# Patient Record
Sex: MALE | Race: WHITE | Employment: UNEMPLOYED | URBAN - METROPOLITAN AREA
[De-identification: names, ages, dates, MRNs, and addresses within clinical notes are randomized per-mention and may not be internally consistent; named-entity substitution may affect disease eponyms.]

---

## 2020-09-21 ENCOUNTER — HOSPITAL ENCOUNTER (OUTPATIENT)
Dept: SURGERY | Age: 60
Discharge: HOME | End: 2020-09-21
Payer: COMMERCIAL

## 2020-09-21 ENCOUNTER — HOSPITAL ENCOUNTER (OUTPATIENT)
Dept: PHYSICAL THERAPY | Age: 60
Discharge: HOME | End: 2020-09-21
Payer: COMMERCIAL

## 2020-09-21 VITALS
BODY MASS INDEX: 29.12 KG/M2 | SYSTOLIC BLOOD PRESSURE: 181 MMHG | RESPIRATION RATE: 16 BRPM | DIASTOLIC BLOOD PRESSURE: 103 MMHG | HEART RATE: 78 BPM | TEMPERATURE: 98.1 F | HEIGHT: 71 IN | OXYGEN SATURATION: 95 % | WEIGHT: 208 LBS

## 2020-09-21 DIAGNOSIS — R06.83 SNORING: Primary | ICD-10-CM

## 2020-09-21 LAB
ANION GAP SERPL CALC-SCNC: 7 MMOL/L (ref 7–16)
APTT PPP: 28 SEC (ref 24.3–35.4)
BACTERIA SPEC CULT: ABNORMAL
BASOPHILS # BLD: 0.1 K/UL (ref 0–0.2)
BASOPHILS NFR BLD: 1 % (ref 0–2)
BUN SERPL-MCNC: 17 MG/DL (ref 8–23)
CALCIUM SERPL-MCNC: 9.3 MG/DL (ref 8.3–10.4)
CHLORIDE SERPL-SCNC: 110 MMOL/L (ref 98–107)
CO2 SERPL-SCNC: 26 MMOL/L (ref 21–32)
CREAT SERPL-MCNC: 1.17 MG/DL (ref 0.8–1.5)
DIFFERENTIAL METHOD BLD: ABNORMAL
EOSINOPHIL # BLD: 0.3 K/UL (ref 0–0.8)
EOSINOPHIL NFR BLD: 3 % (ref 0.5–7.8)
ERYTHROCYTE [DISTWIDTH] IN BLOOD BY AUTOMATED COUNT: 13.6 % (ref 11.9–14.6)
EST. AVERAGE GLUCOSE BLD GHB EST-MCNC: 120 MG/DL
GLUCOSE SERPL-MCNC: 73 MG/DL (ref 65–100)
HBA1C MFR BLD: 5.8 %
HCT VFR BLD AUTO: 44.8 % (ref 41.1–50.3)
HGB BLD-MCNC: 14.7 G/DL (ref 13.6–17.2)
IMM GRANULOCYTES # BLD AUTO: 0.1 K/UL (ref 0–0.5)
IMM GRANULOCYTES NFR BLD AUTO: 1 % (ref 0–5)
INR PPP: 1
LYMPHOCYTES # BLD: 1.1 K/UL (ref 0.5–4.6)
LYMPHOCYTES NFR BLD: 11 % (ref 13–44)
MCH RBC QN AUTO: 29.1 PG (ref 26.1–32.9)
MCHC RBC AUTO-ENTMCNC: 32.8 G/DL (ref 31.4–35)
MCV RBC AUTO: 88.5 FL (ref 79.6–97.8)
MONOCYTES # BLD: 0.8 K/UL (ref 0.1–1.3)
MONOCYTES NFR BLD: 8 % (ref 4–12)
NEUTS SEG # BLD: 7.5 K/UL (ref 1.7–8.2)
NEUTS SEG NFR BLD: 76 % (ref 43–78)
NRBC # BLD: 0 K/UL (ref 0–0.2)
PLATELET # BLD AUTO: 173 K/UL (ref 150–450)
PMV BLD AUTO: 10.6 FL (ref 9.4–12.3)
POTASSIUM SERPL-SCNC: 4.3 MMOL/L (ref 3.5–5.1)
PROTHROMBIN TIME: 13.4 SEC (ref 12–14.7)
RBC # BLD AUTO: 5.06 M/UL (ref 4.23–5.6)
SERVICE CMNT-IMP: ABNORMAL
SODIUM SERPL-SCNC: 143 MMOL/L (ref 136–145)
WBC # BLD AUTO: 9.9 K/UL (ref 4.3–11.1)

## 2020-09-21 PROCEDURE — 97161 PT EVAL LOW COMPLEX 20 MIN: CPT

## 2020-09-21 PROCEDURE — 87641 MR-STAPH DNA AMP PROBE: CPT

## 2020-09-21 PROCEDURE — 85610 PROTHROMBIN TIME: CPT

## 2020-09-21 PROCEDURE — 36415 COLL VENOUS BLD VENIPUNCTURE: CPT

## 2020-09-21 PROCEDURE — 80048 BASIC METABOLIC PNL TOTAL CA: CPT

## 2020-09-21 PROCEDURE — 83036 HEMOGLOBIN GLYCOSYLATED A1C: CPT

## 2020-09-21 PROCEDURE — 80307 DRUG TEST PRSMV CHEM ANLYZR: CPT

## 2020-09-21 PROCEDURE — 85730 THROMBOPLASTIN TIME PARTIAL: CPT

## 2020-09-21 PROCEDURE — 85025 COMPLETE CBC W/AUTO DIFF WBC: CPT

## 2020-09-21 RX ORDER — VERAPAMIL HYDROCHLORIDE 120 MG/1
240 TABLET, FILM COATED, EXTENDED RELEASE ORAL DAILY
COMMUNITY
Start: 2019-10-22 | End: 2020-10-21

## 2020-09-21 RX ORDER — NAPROXEN 250 MG/1
TABLET ORAL DAILY
COMMUNITY
End: 2020-10-08

## 2020-09-21 RX ORDER — ASPIRIN 81 MG/1
81 TABLET ORAL DAILY
COMMUNITY
End: 2020-10-08

## 2020-09-21 RX ORDER — ATORVASTATIN CALCIUM 40 MG/1
40 TABLET, FILM COATED ORAL
COMMUNITY
Start: 2019-10-22

## 2020-09-21 NOTE — PERIOP NOTES
Recent Results (from the past 8 hour(s))   CBC WITH AUTOMATED DIFF    Collection Time: 09/21/20  8:37 AM   Result Value Ref Range    WBC 9.9 4.3 - 11.1 K/uL    RBC 5.06 4.23 - 5.6 M/uL    HGB 14.7 13.6 - 17.2 g/dL    HCT 44.8 41.1 - 50.3 %    MCV 88.5 79.6 - 97.8 FL    MCH 29.1 26.1 - 32.9 PG    MCHC 32.8 31.4 - 35.0 g/dL    RDW 13.6 11.9 - 14.6 %    PLATELET 912 293 - 010 K/uL    MPV 10.6 9.4 - 12.3 FL    ABSOLUTE NRBC 0.00 0.0 - 0.2 K/uL    DF AUTOMATED      NEUTROPHILS 76 43 - 78 %    LYMPHOCYTES 11 (L) 13 - 44 %    MONOCYTES 8 4.0 - 12.0 %    EOSINOPHILS 3 0.5 - 7.8 %    BASOPHILS 1 0.0 - 2.0 %    IMMATURE GRANULOCYTES 1 0.0 - 5.0 %    ABS. NEUTROPHILS 7.5 1.7 - 8.2 K/UL    ABS. LYMPHOCYTES 1.1 0.5 - 4.6 K/UL    ABS. MONOCYTES 0.8 0.1 - 1.3 K/UL    ABS. EOSINOPHILS 0.3 0.0 - 0.8 K/UL    ABS. BASOPHILS 0.1 0.0 - 0.2 K/UL    ABS. IMM.  GRANS. 0.1 0.0 - 0.5 K/UL   METABOLIC PANEL, BASIC    Collection Time: 09/21/20  8:37 AM   Result Value Ref Range    Sodium 143 136 - 145 mmol/L    Potassium 4.3 3.5 - 5.1 mmol/L    Chloride 110 (H) 98 - 107 mmol/L    CO2 26 21 - 32 mmol/L    Anion gap 7 7 - 16 mmol/L    Glucose 73 65 - 100 mg/dL    BUN 17 8 - 23 MG/DL    Creatinine 1.17 0.8 - 1.5 MG/DL    GFR est AA >60 >60 ml/min/1.73m2    GFR est non-AA >60 >60 ml/min/1.73m2    Calcium 9.3 8.3 - 10.4 MG/DL   PROTHROMBIN TIME + INR    Collection Time: 09/21/20  8:37 AM   Result Value Ref Range    Prothrombin time 13.4 12.0 - 14.7 sec    INR 1.0     PTT    Collection Time: 09/21/20  8:37 AM   Result Value Ref Range    aPTT 28.0 24.3 - 35.4 SEC   HEMOGLOBIN A1C WITH EAG    Collection Time: 09/21/20  8:38 AM   Result Value Ref Range    Hemoglobin A1c 5.8 %    Est. average glucose 120 mg/dL

## 2020-09-21 NOTE — PERIOP NOTES
Patient verified name and . Order for consent yes found in EHR and matches case posting; patient verified. Type 3 surgery, joint camp assessment complete. Labs per surgeon: CBC,BMP, PT/PTT,  Hgb A1c, urine nicotine ; results routed via fax to surgeon, Dr Gipson Files for review. Labs per anesthesia protocol: no additional  EKG:completed 10/22/19 and within anesthesia guidelines, placed on chart with most recent cardiology office note 10/22/19 Dr Ghassan Holm and ECHO 19;  fax sent to [facility] requesting stress test result from 17~all for anesthesia reference. Patient aware that a negative Covid swab result is required to proceed with surgery; appointments are made by the surgeon office and should test should be collected 7 days prior to surgery. The testing center is located at the [address], [place]. MRSA/MSSA swab collected; pharmacy to review and dose antibiotic as appropriate. Hospital approved surgical skin cleanser and instructions to return bottle on DOS given per hospital policy. Patient provided with handouts including Guide to Surgery, Pain Management, Hand Hygiene, Blood Transfusion Education, and [facility] Brochure. Patient answered medical/surgical history questions at their best of ability. All prior to admission medications documented in [facility]. Original medication prescription bottle NOT visualized during patient appointment. Patient instructed to hold all vitamins 3 weeks prior to surgery and NSAIDS 5 days prior to surgery. Patient teach back successful and patient demonstrates knowledge of instruction.

## 2020-09-21 NOTE — PERIOP NOTES
PLEASE CONTINUE TAKING ALL PRESCRIPTION MEDICATIONS UP TO THE DAY OF SURGERY UNLESS OTHERWISE DIRECTED BELOW. DISCONTINUE all vitamins and supplements 21 days prior to surgery. DISCONTINUE Non-Steriodal Anti-Inflammatory (NSAIDS) such as Advil and Aleve 5 days prior to surgery. Home Medications to take  the day of surgery    Aspirin   Verapamil        Home Medications   to Hold   Naproxen hold for 5 days prior to surgery        Comments          *Visitor policy of 1 visitor per patient discussed. Please do not bring home medications with you on the day of surgery unless otherwise directed by your nurse. If you are instructed to bring home medications, please give them to your nurse as they will be administered by the nursing staff. If you have any questions, please call [address] [place] [address] [phone] or [facility] [phone]. A copy of this note was provided to the patient for reference.

## 2020-09-21 NOTE — PROGRESS NOTES
09/21/20 0730   Oxygen Therapy   O2 Sat (%) 98 %   Pulse via Oximetry 85 beats per minute   O2 Device Room air   Pre-Treatment   Breath Sounds Bilateral Clear   Pt's symptoms include:    Snoring  Tiredness- excessive daytime sleepiness  HTN  HX OF WIDE COMPLEX TACHYCARDIA  Neck size         43     cm  Modified Hardy stage 4  SACS Score 25  STOP BANG 7  [place] Sleepiness scale 17  Height     5 ' 11   \"   Weight   208  lbs  BMI 29.01    Sleepiness Scale:     Sitting and reading 3    Watching TV 3    Sitting inactive in a public place 2    As a passenger in a car for an hour without a break 2    Lying down to rest in the afternoon when circumstances Permits 3    Sitting and talking to someone 1    Sitting quietly after lunch without alcohol 3    In a car, while stopped for a few minutes 0    Total :17        Refer patient for sleep study based on above assessment. Initial respiratory Assessment completed with pt. Pt was interviewed and evaluated in Joint camp prior to surgery. Patient ID:  Jose Gomez  [id number]  46 y.o.  1960  Surgeon: Dr. Lillian Blankenship  Date of Surgery: 10/7/2020  Procedure: Total Right Hip Arthroplasty  Primary Care Physician: None None  Specialists:     Pt. IS NOT PRACTICING SOCIAL DISTANCING AND NOT  WEARING A MASK WHEN IN PUBLIC.     Pt taught proper COUGH technique  DIAPHRAGMATIC BREATHING EXERCISE INSTRUCTIONS GIVEN    History of smoking:   DENIES                 Quit date:         Secondhand smoke:PARENTS    Past procedures with Oxygen desaturation or delayed awakening:DENIES    Past Medical History:   Diagnosis Date    Arthritis     hips    CAD (coronary artery disease) 2017    non obstructive~ on heart cath; followed by cardio annually    Dyspnea on exertion     GERD (gastroesophageal reflux disease)     resolved    H/O cold sores     Hyperlipidemia     Latex allergy     Wide-complex tachycardia ([address] 2017    NSVT      HX OF ASTHMA ON CHART- PT DENIES  Respiratory history:DENIES SOB                                 SOB  ON EXERTION                                    Respiratory meds:  DENIES    FAMILY PRESENT:             NO                                                   PAST SLEEP STUDY:                        DENIES  HX OF ADILENE:                                           DENIES  ADILENE assessment:                                               SLEEPS ON SIDE        PHYSICAL EXAM   Body mass index is 29.01 kg/m².    Visit Vitals  BP (!) 181/103 (BP 1 Location: Right arm, BP Patient Position: Sitting)   Pulse 78   Temp 98.1 °F (36.7 °C)   Resp 16   Ht 5' 11\" (1.803 m)   Wt 94.3 kg (208 lb)   SpO2 95%   BMI 29.01 kg/m²     Neck circumference: 43    cm    Loud snoring:                                                 YES            Witnessed apnea or wakening gasping or choking:        DENIES         Awakens with headaches:                                               DENIES  Morning or daytime tiredness/ sleepiness:                         TIRED  Dry mouth or sore throat in morning:            YES                                             Hardy stage:  4                                   SACS score:25  Stop Bang   STOP-BANG  Does the patient snore loudly (louder than talking or loud enough to be heard through closed doors)?: Yes  Does the patient often feel tired, fatigued, or sleepy during the daytime, even after a \"good\" night's sleep?: Yes  Has anyone ever observed the patient stop breathing during their sleep? : No  Does the patient have or are they being treated for high blood pressure?: Yes  Is the patient's BMI greater than 35?: No  Is your neck circumference greater than 17 inches (Male) or 16 inches (Female)?: No  Is the patient older than 48?: Yes  Is the patient male?: Yes  ADILENE Score: 5  Has the patient been referred to Sleep Medicine?: Yes  Has the patient previously been diagnosed with Obstructive Sleep Apnea?: No                            [facility]                                        Referrals:  [facility]ST  Pt.  Phone Number:  [phone]

## 2020-09-21 NOTE — PROGRESS NOTES
Kaye Leonardbishnu [facility]  : (63 y.o.) Joint Camp at [address], [address] [place] [address]  [phone]       Physical Therapy Prehab Plan of Treatment and Evaluation Summary:2020    ICD-10: Treatment Diagnosis:   · Pain in Right Hip (M25.551)  · Stiffness of Right Hip, Not elsewhere classified (M25.651)  · Difficulty in walking, Not elsewhere classified (R26.2)  Precautions/Allergies:   Latex  MEDICAL/REFERRING DIAGNOSIS:  Unilateral primary osteoarthritis, right hip [M16.11]  REFERRING PHYSICIAN: Rosealee Ormond, MD  DATE OF SURGERY: 10/7/20    Assessment:   Comments:  Pt. Plans to go home with wife. He needs a left malia as well. PROBLEM LIST (Impacting functional limitations):   [facility] presents with the following right lower extremity(s) problems:  1. Strength  2. Range of Motion  3. Home Exercise Program  4. Pain   INTERVENTIONS PLANNED:  1. Home Exercise Program  2. Educational Discussion      TREATMENT PLAN: Effective Dates: 2020 TO 2020. Frequency/Duration: Patient to continue to perform home exercise program at least twice per day up until his surgery. GOALS: (Goals have been discussed and agreed upon with patient.)  Discharge Goals: Time Frame: 1 Day  1. Patient will demonstrate independence with a home exercise program designed to increase strength, range of motion and pain control to minimize functional deficits and optimize patient for total joint replacement. Rehabilitation Potential For Stated Goals: Good  Regarding Kaye Mathur Vasu's therapy, I certify that the treatment plan above will be carried out by a therapist or under their direction.   Thank you for this referral,  Loren Freeman, PT               HISTORY:   Present Symptoms:  Pain Intensity 1: (5 at worst)  Pain Location 1: Hip, Back   History of Present Injury/Illness (Reason for Referral):  Medical/Referring Diagnosis: Unilateral primary osteoarthritis, right hip [M16.11]   Past Medical History/Comorbidities:   Mr. Manuela Green  has a past medical history of Arthritis, CAD (coronary artery disease) (2017), Dyspnea on exertion, GERD (gastroesophageal reflux disease), H/O cold sores, Hyperlipidemia, Latex allergy, and Wide-complex tachycardia ([address] (2017). He also has no past medical history of Aneurysm ([address] Arrhythmia, Asthma, Autoimmune disease ([address] Cancer ([address] Chronic kidney disease, Chronic obstructive pulmonary disease ([address] Chronic pain, Coagulation disorder ([address] Diabetes ([address] Difficult intubation, Endocarditis, Heart failure ([address] Hypertension, Liver disease, Malignant hyperthermia due to anesthesia, Morbid obesity ([address] Nausea & vomiting, Nicotine vapor product user, Non-nicotine vapor product user, Pseudocholinesterase deficiency, Psychiatric disorder, PUD (peptic ulcer disease), Rheumatic fever, Seizures ([address] Sleep apnea, Stroke ([address] Thromboembolus ([address] or Thyroid disease. Mr. Manuela Green  has a past surgical history that includes hx heart catheterization (2017) and hx colonoscopy.   Social History/Living Environment:   Home Environment: Private residence  # Steps to Enter: 1  Rails to Enter: No  One/Two Story Residence: One story(2 steps)  Living Alone: No  Support Systems: Spouse/Significant Other/Partner  Patient Expects to be Discharged to[de-identified] Private residence  Current DME Used/Available at Home: [address] [facility] [place], straight  Tub or Shower Type: Shower  Work/Activity:  Fabricator, heavy activity  Dominant Side:  RIGHT  Current Medications:  See Pre-assessment nursing note   Number of Personal Factors/Comorbidities that affect the Plan of Care: 1-2: MODERATE COMPLEXITY   EXAMINATION:   ADLs (Current Functional Status):   Ambulation:  [x] Independent  [] Walk Indoors Only  [] Walk Outdoors  [] Use Assistive Device  [] Use Wheelchair Only Dressing:  [x] [address] from Someone for:  [] Sock/Shoes  [] Pants  [] Everything Bathing/Showering:   [x] Independent  [] Requires Assistance from Someone  [] Sponge Bath Only Household Activities:  [x] Routine house and yard work  [] Light Housework Only  [] None   Observation/Orthostatic Postural Assessment:   Exceptions to [place] shoulders  ROM/Flexibility:   Gross Assessment: Yes  AROM: Generally decreased, functional(left LE, hip limited)                LLE Assessment  LLE Assessment (WDL): Exception to WDL      RLE Assessment  RLE Assessment (WDL): Exceptions to WDL(right hip <3/5)  RLE AROM  R Hip Flexion: 100  R Hip ABduction: 10  R Knee Extension: 0   Strength:   Gross Assessment: Yes  Strength: Generally decreased, functional(left LE, hip limited)                  Functional Mobility:    Gross Assessment: Yes    Gait Description (WDL): Exceptions to WDL  Stand to Sit: Additional time, Independent  Sit to Stand: Independent, Additional time  Distance (ft): 200 Feet (ft)  Ambulation - Level of Assistance: Independent  Speed/Gabrielle: Delayed  Stance: Right decreased  Gait Abnormalities: Antalgic;Trunk sway increased          Balance:    Sitting: Intact  Standing: Intact   Body Structures Involved:  1. Bones  2. Joints  3. Muscles  4. Ligaments Body Functions Affected:  1. Movement Related Activities and Participation Affected:  1. Mobility   Number of elements that affect the Plan of Care: 3: MODERATE COMPLEXITY   CLINICAL PRESENTATION:   Presentation: Stable and uncomplicated: LOW COMPLEXITY   CLINICAL DECISION MAKING:   Outcome Measure: Tool Used: Lower Extremity Functional Scale (LEFS)  Score:  Initial: 39/80 Most Recent: X/80 (Date: -- )   Interpretation of Score: 20 questions each scored on a 5 point scale with 0 representing \"extreme difficulty or unable to perform\" and 4 representing \"no difficulty\". The lower the score, the greater the functional disability. 80/80 represents no disability. Minimal detectable change is 9 points. Medical Necessity:   · Mr. Leone is expected to optimize his lower extremity strength and ROM in preparation for joint replacement surgery. Reason for Services/Other Comments:  · Achieve baseline assesment of musculoskeletal system, functional mobility and home environment. , educate in PT HEP in preparation for surgery, educate in hospital plan of care. Use of outcome tool(s) and clinical judgement create a POC that gives a: Clear prediction of patient's progress: LOW COMPLEXITY   TREATMENT:   Treatment/Session Assessment:  Patient was instructed in PT- HEP to increase strength and ROM in LEs. Answered all questions. · Post session pain:  Hip/back pain  · Compliance with Program/Exercises: compliant most of the time.   Total Treatment Duration:  PT Patient Time In/Time Out  Time In: 0830  Time Out: Kait Chaves, PT

## 2020-09-22 PROBLEM — R06.83 SNORING: Status: ACTIVE | Noted: 2020-09-22

## 2020-09-22 LAB
COTININE UR QL SCN: NEGATIVE NG/ML
DRUG SCREEN COMMENT:, 753798: NORMAL

## 2020-09-22 NOTE — ADVANCED PRACTICE NURSE
Total Joint Surgery Preoperative Chart Review      Patient ID:  Jim Lin  [id number]  4 y.o.  1960  Surgeon: Dr. Yuliana Issa  Date of Surgery: 10/7/2020  Procedure: Total Right Hip Arthroplasty  Primary Care Physician: None None  Specialty Physician(s):      Subjective:   Jim Lin is a 60 y.o. WHITE [place]  male who presents for preoperative evaluation for Total Right Hip arthroplasty. This is a preoperative chart review note based on data collected by the nurse at the surgical Pre-Assessment visit. Past Medical History:   Diagnosis Date    Arthritis     hips    CAD (coronary artery disease) 2017    non obstructive~ on heart cath; followed by cardio annually    Dyspnea on exertion     GERD (gastroesophageal reflux disease)     resolved    H/O cold sores     Hyperlipidemia     Latex allergy     Wide-complex tachycardia ([address] 2017    NSVT      Past Surgical History:   Procedure Laterality Date    HX COLONOSCOPY      HX HEART CATHETERIZATION  2017     Family History   Problem Relation Age of Onset    Depression Mother     Heart Disease Father       Social History     Tobacco Use    Smoking status: Never Smoker    Smokeless tobacco: Never Used   Substance Use Topics    Alcohol use: Never     Frequency: Never       Prior to Admission medications    Medication Sig Start Date End Date Taking? Authorizing Provider   aspirin delayed-release 81 mg tablet Take 81 mg by mouth daily. Take / use AM day of surgery  per anesthesia protocols. Yes Provider, Historical   atorvastatin (LIPITOR) 40 mg tablet Take 40 mg by mouth nightly. 10/22/19  Yes Provider, Historical   verapamil ER (CALAN-SR) 120 mg tablet Take 120 mg by mouth daily. Take / use AM day of surgery  per anesthesia protocols. Indications: paroxysmal supraventricular tachycardia 10/22/19 10/21/20 Yes Provider, Historical   multivit-min/FA/lycopen/lutein (CENTRUM SILVER MEN PO) Take  by mouth daily.    Yes Provider, Historical   naproxen (NAPROSYN) 250 mg tablet Take  by mouth daily. Indications: pain   Yes Provider, Historical   LYSINE PO Take 1,000 mg by mouth nightly. Yes Provider, Historical     Allergies   Allergen Reactions    Latex Rash          Objective:     Physical Exam:   No data found. ECG:    EKG Results     None          Data Review:   Labs:     Labs reviewed    Problem List:  )  Patient Active Problem List   Diagnosis Code    Snoring R06.83       Total Joint Surgery Pre-Assessment Recommendations:           Patient reports the symptoms of snoring, fatigue, observed apnea and /or excessive daytime sleepiness. Will refer patient for HST based on above assessment. Recommend continuous saturation monitoring hours of sleep, during hospitalization. Albuterol every 6 hours as need during hospitalization.      Signed By: Ramona Lynch, NP-C    September 22, 2020

## 2020-09-23 NOTE — PERIOP NOTES
Nicholas Coronado [[id number]] (Order [id number])   Lab   Date: 9/21/2020  Department: [facility] Pre Assessment  Released By: Cande Salas RN (auto-released)  Authorizing: Tony Love MD    Component  Value  Flag  Ref Range  Units  Status    Cotinine Screen, urine  Negative   Jneetl=695  ng/mL  Final    Drug Screen Comment:  Comment

## 2020-09-23 NOTE — PERIOP NOTES
Echo dated 12/4/17 received from [facility] - placed on chart for reference DOS if needed.

## 2020-10-04 ENCOUNTER — ANESTHESIA EVENT (OUTPATIENT)
Dept: SURGERY | Age: 60
End: 2020-10-04
Payer: COMMERCIAL

## 2020-10-07 ENCOUNTER — APPOINTMENT (OUTPATIENT)
Dept: GENERAL RADIOLOGY | Age: 60
End: 2020-10-07
Attending: ORTHOPAEDIC SURGERY
Payer: COMMERCIAL

## 2020-10-07 ENCOUNTER — HOME HEALTH ADMISSION (OUTPATIENT)
Dept: HOME HEALTH SERVICES | Facility: HOME HEALTH | Age: 60
End: 2020-10-07
Payer: COMMERCIAL

## 2020-10-07 ENCOUNTER — HOSPITAL ENCOUNTER (OUTPATIENT)
Age: 60
Setting detail: OUTPATIENT SURGERY
Discharge: HOME HEALTH | End: 2020-10-08
Attending: ORTHOPAEDIC SURGERY | Admitting: ORTHOPAEDIC SURGERY
Payer: COMMERCIAL

## 2020-10-07 ENCOUNTER — ANESTHESIA (OUTPATIENT)
Dept: SURGERY | Age: 60
End: 2020-10-07
Payer: COMMERCIAL

## 2020-10-07 DIAGNOSIS — M16.11 PRIMARY OSTEOARTHRITIS OF RIGHT HIP: Primary | ICD-10-CM

## 2020-10-07 LAB
GLUCOSE BLD STRIP.AUTO-MCNC: 90 MG/DL (ref 65–100)
HGB BLD-MCNC: 12.7 G/DL (ref 13.6–17.2)

## 2020-10-07 PROCEDURE — 65270000029 HC RM PRIVATE

## 2020-10-07 PROCEDURE — 97165 OT EVAL LOW COMPLEX 30 MIN: CPT

## 2020-10-07 PROCEDURE — 94760 N-INVAS EAR/PLS OXIMETRY 1: CPT

## 2020-10-07 PROCEDURE — 74011000250 HC RX REV CODE- 250: Performed by: ANESTHESIOLOGY

## 2020-10-07 PROCEDURE — 77030002922 HC SUT FBRWRE ARTH -B: Performed by: ORTHOPAEDIC SURGERY

## 2020-10-07 PROCEDURE — C1776 JOINT DEVICE (IMPLANTABLE): HCPCS | Performed by: ORTHOPAEDIC SURGERY

## 2020-10-07 PROCEDURE — 77030002933 HC SUT MCRYL J&J -A: Performed by: ORTHOPAEDIC SURGERY

## 2020-10-07 PROCEDURE — 74011000250 HC RX REV CODE- 250: Performed by: NURSE ANESTHETIST, CERTIFIED REGISTERED

## 2020-10-07 PROCEDURE — 74011250637 HC RX REV CODE- 250/637: Performed by: NURSE PRACTITIONER

## 2020-10-07 PROCEDURE — 36415 COLL VENOUS BLD VENIPUNCTURE: CPT

## 2020-10-07 PROCEDURE — 72170 X-RAY EXAM OF PELVIS: CPT

## 2020-10-07 PROCEDURE — 76060000034 HC ANESTHESIA 1.5 TO 2 HR: Performed by: ORTHOPAEDIC SURGERY

## 2020-10-07 PROCEDURE — 97161 PT EVAL LOW COMPLEX 20 MIN: CPT

## 2020-10-07 PROCEDURE — 77030003665 HC NDL SPN BBMI -A: Performed by: ANESTHESIOLOGY

## 2020-10-07 PROCEDURE — 74011250636 HC RX REV CODE- 250/636: Performed by: NURSE PRACTITIONER

## 2020-10-07 PROCEDURE — 77030033138 HC SUT PGA STRATFX J&J -B: Performed by: ORTHOPAEDIC SURGERY

## 2020-10-07 PROCEDURE — 74011000250 HC RX REV CODE- 250: Performed by: NURSE PRACTITIONER

## 2020-10-07 PROCEDURE — 97535 SELF CARE MNGMENT TRAINING: CPT

## 2020-10-07 PROCEDURE — 74011250636 HC RX REV CODE- 250/636: Performed by: ANESTHESIOLOGY

## 2020-10-07 PROCEDURE — 76210000016 HC OR PH I REC 1 TO 1.5 HR: Performed by: ORTHOPAEDIC SURGERY

## 2020-10-07 PROCEDURE — 77030033067 HC SUT PDO STRATFX SPIR J&J -B: Performed by: ORTHOPAEDIC SURGERY

## 2020-10-07 PROCEDURE — 2709999900 HC NON-CHARGEABLE SUPPLY: Performed by: ORTHOPAEDIC SURGERY

## 2020-10-07 PROCEDURE — 85018 HEMOGLOBIN: CPT

## 2020-10-07 PROCEDURE — 77030040922 HC BLNKT HYPOTHRM STRY -A: Performed by: ANESTHESIOLOGY

## 2020-10-07 PROCEDURE — 74011250636 HC RX REV CODE- 250/636: Performed by: NURSE ANESTHETIST, CERTIFIED REGISTERED

## 2020-10-07 PROCEDURE — 94762 N-INVAS EAR/PLS OXIMTRY CONT: CPT

## 2020-10-07 PROCEDURE — 97110 THERAPEUTIC EXERCISES: CPT

## 2020-10-07 PROCEDURE — 77030019557 HC ELECTRD VES SEAL MEDT -F: Performed by: ORTHOPAEDIC SURGERY

## 2020-10-07 PROCEDURE — 74011250636 HC RX REV CODE- 250/636: Performed by: ORTHOPAEDIC SURGERY

## 2020-10-07 PROCEDURE — 77030018673: Performed by: ORTHOPAEDIC SURGERY

## 2020-10-07 PROCEDURE — 74011250637 HC RX REV CODE- 250/637: Performed by: ANESTHESIOLOGY

## 2020-10-07 PROCEDURE — 77030034479 HC ADH SKN CLSR PRINEO J&J -B: Performed by: ORTHOPAEDIC SURGERY

## 2020-10-07 PROCEDURE — 76010000162 HC OR TIME 1.5 TO 2 HR INTENSV-TIER 1: Performed by: ORTHOPAEDIC SURGERY

## 2020-10-07 PROCEDURE — 77030007880 HC KT SPN EPDRL BBMI -B: Performed by: ANESTHESIOLOGY

## 2020-10-07 PROCEDURE — 82962 GLUCOSE BLOOD TEST: CPT

## 2020-10-07 PROCEDURE — 77030006835 HC BLD SAW SAG STRY -B: Performed by: ORTHOPAEDIC SURGERY

## 2020-10-07 DEVICE — HIP H2 TOT ADV OTHER HD -- IMPL CAPPED H2: Type: IMPLANTABLE DEVICE | Status: FUNCTIONAL

## 2020-10-07 DEVICE — LINER ACET SZ F ID36MM THK7.9MM 0DEG HIP X3 LOK RNG FOR: Type: IMPLANTABLE DEVICE | Site: HIP | Status: FUNCTIONAL

## 2020-10-07 DEVICE — SHELL ACET CLUS H 58F TRTANIUM -- TRIDENT II: Type: IMPLANTABLE DEVICE | Site: HIP | Status: FUNCTIONAL

## 2020-10-07 DEVICE — STEM FEM SZ 5 127D 35X108X44MM -- ACCOLADE II V40: Type: IMPLANTABLE DEVICE | Site: HIP | Status: FUNCTIONAL

## 2020-10-07 DEVICE — HEAD FEM DELT V40 +5MM NK 36MM -- V40 BIOLOX: Type: IMPLANTABLE DEVICE | Site: HIP | Status: FUNCTIONAL

## 2020-10-07 RX ORDER — ATORVASTATIN CALCIUM 40 MG/1
40 TABLET, FILM COATED ORAL
Status: DISCONTINUED | OUTPATIENT
Start: 2020-10-07 | End: 2020-10-08 | Stop reason: HOSPADM

## 2020-10-07 RX ORDER — HYDROMORPHONE HYDROCHLORIDE 2 MG/1
2 TABLET ORAL
Status: DISCONTINUED | OUTPATIENT
Start: 2020-10-07 | End: 2020-10-08 | Stop reason: HOSPADM

## 2020-10-07 RX ORDER — BUPIVACAINE HYDROCHLORIDE 7.5 MG/ML
INJECTION, SOLUTION INTRASPINAL
Status: COMPLETED | OUTPATIENT
Start: 2020-10-07 | End: 2020-10-07

## 2020-10-07 RX ORDER — OXYCODONE HYDROCHLORIDE 5 MG/1
5 TABLET ORAL
Status: DISCONTINUED | OUTPATIENT
Start: 2020-10-07 | End: 2020-10-07 | Stop reason: HOSPADM

## 2020-10-07 RX ORDER — ALBUTEROL SULFATE 0.83 MG/ML
2.5 SOLUTION RESPIRATORY (INHALATION) AS NEEDED
Status: DISCONTINUED | OUTPATIENT
Start: 2020-10-07 | End: 2020-10-07 | Stop reason: HOSPADM

## 2020-10-07 RX ORDER — MIDAZOLAM HYDROCHLORIDE 1 MG/ML
INJECTION, SOLUTION INTRAMUSCULAR; INTRAVENOUS AS NEEDED
Status: DISCONTINUED | OUTPATIENT
Start: 2020-10-07 | End: 2020-10-07 | Stop reason: HOSPADM

## 2020-10-07 RX ORDER — GABAPENTIN 100 MG/1
100 CAPSULE ORAL 2 TIMES DAILY
Qty: 30 CAP | Refills: 0 | Status: SHIPPED | OUTPATIENT
Start: 2020-10-07 | End: 2020-11-04

## 2020-10-07 RX ORDER — ACETAMINOPHEN 500 MG
1000 TABLET ORAL
Qty: 60 TAB | Refills: 0 | Status: ON HOLD | OUTPATIENT
Start: 2020-10-07 | End: 2020-11-11 | Stop reason: SDUPTHER

## 2020-10-07 RX ORDER — CEFAZOLIN SODIUM/WATER 2 G/20 ML
2 SYRINGE (ML) INTRAVENOUS ONCE
Status: DISCONTINUED | OUTPATIENT
Start: 2020-10-07 | End: 2020-10-07 | Stop reason: SDUPTHER

## 2020-10-07 RX ORDER — TRANEXAMIC ACID 100 MG/ML
INJECTION, SOLUTION INTRAVENOUS AS NEEDED
Status: DISCONTINUED | OUTPATIENT
Start: 2020-10-07 | End: 2020-10-07 | Stop reason: HOSPADM

## 2020-10-07 RX ORDER — ONDANSETRON 8 MG/1
8 TABLET, ORALLY DISINTEGRATING ORAL
Status: DISCONTINUED | OUTPATIENT
Start: 2020-10-07 | End: 2020-10-08 | Stop reason: HOSPADM

## 2020-10-07 RX ORDER — ONDANSETRON 2 MG/ML
INJECTION INTRAMUSCULAR; INTRAVENOUS AS NEEDED
Status: DISCONTINUED | OUTPATIENT
Start: 2020-10-07 | End: 2020-10-07 | Stop reason: HOSPADM

## 2020-10-07 RX ORDER — ONDANSETRON 2 MG/ML
4 INJECTION INTRAMUSCULAR; INTRAVENOUS
Status: DISCONTINUED | OUTPATIENT
Start: 2020-10-07 | End: 2020-10-07 | Stop reason: HOSPADM

## 2020-10-07 RX ORDER — SODIUM CHLORIDE 0.9 % (FLUSH) 0.9 %
5-40 SYRINGE (ML) INJECTION AS NEEDED
Status: DISCONTINUED | OUTPATIENT
Start: 2020-10-07 | End: 2020-10-08 | Stop reason: HOSPADM

## 2020-10-07 RX ORDER — ALBUTEROL SULFATE 0.83 MG/ML
2.5 SOLUTION RESPIRATORY (INHALATION)
Status: DISCONTINUED | OUTPATIENT
Start: 2020-10-07 | End: 2020-10-08 | Stop reason: HOSPADM

## 2020-10-07 RX ORDER — ONDANSETRON 4 MG/1
4 TABLET, ORALLY DISINTEGRATING ORAL
Status: DISCONTINUED | OUTPATIENT
Start: 2020-10-07 | End: 2020-10-08 | Stop reason: HOSPADM

## 2020-10-07 RX ORDER — DEXAMETHASONE SODIUM PHOSPHATE 100 MG/10ML
10 INJECTION INTRAMUSCULAR; INTRAVENOUS ONCE
Status: DISCONTINUED | OUTPATIENT
Start: 2020-10-08 | End: 2020-10-08 | Stop reason: HOSPADM

## 2020-10-07 RX ORDER — ZOLPIDEM TARTRATE 5 MG/1
5 TABLET ORAL
Qty: 30 TAB | Refills: 0 | Status: SHIPPED | OUTPATIENT
Start: 2020-10-07 | End: 2020-11-04

## 2020-10-07 RX ORDER — HYDROMORPHONE HYDROCHLORIDE 1 MG/ML
1 INJECTION, SOLUTION INTRAMUSCULAR; INTRAVENOUS; SUBCUTANEOUS
Status: DISCONTINUED | OUTPATIENT
Start: 2020-10-07 | End: 2020-10-08 | Stop reason: HOSPADM

## 2020-10-07 RX ORDER — HYDROMORPHONE HYDROCHLORIDE 2 MG/1
2 TABLET ORAL
Qty: 40 TAB | Refills: 0 | Status: SHIPPED | OUTPATIENT
Start: 2020-10-07 | End: 2020-10-14

## 2020-10-07 RX ORDER — TRANEXAMIC ACID 650 1/1
1300 TABLET ORAL
Status: COMPLETED | OUTPATIENT
Start: 2020-10-07 | End: 2020-10-07

## 2020-10-07 RX ORDER — ACETAMINOPHEN 500 MG
1000 TABLET ORAL ONCE
Status: COMPLETED | OUTPATIENT
Start: 2020-10-07 | End: 2020-10-07

## 2020-10-07 RX ORDER — CEFAZOLIN SODIUM/WATER 2 G/20 ML
2 SYRINGE (ML) INTRAVENOUS EVERY 8 HOURS
Status: COMPLETED | OUTPATIENT
Start: 2020-10-07 | End: 2020-10-08

## 2020-10-07 RX ORDER — ASPIRIN 81 MG/1
81 TABLET ORAL EVERY 12 HOURS
Status: DISCONTINUED | OUTPATIENT
Start: 2020-10-07 | End: 2020-10-08 | Stop reason: HOSPADM

## 2020-10-07 RX ORDER — KETOROLAC TROMETHAMINE 15 MG/ML
15 INJECTION, SOLUTION INTRAMUSCULAR; INTRAVENOUS EVERY 8 HOURS
Status: DISCONTINUED | OUTPATIENT
Start: 2020-10-07 | End: 2020-10-08 | Stop reason: HOSPADM

## 2020-10-07 RX ORDER — CYCLOBENZAPRINE HCL 5 MG
5 TABLET ORAL
Qty: 30 TAB | Refills: 0 | Status: SHIPPED | OUTPATIENT
Start: 2020-10-07 | End: 2020-11-04

## 2020-10-07 RX ORDER — EPHEDRINE SULFATE/0.9% NACL/PF 50 MG/5 ML
SYRINGE (ML) INTRAVENOUS AS NEEDED
Status: DISCONTINUED | OUTPATIENT
Start: 2020-10-07 | End: 2020-10-07 | Stop reason: HOSPADM

## 2020-10-07 RX ORDER — MIDAZOLAM HYDROCHLORIDE 1 MG/ML
2 INJECTION, SOLUTION INTRAMUSCULAR; INTRAVENOUS
Status: DISCONTINUED | OUTPATIENT
Start: 2020-10-07 | End: 2020-10-07 | Stop reason: HOSPADM

## 2020-10-07 RX ORDER — VERAPAMIL HYDROCHLORIDE 120 MG/1
120 TABLET, FILM COATED, EXTENDED RELEASE ORAL DAILY
Status: DISCONTINUED | OUTPATIENT
Start: 2020-10-08 | End: 2020-10-08 | Stop reason: HOSPADM

## 2020-10-07 RX ORDER — SODIUM CHLORIDE, SODIUM LACTATE, POTASSIUM CHLORIDE, CALCIUM CHLORIDE 600; 310; 30; 20 MG/100ML; MG/100ML; MG/100ML; MG/100ML
1000 INJECTION, SOLUTION INTRAVENOUS CONTINUOUS
Status: DISCONTINUED | OUTPATIENT
Start: 2020-10-07 | End: 2020-10-07 | Stop reason: HOSPADM

## 2020-10-07 RX ORDER — SODIUM CHLORIDE 9 MG/ML
100 INJECTION, SOLUTION INTRAVENOUS CONTINUOUS
Status: DISCONTINUED | OUTPATIENT
Start: 2020-10-07 | End: 2020-10-08 | Stop reason: HOSPADM

## 2020-10-07 RX ORDER — ASPIRIN 81 MG/1
81 TABLET ORAL 2 TIMES DAILY
Qty: 60 TAB | Refills: 0 | Status: ON HOLD | OUTPATIENT
Start: 2020-10-07 | End: 2020-11-11 | Stop reason: SDUPTHER

## 2020-10-07 RX ORDER — MELOXICAM 7.5 MG/1
7.5 TABLET ORAL DAILY
Qty: 30 TAB | Refills: 2 | Status: ON HOLD | OUTPATIENT
Start: 2020-10-07 | End: 2020-11-11 | Stop reason: SDUPTHER

## 2020-10-07 RX ORDER — ZOLPIDEM TARTRATE 5 MG/1
5 TABLET ORAL
Status: DISCONTINUED | OUTPATIENT
Start: 2020-10-07 | End: 2020-10-08 | Stop reason: HOSPADM

## 2020-10-07 RX ORDER — DEXAMETHASONE SODIUM PHOSPHATE 4 MG/ML
INJECTION, SOLUTION INTRA-ARTICULAR; INTRALESIONAL; INTRAMUSCULAR; INTRAVENOUS; SOFT TISSUE AS NEEDED
Status: DISCONTINUED | OUTPATIENT
Start: 2020-10-07 | End: 2020-10-07 | Stop reason: HOSPADM

## 2020-10-07 RX ORDER — HYDROMORPHONE HYDROCHLORIDE 2 MG/ML
0.5 INJECTION, SOLUTION INTRAMUSCULAR; INTRAVENOUS; SUBCUTANEOUS
Status: DISCONTINUED | OUTPATIENT
Start: 2020-10-07 | End: 2020-10-07 | Stop reason: HOSPADM

## 2020-10-07 RX ORDER — NALOXONE HYDROCHLORIDE 0.4 MG/ML
.2-.4 INJECTION, SOLUTION INTRAMUSCULAR; INTRAVENOUS; SUBCUTANEOUS
Status: DISCONTINUED | OUTPATIENT
Start: 2020-10-07 | End: 2020-10-08 | Stop reason: HOSPADM

## 2020-10-07 RX ORDER — SODIUM CHLORIDE 0.9 % (FLUSH) 0.9 %
5-40 SYRINGE (ML) INJECTION EVERY 8 HOURS
Status: DISCONTINUED | OUTPATIENT
Start: 2020-10-07 | End: 2020-10-08 | Stop reason: HOSPADM

## 2020-10-07 RX ORDER — KETOROLAC TROMETHAMINE 30 MG/ML
INJECTION, SOLUTION INTRAMUSCULAR; INTRAVENOUS AS NEEDED
Status: DISCONTINUED | OUTPATIENT
Start: 2020-10-07 | End: 2020-10-07 | Stop reason: HOSPADM

## 2020-10-07 RX ORDER — CEFAZOLIN SODIUM/WATER 2 G/20 ML
2 SYRINGE (ML) INTRAVENOUS ONCE
Status: COMPLETED | OUTPATIENT
Start: 2020-10-07 | End: 2020-10-07

## 2020-10-07 RX ORDER — CYCLOBENZAPRINE HCL 10 MG
5 TABLET ORAL
Status: DISCONTINUED | OUTPATIENT
Start: 2020-10-07 | End: 2020-10-08 | Stop reason: HOSPADM

## 2020-10-07 RX ORDER — GABAPENTIN 100 MG/1
100 CAPSULE ORAL 2 TIMES DAILY
Status: DISCONTINUED | OUTPATIENT
Start: 2020-10-07 | End: 2020-10-08 | Stop reason: HOSPADM

## 2020-10-07 RX ORDER — ROPIVACAINE HYDROCHLORIDE 2 MG/ML
INJECTION, SOLUTION EPIDURAL; INFILTRATION; PERINEURAL AS NEEDED
Status: DISCONTINUED | OUTPATIENT
Start: 2020-10-07 | End: 2020-10-07 | Stop reason: HOSPADM

## 2020-10-07 RX ORDER — ONDANSETRON 8 MG/1
8 TABLET, ORALLY DISINTEGRATING ORAL
Qty: 30 TAB | Refills: 0 | Status: SHIPPED | OUTPATIENT
Start: 2020-10-07 | End: 2020-11-04

## 2020-10-07 RX ORDER — AMOXICILLIN 250 MG
1 CAPSULE ORAL DAILY
Qty: 30 TAB | Refills: 0 | Status: ON HOLD | OUTPATIENT
Start: 2020-10-07 | End: 2020-11-11 | Stop reason: SDUPTHER

## 2020-10-07 RX ORDER — ACETAMINOPHEN 500 MG
1000 TABLET ORAL EVERY 6 HOURS
Status: DISCONTINUED | OUTPATIENT
Start: 2020-10-07 | End: 2020-10-08 | Stop reason: HOSPADM

## 2020-10-07 RX ORDER — LIDOCAINE HYDROCHLORIDE 10 MG/ML
0.1 INJECTION INFILTRATION; PERINEURAL AS NEEDED
Status: DISCONTINUED | OUTPATIENT
Start: 2020-10-07 | End: 2020-10-07 | Stop reason: HOSPADM

## 2020-10-07 RX ORDER — DIPHENHYDRAMINE HCL 25 MG
25 CAPSULE ORAL
Status: DISCONTINUED | OUTPATIENT
Start: 2020-10-07 | End: 2020-10-08 | Stop reason: HOSPADM

## 2020-10-07 RX ORDER — AMOXICILLIN 250 MG
2 CAPSULE ORAL DAILY
Status: DISCONTINUED | OUTPATIENT
Start: 2020-10-08 | End: 2020-10-08 | Stop reason: HOSPADM

## 2020-10-07 RX ORDER — PROPOFOL 10 MG/ML
INJECTION, EMULSION INTRAVENOUS
Status: DISCONTINUED | OUTPATIENT
Start: 2020-10-07 | End: 2020-10-07 | Stop reason: HOSPADM

## 2020-10-07 RX ORDER — MELOXICAM 7.5 MG/1
7.5 TABLET ORAL 2 TIMES DAILY
Status: DISCONTINUED | OUTPATIENT
Start: 2020-10-08 | End: 2020-10-08 | Stop reason: HOSPADM

## 2020-10-07 RX ADMIN — PHENYLEPHRINE HYDROCHLORIDE 50 MCG: 10 INJECTION INTRAVENOUS at 10:48

## 2020-10-07 RX ADMIN — TRANEXAMIC ACID 1000 MG: 100 INJECTION, SOLUTION INTRAVENOUS at 11:44

## 2020-10-07 RX ADMIN — PHENYLEPHRINE HYDROCHLORIDE 100 MCG: 10 INJECTION INTRAVENOUS at 11:07

## 2020-10-07 RX ADMIN — BUPIVACAINE HYDROCHLORIDE IN DEXTROSE 13.5 MG: 7.5 INJECTION, SOLUTION SUBARACHNOID at 10:37

## 2020-10-07 RX ADMIN — DEXAMETHASONE SODIUM PHOSPHATE 10 MG: 4 INJECTION, SOLUTION INTRAMUSCULAR; INTRAVENOUS at 10:43

## 2020-10-07 RX ADMIN — CEFAZOLIN SODIUM 2 G: 100 INJECTION, POWDER, LYOPHILIZED, FOR SOLUTION INTRAVENOUS at 18:02

## 2020-10-07 RX ADMIN — ACETAMINOPHEN 1000 MG: 500 TABLET, FILM COATED ORAL at 09:27

## 2020-10-07 RX ADMIN — Medication 1 AMPULE: at 21:30

## 2020-10-07 RX ADMIN — ATORVASTATIN CALCIUM 40 MG: 40 TABLET, FILM COATED ORAL at 21:30

## 2020-10-07 RX ADMIN — Medication 10 MG: at 11:07

## 2020-10-07 RX ADMIN — ASPIRIN 81 MG: 81 TABLET, COATED ORAL at 21:30

## 2020-10-07 RX ADMIN — ACETAMINOPHEN 1000 MG: 500 TABLET, FILM COATED ORAL at 18:01

## 2020-10-07 RX ADMIN — TRANEXAMIC ACID 1300 MG: 650 TABLET ORAL at 18:01

## 2020-10-07 RX ADMIN — Medication 2 G: at 10:28

## 2020-10-07 RX ADMIN — TRANEXAMIC ACID 1000 MG: 100 INJECTION, SOLUTION INTRAVENOUS at 10:43

## 2020-10-07 RX ADMIN — PROPOFOL 50 MCG/KG/MIN: 10 INJECTION, EMULSION INTRAVENOUS at 10:43

## 2020-10-07 RX ADMIN — SODIUM CHLORIDE 10 MCG/MIN: 900 INJECTION, SOLUTION INTRAVENOUS at 11:15

## 2020-10-07 RX ADMIN — KETOROLAC TROMETHAMINE 15 MG: 15 INJECTION, SOLUTION INTRAMUSCULAR; INTRAVENOUS at 21:30

## 2020-10-07 RX ADMIN — MIDAZOLAM 2 MG: 1 INJECTION INTRAMUSCULAR; INTRAVENOUS at 10:31

## 2020-10-07 RX ADMIN — ONDANSETRON 4 MG: 2 INJECTION INTRAMUSCULAR; INTRAVENOUS at 10:43

## 2020-10-07 RX ADMIN — SODIUM CHLORIDE, SODIUM LACTATE, POTASSIUM CHLORIDE, AND CALCIUM CHLORIDE 1000 ML: 600; 310; 30; 20 INJECTION, SOLUTION INTRAVENOUS at 09:26

## 2020-10-07 RX ADMIN — Medication 10 ML: at 21:30

## 2020-10-07 RX ADMIN — TRANEXAMIC ACID 1300 MG: 650 TABLET ORAL at 15:23

## 2020-10-07 RX ADMIN — Medication 3 AMPULE: at 09:27

## 2020-10-07 RX ADMIN — GABAPENTIN 100 MG: 100 CAPSULE ORAL at 18:01

## 2020-10-07 NOTE — ANESTHESIA PREPROCEDURE EVALUATION
Relevant Problems   No relevant active problems       Anesthetic History   No history of anesthetic complications            Review of Systems / Medical History  Patient summary reviewed and pertinent labs reviewed    Pulmonary          Shortness of breath (with exertion)         Neuro/Psych   Within defined limits           Cardiovascular            Dysrhythmias   CAD (+ stress 2020, clean [facility])    Exercise tolerance: <4 METS  Comments: longstanding mild SOB with activity and stress   GI/Hepatic/Renal     GERD: well controlled           Endo/Other        Arthritis     Other Findings              Physical Exam    Airway  Mallampati: II  TM Distance: 4 - 6 cm  Neck ROM: normal range of motion   Mouth opening: Normal     Cardiovascular    Rhythm: regular  Rate: normal      Pertinent negatives: No murmur   Dental  No notable dental hx       Pulmonary  Breath sounds clear to auscultation               Abdominal         Other Findings            Anesthetic Plan    ASA: 2  Anesthesia type: spinal          Induction: Intravenous  Anesthetic plan and risks discussed with: Patient

## 2020-10-07 NOTE — PERIOP NOTES
Teach back method used in review of Hibiclens usage preop/postop, TB screening, pain management goals, falls precautions and use of Nozin for prevention of staph infections. Incentive spirometer reviewed and pt reached TOTAL TIDAL 3250 ML OBSERVED   in preop holding.

## 2020-10-07 NOTE — PROGRESS NOTES
TRANSFER - IN REPORT:    Verbal report received from Shelby Meléndez RN on Loki Leone  being received from PACU for routine post - op      Report consisted of patients Situation, Background, Assessment and   Recommendations(SBAR). Information from the following report(s) SBAR was reviewed with the receiving nurse. Opportunity for questions and clarification was provided. Assessment completed upon patients arrival to unit and care assumed. Oriented to room, bed controls, and how to order meals. ABDs and tape dry and intact to R hip. SCDs and yellow gripper socks to LEs and instructed not to get up without staff to assist. Moving feet. Starting to get sensation to LEs. Wife at bedside.

## 2020-10-07 NOTE — PROGRESS NOTES
Problem: Mobility Impaired (Adult and Pediatric)  Goal: *Acute Goals and Plan of Care (Insert Text)  Outcome: Progressing Towards Goal  Note: GOALS (1-4 days):  (1.)Mr. Leone will move from supine to sit and sit to supine  in bed with STAND BY ASSIST.    (2.)Mr. Leone will transfer from bed to chair and chair to bed with STAND BY ASSIST using the least restrictive device. (3.)Mr. Leone will ambulate with STAND BY ASSIST for 150 feet with the least restrictive device. (4.)Mr. Leone will ambulate up/down 2 steps with left railing with MINIMAL ASSIST with device as needed. (5.)Mr. Leone will state/observe LIN precautions with 0 verbal cues. ________________________________________________________________________________________________       PHYSICAL THERAPY JOINT CAMP LIN: Initial Assessment and PM 10/7/2020  INPATIENT: Hospital Day: 1  Payor: Fleet Caper / Plan: [facility] [employer] [facility] / Product Type: PPO /      NAME/AGE/GENDER: Kassandra Marmolejo is a [age] [facility] y.o. male   PRIMARY DIAGNOSIS:  Osteoarthritis of right hip, unspecified osteoarthritis type [M16.11]   Procedure(s) and Anesthesia Type:     * RIGHT HIP ARTHROPLASTY TOTAL - Spinal (Right)  ICD-10: Treatment Diagnosis:    · Pain in Right Hip (M25.551)  · Stiffness of Right Hip, Not elsewhere classified (M25.651)  · Difficulty in walking, Not elsewhere classified (R26.2)      ASSESSMENT:     Mr. Fly Ahumada presents with decreased functional mobility and gait as well as decreased rom and strength of right LE s/p right lin. He plans to go home with HHPT. This section established at most recent assessment   PROBLEM LIST (Impairments causing functional limitations):  1. Decreased Strength  2. Decreased ADL/Functional Activities  3. Decreased Transfer Abilities  4. Decreased Ambulation Ability/Technique  5. Decreased Balance  6. Increased Pain  7. Decreased Activity Tolerance  8.  Decreased Flexibility/Joint Mobility  9. Decreased Levy with Home Exercise Program  10. Decreased strength   INTERVENTIONS PLANNED: (Benefits and precautions of physical therapy have been discussed with the patient.)  1. Bed Mobility  2. Cold  3. Gait Training  4. Home Exercise Program (HEP)  5. Range of Motion (ROM)  6. Therapeutic Activites  7. Therapeutic Exercise/Strengthening  8. Transfer Training     TREATMENT PLAN: Frequency/Duration: Follow patient BID for duration of hospital stay to address above goals. Rehabilitation Potential For Stated Goals: Good     RECOMMENDED REHABILITATION/EQUIPMENT: (at time of discharge pending progress): Continue Skilled Therapy and Home Health: Physical Therapy. HISTORY:   History of Present Injury/Illness (Reason for Referral):  S/p right malia  Past Medical History/Comorbidities:   Mr. Coleen Gaitan  has a past medical history of Arthritis, CAD (coronary artery disease) (2017), Dyspnea on exertion, GERD (gastroesophageal reflux disease), H/O cold sores, Hyperlipidemia, Latex allergy, and Wide-complex tachycardia ([address] (2017). He also has no past medical history of Aneurysm ([address] Arrhythmia, Asthma, Autoimmune disease ([address] Cancer ([address] Chronic kidney disease, Chronic obstructive pulmonary disease ([address] Chronic pain, Coagulation disorder ([address] Diabetes ([address] Difficult intubation, Endocarditis, Heart failure ([address] Hypertension, Liver disease, Malignant hyperthermia due to anesthesia, Morbid obesity ([address] Nausea & vomiting, Nicotine vapor product user, Non-nicotine vapor product user, Pseudocholinesterase deficiency, Psychiatric disorder, PUD (peptic ulcer disease), Rheumatic fever, Seizures ([address] Sleep apnea, Stroke ([address] Thromboembolus ([address] or Thyroid disease. Mr. Coleen Gaitan  has a past surgical history that includes hx heart catheterization (2017) and hx colonoscopy.    Social History/Living Environment:   Home Environment: Private residence  # Steps to Enter: 1  Rails to Enter: No  One/Two Story Residence: One story (2 steps)  Living Alone: No  Support Systems: Spouse/Significant Other/Partner  Patient Expects to be Discharged to[de-identified] Private residence  Current DME Used/Available at Home: Lynnann Courser, straight  Tub or Shower Type: Shower    Prior Level of Function/Work/Activity:  independent   Number of Personal Factors/Comorbidities that affect the Plan of Care: 1-2: MODERATE COMPLEXITY   EXAMINATION:   Most Recent Physical Functioning:      Gross Assessment  AROM: Within functional limits(left LE)  Strength: Within functional limits(left LE)        RLE AROM  R Hip Flexion: 90  R Hip ABduction: 10  R Knee Extension: 0            Bed Mobility  Supine to Sit: Contact guard assistance    Transfers  Sit to Stand: Contact guard assistance  Stand to Sit: Contact guard assistance  Bed to Chair: Contact guard assistance    Balance  Sitting: Intact  Standing: With support              Weight Bearing Status  Right Side Weight Bearing: As tolerated  Distance (ft): 30 Feet (ft)  Ambulation - Level of Assistance: Contact guard assistance  Assistive Device: Walker, rolling  Speed/Gabrielle: Delayed  Step Length: Left shortened;Right shortened  Stance: Right decreased  Gait Abnormalities: Antalgic  Interventions: Safety awareness training;Verbal cues     Braces/Orthotics:     Right Hip Cold  Type: Cold/ice packs      Body Structures Involved:  1. Bones  2. Joints  3. Muscles  4. Ligaments Body Functions Affected:  1. Movement Related Activities and Participation Affected:  1. Mobility   Number of elements that affect the Plan of Care: 3: MODERATE COMPLEXITY   CLINICAL PRESENTATION:   Presentation: Stable and uncomplicated: LOW COMPLEXITY   CLINICAL DECISION MAKIN [facility] [address] [address] AM-PAC 6 Clicks   Basic Mobility Inpatient Short Form  How much difficulty does the patient currently have. .. Unable A Lot A Little None   1. Turning over in bed (including adjusting bedclothes, sheets and blankets)?    [] 1 [] 2   [x] 3   [] 4   2. Sitting down on and standing up from a chair with arms ( e.g., wheelchair, bedside commode, etc.)   [] 1   [] 2   [x] 3   [] 4   3. Moving from lying on back to sitting on the side of the bed? [] 1   [] 2   [x] 3   [] 4   How much help from another person does the patient currently need. .. Total A Lot A Little None   4. Moving to and from a bed to a chair (including a wheelchair)? [] 1   [] 2   [x] 3   [] 4   5. Need to walk in hospital room? [] 1   [] 2   [x] 3   [] 4   6. Climbing 3-5 steps with a railing? [] 1   [] 2   [x] 3   [] 4   © 2007, Trustees of [address] [place] [address], under license to [employer]. All rights reserved     Score:  Initial: 18 Most Recent: X (Date: -- )    Interpretation of Tool:  Represents activities that are increasingly more difficult (i.e. Bed mobility, Transfers, Gait). Medical Necessity:     · Patient is expected to demonstrate progress in   · strength, range of motion, and balance  ·  to   · decrease assistance required with exercises and functional mobility  · . Reason for Services/Other Comments:  · Patient   · continues to require present interventions due to patient's inability to perform exercises and functional mobility independently  · . Use of outcome tool(s) and clinical judgement create a POC that gives a: Clear prediction of patient's progress: LOW COMPLEXITY            TREATMENT:   (In addition to Assessment/Re-Assessment sessions the following treatments were rendered)     Pre-treatment Symptoms/Complaints:  hip pain  Pain Initial:      Post Session:  2     Therapeutic Exercise: (8 Minutes):  Exercises per grid below to improve mobility and strength. Required minimal visual, verbal, and manual cues to promote proper body alignment, promote proper body posture, and promote proper body mechanics. Progressed range and repetitions as indicated.      Date:  10/7 Date:   Date:     ACTIVITY/EXERCISE AM PM AM PM AM PM   GROUP THERAPY []  []  []  []  []  []   Ankle Pumps  10a       Quad Sets  10a       Gluteal Sets  10a       Hip ABd/ADduction  10a       Straight Leg Raises         Knee Slides  10a       Short Arc Quads         Long Arc Quads         Chair Slides                  B = bilateral; AA = active assistive; A = active; P = passive      Treatment/Session Assessment:     Response to Treatment:  did fine. Education:  [x] Home Exercises  [x] Fall Precautions  [x] Hip Precautions [] D/C Instruction Review  [x] Hip Prosthesis Review  [x] Walker Management/Safety [] Adaptive Equipment as Needed       Interdisciplinary Collaboration:   o Occupational Therapist  o Registered Nurse    After treatment position/precautions:   o Up in chair  o Bed/Chair-wheels locked  o Bed in low position  o Caregiver at bedside  o Call light within reach  o Family at bedside    Compliance with Program/Exercises: Will assess as treatment progresses. Recommendations/Intent for next treatment session:  Treatment next visit will focus on increasing Mr. Mcphersons independence with bed mobility, transfers, gait training, strength/ROM exercises, modalities for pain, and patient education.       Total Treatment Duration:  PT Patient Time In/Time Out  Time In: 1535  Time Out: 518 [facility],

## 2020-10-07 NOTE — PROGRESS NOTES
Problem: Self Care Deficits Care Plan (Adult)  Goal: *Acute Goals and Plan of Care (Insert Text)  Outcome: Progressing Towards Goal  Note: GOALS:   DISCHARGE GOALS (in preparation for going home/rehab):  3 days  1. Mr. Kun Pardo will perform one lower body dressing activity with minimal assistance with adaptive equipment to demonstrate improved functional mobility and safety. 2.  Mr. Kun Pardo will perform one lower body bathing activity with minimal  assistance with adaptive equipment to demonstrate improved functional mobility and safety. 3.  Mr. Kun Pardo will perform toileting/toilet transfer with contact guard assistance with adaptive equipment to demonstrate improved functional mobility and safety. 4.  Mr. Kun Pardo will perform shower transfer with contact guard assistance with adaptive equipment to demonstrate improved functional mobility and safety. 5.  Mr. Kun Pardo will state LIN precautions with two verbal cues to demonstrate improved functional mobility and safety. JOINT CAMP OCCUPATIONAL THERAPY LIN: Initial Assessment and PM 10/7/2020  INPATIENT: Hospital Day: 1  Payor: TYRESE CARRILLO / Plan: [place] [employer] [facility] / Product Type: PPO /      NAME/AGE/GENDER: Dustin Sarah is a 60 y.o. male   PRIMARY DIAGNOSIS:  Osteoarthritis of right hip, unspecified osteoarthritis type [M16.11]   Procedure(s) and Anesthesia Type:     * RIGHT HIP ARTHROPLASTY TOTAL - Spinal (Right)  ICD-10: Treatment Diagnosis:    · Pain in Right Hip (M25.551)  · Stiffness of Right Hip, Not elsewhere classified (M25.651)  · Other lack of cordination (R27.8)  · Difficulty in walking, Not elsewhere classified (R26.2)  · Other abnormalities of gait and mobility (R26.89)      ASSESSMENT:     Mr. Kun Pardo is s/p right LIN and presents with decreased weight bearing on right LE and decreased independence with functional mobility and activities of daily living as compared to prior level of function and safety. Patient would benefit from skilled Occupational Therapy to maximize independence and safety with self-care task and functional mobility. Pt would also benefit from education on lower body adaptive equipment and hip precautions post-surgery in preparation for going home. Patient plans for further rehab at home with home health services and good family support. OT reviewed therapy schedule and plan of care with patient. Patient was able to transfer and perform self care skills as charted below. Patient instructed to call for assistance when needing to get up from the recliner and all needs in reach. Patient verbalized understanding of call light. He transferred to EOB and donned clothes. He ambulated in the room and back to the recliner. His wife is present and he plans to discharge home tomorrow. This section established at most recent assessment   PROBLEM LIST (Impairments causing functional limitations):  1. Decreased Strength  2. Decreased ADL/Functional Activities  3. Decreased Transfer Abilities  4. Increased Pain  5. Increased Fatigue  6. Decreased Flexibility/Joint Mobility  7. Decreased Knowledge of Precautions   INTERVENTIONS PLANNED: (Benefits and precautions of occupational therapy have been discussed with the patient.)  1. Activities of daily living training  2. Adaptive equipment training  3. Balance training  4. Clothing management  5. Donning&doffing training  6. Theraputic activity     TREATMENT PLAN: Frequency/Duration: Follow patient 1-2 times to address above goals. Rehabilitation Potential For Stated Goals: Good     RECOMMENDED REHABILITATION/EQUIPMENT: (at time of discharge pending progress): Continue Skilled Therapy. OCCUPATIONAL PROFILE AND HISTORY:   History of Present Injury/Illness (Reason for Referral): Pt presents this date s/p (right) LIN.     Past Medical History/Comorbidities:   Mr. Eliecer Meléndez  has a past medical history of Arthritis, CAD (coronary artery disease) (2017), Dyspnea on exertion, GERD (gastroesophageal reflux disease), H/O cold sores, Hyperlipidemia, Latex allergy, and Wide-complex tachycardia ([address] (2017). He also has no past medical history of Aneurysm ([address] Arrhythmia, Asthma, Autoimmune disease ([address] Cancer ([address] Chronic kidney disease, Chronic obstructive pulmonary disease ([address] Chronic pain, Coagulation disorder ([address] Diabetes ([address] Difficult intubation, Endocarditis, Heart failure ([address] Hypertension, Liver disease, Malignant hyperthermia due to anesthesia, Morbid obesity ([address] Nausea & vomiting, Nicotine vapor product user, Non-nicotine vapor product user, Pseudocholinesterase deficiency, Psychiatric disorder, PUD (peptic ulcer disease), Rheumatic fever, Seizures ([address] Sleep apnea, Stroke ([address] Thromboembolus ([address] or Thyroid disease. Mr. Lisa Lopez  has a past surgical history that includes hx heart catheterization (2017) and hx colonoscopy. Social History/Living Environment:   Home Environment: Private residence  # Steps to Enter: 1  Rails to Enter: No  One/Two Story Residence: One story  Living Alone: No  Support Systems: Spouse/Significant Other/Partner  Patient Expects to be Discharged to[de-identified] Private residence  Current DME Used/Available at Home: Odean Homans, straight  Tub or Shower Type: Shower  Prior Level of Function/Work/Activity:  Mod I     Number of Personal Factors/Comorbidities that affect the Plan of Care: Brief history (0):  LOW COMPLEXITY   ASSESSMENT OF OCCUPATIONAL PERFORMANCE[de-identified]   Most Recent Physical Functioning:   Balance  Sitting: Intact  Standing: With support       Gross Assessment  AROM: Within functional limits(left LE)  Strength: Within functional limits(left LE)            Coordination  Fine Motor Skills-Upper: Left Intact; Right Intact  Gross Motor Skills-Upper: Left Intact; Right Intact         Mental Status  Neurologic State: Alert; Appropriate for age  Orientation Level: Appropriate for age  Cognition: Appropriate decision making; Appropriate for age attention/concentration; Appropriate safety awareness; Follows commands  Perception: Appears intact  Perseveration: No perseveration noted  Safety/Judgement: Awareness of environment; Fall prevention          RLE AROM  R Hip Flexion: 90  R Hip ABduction: 10  R Knee Extension: 0     Basic ADLs (From Assessment) Complex ADLs (From Assessment)   Basic ADL  Feeding: Supervision  Oral Facial Hygiene/Grooming: Supervision  Bathing: Minimum assistance, Moderate assistance  Upper Body Dressing: Supervision  Lower Body Dressing: Moderate assistance  Toileting: Minimum assistance     Grooming/Bathing/Dressing Activities of Daily Living     Cognitive Retraining  Safety/Judgement: Awareness of environment; Fall prevention                 Functional Transfers  Toilet Transfer : Contact guard assistance;Minimum assistance  Shower Transfer: Contact guard assistance;Minimum assistance     Bed/Mat Mobility  Supine to Sit: Contact guard assistance  Sit to Stand: Contact guard assistance  Stand to Sit: Contact guard assistance  Bed to Chair: Contact guard assistance         Physical Skills Involved:  1. Strength  2. Activity Tolerance  3. Pain (acute) Cognitive Skills Affected (resulting in the inability to perform in a timely and safe manner): 1. none Psychosocial Skills Affected:  1. none   Number of elements that affect the Plan of Care: 1-3:  LOW COMPLEXITY   CLINICAL DECISION MAKIN [facility] [address] [address] AM-PAC 6 Clicks   Daily Activity Inpatient Short Form  How much help from another person does the patient currently need. .. Total A Lot A Little None   1. Putting on and taking off regular lower body clothing? [] 1   [x] 2   [] 3   [] 4   2. Bathing (including washing, rinsing, drying)? [] 1   [x] 2   [] 3   [] 4   3. Toileting, which includes using toilet, bedpan or urinal?   [] 1   [] 2   [x] 3   [] 4   4. Putting on and taking off regular upper body clothing? [] 1   [] 2   [] 3   [x] 4   5. Taking care of personal grooming such as brushing teeth? [] 1   [] 2   [] 3   [x] 4   6. Eating meals? [] 1   [] 2   [] 3   [x] 4   © 2007, Trustees of [address] [place] [address] [address] [id number], under license to [employer]. All rights reserved     Score:  Initial:19 Most Recent: X (Date: -- )    Interpretation of Tool:  Represents activities that are increasingly more difficult (i.e. Bed mobility, Transfers, Gait). Medical Necessity:     · Patient is expected to demonstrate progress in   · Self care skills and functional mobility  ·     Reason for Services/Other Comments:  · Patient continues to require skilled intervention due to   · Above listed deficits     Use of outcome tool(s) and clinical judgement create a POC that gives a: LOW COMPLEXITY            TREATMENT:   (In addition to Assessment/Re-Assessment sessions the following treatments were rendered)     Pre-treatment Symptoms/Complaints:    Pain: Initial:   Pain Intensity 1: 2  Pain Location 1: Hip  Pain Orientation 1: Right  Pain Intervention(s) 1: Ambulation/Increased Activity  Post Session:  2/10 icepack given     Self Care: (10): Procedure(s) (per grid) utilized to improve and/or restore self-care/home management as related to dressing. Required moderate visual, verbal, manual, and tactile cueing to facilitate activities of daily living skills and compensatory activities. Assessment/Reassessment complete    Treatment/Session Assessment:     Response to Treatment:  tolerated well, moved well.     Education:  [] Home Exercises  [x] Fall Precautions  [x] Hip Precautions [] Going Home Video  [] Knee/Hip Prosthesis Review  [x] Walker Management/Safety [x] Adaptive Equipment as Needed       Interdisciplinary Collaboration:   o Physical Therapist  o Occupational Therapist  o Registered Nurse    After treatment position/precautions:   o Up in chair  o Bed/Chair-wheels locked  o Caregiver at bedside  o Call light within reach  o RN notified  o Family at bedside Compliance with Program/Exercises: Compliant all of the time. Recommendations/Intent for next treatment session:  Treatment next visit will focus on increasing Mr. Mcphersons independence with bed mobility, transfers, self care, functional mobility, modalities for pain, and patient education.       Total Treatment Duration:10  OT Patient Time In/Time Out  Time In: 1520  Time Out: 2026 [facility],

## 2020-10-07 NOTE — OP NOTES
Total Hip Procedure Note    Iva Dare [place],  [id number],  1960    Pre-operative Diagnosis: Osteoarthritis of right hip, unspecified osteoarthritis type [M16.11]    Post-operative Diagnosis: Same    Procedure: Right Total Hip Arthroplasty    BMI: Body mass index is 29.01 kg/m². Landon Hong Location: [address] [place] [address]    Surgeon: Emelyn Salcedo MD    Assistant: Logan Sanchez CFA and Zev Flores NP CFA    Anesthesia: Spinal     Complications: None    EBL: 200cc    Drains: None    Intra-op Findings: Pre-operatively leg lengths were assessed using preop Xrays and with the patient in the lateral decubitus position and operative leg was felt to be similar in length compared to the contralateral leg. The operative hip showed notable cartilage loss of both the femoral head and acetabulum. No intra-operative periprosthetic fracture was encountered. Sciatic nerve was noted to be intact at the end of the procedure. We measured the distance of our resected femoral head/neck from the cut surface to the center of the femoral head to be approximately 39mm. The overall length replaced with the implanted head/neck was 40mm. Intra-operatively we felt that we restored the patients leg lengths to equal lengths using the method described later. Postop with the patient supine we assessed leg lengths and felt they were similar. Patient condition at completion of Procedure: Stable    Implants:   Implant Name Type Inv.  Item Serial No.  Lot No. LRB No. Used Action   SHELL ACET CLUS H 58F TRTANIUM -- TRIDENT II - [id number]  SHELL ACET CLUS H 58F TRTANIUM -- TRIDENT II  [employer] [facility] [id number] Right 1 Implanted   LINER ACET SZ F ID36MM THK7.9MM 0DEG HIP X3 CHRSIS RNG FOR - [id number]  LINER ACET SZ F ID36MM THK7.9MM 0DEG HIP X3 CHRISS RNG FOR  [employer] [facility] [id number] Right 1 Implanted   STEM FEM SZ 5 L108MM NK L35MM 44MM OFFSET 127DEG HIP TI - [id number]  STEM FEM SZ 5 L108MM NK L35MM 44MM OFFSET 127DEG HIP TI  [employer]WD [id number] Right 1 Implanted   HEAD FEM DELT V40 +5MM NK 36MM -- V40 BIOLOX - [id number]  HEAD FEM DELT V40 +5MM NK 36MM -- V40 BIOLOX  [employer] [id number] Right 1 Implanted       Description of Procedure    The complexity of the total joint surgery requires the use of a first assistant for positioning, retraction and assistance in closure. Issac Leone was brought to the operating room. Patient was transferred from the stretcher to OR bed. Anesthesia was induced. IV antibiotics were administered per protocol. Issac Leone was positioned in the lateral decubitus position and the pelvis/torso stabilized with posts. The limb was prepped and draped in the usual sterile fashion. A time out identifying the patient, procedure, operative side and surgeon was administered and charted by the OR Nurse. Prior to incision one gram of TXA was given intravenously. A standard posterior approach was utilized to expose the hip. The incision was carried through the subcutaneous tissue and underlying fascia with hemostasis obtained using the bovie cauterization and Aquamantys. A Charmley retractor was inserted. We resected any redundent bursal tissue off the external rotators. We were able to identify the piriformis tendon. The short external rotators and capsule were dissected off the posterior femur as a single layer just superior to the piriformis tendon. The sciatic nerve was palpated and protected during dissection. The femoral head was dislocated. The articular surface revealed loss of cartilage, exposed bone and bone spurring. The neck was osteotomized approximately 1cm above the top of the lesser trochanter. We were careful to protect the greater trochanter during osteotomy and protect it from iatrogenic injury. Acetabular retractors were placed both anterior and posterior just superficial to the acetabular labrum.   Remaining acetabular labrum was resected and any soft tissue was removed from the acetabulum including any tissue within the codyloid fossa. The acetabular surface revealed loss of cartilage with exposed bone. The acetabulum was sequentially reamed noting proper anteversion and inclination during reaming. The transverse acetabular ligament was used as the primary anatomic landmark to determine version and inclination. The acetabulum was sized to a 58 mm acetabular component. The prepared acetabulum was irrigated of any residual reamings and soft tissue. The permanent acetabular component was impacted into place to achieve appropriate horizontal tilt, anteversion, bone coverage and stability. We visualize that the acetabular component was fully seated. A trial liner was impacted into position. We did not have to excise overhanging osteophytes anterior and posterior  in order to minimize the risk of impingement. We then turned our attention to the proximal femur. A 2-prong proximal femoral retractor was placed. We gained access to the proximal femur using a  and femoral canal finder. The canal was prepared with appropriate lateralization in which we used the initial smaller broaches to remove lateral bone to avoid varus placement of the femoral component. The canal was then broached progressively. The broach was positioned with the appropriate anatomic femoral anteversion. We broached up to a size 5 Accolade II stem. A calcar planar was used. A trial reduction with a size #5 12 degree Accolade II stem with a +5 neck length and 36 mm head was performed. This was found to be the most stable with maximum hip flexion and with internal/external rotation testing. We did not appreciate any evidence of component or bony impingement. Limb lengths were assessed using three techniques.  First, the position of the tip of the operative greater trochanter was compared to the center of the femoral head component and was felt to match this same anatomic relationship as the non-operative hip based on preoperative X-rays. Next, we measured the length of our resected femoral head neck and felt that the trial components matched this resected length as closely as possible when accounting for the length provided by the femoral neck/head. Finally, we compared leg lengths by comparing the possible of the patella with the patients heels together with the patient in the lateral decubitus position. Using these methods it was felt that the patients leg lengths were equilibrated and with appropriate stability as mentioned above. All trial components were removed. The final liner was impacted into place which was a neutrl liner. A cementless size 5 127 degree Accolade II stem was impacted into place carefully. We were careful to observe the calcar region for any iatrogenic fractures during insertion. The permanent femoral head was impacted into place which was a +5mm 36mm ceramic head. [facility]'s hip was reduced and stability was as mentioned above. Dilute Betadine solution was allowed to sit in the surgical site for a 3 minute period and copious saline was used to irrigate the surgical site. The sciatic nerve was palpated and noted to be intact. A periarticular of ropivicaine, toradol, and morphine was injected about the surgical site with care being taken not to inject in the vicinity of neurovascular structures. Prior to wound closure one additional gram of TXA was given for a total of 2 grams. The capsule was repaired with a three #2 Fiberwires secured through drill holes placed in the posterior aspect of the trochanter. No drain was placed. The fascia was closed with a #0 Bidirectional Stratafix barbed suture. The sub-Q layer was closed with 2-0 monocril suture and a  3-0 moncril stratafix suture in a running subcuticular fashion was used to close the skin.  The incision site was thoroughly cleaned with alcohol and the Exofin wound closure system was applied to seal it off from external contamination after the overlying skin was thoroughly cleaned with alcohol. A thin layer of [place] lubricant was applied over the wound to keep the dressing from adhering to the overlying sterile bandage and said bandage was placed. Drapes were then broken down and patient was transferred carefully back to the OR stretcher. The sponge and needle counts were correct. The patient tolerated the procedure without difficulty and left the operating room in satisfactory condition.     Signed By: Yessenia Collins MD

## 2020-10-07 NOTE — PROGRESS NOTES
Patient received resting  In bedside recliner, offers no complaints. Shift assessment completed, will monitor.

## 2020-10-07 NOTE — ANESTHESIA PROCEDURE NOTES
Spinal Block    Start time: 10/7/2020 10:33 AM  End time: 10/7/2020 10:37 AM  Performed by: Nisha Abarca MD  Authorized by: Nisha Abarca MD     Pre-procedure: Indications: primary anesthetic  Preanesthetic Checklist: patient identified, risks and benefits discussed, anesthesia consent, patient being monitored and timeout performed    Timeout Time: 10:33  Preanesthetic Checklist comment:  Risk of nerve damage discussed.     Spinal Block:   Patient Position:  Seated  Prep Region:  Lumbar  Prep: chlorhexidine and patient draped      Location:  L3-4  Technique:  Single shot    Local Dose (mL):  3    Needle:   Needle Type:  Pencan  Needle Gauge:  25 G  Attempts:  1      Events: CSF confirmed, no blood with aspiration and no paresthesia        Assessment:  Insertion:  Uncomplicated  Patient tolerance:  Patient tolerated the procedure well with no immediate complications

## 2020-10-07 NOTE — PROGRESS NOTES
TRANSFER - OUT REPORT:    [facility]  being transferred to room 336 for routine progression of care       Report consisted of patients Situation, Background, Assessment and   Recommendations(SBAR). Information from the following report(s) SBAR, Procedure Summary, MAR, Med Rec Status and Cardiac Rhythm NSR was reviewed with the receiving nurse. Lines:   Peripheral IV 10/07/20 Left Forearm (Active)   Site Assessment Clean, dry, & intact 10/07/20 1241   Phlebitis Assessment 0 10/07/20 1241   Infiltration Assessment 0 10/07/20 1241   Dressing Status Occlusive 10/07/20 1241   Dressing Type Tape;Transparent 10/07/20 1241   Hub Color/Line Status Green;Patent; Infusing 10/07/20 1241   Action Taken Blood drawn 10/07/20 0920        Opportunity for questions and clarification was provided.       Patient transported with:   O2 @ 2 liters  Tech

## 2020-10-07 NOTE — PROGRESS NOTES
Care Management Interventions  PCP Verified by CM: Yes  Mode of Transport at Discharge: Self  Transition of Care Consult (CM Consult): [address]: Yes  Discharge Durable Medical Equipment: No  Physical Therapy Consult: Yes  Occupational Therapy Consult: Yes  Current Support Network: Own Home  Confirm Follow Up Transport: Family  The Plan for Transition of Care is Related to the Following Treatment Goals : return to independent function  The Patient and/or Patient Representative was Provided with a Choice of Provider and Agrees with the Discharge Plan?: Yes  Freedom of Choice List was Provided with Basic Dialogue that Supports the Patient's Individualized Plan of Care/Goals, Treatment Preferences and Shares the Quality Data Associated with the Providers?: Yes  Discharge Location  Discharge Placement: Home with home health    Patient is a 60y.o. year old male admitted for Right LIN . Patient plans to return home on discharge. Order received to arrange home health. Patient without preference towards agency. Referral sent to [facility]. Patient denies any equipment needs as patient has a walker. Will follow until discharge.

## 2020-10-07 NOTE — H&P
[id number] [facility]  Pre Operative History and Physical Exam    Patient ID:  Melonie Cotto  [id number]  23 y.o.  1960    Today: October 7, 2020       CC:  Right hip pain    HPI:   The patient has end stage arthritis of the right hip. The patient was evaluated and examined in the office prior to today and was found to have a history on physical exam consistent with intra-articular pathology of the right hip. Patient complains of anterior groin pain predominately. Pain occurs during activity. Patient has difficulty putting on socks/shoes and has notable pain when getting up from a chair and getting out of a car. Patient does not complain of significant lateral hip pain or radicular pain down the leg. There have been no changes to the patient's orthopedic condition since the last office visit    Past Medical History:  Past Medical History:   Diagnosis Date    Arthritis     hips    CAD (coronary artery disease) 2017    non obstructive~ on heart cath; followed by cardio annually    Dyspnea on exertion     GERD (gastroesophageal reflux disease)     resolved    H/O cold sores     Hyperlipidemia     Latex allergy     Wide-complex tachycardia ([address] 2017    NSVT       Past Surgical History:  Past Surgical History:   Procedure Laterality Date    HX COLONOSCOPY      HX HEART CATHETERIZATION  2017        Medications:     Prior to Admission medications    Medication Sig Start Date End Date Taking? Authorizing Provider   aspirin delayed-release 81 mg tablet Take 81 mg by mouth daily. Take / use AM day of surgery  per anesthesia protocols. Provider, Historical   atorvastatin (LIPITOR) 40 mg tablet Take 40 mg by mouth nightly. 10/22/19   Provider, Historical   verapamil ER (CALAN-SR) 120 mg tablet Take 120 mg by mouth daily. Take / use AM day of surgery  per anesthesia protocols.   Indications: paroxysmal supraventricular tachycardia 10/22/19 10/21/20  Provider, Historical multivit-min/FA/lycopen/lutein (CENTRUM SILVER MEN PO) Take  by mouth daily. Provider, Historical   naproxen (NAPROSYN) 250 mg tablet Take  by mouth daily. Indications: pain    Provider, Historical   LYSINE PO Take 1,000 mg by mouth nightly. Provider, Historical       Family History:     Family History   Problem Relation Age of Onset    Depression Mother     Heart Disease Father        Social History:      Social History     Tobacco Use    Smoking status: Never Smoker    Smokeless tobacco: Never Used   Substance Use Topics    Alcohol use: Never     Frequency: Never         Allergies: Allergies   Allergen Reactions    Latex Rash        Vitals:      Visit Vitals  Ht 5' 11\" (1.803 m)   Wt 94.3 kg (208 lb)   BMI 29.01 kg/m²        Objective:         General: No Acute distress                   HEENT: Normocephalic/atramatic                   Lungs:  Breathing non-labored                   Heart:   RRR                    Abdomen: soft       Extremities:  Prior exam done in office has been consistent with end-stage hip arthritis. We have noted pain with ROM of the right hip which manifests as anterior groin pain. There is decreased internal and external rotation of the affected hip. No significant pain with palpation over the greater trochanteric bursa. No radicular pain with straight leg raise. Patient walks with and antalgic gait. Distally patient has no neurologic deficit. Patient Active Problem List   Diagnosis Code    Snoring R06.83       Assessment:   1. Arthritis of the Right hip    Plan:   The patient has failed previous conservative treatment for this condition. The patient has pain in the right hip which causes daily symptoms which affects the patient's activities of daily living and quality of life.  The risks, benefits, alternatives and possible complications of right total hip arthroplasty have been discussed with the patient including but not limited to infection, bleeding, damage to nerves and blood vessels with particular attention given to risk of damage of the femoral, obturator, lateral femoral cutaneous, superior gluteal, inferior gluteal, and sciatic nerve, risk of dislocation, fracture both intra-op and post-op, limb length inequality, polyethylene wear, implant loosening, risk for continued pain, and risk for revision surgery secondary to but not limited to all of these discussed risks. Further we discussed risk of venous thrombo-embolism including deep vein thrombosis and pulmonary embolism despite being on prophylaxis. We have also previously discussed the potential of morbidity and mortality associated with, but not limited to, the actual surgical procedure, anesthesia, prior medical conditions, and/or the administration of medications perioperatively. I have made no guarantees to the patient regarding outcomes and the patient has voiced understanding of that. The patient has been given the opportunity to ask questions all of which have been answered and the patient wishes to proceed. The patient will be admitted the day of surgery for right total hip replacement. The patient was counseled at length about the risks of suzanne Covid-19 during their perioperative period and any recovery window from their procedure. The patient was made aware that suzanne Covid-19  may worsen their prognosis for recovering from their procedure and lend to a higher morbidity and/or mortality risk. All material risks, benefits, and reasonable alternatives including postponing the procedure were discussed. The patient does  wish to proceed with the procedure at this time.         Signed By: Claudio Bernstein MD  October 7, 2020

## 2020-10-08 VITALS
WEIGHT: 208 LBS | HEART RATE: 84 BPM | OXYGEN SATURATION: 93 % | BODY MASS INDEX: 29.12 KG/M2 | SYSTOLIC BLOOD PRESSURE: 118 MMHG | DIASTOLIC BLOOD PRESSURE: 74 MMHG | HEIGHT: 71 IN | RESPIRATION RATE: 18 BRPM | TEMPERATURE: 98 F

## 2020-10-08 LAB — HGB BLD-MCNC: 12.1 G/DL (ref 13.6–17.2)

## 2020-10-08 PROCEDURE — 97110 THERAPEUTIC EXERCISES: CPT

## 2020-10-08 PROCEDURE — 97116 GAIT TRAINING THERAPY: CPT

## 2020-10-08 PROCEDURE — 74011250637 HC RX REV CODE- 250/637: Performed by: NURSE PRACTITIONER

## 2020-10-08 PROCEDURE — 36415 COLL VENOUS BLD VENIPUNCTURE: CPT

## 2020-10-08 PROCEDURE — 74011250636 HC RX REV CODE- 250/636: Performed by: NURSE PRACTITIONER

## 2020-10-08 PROCEDURE — 97535 SELF CARE MNGMENT TRAINING: CPT

## 2020-10-08 PROCEDURE — 85018 HEMOGLOBIN: CPT

## 2020-10-08 PROCEDURE — 74011000250 HC RX REV CODE- 250: Performed by: NURSE PRACTITIONER

## 2020-10-08 RX ADMIN — ASPIRIN 81 MG: 81 TABLET, COATED ORAL at 09:10

## 2020-10-08 RX ADMIN — Medication 1 AMPULE: at 09:09

## 2020-10-08 RX ADMIN — ACETAMINOPHEN 1000 MG: 500 TABLET, FILM COATED ORAL at 05:25

## 2020-10-08 RX ADMIN — Medication 10 ML: at 05:25

## 2020-10-08 RX ADMIN — DOCUSATE SODIUM 50MG AND SENNOSIDES 8.6MG 2 TABLET: 8.6; 5 TABLET, FILM COATED ORAL at 09:10

## 2020-10-08 RX ADMIN — GABAPENTIN 100 MG: 100 CAPSULE ORAL at 09:10

## 2020-10-08 RX ADMIN — ACETAMINOPHEN 1000 MG: 500 TABLET, FILM COATED ORAL at 00:24

## 2020-10-08 RX ADMIN — KETOROLAC TROMETHAMINE 15 MG: 15 INJECTION, SOLUTION INTRAMUSCULAR; INTRAVENOUS at 05:25

## 2020-10-08 RX ADMIN — VERAPAMIL HYDROCHLORIDE 120 MG: 120 TABLET, FILM COATED, EXTENDED RELEASE ORAL at 09:10

## 2020-10-08 RX ADMIN — CEFAZOLIN SODIUM 2 G: 100 INJECTION, POWDER, LYOPHILIZED, FOR SOLUTION INTRAVENOUS at 05:25

## 2020-10-08 NOTE — PROGRESS NOTES
Given multiple prescriptions and instructed how to take. Has follow up appt scheduled with Dr Mahogany Hall. Home health to see pt for therapy. Reminded of hip precautions. Instructed to call doctor if having fever, excessive drainage, numbness or other problems. I have reviewed discharge instructions with the patient and spouse. The patient and spouse verbalized understanding. Going to car via wheelchair.

## 2020-10-08 NOTE — PROGRESS NOTES
ABDs and tape removed from R hip, no drainage. Incision intact with prineo. Getting up to shower with OT assisting.

## 2020-10-08 NOTE — DISCHARGE INSTRUCTIONS
[facility]   Patient Discharge Instructions    [facility] / [id number] : 1960    Admitted 10/7/2020 Discharged: 10/8/2020     IF YOU HAVE ANY PROBLEMS ONCE YOU ARE AT HOME CALL THE FOLLOWING NUMBERS:   Main office number: [phone]    Take Home Medications     · It is important that you take the medication exactly as they are prescribed. · Keep your medication in the bottles provided by the pharmacist and keep a list of the medication names, dosages, and times to be taken in your wallet. · Do not take other medications without consulting your doctor. What to do at [address] your prehospital diet. If you have excessive nausea or vomitting call your doctor's office     Home Physical Therapy is arranged. Use rolling walker when walking. Patients who have had a joint replacement should not drive until you are seen for your follow up appointment by Dr. Maranda Barillas. When to Call    - Call if you have a temperature greater then 101  - Unable to keep food down  - Loose control of your bladder or bowel function  - Are unable to bear any weight   - Need a pain medication refill       DISCHARGE SUMMARY from Nurse    The following personal items collected during your admission are returned to you:   Dental Appliance: Dental Appliances: None  Vision: Visual Aid: Glasses, With patient  Hearing Aid:    Jewelry: Jewelry: None  Clothing: Clothing: Other (comment)(2 BAGS OF BELONGINGS)  Other Valuables:  Other Valuables: Cell Phone, Wallet(PT WIFE HAS WALLET AND PHONE)  Valuables sent to safe:      PATIENT INSTRUCTIONS:    After general anesthesia or intravenous sedation, for 24 hours or while taking prescription Narcotics:  · Limit your activities  · Do not drive and operate hazardous machinery  · Do not make important personal or business decisions  · Do  not drink alcoholic beverages  · If you have not urinated within 8 hours after discharge, please contact your surgeon on call.    Report the following to your surgeon:  · Excessive pain, swelling, redness or odor of or around the surgical area  · Temperature over 101  · Nausea and vomiting lasting longer than 4 hours or if unable to take medications  · Any signs of decreased circulation or nerve impairment to extremity: change in color, persistent  numbness, tingling, coldness or increase pain  · Any questions, call office @ [phone]      Keep scheduled follow up appointment. If need to change, call office @ [phone]. *  Please give a list of your current medications to your Primary Care Provider. *  Please update this list whenever your medications are discontinued, doses are      changed, or new medications (including over-the-counter products) are added. *  Please carry medication information at all times in case of emergency situations. Patient Education        Hip Replacement Surgery (Posterior): What to Expect at Home  Your Recovery  Hip replacement surgery replaces the worn parts of your hip joint. When you leave the hospital, you will probably be walking with crutches or a walker. You may be able to climb a few stairs and get in and out of bed and chairs. But you will need someone to help you at home for the next few weeks or until you have more energy and can move around better. If you need more extensive rehab, you may go to a specialized rehab center for more treatment. You will go home with a bandage and stitches, staples, tissue glue, or tape strips. You can remove the bandage when your doctor tells you to. If you have stitches or staples, your doctor will remove them 10 days to 3 weeks after your surgery. Glue or tape strips will fall off on their own over time. You may still have some mild pain, and the area may be swollen for 3 to 4 months after surgery. Your doctor will give you medicine for the pain.   You will continue the rehabilitation program (rehab) you started in the hospital. The better you do with your rehab exercises, the sooner you will get your strength and movement back. Most people are able to return to work 4 weeks to 4 months after surgery. This care sheet gives you a general idea about how long it will take for you to recover. But each person recovers at a different pace. Follow the steps below to get better as quickly as possible. How can you care for yourself at home? Activity    · Your doctor may not want your affected leg to cross the center of your body toward the other leg. If so, your therapist may suggest these ideas:  ? Do not cross your legs. ? Be very careful as you get in or out of bed or a car, so your leg does not cross that imaginary line in the middle of your body.     · Go slowly when you climb stairs. Make sure the lights are on. Have someone watch you, if you can. When you climb stairs:  ? Step up first with your unaffected leg. Then bring the affected leg up to the same step. Bring your crutches or cane up. ? To go down stairs, reverse the order. First, put your crutches or cane on the lower step. Then bring the affected leg down to that step. Finally, step down with the unaffected leg.     · You can ride in a car, but stop at least once every hour to get out and walk around.     · You may want to sleep on your back. Don't reach down too far to pull up blankets when you lie in bed.     · If your doctor recommends exercises, do them as directed. You can cut back on your exercises if your muscles start to ache, but don't stop doing them.     · Rest when you feel tired. You may take a nap, but don't stay in bed all day.     · Work with your physical therapist to learn the best way to exercise. You will probably have to use crutches or a walker for at least 4 to 6 weeks.     · Your doctor may advise you to stay away from activities that put stress on the joint. This includes sports such as tennis, football, and jogging.     · Try not to sit for too long at one time.  You will feel less stiff if you take a short walk about every hour. When you sit, use chairs with arms, and don't sit in low chairs.     · Do not bend over more than 90 degrees (like the angle in a letter \"L\").     · Sleep on your back with your legs slightly apart or on your side with a pillow between your knees for about 6 weeks or as your doctor tells you. Do not sleep on your stomach or affected leg.     · Ask your doctor when you can drive again.     · Most people are able to return to work 4 weeks to 4 months after surgery.     · Ask your doctor when it is okay for you to have sex. Diet    · By the time you leave the hospital, you will probably be eating your normal diet. If your stomach is upset, try bland, low-fat foods like plain rice, broiled chicken, toast, and yogurt. Your doctor may recommend that you take iron and vitamin supplements.     · Drink plenty of fluids (unless your doctor tells you not to).   · Eat healthy foods, and watch your portion sizes. Try to stay at your ideal weight. Too much weight puts more stress on your new hip joint.     · You may notice that your bowel movements are not regular right after your surgery. This is common. Try to avoid constipation and straining with bowel movements. You may want to take a fiber supplement every day. If you have not had a bowel movement after a couple of days, ask your doctor about taking a mild laxative. Medicines    · Your doctor will tell you if and when you can restart your medicines. He or she will also give you instructions about taking any new medicines.     · If you take aspirin or some other blood thinner, ask your doctor if and when to start taking it again. Make sure that you understand exactly what your doctor wants you to do.     · Your doctor may give you a blood-thinning medicine to prevent blood clots. If you take a blood thinner, be sure you get instructions about how to take your medicine safely.  Blood thinners can cause serious bleeding problems. This medicine could be in pill form or as a shot (injection). If a shot is necessary, your doctor will tell you how to do this.     · Be safe with medicines. Take pain medicines exactly as directed. ? If the doctor gave you a prescription medicine for pain, take it as prescribed. ? If you are not taking a prescription pain medicine, ask your doctor if you can take an over-the-counter medicine.     · If you think your pain medicine is making you sick to your stomach:  ? Take your medicine after meals (unless your doctor has told you not to). ? Ask your doctor for a different pain medicine.     · If your doctor prescribed antibiotics, take them as directed. Do not stop taking them just because you feel better. You need to take the full course of antibiotics. Incision care    · If your doctor told you how to care for your cut (incision), follow your doctor's instructions. You will have a dressing over the cut. A dressing helps the incision heal and protects it. Your doctor will tell you how to take care of this.     · If you did not get instructions, follow this general advice:  ? If you have strips of tape on the cut the doctor made, leave the tape on for a week or until it falls off.  ? If you have stitches or staples, your doctor will tell you when to come back to have them removed. ? If you have skin adhesive on the cut, leave it on until it falls off. Skin adhesive is also called glue or liquid stitches. ? Change the bandage every day. ? Wash the area daily with warm water, and pat it dry. Don't use hydrogen peroxide or alcohol. They can slow healing. ? You may cover the area with a gauze bandage if it oozes fluid or rubs against clothing. ? You may shower 24 to 48 hours after surgery. Pat the incision dry. Don't swim or take a bath for the first 2 weeks, or until your doctor tells you it is okay. Exercise    · Your rehab program will include a number of exercises to do.  Always do them as your therapist tells you. Ice and elevation    · For pain, put ice or a cold pack on the area for 10 to 20 minutes at a time. Put a thin cloth between the ice and your skin.     · Your ankle may swell for about 3 months. Prop up your ankle when you ice it or anytime you sit or lie down. Try to keep it above the level of your heart. This will help reduce swelling. Other instructions    · Continue to wear your support stockings as your doctor says. These help to prevent blood clots. How long you'll have to wear them depends on your activity level and the amount of swelling you have. Most people wear these stockings for 4 to 6 weeks after surgery.     · Try to prevent falls. To avoid falling:  ? Arrange furniture so that you will not trip on it. ? Get rid of throw rugs, and move electrical cords out of the way. ? Walk only in areas with plenty of light. ? Put grab bars in showers and bathtubs. ? Avoid icy or snowy sidewalks. ? Wear shoes with sturdy, flat soles. Follow-up care is a key part of your treatment and safety. Be sure to make and go to all appointments, and call your doctor if you are having problems. It's also a good idea to know your test results and keep a list of the medicines you take. When should you call for help? Call 911 anytime you think you may need emergency care. For example, call if:    · You passed out (lost consciousness).     · You have severe trouble breathing.     · You have sudden chest pain and shortness of breath, or you cough up blood. Call your doctor now or seek immediate medical care if:    · You have signs that your hip may be dislocated, including:  ? Severe pain and not being able to stand. ? A crooked leg that looks like your hip is out of position. ?  Not being able to bend or straighten your leg.     · Your leg or foot is cool or pale or changes color.     · You cannot feel or move your leg.     · You have signs of a blood clot, such as:  ? Pain in your calf, back of the knee, thigh, or groin. ? Redness and swelling in your leg or groin.     · Your incision comes open and begins to bleed, or the bleeding increases.     · You feel like your heart is racing or beating irregularly.     · You have signs of infection, such as:  ? Increased pain, swelling, warmth, or redness. ? Red streaks leading from the incision. ? Pus draining from the incision. ? A fever. Watch closely for changes in your health, and be sure to contact your doctor if:    · You do not have a bowel movement after taking a laxative.     · You do not get better as expected. Where can you learn more? Go to http://www.gray.com/  Enter Q746 in the search box to learn more about \"Hip Replacement Surgery (Posterior): What to Expect at Home. \"  Current as of: March 2, 2020               Content Version: 12.6  © 3399-8386 [employer]. Care instructions adapted under license by [employer] (which disclaims liability or warranty for this information). If you have questions about a medical condition or this instruction, always ask your healthcare professional. [place] [address] any warranty or liability for your use of this information. These are general instructions for a healthy lifestyle:    No smoking/ No tobacco products/ Avoid exposure to second hand smoke    Surgeon General's Warning:  Quitting smoking now greatly reduces serious risk to your health. Obesity, smoking, and sedentary lifestyle greatly increases your risk for illness    A healthy diet, regular physical exercise & weight monitoring are important for maintaining a healthy lifestyle    You may be retaining fluid if you have a history of heart failure or if you experience any of the following symptoms:  Weight gain of 3 pounds or more overnight or 5 pounds in a week, increased swelling in our hands or feet or shortness of breath while lying flat in bed. Please call your doctor as soon as you notice any of these symptoms; do not wait until your next office visit. Recognize signs and symptoms of STROKE:    F-face looks uneven    A-arms unable to move or move even    S-speech slurred or non-existent    T-time-call 911 as soon as signs and symptoms begin-DO NOT go       Back to bed or wait to see if you get better-TIME IS BRAIN. The discharge information has been reviewed with the patient. The patient verbalized understanding. Information obtained by :  I understand that if any problems occur once I am at home I am to contact my physician. I understand and acknowledge receipt of the instructions indicated above.                                                                                                                                            Physician's or R.N.'s Signature                                                                  Date/Time                                                                                                                                              Patient or Representative Signature                                                          Date/Time

## 2020-10-08 NOTE — PROGRESS NOTES
Problem: Self Care Deficits Care Plan (Adult)  Goal: *Acute Goals and Plan of Care (Insert Text)  Outcome: Progressing Towards Goal  Note: GOALS:   DISCHARGE GOALS (in preparation for going home/rehab):  3 days  1. Mr. Cuauhtemoc Grady will perform one lower body dressing activity with minimal assistance with adaptive equipment to demonstrate improved functional mobility and safety. GOAL MET 10/8/2020    2. Mr. Cuauhtemoc Grady will perform one lower body bathing activity with minimal  assistance with adaptive equipment to demonstrate improved functional mobility and safety. GOAL MET 10/8/2020  3. Mr. Cuauhtemoc Grady will perform toileting/toilet transfer with contact guard assistance with adaptive equipment to demonstrate improved functional mobility and safety. GOAL MET 10/8/2020  4. Mr. Cuauhtemoc Grady will perform shower transfer with contact guard assistance with adaptive equipment to demonstrate improved functional mobility and safety. GOAL MET 10/8/2020  5. Mr. Cuauhtemoc Grady will state LIN precautions with two verbal cues to demonstrate improved functional mobility and safety. GOAL MET 10/8/2020         JOINT CAMP OCCUPATIONAL THERAPY LIN: Daily Note, Discharge, Treatment Day: 2nd and AM 10/8/2020  INPATIENT: Hospital Day: 2  Payor: Coarl Mcmanus / Plan: [facility] [employer] [facility] / Product Type: PPO /      NAME/AGE/GENDER: Ashley Begum is a [age] [facility] y.o male   PRIMARY DIAGNOSIS:  Osteoarthritis of right hip, unspecified osteoarthritis type [M16.11]   Procedure(s) and Anesthesia Type:     * RIGHT HIP ARTHROPLASTY TOTAL - Spinal (Right)  ICD-10: Treatment Diagnosis:    · Pain in Right Hip (M25.551)  · Stiffness of Right Hip, Not elsewhere classified (M25.651)  · Other lack of cordination (R27.8)  · Difficulty in walking, Not elsewhere classified (R26.2)  · Other abnormalities of gait and mobility (R26.89)      ASSESSMENT:     Mr. Cuauhtemoc Grady is s/p right LIN and presents with decreased weight bearing on right LE and decreased independence with functional mobility and activities of daily living as compared to prior level of function and safety. Patient would benefit from skilled Occupational Therapy to maximize independence and safety with self-care task and functional mobility. Pt would also benefit from education on lower body adaptive equipment and hip precautions post-surgery in preparation for going home. Patient plans for further rehab at home with home health services and good family support. OT reviewed therapy schedule and plan of care with patient. Patient was able to transfer and perform self care skills as charted below. Patient instructed to call for assistance when needing to get up from the recliner and all needs in reach. Patient verbalized understanding of call light. He transferred to EOB and donned clothes. He ambulated in the room and back to the recliner. His wife is present and he plans to discharge home tomorrow. 10/8//2020 730am patient tranfserred to EOB stood and nurse removed bandage. He ambulated to the bathroom with supervision. He stood and urinated then transferred to shower. He showered and dressed using long handled sponge and hip kit. He stood at sink to groom then  He returned to recliner all needs in reach. He plans to return home with wife to assist.all goals met. He has hip kit at home. Discharge Ot. This section established at most recent assessment   PROBLEM LIST (Impairments causing functional limitations):  1. Decreased Strength  2. Decreased ADL/Functional Activities  3. Decreased Transfer Abilities  4. Increased Pain  5. Increased Fatigue  6. Decreased Flexibility/Joint Mobility  7. Decreased Knowledge of Precautions   INTERVENTIONS PLANNED: (Benefits and precautions of occupational therapy have been discussed with the patient.)  1. Activities of daily living training  2. Adaptive equipment training  3. Balance training  4. Clothing management  5. Donning&doffing training  6.  Theraputic activity     TREATMENT PLAN: Frequency/Duration: Follow patient 1-2 times to address above goals. Rehabilitation Potential For Stated Goals: Good     RECOMMENDED REHABILITATION/EQUIPMENT: (at time of discharge pending progress): Continue Skilled Therapy. OCCUPATIONAL PROFILE AND HISTORY:   History of Present Injury/Illness (Reason for Referral): Pt presents this date s/p (right) LIN. Past Medical History/Comorbidities:   Mr. Cuauhtemoc Grady  has a past medical history of Arthritis, CAD (coronary artery disease) (2017), Dyspnea on exertion, GERD (gastroesophageal reflux disease), H/O cold sores, Hyperlipidemia, Latex allergy, and Wide-complex tachycardia ([address] (2017). He also has no past medical history of Aneurysm ([address] Arrhythmia, Asthma, Autoimmune disease ([address] Cancer ([address] Chronic kidney disease, Chronic obstructive pulmonary disease ([address] Chronic pain, Coagulation disorder ([address] Diabetes ([address] Difficult intubation, Endocarditis, Heart failure ([address] Hypertension, Liver disease, Malignant hyperthermia due to anesthesia, Morbid obesity ([address] Nausea & vomiting, Nicotine vapor product user, Non-nicotine vapor product user, Pseudocholinesterase deficiency, Psychiatric disorder, PUD (peptic ulcer disease), Rheumatic fever, Seizures ([address] Sleep apnea, Stroke ([address] Thromboembolus ([address] or Thyroid disease. Mr. Cuauhtemoc Grady  has a past surgical history that includes hx heart catheterization (2017) and hx colonoscopy.   Social History/Living Environment:   Home Environment: Private residence  # Steps to Enter: 1  Rails to Enter: No  One/Two Story Residence: One story  Living Alone: No  Support Systems: Spouse/Significant Other/Partner  Patient Expects to be Discharged to[de-identified] Private residence  Current DME Used/Available at Home: Ely Cast, straight  Tub or Shower Type: Shower  Prior Level of Function/Work/Activity:  Mod I     Number of Personal Factors/Comorbidities that affect the Plan of Care: Brief history (0): LOW COMPLEXITY   ASSESSMENT OF OCCUPATIONAL PERFORMANCE[de-identified]   Most Recent Physical Functioning:   Balance  Sitting: Intact  Standing: Without support                              Mental Status  Neurologic State: Alert; Appropriate for age  Orientation Level: Appropriate for age  Cognition: Appropriate decision making; Appropriate for age attention/concentration; Appropriate safety awareness; Follows commands  Perception: Appears intact  Perseveration: No perseveration noted  Safety/Judgement: Awareness of environment; Fall prevention                Basic ADLs (From Assessment) Complex ADLs (From Assessment)   Basic ADL  Feeding: Supervision  Oral Facial Hygiene/Grooming: Supervision  Bathing: Minimum assistance, Moderate assistance  Type of Bath: Chlorhexidine (CHG), Shower  Upper Body Dressing: Supervision  Lower Body Dressing: Moderate assistance  Toileting: Minimum assistance     Grooming/Bathing/Dressing Activities of Daily Living   Grooming  Grooming Assistance: Supervision  Position Performed: Standing  Washing Face: Supervision  Washing Hands: Supervision  Brushing/Combing Hair: Supervision Cognitive Retraining  Safety/Judgement: Awareness of environment; Fall prevention   Upper Body Bathing  Bathing Assistance: Supervision  Position Performed: Standing     Lower Body Bathing  Bathing Assistance: Supervision;Minimum assistance  Perineal  : Supervision  Position Performed: Standing  Adaptive Equipment: Grab bar  Lower Body : Minimum assistance  Position Performed: Seated in chair;Standing  Adaptive Equipment: Grab bar;Long handled sponge; Tub bench Toileting  Toileting Assistance: Supervision  Bladder Hygiene: Supervision  Clothing Management: Supervision   Upper Body Dressing Assistance  Dressing Assistance: Supervision  Pullover Shirt: Supervision Functional Transfers  Bathroom Mobility: Supervision/set up  Toilet Transfer : Supervision  Shower Transfer: [address]: Grab bars; Tub transfer bench   Lower Body Dressing Assistance  Dressing Assistance: Contact guard assistance  Underpants: Contact guard assistance  Pants With Elastic Waist: Contact guard assistance  Socks: Contact guard assistance Bed/Mat Mobility  Supine to Sit: Supervision  Sit to Supine: Supervision  Stand to Sit: Supervision  Bed to Chair: Supervision         Physical Skills Involved:  1. Strength  2. Activity Tolerance  3. Pain (acute) Cognitive Skills Affected (resulting in the inability to perform in a timely and safe manner): 1. none Psychosocial Skills Affected:  1. none   Number of elements that affect the Plan of Care: 1-3:  LOW COMPLEXITY   CLINICAL DECISION MAKIN[address] [place] [address] AM-PAC 6 Clicks   Daily Activity Inpatient Short Form  How much help from another person does the patient currently need. .. Total A Lot A Little None   1. Putting on and taking off regular lower body clothing? [] 1   [] 2   [x] 3   [] 4   2. Bathing (including washing, rinsing, drying)? [] 1   [] 2   [x] 3   [] 4   3. Toileting, which includes using toilet, bedpan or urinal?   [] 1   [] 2   [x] 3   [] 4   4. Putting on and taking off regular upper body clothing? [] 1   [] 2   [] 3   [x] 4   5. Taking care of personal grooming such as brushing teeth? [] 1   [] 2   [] 3   [x] 4   6. Eating meals? [] 1   [] 2   [] 3   [x] 4   © , Trustees of [address] [place] [address], under license to [employer]. All rights reserved     Score:  Initial:19 Most Recent: 21 discharge 10/08/2020    Interpretation of Tool:  Represents activities that are increasingly more difficult (i.e. Bed mobility, Transfers, Gait).         Use of outcome tool(s) and clinical judgement create a POC that gives a: LOW COMPLEXITY            TREATMENT:   (In addition to Assessment/Re-Assessment sessions the following treatments were rendered)     Pre-treatment Symptoms/Complaints:    Pain: Initial:   Pain Intensity 1: 0  Pain Location 1: Hip  Pain Orientation 1: Right  Pain Intervention(s) 1: Ambulation/Increased Activity  Post Session:  2/10 icepack given     Self Care: 40): Procedure(s) (per grid) utilized to improve and/or restore self-care/home management as related to dressing, bathing, toileting and grooming. Required min visual, verbal, manual and tactile cueing to facilitate activities of daily living skills, compensatory activities and hip precautions and hip kit use. Treatment/Session Assessment:     Response to Treatment:  tolerated well, moved well. Education:  [] Home Exercises  [x] Fall Precautions  [x] Hip Precautions [] Going Home Video  [] Knee/Hip Prosthesis Review  [x] Walker Management/Safety [x] Adaptive Equipment as Needed       Interdisciplinary Collaboration:   o Physical Therapist  o Occupational Therapist  o Registered Nurse  o PA    After treatment position/precautions:   o Up in chair  o Bed/Chair-wheels locked  o Call light within reach  o RN notified     Compliance with Program/Exercises: Compliant all of the time. Recommendations/Intent for next treatment session: Pt doing well all goals met and will do well at home with support from spouse. Patient will be discharged home with home health PT. No further Occupational Therapy warranted, will discharge Occupational Therapy services.         Total Treatment Duration:40  OT Patient Time In/Time Out  Time In: 0730  Time Out: Kat [address], OT

## 2020-10-08 NOTE — PROGRESS NOTES
Problem: Mobility Impaired (Adult and Pediatric)  Goal: *Acute Goals and Plan of Care (Insert Text)  Outcome: Progressing Towards Goal  Note: GOALS (1-4 days):  (1.)Mr. Leone will move from supine to sit and sit to supine  in bed with STAND BY ASSIST.    (2.)Mr. Leone will transfer from bed to chair and chair to bed with STAND BY ASSIST using the least restrictive device. (3.)Mr. Leone will ambulate with STAND BY ASSIST for 150 feet with the least restrictive device. (4.)Mr. Leone will ambulate up/down 2 steps with left railing with MINIMAL ASSIST with device as needed. (5.)Mr. Leone will state/observe MALIA precautions with 0 verbal cues. Goals met  ________________________________________________________________________________________________       PHYSICAL THERAPY JOINT CAMP MALIA: Daily Note and AM 10/8/2020  INPATIENT: Hospital Day: 2  Payor: Quincy Bianchi / Plan: [facility] / Product Type: PPO /      NAME/AGE/GENDER: Edison Jack is a 60 y.o. male   PRIMARY DIAGNOSIS:  Osteoarthritis of right hip, unspecified osteoarthritis type [M16.11]   Procedure(s) and Anesthesia Type:     * RIGHT HIP ARTHROPLASTY TOTAL - Spinal (Right)  ICD-10: Treatment Diagnosis:    · Pain in Right Hip (M25.551)  · Stiffness of Right Hip, Not elsewhere classified (M25.651)  · Difficulty in walking, Not elsewhere classified (R26.2)      ASSESSMENT:     Mr. Radha Gardner presents with decreased functional mobility and gait as well as decreased rom and strength of right LE s/p right malia. He plans to go home with HHPT. Pt. Doing very well this am and is eager to go home. He reports no pain. We discussed safety and malia precautions. He ambulated well with RW and did steps without any problems. He did malia exercises with cues only. He had no questions or concerns about going home today.     This section established at most recent assessment   PROBLEM LIST (Impairments causing functional limitations):  1. Decreased Strength  2. Decreased ADL/Functional Activities  3. Decreased Transfer Abilities  4. Decreased Ambulation Ability/Technique  5. Decreased Balance  6. Increased Pain  7. Decreased Activity Tolerance  8. Decreased Flexibility/Joint Mobility  9. Decreased Hughes with Home Exercise Program  10. Decreased strength   INTERVENTIONS PLANNED: (Benefits and precautions of physical therapy have been discussed with the patient.)  1. Bed Mobility  2. Cold  3. Gait Training  4. Home Exercise Program (HEP)  5. Range of Motion (ROM)  6. Therapeutic Activites  7. Therapeutic Exercise/Strengthening  8. Transfer Training     TREATMENT PLAN: Frequency/Duration: Follow patient BID for duration of hospital stay to address above goals. Rehabilitation Potential For Stated Goals: Good     RECOMMENDED REHABILITATION/EQUIPMENT: (at time of discharge pending progress): Continue Skilled Therapy and Home Health: Physical Therapy. HISTORY:   History of Present Injury/Illness (Reason for Referral):  S/p right malia  Past Medical History/Comorbidities:   Mr. Juan Blanco  has a past medical history of Arthritis, CAD (coronary artery disease) (2017), Dyspnea on exertion, GERD (gastroesophageal reflux disease), H/O cold sores, Hyperlipidemia, Latex allergy, and Wide-complex tachycardia ([address] (2017).  He also has no past medical history of Aneurysm ([address] Arrhythmia, Asthma, Autoimmune disease ([address] Cancer ([address] Chronic kidney disease, Chronic obstructive pulmonary disease ([address] Chronic pain, Coagulation disorder ([address] Diabetes ([address] Difficult intubation, Endocarditis, Heart failure ([address] Hypertension, Liver disease, Malignant hyperthermia due to anesthesia, Morbid obesity ([address] Nausea & vomiting, Nicotine vapor product user, Non-nicotine vapor product user, Pseudocholinesterase deficiency, Psychiatric disorder, PUD (peptic ulcer disease), Rheumatic fever, Seizures ([address] Sleep apnea, Stroke ([address]bo[address] or Thyroid disease. Mr. Nadine Min  has a past surgical history that includes hx heart catheterization (2017) and hx colonoscopy. Social History/Living Environment:   Home Environment: Private residence  # Steps to Enter: 1  Rails to Enter: No  One/Two Story Residence: One story  Living Alone: No  Support Systems: Spouse/Significant Other/Partner  Patient Expects to be Discharged to[de-identified] Private residence  Current DME Used/Available at Home: Cane, straight  Tub or Shower Type: Shower    Prior Level of Function/Work/Activity:  independent   Number of Personal Factors/Comorbidities that affect the Plan of Care: 1-2: MODERATE COMPLEXITY   EXAMINATION:   Most Recent Physical Functioning:               RLE AROM  R Hip Flexion: 90  R Hip ABduction: 20            Bed Mobility  Supine to Sit: Supervision  Sit to Supine: Supervision    Transfers  Stand to Sit: Supervision  Bed to Chair: Supervision    Balance  Sitting: Intact  Standing: With support              Weight Bearing Status  Right Side Weight Bearing: As tolerated  Distance (ft): 200 Feet (ft)  Ambulation - Level of Assistance: Supervision  Assistive Device: Walker, rolling  Speed/Gabrielle: Delayed  Stance: Right decreased  Gait Abnormalities: Antalgic  Number of Stairs Trained: 3  Stairs - Level of Assistance: Stand-by assistance  Rail Use: Both(also one step with walker)  Interventions: Safety awareness training;Verbal cues     Braces/Orthotics:     Right Hip Cold  Type: Cold/ice packs      Body Structures Involved:  1. Bones  2. Joints  3. Muscles  4. Ligaments Body Functions Affected:  1. Movement Related Activities and Participation Affected:  1.  Mobility   Number of elements that affect the Plan of Care: 3: MODERATE COMPLEXITY   CLINICAL PRESENTATION:   Presentation: Stable and uncomplicated: LOW COMPLEXITY   CLINICAL DECISION MAKIN [facility] [address] [id number] AM-PAC 6 Clicks   Basic Mobility Inpatient Short Form  How much difficulty does the patient currently have. .. Unable A Lot A Little None   1. Turning over in bed (including adjusting bedclothes, sheets and blankets)? [] 1   [] 2   [x] 3   [] 4   2. Sitting down on and standing up from a chair with arms ( e.g., wheelchair, bedside commode, etc.)   [] 1   [] 2   [x] 3   [] 4   3. Moving from lying on back to sitting on the side of the bed? [] 1   [] 2   [x] 3   [] 4   How much help from another person does the patient currently need. .. Total A Lot A Little None   4. Moving to and from a bed to a chair (including a wheelchair)? [] 1   [] 2   [x] 3   [] 4   5. Need to walk in hospital room? [] 1   [] 2   [x] 3   [] 4   6. Climbing 3-5 steps with a railing? [] 1   [] 2   [x] 3   [] 4   © 2007, Trustees of [address] [place], [place] [address], under license to [employer]. All rights reserved     Score:  Initial: 18 Most Recent: X (Date: -- )    Interpretation of Tool:  Represents activities that are increasingly more difficult (i.e. Bed mobility, Transfers, Gait). Medical Necessity:     · Patient is expected to demonstrate progress in   · strength, range of motion, and balance  ·  to   · decrease assistance required with exercises and functional mobility  · . Reason for Services/Other Comments:  · Patient   · continues to require present interventions due to patient's inability to perform exercises and functional mobility independently  · . Use of outcome tool(s) and clinical judgement create a POC that gives a: Clear prediction of patient's progress: LOW COMPLEXITY            TREATMENT:   (In addition to Assessment/Re-Assessment sessions the following treatments were rendered)     Pre-treatment Symptoms/Complaints:  Little hip soreness  Pain Initial:      Post Session:  none     Therapeutic Exercise: (10 Minutes):  Exercises per grid below to improve mobility and strength.   Required minimal visual, verbal, and manual cues to promote proper body alignment, promote proper body posture, and promote proper body mechanics. Progressed range and repetitions as indicated. Gait Training (15 Minutes):  Gait training to improve and/or restore physical functioning as related to mobility, strength and balance. Ambulated 200 Feet (ft) with Supervision using a Walker, rolling and minimal Safety awareness training;Verbal cues related to their stance phase to promote proper body alignment, promote proper body posture and promote proper body mechanics. Date:  10/7 Date:  10/8 Date:     ACTIVITY/EXERCISE AM PM AM PM AM PM   GROUP THERAPY  []  []  []  []  []  []   Ankle Pumps  10a 15a      Quad Sets  10a 15a      Gluteal Sets  10a 15a      Hip ABd/ADduction  10a 15a      Straight Leg Raises         Knee Slides  10a 15a      Short Arc Quads   15a      Long Arc Quads   15a      Chair Slides                  B = bilateral; AA = active assistive; A = active; P = passive      Treatment/Session Assessment:     Response to Treatment:  Doing very well    Education:  [x] Home Exercises  [x] Fall Precautions  [x] Hip Precautions [x] D/C Instruction Review  [x] Hip Prosthesis Review  [x] Walker Management/Safety [] Adaptive Equipment as Needed       Interdisciplinary Collaboration:   o Registered Nurse    After treatment position/precautions:   o Up in chair  o Bed/Chair-wheels locked  o Bed in low position  o Call light within reach    Compliance with Program/Exercises: Compliant most of the time. Recommendations/Intent for next treatment session:  Treatment next visit will focus on increasing Mr. Mcphersons independence with bed mobility, transfers, gait training, strength/ROM exercises, modalities for pain, and patient education.       Total Treatment Duration:  PT Patient Time In/Time Out  Time In: [facility]  Time Out: [address] [facility]

## 2020-10-08 NOTE — PROGRESS NOTES
10/07/20 1955   Oxygen Therapy   O2 Sat (%) 95 %   Pulse via Oximetry 84 beats per minute   O2 Device Room air   O2 Flow Rate (L/min) 0 l/min   Patient placed on continuous sat monitor. Monitor history deleted prior to placing on patient. Patient working on IS achieving 6125-9652 ml. Very good effort, technique.

## 2020-10-08 NOTE — PROGRESS NOTES
2020         Post Op day: 1 Day Post-Op     Admit Date: 10/7/2020  Admit Diagnosis: Osteoarthritis of right hip, unspecified osteoarthritis type [M16.11]  Osteoarthritis of right hip [M16.11]        Subjective: Patient stable. No acute events. Objective:   Visit Vitals  /69 (BP 1 Location: Right arm, BP Patient Position: At rest)   Pulse 76   Temp 98 °F (36.7 °C)   Resp 18   Ht 5' 11\" (1.803 m)   Wt 94.3 kg (208 lb)   SpO2 94%   BMI 29.01 kg/m²    Temp (24hrs), Av.7 °F (36.5 °C), Min:97.3 °F (36.3 °C), Max:98.2 °F (36.8 °C)    Lab Results   Component Value Date/Time    HGB 12.1 (L) 10/08/2020 04:23 AM     Extremity Exam  Dressing clean and dry   Tibialis Anterior and Gastroc-Soleus functioning normally right lower extremity  Sensation intact to light touch on operative limb  Extremity perfused  TEDS/SCDS in place  No sign of DVT     Assessment / Plan :  WBAT RLE  Continue PT/OT  Continue current DVT prophylaxis in house.  Discharge on ASA BID  DIspo-HH  Patient Active Problem List   Diagnosis Code    Snoring R06.83    Osteoarthritis of right hip M16.11          Signed By: Cristal Borges NP

## 2020-10-10 ENCOUNTER — HOME CARE VISIT (OUTPATIENT)
Dept: SCHEDULING | Facility: HOME HEALTH | Age: 60
End: 2020-10-10
Payer: COMMERCIAL

## 2020-10-10 VITALS
TEMPERATURE: 98.5 F | HEART RATE: 85 BPM | RESPIRATION RATE: 18 BRPM | DIASTOLIC BLOOD PRESSURE: 80 MMHG | SYSTOLIC BLOOD PRESSURE: 148 MMHG

## 2020-10-10 PROCEDURE — G0151 HHCP-SERV OF PT,EA 15 MIN: HCPCS

## 2020-10-10 PROCEDURE — 400013 HH SOC

## 2020-10-13 ENCOUNTER — HOME CARE VISIT (OUTPATIENT)
Dept: SCHEDULING | Facility: HOME HEALTH | Age: 60
End: 2020-10-13
Payer: COMMERCIAL

## 2020-10-13 VITALS
DIASTOLIC BLOOD PRESSURE: 82 MMHG | TEMPERATURE: 97.3 F | SYSTOLIC BLOOD PRESSURE: 142 MMHG | HEART RATE: 80 BPM | RESPIRATION RATE: 16 BRPM

## 2020-10-13 PROCEDURE — G0151 HHCP-SERV OF PT,EA 15 MIN: HCPCS

## 2020-10-15 ENCOUNTER — HOME CARE VISIT (OUTPATIENT)
Dept: SCHEDULING | Facility: HOME HEALTH | Age: 60
End: 2020-10-15
Payer: COMMERCIAL

## 2020-10-15 VITALS
SYSTOLIC BLOOD PRESSURE: 154 MMHG | TEMPERATURE: 97.6 F | RESPIRATION RATE: 16 BRPM | DIASTOLIC BLOOD PRESSURE: 84 MMHG | HEART RATE: 80 BPM

## 2020-10-15 PROCEDURE — G0151 HHCP-SERV OF PT,EA 15 MIN: HCPCS

## 2020-10-20 ENCOUNTER — HOME CARE VISIT (OUTPATIENT)
Dept: SCHEDULING | Facility: HOME HEALTH | Age: 60
End: 2020-10-20
Payer: COMMERCIAL

## 2020-10-20 VITALS
OXYGEN SATURATION: 98 % | TEMPERATURE: 98 F | SYSTOLIC BLOOD PRESSURE: 158 MMHG | RESPIRATION RATE: 18 BRPM | HEART RATE: 82 BPM | DIASTOLIC BLOOD PRESSURE: 85 MMHG

## 2020-10-20 PROCEDURE — G0157 HHC PT ASSISTANT EA 15: HCPCS

## 2020-10-22 ENCOUNTER — HOME CARE VISIT (OUTPATIENT)
Dept: SCHEDULING | Facility: HOME HEALTH | Age: 60
End: 2020-10-22
Payer: COMMERCIAL

## 2020-10-22 VITALS
SYSTOLIC BLOOD PRESSURE: 150 MMHG | RESPIRATION RATE: 18 BRPM | OXYGEN SATURATION: 97 % | TEMPERATURE: 98.5 F | HEART RATE: 80 BPM | DIASTOLIC BLOOD PRESSURE: 84 MMHG

## 2020-10-22 PROCEDURE — G0157 HHC PT ASSISTANT EA 15: HCPCS

## 2020-10-27 ENCOUNTER — HOME CARE VISIT (OUTPATIENT)
Dept: SCHEDULING | Facility: HOME HEALTH | Age: 60
End: 2020-10-27
Payer: COMMERCIAL

## 2020-10-27 VITALS
SYSTOLIC BLOOD PRESSURE: 150 MMHG | RESPIRATION RATE: 18 BRPM | DIASTOLIC BLOOD PRESSURE: 78 MMHG | HEART RATE: 78 BPM | OXYGEN SATURATION: 98 % | TEMPERATURE: 97.8 F

## 2020-10-27 PROCEDURE — G0151 HHCP-SERV OF PT,EA 15 MIN: HCPCS

## 2020-11-04 ENCOUNTER — HOSPITAL ENCOUNTER (OUTPATIENT)
Dept: SURGERY | Age: 60
Discharge: HOME | End: 2020-11-04
Attending: ORTHOPAEDIC SURGERY
Payer: COMMERCIAL

## 2020-11-04 VITALS
HEART RATE: 83 BPM | HEIGHT: 71 IN | OXYGEN SATURATION: 96 % | SYSTOLIC BLOOD PRESSURE: 156 MMHG | BODY MASS INDEX: 29.54 KG/M2 | DIASTOLIC BLOOD PRESSURE: 92 MMHG | WEIGHT: 211 LBS | TEMPERATURE: 97.3 F

## 2020-11-04 LAB
ANION GAP SERPL CALC-SCNC: 4 MMOL/L (ref 7–16)
BACTERIA SPEC CULT: ABNORMAL
BUN SERPL-MCNC: 19 MG/DL (ref 8–23)
CALCIUM SERPL-MCNC: 9.6 MG/DL (ref 8.3–10.4)
CHLORIDE SERPL-SCNC: 107 MMOL/L (ref 98–107)
CO2 SERPL-SCNC: 29 MMOL/L (ref 21–32)
CREAT SERPL-MCNC: 1.19 MG/DL (ref 0.8–1.5)
ERYTHROCYTE [DISTWIDTH] IN BLOOD BY AUTOMATED COUNT: 14.4 % (ref 11.9–14.6)
GLUCOSE SERPL-MCNC: 88 MG/DL (ref 65–100)
HCT VFR BLD AUTO: 40.3 % (ref 41.1–50.3)
HGB BLD-MCNC: 12.7 G/DL (ref 13.6–17.2)
MCH RBC QN AUTO: 29 PG (ref 26.1–32.9)
MCHC RBC AUTO-ENTMCNC: 31.5 G/DL (ref 31.4–35)
MCV RBC AUTO: 92 FL (ref 79.6–97.8)
NRBC # BLD: 0 K/UL (ref 0–0.2)
PLATELET # BLD AUTO: 183 K/UL (ref 150–450)
PMV BLD AUTO: 10 FL (ref 9.4–12.3)
POTASSIUM SERPL-SCNC: 4.1 MMOL/L (ref 3.5–5.1)
RBC # BLD AUTO: 4.38 M/UL (ref 4.23–5.6)
SERVICE CMNT-IMP: ABNORMAL
SODIUM SERPL-SCNC: 140 MMOL/L (ref 136–145)
WBC # BLD AUTO: 9.4 K/UL (ref 4.3–11.1)

## 2020-11-04 PROCEDURE — 85027 COMPLETE CBC AUTOMATED: CPT

## 2020-11-04 PROCEDURE — 36415 COLL VENOUS BLD VENIPUNCTURE: CPT

## 2020-11-04 PROCEDURE — 87641 MR-STAPH DNA AMP PROBE: CPT

## 2020-11-04 PROCEDURE — 80048 BASIC METABOLIC PNL TOTAL CA: CPT

## 2020-11-04 RX ORDER — VERAPAMIL HYDROCHLORIDE 240 MG/1
240 TABLET, FILM COATED, EXTENDED RELEASE ORAL DAILY
COMMUNITY

## 2020-11-04 NOTE — PERIOP NOTES
PLEASE CONTINUE TAKING ALL PRESCRIPTION MEDICATIONS UP TO THE DAY OF SURGERY UNLESS OTHERWISE DIRECTED BELOW. DISCONTINUE all vitamins and supplements now. DISCONTINUE Non-Steriodal Anti-Inflammatory (NSAIDS) such as Advil, Ibuprofen, Motrin, Naproxen, and Aleve FIVE days prior to surgery. Home Medications to take  the day of surgery (11/11/2020)      81 mg Aspirin, Verapamil              Home Medications   to Hold   Meloxicam/Mobic=stop 5 days prior on 11/06/2020   *Decrease Aspirin to one 81 mg tab daily on 11/06/2020     Comments   *May take Tylenol up until the day before surgery if needed. *Bring: Carmelina-hex soap, Incentive Spirometer, Photo ID, Insurance card,    *Visitor policy of 1 visitor per patient discussed. Please do not bring home medications with you on the day of surgery unless otherwise directed by your nurse. If you are instructed to bring home medications, please give them to your nurse as they will be administered by the nursing staff. If you have any questions, please call [facility] [phone]. A copy of this note was provided to the patient for reference.

## 2020-11-04 NOTE — PERIOP NOTES
Received EKG dated 09/28/2020 from [facility]. Result within anesthesia protocol and on chart for reference if needed.

## 2020-11-04 NOTE — ADVANCED PRACTICE NURSE
Total Joint Surgery Preoperative Chart Review      Patient ID:  Shruthi Bermudez  [id number]  63 y.o.  1960  Surgeon: Dr. Galen Charles  Date of Surgery: 11/11/2020  Procedure: Total Left Hip Arthroplasty  Primary Care Physician: None None  Specialty Physician(s):      Subjective:   Shruthi Bermudez is a 63 y.o. WHITE OR  male who presents for preoperative evaluation for Total Left Hip arthroplasty. This is a preoperative chart review note based on data collected by the nurse at the surgical Pre-Assessment visit. Past Medical History:   Diagnosis Date    Arthritis     hips    CAD (coronary artery disease) 2017, 10/02/2020    Mild CAD, normal LV function per cath dated 10/02/2020; followed by [facility]    Dyspnea on exertion     GERD (gastroesophageal reflux disease)     resolved    H/O cold sores     Hyperlipidemia     on med for control     Hypertension     on med for control     Latex allergy     Wide-complex tachycardia ([address] 2017    NSVT      Past Surgical History:   Procedure Laterality Date    HX COLONOSCOPY      HX HEART CATHETERIZATION  2017    no stents     HX HEART CATHETERIZATION  10/02/2020    Mild CAD, normal LV function    HX HIP REPLACEMENT Right 10/07/2020     Family History   Problem Relation Age of Onset    Depression Mother     Heart Disease Father       Social History     Tobacco Use    Smoking status: Never Smoker    Smokeless tobacco: Never Used   Substance Use Topics    Alcohol use: Never     Frequency: Never       Prior to Admission medications    Medication Sig Start Date End Date Taking? Authorizing Provider   verapamil ER (CALAN-SR) 240 mg CR tablet Take 240 mg by mouth daily. Take / use AM day of surgery  per anesthesia protocols. Yes Provider, Historical   losartan (COZAAR) 50 mg tablet Take 100 mg by mouth daily.  10/17/20  Yes RAYMUNDO Loera   acetaminophen (TYLENOL) 500 mg tablet Take 2 Tabs by mouth every six (6) hours as needed for Pain. 10/7/20  Yes Viridiana Raman MD   aspirin delayed-release 81 mg tablet Take 1 Tab by mouth two (2) times a day. 10/7/20  Yes Viridiana Raman MD   meloxicam (MOBIC) 7.5 mg tablet Take 1 Tab by mouth daily. 10/7/20  Yes Viridiana Raman MD   senna-docusate (PERICOLACE) 8.6-50 mg per tablet Take 1 Tab by mouth daily. 10/7/20  Yes Viridiana Raman MD   atorvastatin (LIPITOR) 40 mg tablet Take 40 mg by mouth nightly. 10/22/19  Yes Provider, Historical   multivit-min/FA/lycopen/lutein (CENTRUM SILVER MEN PO) Take 1 Tab by mouth daily. Yes Provider, Historical   LYSINE PO Take 1,000 mg by mouth nightly.    Yes Provider, Historical     Allergies   Allergen Reactions    Latex Rash          Objective:     Physical Exam:   Patient Vitals for the past 24 hrs:   Temp Pulse BP SpO2   11/04/20 1013 97.3 °F (36.3 °C) 83 (!) 156/92 96 %       ECG:    EKG Results     None          Data Review:   Labs:   Recent Results (from the past 24 hour(s))   CBC W/O DIFF    Collection Time: 11/04/20 10:53 AM   Result Value Ref Range    WBC 9.4 4.3 - 11.1 K/uL    RBC 4.38 4.23 - 5.6 M/uL    HGB 12.7 (L) 13.6 - 17.2 g/dL    HCT 40.3 (L) 41.1 - 50.3 %    MCV 92.0 79.6 - 97.8 FL    MCH 29.0 26.1 - 32.9 PG    MCHC 31.5 31.4 - 35.0 g/dL    RDW 14.4 11.9 - 14.6 %    PLATELET 883 935 - 061 K/uL    MPV 10.0 9.4 - 12.3 FL    ABSOLUTE NRBC 0.00 0.0 - 0.2 K/uL   METABOLIC PANEL, BASIC    Collection Time: 11/04/20 10:53 AM   Result Value Ref Range    Sodium 140 136 - 145 mmol/L    Potassium 4.1 3.5 - 5.1 mmol/L    Chloride 107 98 - 107 mmol/L    CO2 29 21 - 32 mmol/L    Anion gap 4 (L) 7 - 16 mmol/L    Glucose 88 65 - 100 mg/dL    BUN 19 8 - 23 MG/DL    Creatinine 1.19 0.8 - 1.5 MG/DL    GFR est AA >60 >60 ml/min/1.73m2    GFR est non-AA >60 >60 ml/min/1.73m2    Calcium 9.6 8.3 - 10.4 MG/DL         Problem List:  )  Patient Active Problem List   Diagnosis Code    Snoring R06.83    Osteoarthritis of right hip M16.11       Total Joint Surgery Pre-Assessment Recommendations:           HST pending  Recommend continuous saturation monitoring hours of sleep, during hospitalization. O2 prn per respiratory protocol.        Signed By: Drea Albarado NP-C    November 4, 2020

## 2020-11-04 NOTE — PERIOP NOTES
Patient verified name and . Order for consent NOT found in EHR; patient verified. Type 3 surgery, walk in assessment complete. Labs per surgeon: unknown; no orders received in EHR at time of assessment. Labs per anesthesia protocol: CBC, BMP; results pending. EKG: last completed on 2020 at [facility] cardiology. Will request record via fax along with EKG dated 10/22/19 for comparison. Heart cath (10/02/2020), Stress test (2020), ECHO (19), and [facility] office note's (2020 & 10/22/19) available in EHR for reference if needed. Patient states still have Incentive Spirometer from prior surgery in October. Instructed to bring dos and use 10 times twice a day. Patient verbalized understanding. Instruction sheet given. Patient aware that a negative Covid swab result is required to proceed with surgery; appointments are made by the surgeon office and should test should be collected 7 days prior to surgery. The testing center is located at the . [address], [place]. MRSA/MSSA swab collected; pharmacy to review and dose antibiotic as appropriate. Hospital approved surgical skin cleanser and instructions to return bottle on DOS given per hospital policy. Patient provided with handouts including Guide to Surgery, Pain Management, Hand Hygiene, Blood Transfusion Education, and [facility] Brochure. Patient answered medical/surgical history questions at their best of ability. All prior to admission medications documented in [facility]. Original medication prescription bottles NOT visualized during patient appointment. Patient instructed to hold all vitamins 3 weeks prior to surgery and NSAIDS 5 days prior to surgery. Patient teach back successful and patient demonstrates knowledge of instruction.

## 2020-11-04 NOTE — PERIOP NOTES
Dr. Wilda Asher,     Your patient was seen in [place] [address] today. I am attaching the results of the labs for your to review and follow-up with if necessary. If you would like to order additional labs or tests please enter into [facility] or fax to [phone]. Please do not respond to this message as my mailbox is not monitored. You may call [phone] with questions or concerns. Patient does not have a PCP to route labs to.        Recent Results (from the past 12 hour(s))   CBC W/O DIFF    Collection Time: 11/04/20 10:53 AM   Result Value Ref Range    WBC 9.4 4.3 - 11.1 K/uL    RBC 4.38 4.23 - 5.6 M/uL    HGB 12.7 (L) 13.6 - 17.2 g/dL    HCT 40.3 (L) 41.1 - 50.3 %    MCV 92.0 79.6 - 97.8 FL    MCH 29.0 26.1 - 32.9 PG    MCHC 31.5 31.4 - 35.0 g/dL    RDW 14.4 11.9 - 14.6 %    PLATELET 010 279 - 284 K/uL    MPV 10.0 9.4 - 12.3 FL    ABSOLUTE NRBC 0.00 0.0 - 0.2 K/uL   METABOLIC PANEL, BASIC    Collection Time: 11/04/20 10:53 AM   Result Value Ref Range    Sodium 140 136 - 145 mmol/L    Potassium 4.1 3.5 - 5.1 mmol/L    Chloride 107 98 - 107 mmol/L    CO2 29 21 - 32 mmol/L    Anion gap 4 (L) 7 - 16 mmol/L    Glucose 88 65 - 100 mg/dL    BUN 19 8 - 23 MG/DL    Creatinine 1.19 0.8 - 1.5 MG/DL    GFR est AA >60 >60 ml/min/1.73m2    GFR est non-AA >60 >60 ml/min/1.73m2    Calcium 9.6 8.3 - 10.4 MG/DL

## 2020-11-04 NOTE — PERIOP NOTES
The following records have been requested from [facility]:       [address]    Please send EKG's (09/28/2020 & 10/22/2019) via fax to [phone]. Thank you!

## 2020-11-10 ENCOUNTER — ANESTHESIA EVENT (OUTPATIENT)
Dept: SURGERY | Age: 60
End: 2020-11-10
Payer: COMMERCIAL

## 2020-11-11 ENCOUNTER — HOSPITAL ENCOUNTER (OUTPATIENT)
Age: 60
Setting detail: OBSERVATION
Discharge: HOME HEALTH | End: 2020-11-12
Attending: ORTHOPAEDIC SURGERY | Admitting: ORTHOPAEDIC SURGERY
Payer: COMMERCIAL

## 2020-11-11 ENCOUNTER — HOME HEALTH ADMISSION (OUTPATIENT)
Dept: HOME HEALTH SERVICES | Facility: HOME HEALTH | Age: 60
End: 2020-11-11
Payer: COMMERCIAL

## 2020-11-11 ENCOUNTER — ANESTHESIA (OUTPATIENT)
Dept: SURGERY | Age: 60
End: 2020-11-11
Payer: COMMERCIAL

## 2020-11-11 ENCOUNTER — APPOINTMENT (OUTPATIENT)
Dept: GENERAL RADIOLOGY | Age: 60
End: 2020-11-11
Attending: ORTHOPAEDIC SURGERY
Payer: COMMERCIAL

## 2020-11-11 DIAGNOSIS — M16.12 PRIMARY OSTEOARTHRITIS OF LEFT HIP: Primary | ICD-10-CM

## 2020-11-11 PROBLEM — M16.9 OA (OSTEOARTHRITIS) OF HIP: Status: ACTIVE | Noted: 2020-11-11

## 2020-11-11 LAB
GLUCOSE BLD STRIP.AUTO-MCNC: 79 MG/DL (ref 65–100)
HGB BLD-MCNC: 10.9 G/DL (ref 13.6–17.2)

## 2020-11-11 PROCEDURE — 97165 OT EVAL LOW COMPLEX 30 MIN: CPT

## 2020-11-11 PROCEDURE — 77030033138 HC SUT PGA STRATFX J&J -B: Performed by: ORTHOPAEDIC SURGERY

## 2020-11-11 PROCEDURE — 2709999900 HC NON-CHARGEABLE SUPPLY: Performed by: ORTHOPAEDIC SURGERY

## 2020-11-11 PROCEDURE — 99218 HC RM OBSERVATION: CPT

## 2020-11-11 PROCEDURE — 77030019557 HC ELECTRD VES SEAL MEDT -F: Performed by: ORTHOPAEDIC SURGERY

## 2020-11-11 PROCEDURE — 77030007880 HC KT SPN EPDRL BBMI -B: Performed by: STUDENT IN AN ORGANIZED HEALTH CARE EDUCATION/TRAINING PROGRAM

## 2020-11-11 PROCEDURE — 77030003665 HC NDL SPN BBMI -A: Performed by: STUDENT IN AN ORGANIZED HEALTH CARE EDUCATION/TRAINING PROGRAM

## 2020-11-11 PROCEDURE — 74011000250 HC RX REV CODE- 250: Performed by: ORTHOPAEDIC SURGERY

## 2020-11-11 PROCEDURE — 2709999900 HC NON-CHARGEABLE SUPPLY

## 2020-11-11 PROCEDURE — 74011250637 HC RX REV CODE- 250/637: Performed by: NURSE PRACTITIONER

## 2020-11-11 PROCEDURE — 76010000162 HC OR TIME 1.5 TO 2 HR INTENSV-TIER 1: Performed by: ORTHOPAEDIC SURGERY

## 2020-11-11 PROCEDURE — 74011000250 HC RX REV CODE- 250: Performed by: NURSE ANESTHETIST, CERTIFIED REGISTERED

## 2020-11-11 PROCEDURE — 94762 N-INVAS EAR/PLS OXIMTRY CONT: CPT

## 2020-11-11 PROCEDURE — 76210000006 HC OR PH I REC 0.5 TO 1 HR: Performed by: ORTHOPAEDIC SURGERY

## 2020-11-11 PROCEDURE — 36415 COLL VENOUS BLD VENIPUNCTURE: CPT

## 2020-11-11 PROCEDURE — 74011000250 HC RX REV CODE- 250: Performed by: NURSE PRACTITIONER

## 2020-11-11 PROCEDURE — 72170 X-RAY EXAM OF PELVIS: CPT

## 2020-11-11 PROCEDURE — C1776 JOINT DEVICE (IMPLANTABLE): HCPCS | Performed by: ORTHOPAEDIC SURGERY

## 2020-11-11 PROCEDURE — 77030002922 HC SUT FBRWRE ARTH -B: Performed by: ORTHOPAEDIC SURGERY

## 2020-11-11 PROCEDURE — 74011250636 HC RX REV CODE- 250/636: Performed by: STUDENT IN AN ORGANIZED HEALTH CARE EDUCATION/TRAINING PROGRAM

## 2020-11-11 PROCEDURE — 97161 PT EVAL LOW COMPLEX 20 MIN: CPT

## 2020-11-11 PROCEDURE — 76060000035 HC ANESTHESIA 2 TO 2.5 HR: Performed by: ORTHOPAEDIC SURGERY

## 2020-11-11 PROCEDURE — C1713 ANCHOR/SCREW BN/BN,TIS/BN: HCPCS | Performed by: ORTHOPAEDIC SURGERY

## 2020-11-11 PROCEDURE — 77030018673: Performed by: ORTHOPAEDIC SURGERY

## 2020-11-11 PROCEDURE — 74011250636 HC RX REV CODE- 250/636: Performed by: ORTHOPAEDIC SURGERY

## 2020-11-11 PROCEDURE — 74011000250 HC RX REV CODE- 250: Performed by: STUDENT IN AN ORGANIZED HEALTH CARE EDUCATION/TRAINING PROGRAM

## 2020-11-11 PROCEDURE — 94760 N-INVAS EAR/PLS OXIMETRY 1: CPT

## 2020-11-11 PROCEDURE — 77030002933 HC SUT MCRYL J&J -A: Performed by: ORTHOPAEDIC SURGERY

## 2020-11-11 PROCEDURE — 77030034479 HC ADH SKN CLSR PRINEO J&J -B: Performed by: ORTHOPAEDIC SURGERY

## 2020-11-11 PROCEDURE — 77030006835 HC BLD SAW SAG STRY -B: Performed by: ORTHOPAEDIC SURGERY

## 2020-11-11 PROCEDURE — 97110 THERAPEUTIC EXERCISES: CPT

## 2020-11-11 PROCEDURE — 74011250637 HC RX REV CODE- 250/637: Performed by: ORTHOPAEDIC SURGERY

## 2020-11-11 PROCEDURE — 74011250637 HC RX REV CODE- 250/637: Performed by: STUDENT IN AN ORGANIZED HEALTH CARE EDUCATION/TRAINING PROGRAM

## 2020-11-11 PROCEDURE — 85018 HEMOGLOBIN: CPT

## 2020-11-11 PROCEDURE — 82962 GLUCOSE BLOOD TEST: CPT

## 2020-11-11 PROCEDURE — 77030033067 HC SUT PDO STRATFX SPIR J&J -B: Performed by: ORTHOPAEDIC SURGERY

## 2020-11-11 PROCEDURE — 65270000029 HC RM PRIVATE

## 2020-11-11 PROCEDURE — 74011250636 HC RX REV CODE- 250/636: Performed by: NURSE ANESTHETIST, CERTIFIED REGISTERED

## 2020-11-11 PROCEDURE — 77030040361 HC SLV COMPR DVT MDII -B

## 2020-11-11 PROCEDURE — 74011250636 HC RX REV CODE- 250/636: Performed by: NURSE PRACTITIONER

## 2020-11-11 DEVICE — HIP H2 TOT ADV OTHER HD -- IMPL CAPPED H2: Type: IMPLANTABLE DEVICE | Site: HIP | Status: FUNCTIONAL

## 2020-11-11 DEVICE — CERAMIC V40 FEMORAL HEAD
Type: IMPLANTABLE DEVICE | Site: HIP | Status: FUNCTIONAL
Brand: BIOLOX

## 2020-11-11 DEVICE — 127 DEGREE NECK ANGLE HIP STEM
Type: IMPLANTABLE DEVICE | Site: HIP | Status: FUNCTIONAL
Brand: ACCOLADE

## 2020-11-11 DEVICE — SHELL ACET CLUS H 58F TRTANIUM -- TRIDENT II: Type: IMPLANTABLE DEVICE | Site: HIP | Status: FUNCTIONAL

## 2020-11-11 DEVICE — 0 DEGREE POLYETHYLENE INSERT
Type: IMPLANTABLE DEVICE | Site: HIP | Status: FUNCTIONAL
Brand: TRIDENT

## 2020-11-11 DEVICE — 6.5MM LOW PROFILE HEX SCREW 35MM
Type: IMPLANTABLE DEVICE | Site: HIP | Status: FUNCTIONAL
Brand: TRIDENT II

## 2020-11-11 RX ORDER — ZOLPIDEM TARTRATE 5 MG/1
5 TABLET ORAL
Status: DISCONTINUED | OUTPATIENT
Start: 2020-11-11 | End: 2020-11-12 | Stop reason: HOSPADM

## 2020-11-11 RX ORDER — TRANEXAMIC ACID 100 MG/ML
INJECTION, SOLUTION INTRAVENOUS AS NEEDED
Status: DISCONTINUED | OUTPATIENT
Start: 2020-11-11 | End: 2020-11-11 | Stop reason: HOSPADM

## 2020-11-11 RX ORDER — LOSARTAN POTASSIUM 50 MG/1
100 TABLET ORAL DAILY
Status: DISCONTINUED | OUTPATIENT
Start: 2020-11-12 | End: 2020-11-12 | Stop reason: HOSPADM

## 2020-11-11 RX ORDER — AMOXICILLIN 250 MG
2 CAPSULE ORAL DAILY
Status: DISCONTINUED | OUTPATIENT
Start: 2020-11-12 | End: 2020-11-12 | Stop reason: HOSPADM

## 2020-11-11 RX ORDER — FLUMAZENIL 0.1 MG/ML
0.2 INJECTION INTRAVENOUS
Status: DISCONTINUED | OUTPATIENT
Start: 2020-11-11 | End: 2020-11-11 | Stop reason: HOSPADM

## 2020-11-11 RX ORDER — DEXAMETHASONE SODIUM PHOSPHATE 4 MG/ML
INJECTION, SOLUTION INTRA-ARTICULAR; INTRALESIONAL; INTRAMUSCULAR; INTRAVENOUS; SOFT TISSUE AS NEEDED
Status: DISCONTINUED | OUTPATIENT
Start: 2020-11-11 | End: 2020-11-11 | Stop reason: HOSPADM

## 2020-11-11 RX ORDER — KETOROLAC TROMETHAMINE 30 MG/ML
15 INJECTION, SOLUTION INTRAMUSCULAR; INTRAVENOUS EVERY 8 HOURS
Status: DISCONTINUED | OUTPATIENT
Start: 2020-11-11 | End: 2020-11-12 | Stop reason: HOSPADM

## 2020-11-11 RX ORDER — ACETAMINOPHEN 500 MG
1000 TABLET ORAL EVERY 6 HOURS
Status: DISCONTINUED | OUTPATIENT
Start: 2020-11-11 | End: 2020-11-12 | Stop reason: HOSPADM

## 2020-11-11 RX ORDER — SODIUM CHLORIDE 9 MG/ML
100 INJECTION, SOLUTION INTRAVENOUS CONTINUOUS
Status: DISCONTINUED | OUTPATIENT
Start: 2020-11-11 | End: 2020-11-12 | Stop reason: HOSPADM

## 2020-11-11 RX ORDER — CEFAZOLIN SODIUM/WATER 2 G/20 ML
SYRINGE (ML) INTRAVENOUS
Status: ACTIVE
Start: 2020-11-11 | End: 2020-11-11

## 2020-11-11 RX ORDER — CEFAZOLIN SODIUM/WATER 2 G/20 ML
2 SYRINGE (ML) INTRAVENOUS ONCE
Status: DISCONTINUED | OUTPATIENT
Start: 2020-11-11 | End: 2020-11-11 | Stop reason: SDUPTHER

## 2020-11-11 RX ORDER — SODIUM CHLORIDE, SODIUM LACTATE, POTASSIUM CHLORIDE, CALCIUM CHLORIDE 600; 310; 30; 20 MG/100ML; MG/100ML; MG/100ML; MG/100ML
100 INJECTION, SOLUTION INTRAVENOUS CONTINUOUS
Status: DISCONTINUED | OUTPATIENT
Start: 2020-11-11 | End: 2020-11-11 | Stop reason: HOSPADM

## 2020-11-11 RX ORDER — FENTANYL CITRATE 50 UG/ML
INJECTION, SOLUTION INTRAMUSCULAR; INTRAVENOUS AS NEEDED
Status: DISCONTINUED | OUTPATIENT
Start: 2020-11-11 | End: 2020-11-11 | Stop reason: HOSPADM

## 2020-11-11 RX ORDER — HYDROMORPHONE HYDROCHLORIDE 2 MG/1
2 TABLET ORAL
Status: DISCONTINUED | OUTPATIENT
Start: 2020-11-11 | End: 2020-11-12 | Stop reason: HOSPADM

## 2020-11-11 RX ORDER — ATORVASTATIN CALCIUM 40 MG/1
40 TABLET, FILM COATED ORAL
Status: DISCONTINUED | OUTPATIENT
Start: 2020-11-11 | End: 2020-11-12 | Stop reason: HOSPADM

## 2020-11-11 RX ORDER — DEXAMETHASONE SODIUM PHOSPHATE 100 MG/10ML
10 INJECTION INTRAMUSCULAR; INTRAVENOUS ONCE
Status: DISCONTINUED | OUTPATIENT
Start: 2020-11-12 | End: 2020-11-12 | Stop reason: HOSPADM

## 2020-11-11 RX ORDER — MELOXICAM 7.5 MG/1
7.5 TABLET ORAL 2 TIMES DAILY
Status: DISCONTINUED | OUTPATIENT
Start: 2020-11-12 | End: 2020-11-12 | Stop reason: HOSPADM

## 2020-11-11 RX ORDER — TRANEXAMIC ACID 650 1/1
1300 TABLET ORAL
Status: DISPENSED | OUTPATIENT
Start: 2020-11-11 | End: 2020-11-11

## 2020-11-11 RX ORDER — HYDROMORPHONE HYDROCHLORIDE 2 MG/ML
0.5 INJECTION, SOLUTION INTRAMUSCULAR; INTRAVENOUS; SUBCUTANEOUS
Status: DISCONTINUED | OUTPATIENT
Start: 2020-11-11 | End: 2020-11-11 | Stop reason: HOSPADM

## 2020-11-11 RX ORDER — SODIUM CHLORIDE 0.9 % (FLUSH) 0.9 %
5-40 SYRINGE (ML) INJECTION AS NEEDED
Status: DISCONTINUED | OUTPATIENT
Start: 2020-11-11 | End: 2020-11-11 | Stop reason: HOSPADM

## 2020-11-11 RX ORDER — DIPHENHYDRAMINE HCL 25 MG
25 CAPSULE ORAL
Status: DISCONTINUED | OUTPATIENT
Start: 2020-11-11 | End: 2020-11-12 | Stop reason: HOSPADM

## 2020-11-11 RX ORDER — ASPIRIN 81 MG/1
81 TABLET ORAL EVERY 12 HOURS
Status: DISCONTINUED | OUTPATIENT
Start: 2020-11-11 | End: 2020-11-12 | Stop reason: HOSPADM

## 2020-11-11 RX ORDER — CEFAZOLIN SODIUM/WATER 2 G/20 ML
2 SYRINGE (ML) INTRAVENOUS ONCE
Status: COMPLETED | OUTPATIENT
Start: 2020-11-11 | End: 2020-11-11

## 2020-11-11 RX ORDER — CYCLOBENZAPRINE HCL 10 MG
5 TABLET ORAL
Status: DISCONTINUED | OUTPATIENT
Start: 2020-11-11 | End: 2020-11-12 | Stop reason: HOSPADM

## 2020-11-11 RX ORDER — ONDANSETRON 2 MG/ML
INJECTION INTRAMUSCULAR; INTRAVENOUS AS NEEDED
Status: DISCONTINUED | OUTPATIENT
Start: 2020-11-11 | End: 2020-11-11 | Stop reason: HOSPADM

## 2020-11-11 RX ORDER — PROPOFOL 10 MG/ML
INJECTION, EMULSION INTRAVENOUS
Status: DISCONTINUED | OUTPATIENT
Start: 2020-11-11 | End: 2020-11-11 | Stop reason: HOSPADM

## 2020-11-11 RX ORDER — ONDANSETRON 8 MG/1
8 TABLET, ORALLY DISINTEGRATING ORAL
Qty: 30 TAB | Refills: 0 | Status: SHIPPED | OUTPATIENT
Start: 2020-11-11 | End: 2021-04-01 | Stop reason: ALTCHOICE

## 2020-11-11 RX ORDER — ZOLPIDEM TARTRATE 5 MG/1
5 TABLET ORAL
Qty: 30 TAB | Refills: 0 | Status: SHIPPED | OUTPATIENT
Start: 2020-11-11 | End: 2021-04-01 | Stop reason: ALTCHOICE

## 2020-11-11 RX ORDER — ROPIVACAINE HYDROCHLORIDE 2 MG/ML
INJECTION, SOLUTION EPIDURAL; INFILTRATION; PERINEURAL AS NEEDED
Status: DISCONTINUED | OUTPATIENT
Start: 2020-11-11 | End: 2020-11-11 | Stop reason: HOSPADM

## 2020-11-11 RX ORDER — MIDAZOLAM HYDROCHLORIDE 1 MG/ML
2 INJECTION, SOLUTION INTRAMUSCULAR; INTRAVENOUS
Status: COMPLETED | OUTPATIENT
Start: 2020-11-11 | End: 2020-11-11

## 2020-11-11 RX ORDER — CEFAZOLIN SODIUM/WATER 2 G/20 ML
2 SYRINGE (ML) INTRAVENOUS EVERY 8 HOURS
Status: COMPLETED | OUTPATIENT
Start: 2020-11-11 | End: 2020-11-11

## 2020-11-11 RX ORDER — ACETAMINOPHEN 500 MG
1000 TABLET ORAL ONCE
Status: COMPLETED | OUTPATIENT
Start: 2020-11-11 | End: 2020-11-11

## 2020-11-11 RX ORDER — ONDANSETRON 4 MG/1
4 TABLET, ORALLY DISINTEGRATING ORAL
Status: DISCONTINUED | OUTPATIENT
Start: 2020-11-11 | End: 2020-11-12 | Stop reason: HOSPADM

## 2020-11-11 RX ORDER — ACETAMINOPHEN 500 MG
1000 TABLET ORAL
Qty: 60 TAB | Refills: 0 | Status: SHIPPED | OUTPATIENT
Start: 2020-11-11

## 2020-11-11 RX ORDER — VERAPAMIL HYDROCHLORIDE 120 MG/1
240 TABLET, FILM COATED, EXTENDED RELEASE ORAL DAILY
Status: DISCONTINUED | OUTPATIENT
Start: 2020-11-12 | End: 2020-11-12 | Stop reason: HOSPADM

## 2020-11-11 RX ORDER — GABAPENTIN 100 MG/1
100 CAPSULE ORAL 2 TIMES DAILY
Status: DISCONTINUED | OUTPATIENT
Start: 2020-11-11 | End: 2020-11-12 | Stop reason: HOSPADM

## 2020-11-11 RX ORDER — SODIUM CHLORIDE 0.9 % (FLUSH) 0.9 %
5-40 SYRINGE (ML) INJECTION EVERY 8 HOURS
Status: DISCONTINUED | OUTPATIENT
Start: 2020-11-11 | End: 2020-11-12 | Stop reason: HOSPADM

## 2020-11-11 RX ORDER — SODIUM CHLORIDE, SODIUM LACTATE, POTASSIUM CHLORIDE, CALCIUM CHLORIDE 600; 310; 30; 20 MG/100ML; MG/100ML; MG/100ML; MG/100ML
INJECTION, SOLUTION INTRAVENOUS
Status: DISCONTINUED | OUTPATIENT
Start: 2020-11-11 | End: 2020-11-11 | Stop reason: HOSPADM

## 2020-11-11 RX ORDER — OXYCODONE HYDROCHLORIDE 5 MG/1
5 TABLET ORAL
Status: DISCONTINUED | OUTPATIENT
Start: 2020-11-11 | End: 2020-11-11 | Stop reason: HOSPADM

## 2020-11-11 RX ORDER — HYDROMORPHONE HYDROCHLORIDE 2 MG/1
2 TABLET ORAL
Qty: 40 TAB | Refills: 0 | Status: SHIPPED | OUTPATIENT
Start: 2020-11-11 | End: 2020-11-18

## 2020-11-11 RX ORDER — SODIUM CHLORIDE 0.9 % (FLUSH) 0.9 %
5-40 SYRINGE (ML) INJECTION AS NEEDED
Status: DISCONTINUED | OUTPATIENT
Start: 2020-11-11 | End: 2020-11-12 | Stop reason: HOSPADM

## 2020-11-11 RX ORDER — EPHEDRINE SULFATE/0.9% NACL/PF 50 MG/5 ML
SYRINGE (ML) INTRAVENOUS AS NEEDED
Status: DISCONTINUED | OUTPATIENT
Start: 2020-11-11 | End: 2020-11-11 | Stop reason: HOSPADM

## 2020-11-11 RX ORDER — AMOXICILLIN 250 MG
1 CAPSULE ORAL DAILY
Qty: 30 TAB | Refills: 0 | Status: SHIPPED | OUTPATIENT
Start: 2020-11-11 | End: 2021-04-01 | Stop reason: ALTCHOICE

## 2020-11-11 RX ORDER — GABAPENTIN 100 MG/1
100 CAPSULE ORAL 2 TIMES DAILY
Qty: 30 CAP | Refills: 0 | Status: SHIPPED | OUTPATIENT
Start: 2020-11-11 | End: 2021-04-01 | Stop reason: ALTCHOICE

## 2020-11-11 RX ORDER — HYDROMORPHONE HYDROCHLORIDE 1 MG/ML
1 INJECTION, SOLUTION INTRAMUSCULAR; INTRAVENOUS; SUBCUTANEOUS
Status: DISCONTINUED | OUTPATIENT
Start: 2020-11-11 | End: 2020-11-12 | Stop reason: HOSPADM

## 2020-11-11 RX ORDER — BUPIVACAINE HYDROCHLORIDE 7.5 MG/ML
INJECTION, SOLUTION INTRASPINAL
Status: DISCONTINUED | OUTPATIENT
Start: 2020-11-11 | End: 2020-11-11 | Stop reason: HOSPADM

## 2020-11-11 RX ORDER — SODIUM CHLORIDE 9 MG/ML
INJECTION INTRAMUSCULAR; INTRAVENOUS; SUBCUTANEOUS AS NEEDED
Status: DISCONTINUED | OUTPATIENT
Start: 2020-11-11 | End: 2020-11-11 | Stop reason: HOSPADM

## 2020-11-11 RX ORDER — ASPIRIN 81 MG/1
81 TABLET ORAL 2 TIMES DAILY
Qty: 60 TAB | Refills: 0 | Status: SHIPPED | OUTPATIENT
Start: 2020-11-11

## 2020-11-11 RX ORDER — DIPHENHYDRAMINE HYDROCHLORIDE 50 MG/ML
12.5 INJECTION, SOLUTION INTRAMUSCULAR; INTRAVENOUS
Status: DISCONTINUED | OUTPATIENT
Start: 2020-11-11 | End: 2020-11-11 | Stop reason: HOSPADM

## 2020-11-11 RX ORDER — NALOXONE HYDROCHLORIDE 0.4 MG/ML
0.1 INJECTION, SOLUTION INTRAMUSCULAR; INTRAVENOUS; SUBCUTANEOUS AS NEEDED
Status: DISCONTINUED | OUTPATIENT
Start: 2020-11-11 | End: 2020-11-11 | Stop reason: HOSPADM

## 2020-11-11 RX ORDER — NALOXONE HYDROCHLORIDE 0.4 MG/ML
.2-.4 INJECTION, SOLUTION INTRAMUSCULAR; INTRAVENOUS; SUBCUTANEOUS
Status: DISCONTINUED | OUTPATIENT
Start: 2020-11-11 | End: 2020-11-12 | Stop reason: HOSPADM

## 2020-11-11 RX ORDER — LIDOCAINE HYDROCHLORIDE 10 MG/ML
0.1 INJECTION INFILTRATION; PERINEURAL AS NEEDED
Status: DISCONTINUED | OUTPATIENT
Start: 2020-11-11 | End: 2020-11-11 | Stop reason: HOSPADM

## 2020-11-11 RX ORDER — SODIUM CHLORIDE 0.9 % (FLUSH) 0.9 %
5-40 SYRINGE (ML) INJECTION EVERY 8 HOURS
Status: DISCONTINUED | OUTPATIENT
Start: 2020-11-11 | End: 2020-11-11 | Stop reason: HOSPADM

## 2020-11-11 RX ORDER — MIDAZOLAM HYDROCHLORIDE 1 MG/ML
INJECTION, SOLUTION INTRAMUSCULAR; INTRAVENOUS
Status: DISCONTINUED
Start: 2020-11-11 | End: 2020-11-11 | Stop reason: ALTCHOICE

## 2020-11-11 RX ORDER — CYCLOBENZAPRINE HCL 5 MG
5 TABLET ORAL
Qty: 30 TAB | Refills: 0 | Status: SHIPPED | OUTPATIENT
Start: 2020-11-11 | End: 2021-04-01 | Stop reason: ALTCHOICE

## 2020-11-11 RX ORDER — MIDAZOLAM HYDROCHLORIDE 1 MG/ML
INJECTION, SOLUTION INTRAMUSCULAR; INTRAVENOUS AS NEEDED
Status: DISCONTINUED | OUTPATIENT
Start: 2020-11-11 | End: 2020-11-11 | Stop reason: HOSPADM

## 2020-11-11 RX ORDER — MELOXICAM 7.5 MG/1
7.5 TABLET ORAL DAILY
Qty: 30 TAB | Refills: 2 | OUTPATIENT
Start: 2020-11-11 | End: 2021-04-05

## 2020-11-11 RX ORDER — ACETAMINOPHEN 500 MG
TABLET ORAL
Status: ACTIVE
Start: 2020-11-11 | End: 2020-11-11

## 2020-11-11 RX ORDER — ONDANSETRON 8 MG/1
8 TABLET, ORALLY DISINTEGRATING ORAL
Status: DISCONTINUED | OUTPATIENT
Start: 2020-11-11 | End: 2020-11-12 | Stop reason: HOSPADM

## 2020-11-11 RX ORDER — KETOROLAC TROMETHAMINE 30 MG/ML
INJECTION, SOLUTION INTRAMUSCULAR; INTRAVENOUS AS NEEDED
Status: DISCONTINUED | OUTPATIENT
Start: 2020-11-11 | End: 2020-11-11 | Stop reason: HOSPADM

## 2020-11-11 RX ADMIN — CEFAZOLIN SODIUM 2 G: 10 INJECTION, POWDER, FOR SOLUTION INTRAVENOUS at 17:13

## 2020-11-11 RX ADMIN — Medication 10 MG: at 09:34

## 2020-11-11 RX ADMIN — Medication 5 MG: at 09:31

## 2020-11-11 RX ADMIN — FENTANYL CITRATE 25 MCG: 50 INJECTION INTRAMUSCULAR; INTRAVENOUS at 08:20

## 2020-11-11 RX ADMIN — TRANEXAMIC ACID 1000 MG: 100 INJECTION, SOLUTION INTRAVENOUS at 08:25

## 2020-11-11 RX ADMIN — PHENYLEPHRINE HYDROCHLORIDE 100 MCG: 10 INJECTION INTRAVENOUS at 09:31

## 2020-11-11 RX ADMIN — ATORVASTATIN CALCIUM 40 MG: 40 TABLET, FILM COATED ORAL at 22:05

## 2020-11-11 RX ADMIN — Medication 3 AMPULE: at 06:44

## 2020-11-11 RX ADMIN — PHENYLEPHRINE HYDROCHLORIDE 100 MCG: 10 INJECTION INTRAVENOUS at 09:19

## 2020-11-11 RX ADMIN — BUPIVACAINE HYDROCHLORIDE IN DEXTROSE 12 MG: 7.5 INJECTION, SOLUTION SUBARACHNOID at 08:15

## 2020-11-11 RX ADMIN — ONDANSETRON 4 MG: 2 INJECTION INTRAMUSCULAR; INTRAVENOUS at 09:42

## 2020-11-11 RX ADMIN — PHENYLEPHRINE HYDROCHLORIDE 100 MCG: 10 INJECTION INTRAVENOUS at 08:57

## 2020-11-11 RX ADMIN — PHENYLEPHRINE HYDROCHLORIDE 100 MCG: 10 INJECTION INTRAVENOUS at 09:03

## 2020-11-11 RX ADMIN — ACETAMINOPHEN 1000 MG: 500 TABLET, FILM COATED ORAL at 06:44

## 2020-11-11 RX ADMIN — PHENYLEPHRINE HYDROCHLORIDE 100 MCG: 10 INJECTION INTRAVENOUS at 08:49

## 2020-11-11 RX ADMIN — DEXAMETHASONE SODIUM PHOSPHATE 8 MG: 4 INJECTION, SOLUTION INTRAMUSCULAR; INTRAVENOUS at 09:42

## 2020-11-11 RX ADMIN — ACETAMINOPHEN 1000 MG: 500 TABLET, FILM COATED ORAL at 23:21

## 2020-11-11 RX ADMIN — Medication 10 MG: at 09:29

## 2020-11-11 RX ADMIN — SODIUM CHLORIDE, SODIUM LACTATE, POTASSIUM CHLORIDE, AND CALCIUM CHLORIDE 100 ML/HR: 600; 310; 30; 20 INJECTION, SOLUTION INTRAVENOUS at 06:44

## 2020-11-11 RX ADMIN — FENTANYL CITRATE 25 MCG: 50 INJECTION INTRAMUSCULAR; INTRAVENOUS at 08:08

## 2020-11-11 RX ADMIN — PROPOFOL 50 MCG/KG/MIN: 10 INJECTION, EMULSION INTRAVENOUS at 08:25

## 2020-11-11 RX ADMIN — CEFAZOLIN SODIUM 2 G: 10 INJECTION, POWDER, FOR SOLUTION INTRAVENOUS at 23:22

## 2020-11-11 RX ADMIN — Medication 15 MG: at 09:37

## 2020-11-11 RX ADMIN — KETOROLAC TROMETHAMINE 15 MG: 30 INJECTION, SOLUTION INTRAMUSCULAR at 17:15

## 2020-11-11 RX ADMIN — SODIUM CHLORIDE, SODIUM LACTATE, POTASSIUM CHLORIDE, AND CALCIUM CHLORIDE: 600; 310; 30; 20 INJECTION, SOLUTION INTRAVENOUS at 08:02

## 2020-11-11 RX ADMIN — LIDOCAINE HYDROCHLORIDE 0.1 ML: 10 INJECTION, SOLUTION INFILTRATION; PERINEURAL at 06:45

## 2020-11-11 RX ADMIN — Medication 81 MG: at 22:05

## 2020-11-11 RX ADMIN — TRANEXAMIC ACID 1300 MG: 650 TABLET ORAL at 17:14

## 2020-11-11 RX ADMIN — GABAPENTIN 100 MG: 100 CAPSULE ORAL at 17:15

## 2020-11-11 RX ADMIN — Medication 2 G: at 08:20

## 2020-11-11 RX ADMIN — KETOROLAC TROMETHAMINE 15 MG: 30 INJECTION, SOLUTION INTRAMUSCULAR at 23:21

## 2020-11-11 RX ADMIN — MIDAZOLAM 2 MG: 1 INJECTION INTRAMUSCULAR; INTRAVENOUS at 07:20

## 2020-11-11 RX ADMIN — PHENYLEPHRINE HYDROCHLORIDE 100 MCG: 10 INJECTION INTRAVENOUS at 09:24

## 2020-11-11 RX ADMIN — Medication 1 AMPULE: at 22:05

## 2020-11-11 RX ADMIN — PHENYLEPHRINE HYDROCHLORIDE 50 MCG: 10 INJECTION INTRAVENOUS at 09:34

## 2020-11-11 RX ADMIN — PHENYLEPHRINE HYDROCHLORIDE 50 MCG: 10 INJECTION INTRAVENOUS at 09:21

## 2020-11-11 RX ADMIN — ACETAMINOPHEN 1000 MG: 500 TABLET, FILM COATED ORAL at 17:14

## 2020-11-11 RX ADMIN — PHENYLEPHRINE HYDROCHLORIDE 100 MCG: 10 INJECTION INTRAVENOUS at 09:14

## 2020-11-11 RX ADMIN — MIDAZOLAM 2 MG: 1 INJECTION INTRAMUSCULAR; INTRAVENOUS at 08:04

## 2020-11-11 RX ADMIN — Medication 5 ML: at 17:16

## 2020-11-11 NOTE — H&P
[facility]  Pre Operative History and Physical Exam    Patient ID:  Rosemary Kimbrough  [id number]  59 y.o.  1960    Today: November 11, 2020       CC:  Left hip pain    HPI:   The patient has end stage arthritis of the left hip. The patient was evaluated and examined in the office prior to today and was found to have a history and physical exam consistent with intra-articular pathology of the left hip. Patient complains of anterior groin pain predominately. Pain occurs during activity. Patient has difficulty putting on socks/shoes and has notable pain when getting up from a chair and getting out of a car. Patient does not complain of significant lateral hip pain or radicular pain down the leg. There have been no changes to the patient's orthopedic condition since the last office visit    Past Medical History:  Past Medical History:   Diagnosis Date    Arthritis     hips    CAD (coronary artery disease) 2017, 10/02/2020    Mild CAD, normal LV function per cath dated 10/02/2020; followed by [facility]    Dyspnea on exertion     GERD (gastroesophageal reflux disease)     resolved    H/O cold sores     Hyperlipidemia     on med for control     Hypertension     on med for control     Latex allergy     Wide-complex tachycardia ([facility] [address] 2017    NSVT       Past Surgical History:  Past Surgical History:   Procedure Laterality Date    HX COLONOSCOPY      HX HEART CATHETERIZATION  2017    no stents     HX HEART CATHETERIZATION  10/02/2020    Mild CAD, normal LV function    HX HIP REPLACEMENT Right 10/07/2020        Medications:     Prior to Admission medications    Medication Sig Start Date End Date Taking? Authorizing Provider   verapamil ER (CALAN-SR) 240 mg CR tablet Take 240 mg by mouth daily. Take / use AM day of surgery  per anesthesia protocols. Provider, Historical   losartan (COZAAR) 50 mg tablet Take 100 mg by mouth daily.  10/17/20   RAYMUNDO Lopez acetaminophen (TYLENOL) 500 mg tablet Take 2 Tabs by mouth every six (6) hours as needed for Pain. 10/7/20   Flonnie Siemens, MD   aspirin delayed-release 81 mg tablet Take 1 Tab by mouth two (2) times a day. 10/7/20   Flonnie Siemens, MD   meloxicam (MOBIC) 7.5 mg tablet Take 1 Tab by mouth daily. 10/7/20   Flonnie Siemens, MD   senna-docusate (PERICOLACE) 8.6-50 mg per tablet Take 1 Tab by mouth daily. 10/7/20   Flonnie Siemens, MD   atorvastatin (LIPITOR) 40 mg tablet Take 40 mg by mouth nightly. 10/22/19   Provider, Historical   multivit-min/FA/lycopen/lutein (CENTRUM SILVER MEN PO) Take 1 Tab by mouth daily. Provider, Historical   LYSINE PO Take 1,000 mg by mouth nightly. Provider, Historical       Family History:     Family History   Problem Relation Age of Onset    Depression Mother     Heart Disease Father        Social History:      Social History     Tobacco Use    Smoking status: Never Smoker    Smokeless tobacco: Never Used   Substance Use Topics    Alcohol use: Never     Frequency: Never         Allergies: Allergies   Allergen Reactions    Latex Rash        Vitals:      Visit Vitals  Ht 5' 11\" (1.803 m)   Wt 95.7 kg (211 lb)   BMI 29.43 kg/m²        Objective:         General: No Acute distress                   HEENT: Normocephalic/atramatic                   Lungs:  Breathing non-labored                   Heart:   RRR                    Abdomen: soft       Extremities:  Prior exam done in office has been consistent with end-stage hip arthritis. We have noted pain with ROM of the left hip which manifests as anterior groin pain. There is decreased internal and external rotation of the affected hip. No significant pain with palpation over the greater trochanteric bursa. No radicular pain with straight leg raise. Patient walks with and antalgic gait. Distally patient has no neurologic deficit.       Patient Active Problem List   Diagnosis Code    Snoring R06.83    Osteoarthritis of right hip M16.11       Assessment:   1. Arthritis of the Left hip    Plan:   The patient has failed previous conservative treatment for this condition. The patient has pain in the left hip which causes daily symptoms which affects the patient's activities of daily living and quality of life. The risks, benefits, alternatives and possible complications of left total hip arthroplasty have been discussed with the patient including but not limited to infection, bleeding, damage to nerves and blood vessels with particular attention given to risk of damage of the femoral, obturator, lateral femoral cutaneous, superior gluteal, inferior gluteal, and sciatic nerve, risk of dislocation, fracture both intra-op and post-op, limb length inequality, polyethylene wear, implant loosening, risk for continued pain, and risk for revision surgery secondary to but not limited to all of these discussed risks. Further we discussed risk of venous thrombo-embolism including deep vein thrombosis and pulmonary embolism despite being on prophylaxis. We have also previously discussed the potential of morbidity and mortality associated with, but not limited to, the actual surgical procedure, anesthesia, prior medical conditions, and/or the administration of medications perioperatively. I have made no guarantees to the patient regarding outcomes and the patient has voiced understanding of that. The patient has been given the opportunity to ask questions all of which have been answered and the patient wishes to proceed. The patient was counseled at length about the risks of suzanne Covid-19 during their perioperative period and any recovery window from their procedure. The patient was made aware that suzanne Covid-19  may worsen their prognosis for recovering from their procedure and lend to a higher morbidity and/or mortality risk.   All material risks, benefits, and reasonable alternatives including postponing the procedure were discussed. The patient does  wish to proceed with the procedure at this time.         Signed By: Raymundo Moya MD  November 11, 2020

## 2020-11-11 NOTE — ANESTHESIA PREPROCEDURE EVALUATION
Relevant Problems   No relevant active problems       Anesthetic History   No history of anesthetic complications            Review of Systems / Medical History  Patient summary reviewed and pertinent labs reviewed    Pulmonary          Shortness of breath (with exertion)         Neuro/Psych   Within defined limits           Cardiovascular            Dysrhythmias   CAD (+ stress 2020, clean [facility])    Exercise tolerance: <4 METS  Comments: longstanding mild SOB with activity and stress    Denies any current angina. His SOB with exertion is unchanged. GI/Hepatic/Renal     GERD: well controlled           Endo/Other        Arthritis     Other Findings              Physical Exam    Airway  Mallampati: II  TM Distance: 4 - 6 cm  Neck ROM: normal range of motion   Mouth opening: Normal     Cardiovascular    Rhythm: regular  Rate: normal      Pertinent negatives: No murmur   Dental  No notable dental hx       Pulmonary  Breath sounds clear to auscultation               Abdominal         Other Findings            Anesthetic Plan    ASA: 2  Anesthesia type: spinal          Induction: Intravenous  Anesthetic plan and risks discussed with: Patient and Family      Tolerated SAB in October without issue.

## 2020-11-11 NOTE — PROGRESS NOTES
Uneventful shift. Pt voiding well. Has strong push/ pulls to both feet and wiggles all toes well. All sensation has returned to both lower extremities. inst pt to call for needs.

## 2020-11-11 NOTE — PROGRESS NOTES
Problem: Self Care Deficits Care Plan (Adult)  Goal: *Acute Goals and Plan of Care (Insert Text)  Outcome: Progressing Towards Goal  Note: GOALS:   DISCHARGE GOALS (in preparation for going home/rehab):  3 days  1. Mr. Donald Branch will perform one lower body dressing activity with minimal assistance with adaptive equipment to demonstrate improved functional mobility and safety. 2.  Mr. Donald Branch will perform one lower body bathing activity with minimal  assistance with adaptive equipment to demonstrate improved functional mobility and safety. 3.  Mr. Donald Branch will perform toileting/toilet transfer with contact guard assistance with adaptive equipment to demonstrate improved functional mobility and safety. 4.  Mr. Donald Branch will perform shower transfer with contact guard assistance with adaptive equipment to demonstrate improved functional mobility and safety. 5.  Mr. Donald Branch will state LIN precautions with two verbal cues to demonstrate improved functional mobility and safety. JOINT CAMP OCCUPATIONAL THERAPY LIN: Initial Assessment and PM 11/11/2020  INPATIENT: Hospital Day: 1  Payor: Rambo Szymanski / Plan: [facility] / Product Type: PPO /      NAME/AGE/GENDER: Karie Kelley is a 63 y.o. male   PRIMARY DIAGNOSIS:  Localized osteoarthrosis of left hip [M16.12]   Procedure(s) and Anesthesia Type:     * LEFT HIP ARTHROPLASTY TOTAL / [facility] / POD 1 - Spinal (Left)  ICD-10: Treatment Diagnosis:    · Pain in left hip (M25.552)  · Stiffness of Left Hip, Not elsewhere classified (M25.652)  · Other lack of cordination (R27.8)  · Difficulty in walking, Not elsewhere classified (R26.2)      ASSESSMENT:     Mr. Donald Branch is s/p left LIN and presents with decreased weight bearing on left LE and decreased independence with functional mobility and activities of daily living as compared to prior level of function and safety.   Patient would benefit from skilled Occupational Therapy to maximize independence and safety with self-care task and functional mobility. Pt would also benefit from education on lower body adaptive equipment and hip precautions post-surgery in preparation for going home. Patient plans for further rehab at home with home health services and good family support . OT reviewed therapy schedule and plan of care with patient. Patient was able to transfer and perform self care skills as charted below. Patient instructed to call for assistance when needing to get up from the recliner and all needs in reach. Patient verbalized understanding of call light. He was working with PT and his wife had assisted him with getting dressed. He had his other hip done a months ago. He is SBA with mobility and plans to go home tomorrow  with wifes assistance. He should progress well with  ADLs. Will see in AM for full ADL session. This section established at most recent assessment   PROBLEM LIST (Impairments causing functional limitations):  1. Decreased Strength  2. Decreased ADL/Functional Activities  3. Decreased Transfer Abilities  4. Increased Pain  5. Increased Fatigue  6. Decreased Flexibility/Joint Mobility  7. Decreased Knowledge of Precautions   INTERVENTIONS PLANNED: (Benefits and precautions of occupational therapy have been discussed with the patient.)  1. Activities of daily living training  2. Adaptive equipment training  3. Balance training  4. Clothing management  5. Donning&doffing training  6. Theraputic activity     TREATMENT PLAN: Frequency/Duration: Follow patient 1-2 times to address above goals. Rehabilitation Potential For Stated Goals: Good     RECOMMENDED REHABILITATION/EQUIPMENT: (at time of discharge pending progress): Continue Skilled Therapy. OCCUPATIONAL PROFILE AND HISTORY:   History of Present Injury/Illness (Reason for Referral): Pt presents this date s/p (left) LIN.     Past Medical History/Comorbidities:   Mr. Sharifa Barrios  has a past medical history of Arthritis, CAD (coronary artery disease) (2017, 10/02/2020), Dyspnea on exertion, GERD (gastroesophageal reflux disease), H/O cold sores, Hyperlipidemia, Hypertension, Latex allergy, and Wide-complex tachycardia ([address] (2017). Mr. Kun Pardo  has a past surgical history that includes hx heart catheterization (2017); hx colonoscopy; hx hip replacement (Right, 10/07/2020); and hx heart catheterization (10/02/2020). Social History/Living Environment:   Home Environment: Private residence  # Steps to Enter: 1  One/Two Story Residence: One story  # of Interior Steps: 2  Living Alone: No  Support Systems: Spouse/Significant Other/Partner  Patient Expects to be Discharged to[de-identified] Private residence  Current DME Used/Available at Home: Cane, straight  Tub or Shower Type: Shower  Prior Level of Function/Work/Activity:  Mod i     Number of Personal Factors/Comorbidities that affect the Plan of Care: Brief history (0):  LOW COMPLEXITY   ASSESSMENT OF OCCUPATIONAL PERFORMANCE[de-identified]   Most Recent Physical Functioning:   Balance  Sitting: Intact  Standing: With support       Gross Assessment: Yes  Gross Assessment  AROM: Within functional limits(right LE)  Strength: Generally decreased, functional(right LE)          LLE AROM  L Hip Flexion: 90  L Hip ABduction: 10 Coordination  Fine Motor Skills-Upper: Left Intact; Right Intact  Gross Motor Skills-Upper: Left Intact; Right Intact         Mental Status  Neurologic State: Alert; Appropriate for age  Orientation Level: Appropriate for age  Cognition: Appropriate decision making; Appropriate for age attention/concentration; Appropriate safety awareness; Follows commands  Perception: Appears intact  Perseveration: No perseveration noted  Safety/Judgement: Awareness of environment; Fall prevention                Basic ADLs (From Assessment) Complex ADLs (From Assessment)   Basic ADL  Feeding: Independent  Oral Facial Hygiene/Grooming: Supervision  Bathing: Minimum assistance  Upper Body Dressing: Supervision  Lower Body Dressing: Minimum assistance, Moderate assistance  Toileting: Stand by assistance     Grooming/Bathing/Dressing Activities of Daily Living     Cognitive Retraining  Safety/Judgement: Awareness of environment; Fall prevention                 Functional Transfers  Toilet Transfer : Stand-by assistance;Contact guard assistance  Shower Transfer: Stand-by assistance;Contact guard assistance     Bed/Mat Mobility  Supine to Sit: (up in chair)  Sit to Stand: Stand-by assistance  Stand to Sit: Stand-by assistance         Physical Skills Involved:  1. Strength  2. Activity Tolerance Cognitive Skills Affected (resulting in the inability to perform in a timely and safe manner): 1. none Psychosocial Skills Affected:  1. none   Number of elements that affect the Plan of Care: 1-3:  LOW COMPLEXITY   CLINICAL DECISION MAKING:   [facility] AM-PAC 6 Clicks   Daily Activity Inpatient Short Form  How much help from another person does the patient currently need. .. Total A Lot A Little None   1. Putting on and taking off regular lower body clothing? [] 1   [x] 2   [] 3   [] 4   2. Bathing (including washing, rinsing, drying)? [] 1   [] 2   [x] 3   [] 4   3. Toileting, which includes using toilet, bedpan or urinal?   [] 1   [] 2   [x] 3   [] 4   4. Putting on and taking off regular upper body clothing? [] 1   [] 2   [] 3   [x] 4   5. Taking care of personal grooming such as brushing teeth? [] 1   [] 2   [] 3   [x] 4   6. Eating meals? [] 1   [] 2   [] 3   [x] 4   © 2007, Trustees of [facility], under license to [employer]. All rights reserved     Score:  Initial: 20 Most Recent: X (Date: -- )    Interpretation of Tool:  Represents activities that are increasingly more difficult (i.e. Bed mobility, Transfers, Gait).    Medical Necessity:     · Patient is expected to demonstrate progress in   · Self care skills and functional mobility  ·     Reason for Services/Other Comments:  · Patient continues to require skilled intervention due to   · Above listed deficits     Use of outcome tool(s) and clinical judgement create a POC that gives a: LOW COMPLEXITY            TREATMENT:   (In addition to Assessment/Re-Assessment sessions the following treatments were rendered)     Pre-treatment Symptoms/Complaints:    Pain: Initial:   Pain Intensity 1: 0  Post Session:  0/10     Assessment/Reassessment only, no treatment provided today    Treatment/Session Assessment:     Response to Treatment:  tolerated very well, SBA mobilty. Education:  [] Home Exercises  [x] Fall Precautions  [x] Hip Precautions [] Going Home Video  [] Knee/Hip Prosthesis Review  [x] Walker Management/Safety [x] Adaptive Equipment as Needed       Interdisciplinary Collaboration:   o Physical Therapist  o Occupational Therapist  o Registered Nurse    After treatment position/precautions:   o Up in chair  o Bed/Chair-wheels locked  o Call light within reach  o RN notified  o Family at bedside     Compliance with Program/Exercises: Compliant all of the time, Will assess as treatment progresses. Recommendations/Intent for next treatment session:  Treatment next visit will focus on increasing Mr. Sears independence with bed mobility, transfers, self care, functional mobility, modalities for pain, and patient education.       Total Treatment Duration:  OT Patient Time In/Time Out  Time In: 1340  Time Out: [address] [facility], OT

## 2020-11-11 NOTE — OP NOTES
Total Hip Procedure Note    Sherril Oh [place],  [id number],  1960    Pre-operative Diagnosis: Localized osteoarthrosis of left hip [M16.12]    Post-operative Diagnosis: Same    Procedure: Left Total Hip Arthroplasty    BMI: Body mass index is 29.43 kg/m². Elizabeth Lozada Location: [address] [place] [address]    Surgeon: Angelika Perales MD    Assistant: Flakita Lance CFA and Marycruz Ayala NP CFA    Anesthesia: Spinal     Complications: None    EBL: 200cc    Drains: None    Intra-op Findings: Pre-operatively leg lengths were assessed using preop Xrays and with the patient in the lateral decubitus position and operative leg was felt to be 2-4mm shorter in length compared to the contralateral leg. The operative hip showed notable cartilage loss of both the femoral head and acetabulum. No intra-operative periprosthetic fracture was encountered. Sciatic nerve was noted to be intact at the end of the procedure. We measured the distance of our resected femoral head/neck from the cut surface to the center of the femoral head to be approximately 34mm. The overall length replaced with the implanted head/neck was 37.5mm. Intra-operatively we felt that we restored the patients leg lengths to equal lengths using the method described later. Postop with the patient supine we assessed leg lengths and felt they were similar. Patient condition at completion of Procedure: Stable    Implants:   Implant Name Type Inv.  Item Serial No.  Lot No. LRB No. Used Action   SHELL ACET CLUS H 58F TRTANIUM -- TRIDENT II - [id number]  SHELL ACET CLUS H 58F TRTANIUM -- TRIDENT II [id number] [employer] [facility]_ [id number] Left 1 Implanted   SCREW BNE L35MM [id number] LO PROF HEX TRIDENT LL - [id number]  SCREW BNE L35MM [id number] LO PROF HEX TRIDENT LL 2UFE [employer] [facility]_WD 2UFE Left 1 Implanted   INSERT ACET 0DEG 36MM F X3 -- TRIDENT - [id number]  INSERT ACET 0DEG 36MM F X3 -- TRIDENT  [employer] HOW_ [id number] Left 1 Implanted   LOW PROFILE HEX SCREW     2UFE Left 1 Implanted   PIN FIX TROCAR PT 1 END 9/64X9 IN 1 PT STYL SMOOTH PLN STRL - [id number]  PIN FIX TROCAR PT 1 END 9/64X9 IN 1 PT STYL SMOOTH PLN STRL  [facility]_ NU2EN Left 1 Implanted and Explanted   STEM FEM SZ 5 127D [id number] -- ACCOLADE II V40 - [id number]  STEM FEM SZ 5 127D [id number] -- ACCOLADE II V40  [employer] [facility] [id number] Left 1 Implanted   HEAD FEM DELT V40 +0MM NK 36MM -- V40 BIOLOX - [id number]  HEAD FEM DELT V40 +0MM NK 36MM -- V40 BIOLOX  [employer] [facility] [id number] Left 1 Implanted       Description of Procedure    The complexity of the total joint surgery requires the use of a first assistant for positioning, retraction and assistance in closure. Issac Leone was brought to the operating room. Patient was transferred from the stretcher to OR bed. Anesthesia was induced. IV antibiotics were administered per protocol. Issac Leone was positioned in the lateral decubitus position and the pelvis/torso stabilized with posts. The limb was prepped and draped in the usual sterile fashion. A time out identifying the patient, procedure, operative side and surgeon was administered and charted by the OR Nurse. Prior to incision one gram of TXA was given intravenously. A standard posterior approach was utilized to expose the hip. The incision was carried through the subcutaneous tissue and underlying fascia with hemostasis obtained using the bovie cauterization and Aquamantys. A Charmley retractor was inserted. We resected any redundent bursal tissue off the external rotators. We were able to identify the piriformis tendon. The short external rotators and capsule were dissected off the posterior femur as a single layer just superior to the piriformis tendon. The sciatic nerve was palpated and protected during dissection. The femoral head was dislocated.  The articular surface revealed loss of cartilage, exposed bone and bone spurring. The neck was osteotomized approximately 1cm above the top of the lesser trochanter. We were careful to protect the greater trochanter during osteotomy and protect it from iatrogenic injury. Acetabular retractors were placed both anterior and posterior just superficial to the acetabular labrum. Remaining acetabular labrum was resected and any soft tissue was removed from the acetabulum including any tissue within the codyloid fossa. The acetabular surface revealed loss of cartilage with exposed bone. The acetabulum was sequentially reamed noting proper anteversion and inclination during reaming. The transverse acetabular ligament was used as the primary anatomic landmark to determine version and inclination. The acetabulum was sized to a 58 mm acetabular component. The prepared acetabulum was irrigated of any residual reamings and soft tissue. The permanent acetabular component was impacted into place to achieve appropriate horizontal tilt, anteversion, bone coverage and stability. We visualize that the acetabular component was fully seated. A trial liner was impacted into position. We did not have to excise overhanging osteophytes anterior and posterior  in order to minimize the risk of impingement. We then turned our attention to the proximal femur. A 2-prong proximal femoral retractor was placed. We gained access to the proximal femur using a  and femoral canal finder. The canal was prepared with appropriate lateralization in which we used the initial smaller broaches to remove lateral bone to avoid varus placement of the femoral component. The canal was then broached progressively. The broach was positioned with the appropriate anatomic femoral anteversion. We broached up to a size 5 Accolade II stem. A calcar planar was used. A trial reduction with a size #5 127 degree Accolade II stem with a +2.5 neck length and 36 mm head was performed.   This was found to be the most stable with maximum hip flexion and with internal/external rotation testing. We did not appreciate any evidence of component or bony impingement. Limb lengths were assessed using three techniques. First, the position of the tip of the operative greater trochanter was compared to the center of the femoral head component and was felt to match this same anatomic relationship as the non-operative hip based on preoperative X-rays. Next, we measured the length of our resected femoral head neck and felt that the trial components matched this resected length as closely as possible when accounting for the length provided by the femoral neck/head. Finally, we compared leg lengths by comparing the possible of the patella with the patients heels together with the patient in the lateral decubitus position. Using these methods it was felt that the patients leg lengths were equilibrated and with appropriate stability as mentioned above. All trial components were removed. Prior to final polyethylene insertion one screw was placed to further stabilize the cup. The final liner was impacted into place which was a neutral liner. A cementless size 5 127 degree Accolade II stem was impacted into place carefully. We were careful to observe the calcar region for any iatrogenic fractures during insertion. The permanent femoral head was impacted into place which was a +2.5mm 36mm ceramic head. Rob Leone's hip was reduced and stability was as mentioned above. Dilute Betadine solution was allowed to sit in the surgical site for a 3 minute period and copious saline was used to irrigate the surgical site. The sciatic nerve was palpated and noted to be intact. A periarticular of ropivicaine, toradol, and morphine was injected about the surgical site with care being taken not to inject in the vicinity of neurovascular structures. Prior to wound closure one additional gram of TXA was given for a total of 2 grams.  The capsule was repaired with a three #2 Fiberwires secured through drill holes placed in the posterior aspect of the trochanter. No drain was placed. The fascia was closed with a #0 Bidirectional Stratafix barbed suture. The sub-Q layer was closed with 2-0 monocril suture and a  3-0 moncril stratafix suture in a running subcuticular fashion was used to close the skin. The incision site was thoroughly cleaned with alcohol and the Exofin wound closure system was applied to seal it off from external contamination after the overlying skin was thoroughly cleaned with alcohol. A thin layer of [place] lubricant was applied over the wound to keep the dressing from adhering to the overlying sterile bandage and said bandage was placed. Drapes were then broken down and patient was transferred carefully back to the OR stretcher. The sponge and needle counts were correct. The patient tolerated the procedure without difficulty and left the operating room in satisfactory condition.     Signed By: Andrae Mosley MD

## 2020-11-11 NOTE — ANESTHESIA PROCEDURE NOTES
Spinal Block    Start time: 11/11/2020 8:09 AM  End time: 11/11/2020 8:15 AM  Performed by: Corrie Collins MD  Authorized by: Corrie Collins MD     Pre-procedure:   Indications: primary anesthetic  Preanesthetic Checklist: patient identified, risks and benefits discussed, anesthesia consent, site marked, patient being monitored and timeout performed    Timeout Time: 08:09          Spinal Block:   Patient Position:  Seated  Prep Region:  Lumbar  Prep: chlorhexidine      Location:  L4-5  Technique:  Single shot    Local Dose (mL):  1.6    Needle:   Needle Type:  Quincke  Needle Gauge:  22 G  Attempts:  2      Events: CSF confirmed, no blood with aspiration and no paresthesia        Assessment:  Insertion:  Uncomplicated  Patient tolerance:  Patient tolerated the procedure well with no immediate complications

## 2020-11-11 NOTE — PERIOP NOTES
Teach back method used in review of Hibiclens usage preop/postop, TB screening, pain management goals, falls precautions and use of Nozin for prevention of staph infections. Incentive spirometer-will review postop.

## 2020-11-11 NOTE — PROGRESS NOTES
Problem: Mobility Impaired (Adult and Pediatric)  Goal: *Acute Goals and Plan of Care (Insert Text)  Outcome: Progressing Towards Goal  Note: GOALS (1-4 days):  (1.)Mr. Leone will move from supine to sit and sit to supine  in bed with SUPERVISION. (2.)Mr. Leone will transfer from bed to chair and chair to bed with SUPERVISION using the least restrictive device. (3.)Mr. Leone will ambulate with SUPERVISION for 200 feet with the least restrictive device. (4.)Mr. Leone will ambulate up/down 3 steps with left railing with STAND BY ASSIST with device as needed. (5.)Mr. Leone will state/observe LIN precautions with 0 verbal cues. ________________________________________________________________________________________________       PHYSICAL THERAPY JOINT CAMP LIN: Initial Assessment and PM 11/11/2020  INPATIENT: Hospital Day: 1  Payor: Benito Infante / [facility] / Product Type: PPO /      NAME/AGE/GENDER: Paula Booker is a 63 y.o. male   PRIMARY DIAGNOSIS:  Localized osteoarthrosis of left hip [M16.12]   Procedure(s) and Anesthesia Type:     * LEFT HIP ARTHROPLASTY TOTAL / Theron Lank / POD 1 - Spinal (Left)  ICD-10: Treatment Diagnosis:    · Pain in left hip (M25.552)  · Stiffness of Left Hip, Not elsewhere classified (M25.652)  · Difficulty in walking, Not elsewhere classified (R26.2)      ASSESSMENT:     Mr. Luis Daniel Haq presents with decreased functional mobility and gait as well as decreased rom and strength of left LE s/p left lin. He plans to go home with HHPT. This section established at most recent assessment   PROBLEM LIST (Impairments causing functional limitations):  1. Decreased Strength  2. Decreased ADL/Functional Activities  3. Decreased Transfer Abilities  4. Decreased Ambulation Ability/Technique  5. Decreased Balance  6. Increased Pain  7. Decreased Activity Tolerance  8. Decreased Flexibility/Joint Mobility  9.  Decreased [place] with Home Exercise Program  10. Decreased strength   INTERVENTIONS PLANNED: (Benefits and precautions of physical therapy have been discussed with the patient.)  1. Bed Mobility  2. Cold  3. Gait Training  4. Home Exercise Program (HEP)  5. Range of Motion (ROM)  6. Therapeutic Activites  7. Therapeutic Exercise/Strengthening  8. Transfer Training     TREATMENT PLAN: Frequency/Duration: Follow patient BID for duration of hospital stay to address above goals. Rehabilitation Potential For Stated Goals: Good     RECOMMENDED REHABILITATION/EQUIPMENT: (at time of discharge pending progress): Continue Skilled Therapy and Home Health: Physical Therapy. HISTORY:   History of Present Injury/Illness (Reason for Referral):  S/p left malia  Past Medical History/Comorbidities:   Mr. Yolande Flowers  has a past medical history of Arthritis, CAD (coronary artery disease) (2017, 10/02/2020), Dyspnea on exertion, GERD (gastroesophageal reflux disease), H/O cold sores, Hyperlipidemia, Hypertension, Latex allergy, and Wide-complex tachycardia ([address] (2017). Mr. Yolande Flowers  has a past surgical history that includes hx heart catheterization (2017); hx colonoscopy; hx hip replacement (Right, 10/07/2020); and hx heart catheterization (10/02/2020).    Social History/Living Environment:   Home Environment: Private residence  # Steps to Enter: 1 (ramp)  Rails to Academize: No  One/Two Story Residence: Other (Comment)  # of Interior Steps: 3  Interior Rails: None  Lift Chair Available: No  Living Alone: No  Support Systems: Spouse/Significant Other/Partner  Patient Expects to be Discharged to[de-identified] Private residence  Current DME Used/Available at Home: Dennis Arguelloist, straight  Tub or Shower Type: Shower    Prior Level of Function/Work/Activity:  independent   Number of Personal Factors/Comorbidities that affect the Plan of Care: 1-2: MODERATE COMPLEXITY   EXAMINATION:   Most Recent Physical Functioning:   Gross Assessment: Yes  Gross Assessment  AROM: Within functional limits(right LE)  Strength: Generally decreased, functional(right LE)          LLE AROM  L Hip Flexion: 90  L Hip ABduction: 10          Bed Mobility  Supine to Sit: (up in chair)    Transfers  Sit to Stand: Stand-by assistance  Stand to Sit: Stand-by assistance    Balance  Sitting: Intact  Standing: With support              Weight Bearing Status  Left Side Weight Bearing: As tolerated  Distance (ft): 100 Feet (ft)  Ambulation - Level of Assistance: Stand-by assistance  Assistive Device: Walker, rolling  Stance: Left decreased  Gait Abnormalities: Antalgic  Interventions: Safety awareness training;Verbal cues     Braces/Orthotics:     Left Hip Cold  Type: Cold/ice pack      Body Structures Involved:  1. Bones  2. Joints  3. Muscles  4. Ligaments Body Functions Affected:  1. Movement Related Activities and Participation Affected:  1. Mobility   Number of elements that affect the Plan of Care: 3: MODERATE COMPLEXITY   CLINICAL PRESENTATION:   Presentation: Stable and uncomplicated: LOW COMPLEXITY   CLINICAL DECISION MAKIN[address] [place] [id number] AM-PAC 6 Clicks   Basic Mobility Inpatient Short Form  How much difficulty does the patient currently have. .. Unable A Lot A Little None   1. Turning over in bed (including adjusting bedclothes, sheets and blankets)? [] 1   [] 2   [x] 3   [] 4   2. Sitting down on and standing up from a chair with arms ( e.g., wheelchair, bedside commode, etc.)   [] 1   [] 2   [x] 3   [] 4   3. Moving from lying on back to sitting on the side of the bed? [] 1   [] 2   [x] 3   [] 4   How much help from another person does the patient currently need. .. Total A Lot A Little None   4. Moving to and from a bed to a chair (including a wheelchair)? [] 1   [] 2   [] 3   [x] 4   5. Need to walk in hospital room? [] 1   [] 2   [] 3   [x] 4   6. Climbing 3-5 steps with a railing? [] 1   [] 2   [x] 3   [] 4   © , Trustees of [address] [place] [id number], under license to [employer] All rights reserved     Score:  Initial: 20 Most Recent: X (Date: -- )    Interpretation of Tool:  Represents activities that are increasingly more difficult (i.e. Bed mobility, Transfers, Gait). Medical Necessity:     · Patient is expected to demonstrate progress in   · strength, range of motion, and balance  ·  to   · decrease assistance required with exercises and functional mobility  · . Reason for Services/Other Comments:  · Patient   · continues to require present interventions due to patient's inability to perform exercises and functional mobility independently  · . Use of outcome tool(s) and clinical judgement create a POC that gives a: Clear prediction of patient's progress: LOW COMPLEXITY            TREATMENT:   (In addition to Assessment/Re-Assessment sessions the following treatments were rendered)     Pre-treatment Symptoms/Complaints:  hip sore  Pain Initial:      Post Session:  3     Therapeutic Exercise: (8 Minutes):  Exercises per grid below to improve mobility and strength. Required minimal visual, verbal, and manual cues to promote proper body alignment, promote proper body posture, and promote proper body mechanics. Progressed range and repetitions as indicated. Date:  11/11 Date:   Date:     ACTIVITY/EXERCISE AM PM AM PM AM PM   GROUP THERAPY  []  []  []  []  []  []   Ankle Pumps  10       Quad Sets  10       Gluteal Sets  10       Hip ABd/ADduction  10       Straight Leg Raises         Knee Slides  10       Short Arc Quads  10       Long Arc Quads         Chair Slides                  B = bilateral; AA = active assistive; A = active; P = passive      Treatment/Session Assessment:     Response to Treatment:  did well.     Education:  [x] Home Exercises  [x] Fall Precautions  [x] Hip Precautions [] D/C Instruction Review  [x] Hip Prosthesis Review  [x] Walker Management/Safety [] Adaptive Equipment as Needed       Interdisciplinary Collaboration:   o Occupational Therapist  o Registered Nurse    After treatment position/precautions:   o Up in chair  o Bed/Chair-wheels locked  o Bed in low position  o Caregiver at bedside  o Call light within reach  o Family at bedside    Compliance with Program/Exercises: Will assess as treatment progresses. Recommendations/Intent for next treatment session:  Treatment next visit will focus on increasing Mr. Sears independence with bed mobility, transfers, gait training, strength/ROM exercises, modalities for pain, and patient education.       Total Treatment Duration:  PT Patient Time In/Time Out  Time In: 1325  Time Out: 2129 Hector Crowell, PT

## 2020-11-11 NOTE — PERIOP NOTES
Betadine Lavage:  17.5cc of betadine lot #  [id number],   Exp date  09/2021,  In 500cc of .9ns Lot # [id number], exp.  Date  06/01/2023 ,

## 2020-11-11 NOTE — PROGRESS NOTES
TRANSFER - IN REPORT:    Verbal report received from ALEX Hernandezrn(name) on Luna Leone  being received from [facility] for routine progression of care      Report consisted of patients Situation, Background, Assessment and   Recommendations(SBAR). Information from the following report(s) SBAR, Kardex, Procedure Summary, Intake/Output, MAR and Recent Results was reviewed with the receiving nurse. Opportunity for questions and clarification was provided. Assessment completed upon patients arrival to unit and care assumed.

## 2020-11-11 NOTE — PERIOP NOTES
TRANSFER - OUT REPORT:    Verbal report given to Mona OQUENDO(name) on Maryann Leone  being transferred to [facility](unit) for routine post - op       Report consisted of patients Situation, Background, Assessment and   Recommendations(SBAR). Information from the following report(s) SBAR, Kardex, OR Summary, Intake/Output, MAR and Cardiac Rhythm NSR was reviewed with the receiving nurse. Lines:   Peripheral IV 11/11/20 Left Wrist (Active)   Site Assessment Clean, dry, & intact 11/11/20 1021   Phlebitis Assessment 0 11/11/20 1021   Infiltration Assessment 0 11/11/20 1021   Dressing Status Clean, dry, & intact 11/11/20 1021   Dressing Type Transparent 11/11/20 1021   Hub Color/Line Status Green; Infusing 11/11/20 1021        Opportunity for questions and clarification was provided.       Patient transported with:   room air

## 2020-11-12 VITALS
BODY MASS INDEX: 29.54 KG/M2 | HEIGHT: 71 IN | RESPIRATION RATE: 14 BRPM | HEART RATE: 100 BPM | WEIGHT: 211 LBS | OXYGEN SATURATION: 100 % | SYSTOLIC BLOOD PRESSURE: 117 MMHG | TEMPERATURE: 98.3 F | DIASTOLIC BLOOD PRESSURE: 70 MMHG

## 2020-11-12 LAB — HGB BLD-MCNC: 11.1 G/DL (ref 13.6–17.2)

## 2020-11-12 PROCEDURE — 36415 COLL VENOUS BLD VENIPUNCTURE: CPT

## 2020-11-12 PROCEDURE — 85018 HEMOGLOBIN: CPT

## 2020-11-12 PROCEDURE — 97110 THERAPEUTIC EXERCISES: CPT

## 2020-11-12 PROCEDURE — 94760 N-INVAS EAR/PLS OXIMETRY 1: CPT

## 2020-11-12 PROCEDURE — 74011250637 HC RX REV CODE- 250/637: Performed by: NURSE PRACTITIONER

## 2020-11-12 PROCEDURE — 97535 SELF CARE MNGMENT TRAINING: CPT

## 2020-11-12 PROCEDURE — 97116 GAIT TRAINING THERAPY: CPT

## 2020-11-12 PROCEDURE — 99218 HC RM OBSERVATION: CPT

## 2020-11-12 RX ADMIN — GABAPENTIN 100 MG: 100 CAPSULE ORAL at 09:06

## 2020-11-12 RX ADMIN — ACETAMINOPHEN 1000 MG: 500 TABLET, FILM COATED ORAL at 06:22

## 2020-11-12 RX ADMIN — VERAPAMIL HYDROCHLORIDE 240 MG: 120 TABLET, FILM COATED, EXTENDED RELEASE ORAL at 09:07

## 2020-11-12 RX ADMIN — Medication 1 AMPULE: at 09:07

## 2020-11-12 RX ADMIN — Medication 81 MG: at 09:07

## 2020-11-12 RX ADMIN — DOCUSATE SODIUM 50MG AND SENNOSIDES 8.6MG 2 TABLET: 8.6; 5 TABLET, FILM COATED ORAL at 09:06

## 2020-11-12 RX ADMIN — LOSARTAN POTASSIUM 100 MG: 50 TABLET, FILM COATED ORAL at 09:06

## 2020-11-12 NOTE — PROGRESS NOTES
Care Management Interventions  PCP Verified by CM: Yes  Mode of Transport at Discharge: Self  Transition of Care Consult (CM Consult): Home Health  Discharge Durable Medical Equipment: No  Occupational Therapy Consult: Yes  Current Support Network: Lives with Spouse  Confirm Follow Up Transport: Family  Discharge Location  Discharge Placement: Home with home health    Patient is a 63y.o. year old male admitted for Left LIN . Patient plans to return home on discharge. Order received to arrange home health. Patient without preference towards agency. Referral sent to [facility]. Patient denies any equipment needs as patient has a walker. Will follow until discharge.

## 2020-11-12 NOTE — PROGRESS NOTES
2020         Post Op day: 1 Day Post-Op     Admit Date: 2020  Admit Diagnosis: Localized osteoarthrosis of left hip [M16.12]  OA (osteoarthritis) of hip [M16.9]        Subjective: Patient stable. No acute events. Objective:   Visit Vitals  /66 (BP 1 Location: Right arm, BP Patient Position: At rest)   Pulse 90   Temp 98.3 °F (36.8 °C)   Resp 14   Ht 5' 11\" (1.803 m)   Wt 95.7 kg (211 lb)   SpO2 91%   BMI 29.43 kg/m²    Temp (24hrs), Av.2 °F (36.8 °C), Min:98 °F (36.7 °C), Max:98.6 °F (37 °C)    Lab Results   Component Value Date/Time    HGB 11.1 (L) 2020 03:42 AM     Extremity Exam  Dressing clean and dry   Tibialis Anterior and Gastroc-Soleus functioning normally left lower extremity  Sensation intact to light touch on operative limb  Extremity perfused  TEDS/SCDS in place  No sign of DVT     Assessment / Plan :  WBAT LLE  Continue PT/OT  Continue current DVT prophylaxis in house.  Discharge on ASA BID  DIspo-HH  Patient Active Problem List   Diagnosis Code    Snoring R06.83    Osteoarthritis of right hip M16.11    OA (osteoarthritis) of hip M16.9          Signed By: Amaya Goldberg NP

## 2020-11-12 NOTE — DISCHARGE INSTRUCTIONS
[facility]   Patient Discharge Instructions    Nils Canseco [facility] / [id number] : 1960    Admitted 2020 Discharged: 2020     IF YOU HAVE ANY PROBLEMS ONCE YOU ARE AT HOME CALL THE FOLLOWING NUMBERS:   Main office number: [phone]    Take Home Medications     · It is important that you take the medication exactly as they are prescribed. · Keep your medication in the bottles provided by the pharmacist and keep a list of the medication names, dosages, and times to be taken in your wallet. · Do not take other medications without consulting your doctor. What to do at [facility] [address] your prehospital diet. If you have excessive nausea or vomitting call your doctor's office     Home Physical Therapy is arranged. Use rolling walker when walking. Patients who have had a joint replacement should not drive until you are seen for your follow up appointment by Dr. Emmanuel Taveras. When to Call    - Call if you have a temperature greater then 101  - Unable to keep food down  - Loose control of your bladder or bowel function  - Are unable to bear any weight   - Need a pain medication refill       DISCHARGE SUMMARY from Nurse    The following personal items collected during your admission are returned to you:   Dental Appliance: Dental Appliances: None  Vision: Visual Aid: None  Hearing Aid:    Jewelry: Jewelry: None  Clothing: Clothing: None  Other Valuables: Other Valuables: Cell Phone, Nuala Fortine, None(all with [employer])  Valuables sent to safe:      PATIENT INSTRUCTIONS:    After general anesthesia or intravenous sedation, for 24 hours or while taking prescription Narcotics:  · Limit your activities  · Do not drive and operate hazardous machinery  · Do not make important personal or business decisions  · Do  not drink alcoholic beverages  · If you have not urinated within 8 hours after discharge, please contact your surgeon on call.     Report the following to your surgeon:  · Excessive pain, swelling, redness or odor of or around the surgical area  · Temperature over 101  · Nausea and vomiting lasting longer than 4 hours or if unable to take medications  · Any signs of decreased circulation or nerve impairment to extremity: change in color, persistent  numbness, tingling, coldness or increase pain  · Any questions, call office @ [phone]      Keep scheduled follow up appointment. If need to change, call office @ [phone]. *  Please give a list of your current medications to your Primary Care Provider. *  Please update this list whenever your medications are discontinued, doses are      changed, or new medications (including over-the-counter products) are added. *  Please carry medication information at all times in case of emergency situations. Patient Education        Hip Replacement Surgery (Posterior): What to Expect at Home  Your Recovery  Hip replacement surgery replaces the worn parts of your hip joint. When you leave the hospital, you will probably be walking with crutches or a walker. You may be able to climb a few stairs and get in and out of bed and chairs. But you will need someone to help you at home for the next few weeks or until you have more energy and can move around better. If you need more extensive rehab, you may go to a specialized rehab center for more treatment. You will go home with a bandage and stitches, staples, tissue glue, or tape strips. You can remove the bandage when your doctor tells you to. If you have stitches or staples, your doctor will remove them 10 days to 3 weeks after your surgery. Glue or tape strips will fall off on their own over time. You may still have some mild pain, and the area may be swollen for 3 to 4 months after surgery. Your doctor will give you medicine for the pain.   You will continue the rehabilitation program (rehab) you started in the hospital. The better you do with your rehab exercises, the sooner you will get your strength and movement back. Most people are able to return to work 4 weeks to 4 months after surgery. This care sheet gives you a general idea about how long it will take for you to recover. But each person recovers at a different pace. Follow the steps below to get better as quickly as possible. How can you care for yourself at home? Activity    · Your doctor may not want your affected leg to cross the center of your body toward the other leg. If so, your therapist may suggest these ideas:  ? Do not cross your legs. ? Be very careful as you get in or out of bed or a car, so your leg does not cross that imaginary line in the middle of your body.     · Go slowly when you climb stairs. Make sure the lights are on. Have someone watch you, if you can. When you climb stairs:  ? Step up first with your unaffected leg. Then bring the affected leg up to the same step. Bring your crutches or cane up. ? To go down stairs, reverse the order. First, put your crutches or cane on the lower step. Then bring the affected leg down to that step. Finally, step down with the unaffected leg.     · You can ride in a car, but stop at least once every hour to get out and walk around.     · You may want to sleep on your back. Don't reach down too far to pull up blankets when you lie in bed.     · If your doctor recommends exercises, do them as directed. You can cut back on your exercises if your muscles start to ache, but don't stop doing them.     · Rest when you feel tired. You may take a nap, but don't stay in bed all day.     · Work with your physical therapist to learn the best way to exercise. You will probably have to use crutches or a walker for at least 4 to 6 weeks.     · Your doctor may advise you to stay away from activities that put stress on the joint. This includes sports such as tennis, football, and jogging.     · Try not to sit for too long at one time.  You will feel less stiff if you take a short walk about every hour. When you sit, use chairs with arms, and don't sit in low chairs.     · Do not bend over more than 90 degrees (like the angle in a letter \"L\").     · Sleep on your back with your legs slightly apart or on your side with a pillow between your knees for about 6 weeks or as your doctor tells you. Do not sleep on your stomach or affected leg.     · Ask your doctor when you can drive again.     · Most people are able to return to work 4 weeks to 4 months after surgery.     · Ask your doctor when it is okay for you to have sex. Diet    · By the time you leave the hospital, you will probably be eating your normal diet. If your stomach is upset, try bland, low-fat foods like plain rice, broiled chicken, toast, and yogurt. Your doctor may recommend that you take iron and vitamin supplements.     · Drink plenty of fluids (unless your doctor tells you not to).   · Eat healthy foods, and watch your portion sizes. Try to stay at your ideal weight. Too much weight puts more stress on your new hip joint.     · You may notice that your bowel movements are not regular right after your surgery. This is common. Try to avoid constipation and straining with bowel movements. You may want to take a fiber supplement every day. If you have not had a bowel movement after a couple of days, ask your doctor about taking a mild laxative. Medicines    · Your doctor will tell you if and when you can restart your medicines. He or she will also give you instructions about taking any new medicines.     · If you take aspirin or some other blood thinner, ask your doctor if and when to start taking it again. Make sure that you understand exactly what your doctor wants you to do.     · Your doctor may give you a blood-thinning medicine to prevent blood clots. If you take a blood thinner, be sure you get instructions about how to take your medicine safely. Blood thinners can cause serious bleeding problems.  This medicine could be in pill form or as a shot (injection). If a shot is necessary, your doctor will tell you how to do this.     · Be safe with medicines. Take pain medicines exactly as directed. ? If the doctor gave you a prescription medicine for pain, take it as prescribed. ? If you are not taking a prescription pain medicine, ask your doctor if you can take an over-the-counter medicine.     · If you think your pain medicine is making you sick to your stomach:  ? Take your medicine after meals (unless your doctor has told you not to). ? Ask your doctor for a different pain medicine.     · If your doctor prescribed antibiotics, take them as directed. Do not stop taking them just because you feel better. You need to take the full course of antibiotics. Incision care    · If your doctor told you how to care for your cut (incision), follow your doctor's instructions. You will have a dressing over the cut. A dressing helps the incision heal and protects it. Your doctor will tell you how to take care of this.     · If you did not get instructions, follow this general advice:  ? If you have strips of tape on the cut the doctor made, leave the tape on for a week or until it falls off.  ? If you have stitches or staples, your doctor will tell you when to come back to have them removed. ? If you have skin adhesive on the cut, leave it on until it falls off. Skin adhesive is also called glue or liquid stitches. ? Change the bandage every day. ? Wash the area daily with warm water, and pat it dry. Don't use hydrogen peroxide or alcohol. They can slow healing. ? You may cover the area with a gauze bandage if it oozes fluid or rubs against clothing. ? You may shower 24 to 48 hours after surgery. Pat the incision dry. Don't swim or take a bath for the first 2 weeks, or until your doctor tells you it is okay. Exercise    · Your rehab program will include a number of exercises to do. Always do them as your therapist tells you.    Ice and elevation    · For pain, put ice or a cold pack on the area for 10 to 20 minutes at a time. Put a thin cloth between the ice and your skin.     · Your ankle may swell for about 3 months. Prop up your ankle when you ice it or anytime you sit or lie down. Try to keep it above the level of your heart. This will help reduce swelling. Other instructions    · Continue to wear your support stockings as your doctor says. These help to prevent blood clots. How long you'll have to wear them depends on your activity level and the amount of swelling you have. Most people wear these stockings for 4 to 6 weeks after surgery.     · Try to prevent falls. To avoid falling:  ? Arrange furniture so that you will not trip on it. ? Get rid of throw rugs, and move electrical cords out of the way. ? Walk only in areas with plenty of light. ? Put grab bars in showers and bathtubs. ? Avoid icy or snowy sidewalks. ? Wear shoes with sturdy, flat soles. Follow-up care is a key part of your treatment and safety. Be sure to make and go to all appointments, and call your doctor if you are having problems. It's also a good idea to know your test results and keep a list of the medicines you take. When should you call for help? Call 911 anytime you think you may need emergency care. For example, call if:    · You passed out (lost consciousness).     · You have severe trouble breathing.     · You have sudden chest pain and shortness of breath, or you cough up blood. Call your doctor now or seek immediate medical care if:    · You have signs that your hip may be dislocated, including:  ? Severe pain and not being able to stand. ? A crooked leg that looks like your hip is out of position. ? Not being able to bend or straighten your leg.     · Your leg or foot is cool or pale or changes color.     · You cannot feel or move your leg.     · You have signs of a blood clot, such as:  ? Pain in your calf, back of the knee, thigh, or groin. ?  Redness and swelling in your leg or groin.     · Your incision comes open and begins to bleed, or the bleeding increases.     · You feel like your heart is racing or beating irregularly.     · You have signs of infection, such as:  ? Increased pain, swelling, warmth, or redness. ? Red streaks leading from the incision. ? Pus draining from the incision. ? A fever. Watch closely for changes in your health, and be sure to contact your doctor if:    · You do not have a bowel movement after taking a laxative.     · You do not get better as expected. Where can you learn more? Go to http://www.gray.com/  Enter Q746 in the search box to learn more about \"Hip Replacement Surgery (Posterior): What to Expect at Home. \"  Current as of: March 2, 2020               Content Version: 12.6  © 0812-0123 [employer]. Care instructions adapted under license by [employer] (which disclaims liability or warranty for this information). If you have questions about a medical condition or this instruction, always ask your healthcare professional. [address] any warranty or liability for your use of this information. These are general instructions for a healthy lifestyle:    No smoking/ No tobacco products/ Avoid exposure to second hand smoke    Surgeon General's Warning:  Quitting smoking now greatly reduces serious risk to your health. Obesity, smoking, and sedentary lifestyle greatly increases your risk for illness    A healthy diet, regular physical exercise & weight monitoring are important for maintaining a healthy lifestyle    You may be retaining fluid if you have a history of heart failure or if you experience any of the following symptoms:  Weight gain of 3 pounds or more overnight or 5 pounds in a week, increased swelling in our hands or feet or shortness of breath while lying flat in bed.   Please call your doctor as soon as you notice any of these symptoms; do not wait until your next office visit. Recognize signs and symptoms of STROKE:    F-face looks uneven    A-arms unable to move or move even    S-speech slurred or non-existent    T-time-call 911 as soon as signs and symptoms begin-DO NOT go       Back to bed or wait to see if you get better-TIME IS BRAIN. The discharge information has been reviewed with the patient. The patient verbalized understanding. Information obtained by :  I understand that if any problems occur once I am at home I am to contact my physician. I understand and acknowledge receipt of the instructions indicated above.                                                                                                                                            Physician's or R.N.'s Signature                                                                  Date/Time                                                                                                                                              Patient or Representative Signature                                                          Date/Time

## 2020-11-12 NOTE — PROGRESS NOTES
Problem: Self Care Deficits Care Plan (Adult)  Goal: *Acute Goals and Plan of Care (Insert Text)  Outcome: Progressing Towards Goal  Note: GOALS:   DISCHARGE GOALS (in preparation for going home/rehab):  3 days  1. Mr. Arnulfo Castano will perform one lower body dressing activity with minimal assistance with adaptive equipment to demonstrate improved functional mobility and safety. -GOAL MET 11/12/2020   2. Mr. Arnulfo Castano will perform one lower body bathing activity with minimal  assistance with adaptive equipment to demonstrate improved functional mobility and safety. -GOAL MET 11/12/2020   3. Mr. Arnulfo Castano will perform toileting/toilet transfer with contact guard assistance with adaptive equipment to demonstrate improved functional mobility and safety. -GOAL MET 11/12/2020   4. Mr. Arnulfo Castano will perform shower transfer with contact guard assistance with adaptive equipment to demonstrate improved functional mobility and safety. -GOAL MET 11/12/2020   5. Mr. Arnulfo Castano will state LIN precautions with two verbal cues to demonstrate improved functional mobility and safety. -GOAL MET 11/12/2020          JOINT CAMP OCCUPATIONAL THERAPY LIN: Daily Note and Discharge 11/12/2020  INPATIENT: Hospital Day: 2  Payor: Rock Webb / Plan: [place] [facility] / Product Type: PPO /      NAME/AGE/GENDER: Ashleigh Collins is a 63 y.o. male   PRIMARY DIAGNOSIS:  Localized osteoarthrosis of left hip [M16.12]   Procedure(s) and Anesthesia Type:     * LEFT HIP ARTHROPLASTY TOTAL / Heather Kobus / POD 1 - Spinal (Left)  ICD-10: Treatment Diagnosis:    · Pain in left hip (M25.552)  · Stiffness of Left Hip, Not elsewhere classified (M25.652)  · Other lack of cordination (R27.8)  · Difficulty in walking, Not elsewhere classified (R26.2)      ASSESSMENT:     Mr. Arnulfo Castano is s/p Left LIN and presents with decreased weight bearing on L LE and decreased independence with functional mobility and activities of daily living.   Patient completed shower and dressing as charted below in ADL grid and is ambulating with rolling walker and supervision assist.  Patient has met 5/5 goals and plans to return home with good family support. Will do well at home for self cares and transfers during ADL's.  D/C OT for acute deficits. This section established at most recent assessment   PROBLEM LIST (Impairments causing functional limitations):  1. Decreased Strength  2. Decreased ADL/Functional Activities  3. Decreased Transfer Abilities  4. Increased Pain  5. Increased Fatigue  6. Decreased Flexibility/Joint Mobility  7. Decreased Knowledge of Precautions   INTERVENTIONS PLANNED: (Benefits and precautions of occupational therapy have been discussed with the patient.)  1. Activities of daily living training  2. Adaptive equipment training  3. Balance training  4. Clothing management  5. Donning&doffing training  6. Theraputic activity     TREATMENT PLAN: Frequency/Duration: Follow patient 1-2 times to address above goals. Rehabilitation Potential For Stated Goals: Good     RECOMMENDED REHABILITATION/EQUIPMENT: (at time of discharge pending progress): Continue Skilled Therapy. OCCUPATIONAL PROFILE AND HISTORY:   History of Present Injury/Illness (Reason for Referral): Pt presents this date s/p (left) LIN. Past Medical History/Comorbidities:   Mr. Kiran Valenzuela  has a past medical history of Arthritis, CAD (coronary artery disease) (2017, 10/02/2020), Dyspnea on exertion, GERD (gastroesophageal reflux disease), H/O cold sores, Hyperlipidemia, Hypertension, Latex allergy, and Wide-complex tachycardia ([address] (2017). Mr. Kiran Valenzuela  has a past surgical history that includes hx heart catheterization (2017); hx colonoscopy; hx hip replacement (Right, 10/07/2020); and hx heart catheterization (10/02/2020).   Social History/Living Environment:   Home Environment: Private residence  # Steps to Enter: 1  One/Two Story Residence: One story  # of Interior Steps: 2  Living Alone: No  Support Systems: Spouse/Significant Other/Partner  Patient Expects to be Discharged to[de-identified] Private residence  Current DME Used/Available at Home: [employer], straight  Tub or Shower Type: Shower  Prior Level of Function/Work/Activity:  Mod i     Number of Personal Factors/Comorbidities that affect the Plan of Care: Brief history (0):  LOW COMPLEXITY   ASSESSMENT OF OCCUPATIONAL PERFORMANCE[de-identified]   Most Recent Physical Functioning:   Balance  Sitting: Intact  Standing: With support                    Coordination  Fine Motor Skills-Upper: Left Intact; Right Intact  Gross Motor Skills-Upper: Left Intact; Right Intact         Mental Status  Neurologic State: Alert  Orientation Level: Oriented X4  Cognition: Appropriate decision making  Perception: Appears intact                Basic ADLs (From Assessment) Complex ADLs (From Assessment)   Basic ADL  Feeding: Independent  Oral Facial Hygiene/Grooming: Independent  Bathing: Supervision  Type of Bath: Chlorhexidine (CHG), Full, Shower  Upper Body Dressing: Independent  Lower Body Dressing: Supervision  Toileting: Supervision     Grooming/Bathing/Dressing Activities of Daily Living   Grooming  Grooming Assistance: Supervision     Upper Body Bathing  Bathing Assistance: Supervision     Lower Body Bathing  Bathing Assistance: Supervision     Upper Body Dressing Assistance  Dressing Assistance: Supervision Functional Transfers  Bathroom Mobility: Supervision/set up  Toilet Transfer : Supervision  Shower Transfer: Supervision   Lower Body Dressing Assistance  Dressing Assistance: Minimum assistance  Underpants: Stand-by assistance  Pants With Elastic Waist: Stand-by assistance  Socks: Minimum assistance Bed/Mat Mobility  Sit to Stand: Supervision  Stand to Sit: Supervision  Bed to Chair: Supervision         Physical Skills Involved:  1. Strength  2.  Activity Tolerance Cognitive Skills Affected (resulting in the inability to perform in a timely and safe manner): 1. none Psychosocial Skills Affected:  1. none   Number of elements that affect the Plan of Care: 1-3:  LOW COMPLEXITY   CLINICAL DECISION MAKIN[address] [place] [address] AM-PAC 6 Clicks   Daily Activity Inpatient Short Form  How much help from another person does the patient currently need. .. Total A Lot A Little None   1. Putting on and taking off regular lower body clothing? [] 1   [] 2   [x] 3   [] 4   2. Bathing (including washing, rinsing, drying)? [] 1   [] 2   [x] 3   [] 4   3. Toileting, which includes using toilet, bedpan or urinal?   [] 1   [] 2   [x] 3   [] 4   4. Putting on and taking off regular upper body clothing? [] 1   [] 2   [] 3   [x] 4   5. Taking care of personal grooming such as brushing teeth? [] 1   [] 2   [] 3   [x] 4   6. Eating meals? [] 1   [] 2   [] 3   [x] 4   © , Trustees of [address] [place] [address], under license to [employer]. All rights reserved     Score:  Initial: 20 Most Recent: 21 (Date: 2020 )    Interpretation of Tool:  Represents activities that are increasingly more difficult (i.e. Bed mobility, Transfers, Gait). Medical Necessity:     · Patient is expected to demonstrate progress in   · Self care skills and functional mobility  ·     Reason for Services/Other Comments:  · Patient continues to require skilled intervention due to   · Above listed deficits     Use of outcome tool(s) and clinical judgement create a POC that gives a: LOW COMPLEXITY            TREATMENT:   (In addition to Assessment/Re-Assessment sessions the following treatments were rendered)     Pre-treatment Symptoms/Complaints:    Pain: Initial:   Pain Intensity 1: 0  Post Session:  0/10     Self Care: (40): Procedure(s) (per grid) utilized to improve and/or restore self-care/home management as related to dressing, bathing, toileting and grooming. Required minimal verbal and tactile cueing to facilitate activities of daily living skills.       Treatment/Session Assessment: Response to Treatment:  tolerated very well. Education:  [] Home Exercises  [x] Fall Precautions  [x] Hip Precautions [] Going Home Video  [] Knee/Hip Prosthesis Review  [x] Walker Management/Safety [x] Adaptive Equipment as Needed       Interdisciplinary Collaboration:   o Physical Therapist  o Occupational Therapist  o Registered Nurse    After treatment position/precautions:   o Up in chair  o Bed/Chair-wheels locked  o Call light within reach  o RN notified  o Family at bedside     Compliance with Program/Exercises: Compliant all of the time, Will assess as treatment progresses. Recommendations/Intent for next treatment session:  D/C OT for acute deficits.       Total Treatment Duration:  OT Patient Time In/Time Out  Time In: 0740  Time Out: 1200 Haylee Crowell, OT

## 2020-11-12 NOTE — PROGRESS NOTES
Problem: Mobility Impaired (Adult and Pediatric)  Goal: *Acute Goals and Plan of Care (Insert Text)  Outcome: Progressing Towards Goal  Note: GOALS (1-4 days):  (1.)Mr. Leone will move from supine to sit and sit to supine  in bed with SUPERVISION. (2.)Mr. Leone will transfer from bed to chair and chair to bed with SUPERVISION using the least restrictive device. (3.)Mr. Leone will ambulate with SUPERVISION for 200 feet with the least restrictive device. (4.)Mr. Leone will ambulate up/down 3 steps with left railing with STAND BY ASSIST with device as needed. (5.)Mr. Leone will state/observe LIN precautions with 0 verbal cues. Goals met  ________________________________________________________________________________________________       PHYSICAL THERAPY JOINT CAMP LIN: Daily Note and AM 11/12/2020  INPATIENT: Hospital Day: 2  Payor: Rambo Szymanski / Plan: [facility] / Product Type: PPO /      NAME/AGE/GENDER: Karie Kelley is a 63 y.o. male   PRIMARY DIAGNOSIS:  Localized osteoarthrosis of left hip [M16.12]   Procedure(s) and Anesthesia Type:     * LEFT HIP ARTHROPLASTY TOTAL / Dot Pili / POD 1 - Spinal (Left)  ICD-10: Treatment Diagnosis:    · Pain in left hip (M25.552)  · Stiffness of Left Hip, Not elsewhere classified (M25.652)  · Difficulty in walking, Not elsewhere classified (R26.2)      ASSESSMENT:     Mr. Bennett Plan presents with decreased functional mobility and gait as well as decreased rom and strength of left LE s/p left lin. He plans to go home with HHPT. Pt. Doing well and is eager to go home. He had no questions or concerns about going home this am.   We reviewed safety and lin precautions. He ambulated well with RW and did some steps without any difficulty. He did lin exercises with cues only. This section established at most recent assessment   PROBLEM LIST (Impairments causing functional limitations):  1.  Decreased Strength  2. Decreased ADL/Functional Activities  3. Decreased Transfer Abilities  4. Decreased Ambulation Ability/Technique  5. Decreased Balance  6. Increased Pain  7. Decreased Activity Tolerance  8. Decreased Flexibility/Joint Mobility  9. Decreased [place] with Home Exercise Program  10. Decreased strength   INTERVENTIONS PLANNED: (Benefits and precautions of physical therapy have been discussed with the patient.)  1. Bed Mobility  2. Cold  3. Gait Training  4. Home Exercise Program (HEP)  5. Range of Motion (ROM)  6. Therapeutic Activites  7. Therapeutic Exercise/Strengthening  8. Transfer Training     TREATMENT PLAN: Frequency/Duration: Follow patient BID for duration of hospital stay to address above goals. Rehabilitation Potential For Stated Goals: Good     RECOMMENDED REHABILITATION/EQUIPMENT: (at time of discharge pending progress): Continue Skilled Therapy and Home Health: Physical Therapy. HISTORY:   History of Present Injury/Illness (Reason for Referral):  S/p left malia  Past Medical History/Comorbidities:   Mr. Kunal Smallwood  has a past medical history of Arthritis, CAD (coronary artery disease) (2017, 10/02/2020), Dyspnea on exertion, GERD (gastroesophageal reflux disease), H/O cold sores, Hyperlipidemia, Hypertension, Latex allergy, and Wide-complex tachycardia ([facility] [address] (2017). Mr. Kunal Smallwood  has a past surgical history that includes hx heart catheterization (2017); hx colonoscopy; hx hip replacement (Right, 10/07/2020); and hx heart catheterization (10/02/2020).    Social History/Living Environment:   Home Environment: Private residence  # Steps to Enter: 1  Rails to Enter: No  One/Two Story Residence: One story  # of Interior Steps: 2  Interior Rails: None  Lift Chair Available: No  Living Alone: No  Support Systems: Spouse/Significant Other/Partner  Patient Expects to be [employer]charged to[employer] residence  Current DME Used/Available at Home: Cane, straight  Tub or Shower Type: Shower    Prior Level of Function/Work/Activity:  independent   Number of Personal Factors/Comorbidities that affect the Plan of Care: 1-2: MODERATE COMPLEXITY   EXAMINATION:   Most Recent Physical Functioning:                 LLE AROM  L Hip Flexion: 90  L Hip ABduction: 10               Transfers  Sit to Stand: Supervision  Stand to Sit: Supervision  Bed to Chair: Supervision    Balance  Sitting: Intact  Standing: With support              Weight Bearing Status  Left Side Weight Bearing: As tolerated  Distance (ft): 200 Feet (ft)  Ambulation - Level of Assistance: Supervision  Assistive Device: Walker, rolling  Stance: Left decreased  Gait Abnormalities: Antalgic  Number of Stairs Trained: 3  Stairs - Level of Assistance: Stand-by assistance  Rail Use: Left (also one step wtih walker)  Interventions: Safety awareness training;Verbal cues     Braces/Orthotics:     Left Hip Cold  Type: Cold/ice pack      Body Structures Involved:  1. Bones  2. Joints  3. Muscles  4. Ligaments Body Functions Affected:  1. Movement Related Activities and Participation Affected:  1. Mobility   Number of elements that affect the Plan of Care: 3: MODERATE COMPLEXITY   CLINICAL PRESENTATION:   Presentation: Stable and uncomplicated: LOW COMPLEXITY   CLINICAL DECISION MAKIN[address] [place] [address] AM-PAC 6 Clicks   Basic Mobility Inpatient Short Form  How much difficulty does the patient currently have. .. Unable A Lot A Little None   1. Turning over in bed (including adjusting bedclothes, sheets and blankets)? [] 1   [] 2   [x] 3   [] 4   2. Sitting down on and standing up from a chair with arms ( e.g., wheelchair, bedside commode, etc.)   [] 1   [] 2   [x] 3   [] 4   3. Moving from lying on back to sitting on the side of the bed? [] 1   [] 2   [x] 3   [] 4   How much help from another person does the patient currently need. .. Total A Lot A Little None   4. Moving to and from a bed to a chair (including a wheelchair)?   [] 1   [] 2 [] 3   [x] 4   5. Need to walk in hospital room? [] 1   [] 2   [] 3   [x] 4   6. Climbing 3-5 steps with a railing? [] 1   [] 2   [x] 3   [] 4   © 2007, Trustees of [address] [place] [address] [address] [address], under license to [employer]. All rights reserved     Score:  Initial: 20 Most Recent: X (Date: -- )    Interpretation of Tool:  Represents activities that are increasingly more difficult (i.e. Bed mobility, Transfers, Gait). Medical Necessity:     · Patient is expected to demonstrate progress in   · strength, range of motion, and balance  ·  to   · decrease assistance required with exercises and functional mobility  · . Reason for Services/Other Comments:  · Patient   · continues to require present interventions due to patient's inability to perform exercises and functional mobility independently  · . Use of outcome tool(s) and clinical judgement create a POC that gives a: Clear prediction of patient's progress: LOW COMPLEXITY            TREATMENT:   (In addition to Assessment/Re-Assessment sessions the following treatments were rendered)     Pre-treatment Symptoms/Complaints:  hip sore  Pain Initial:      Post Session:  Did not rate, mild pain     Therapeutic Exercise: (10 Minutes):  Exercises per grid below to improve mobility and strength. Required minimal visual, verbal, and manual cues to promote proper body alignment, promote proper body posture, and promote proper body mechanics. Progressed range and repetitions as indicated. Gait Training (15 Minutes):  Gait training to improve and/or restore physical functioning as related to mobility, strength and balance. Ambulated 200 Feet (ft) with Supervision using a Walker, rolling and minimal Safety awareness training;Verbal cues related to their stance phase to promote proper body alignment, promote proper body posture and promote proper body mechanics.        Date:  11/11 Date:  11/12 Date:     ACTIVITY/EXERCISE AM PM AM PM AM PM   GROUP THERAPY  []  []  []  []  [] []   Ankle Pumps  10 15a      Quad Sets  10 15a      Gluteal Sets  10 15a      Hip ABd/ADduction  10 15a      Straight Leg Raises         Knee Slides  10 15a      Short Arc Quads  10 15a      Long Arc Quads   15a      Chair Slides                  B = bilateral; AA = active assistive; A = active; P = passive      Treatment/Session Assessment:     Response to Treatment:  Doing very well    Education:  [x] Home Exercises  [x] Fall Precautions  [x] Hip Precautions [x] D/C Instruction Review  [x] Hip Prosthesis Review  [x] Walker Management/Safety [] Adaptive Equipment as Needed       Interdisciplinary Collaboration:   o Registered Nurse    After treatment position/precautions:   o Up in chair  o Bed/Chair-wheels locked  o Bed in low position  o Call light within reach    Compliance with Program/Exercises: Compliant most of the time. Recommendations/Intent for next treatment session:  Treatment next visit will focus on increasing Mr. Mcphersons independence with bed mobility, transfers, gait training, strength/ROM exercises, modalities for pain, and patient education.       Total Treatment Duration:  PT Patient Time In/Time Out  Time In: 0900  Time Out: Bunny [address], PT

## 2020-11-12 NOTE — PROGRESS NOTES
Pt discharge summary and home medication sheet reviewed and signed by pt. Copy given for take home use. Dr Arturo Mccall teaching sheet with home medication sheet and RX given to pt. All goals met. Assessment unchanged. Pt leaving hospital via w/c with staff member and wife.

## 2020-11-13 ENCOUNTER — HOME CARE VISIT (OUTPATIENT)
Dept: SCHEDULING | Facility: HOME HEALTH | Age: 60
End: 2020-11-13
Payer: COMMERCIAL

## 2020-11-13 VITALS
DIASTOLIC BLOOD PRESSURE: 62 MMHG | SYSTOLIC BLOOD PRESSURE: 116 MMHG | HEART RATE: 70 BPM | RESPIRATION RATE: 18 BRPM | OXYGEN SATURATION: 96 % | TEMPERATURE: 97.4 F

## 2020-11-13 PROCEDURE — 400013 HH SOC

## 2020-11-13 PROCEDURE — G0151 HHCP-SERV OF PT,EA 15 MIN: HCPCS

## 2020-11-17 ENCOUNTER — HOME CARE VISIT (OUTPATIENT)
Dept: SCHEDULING | Facility: HOME HEALTH | Age: 60
End: 2020-11-17
Payer: COMMERCIAL

## 2020-11-17 VITALS
RESPIRATION RATE: 16 BRPM | HEART RATE: 88 BPM | TEMPERATURE: 96.9 F | SYSTOLIC BLOOD PRESSURE: 126 MMHG | DIASTOLIC BLOOD PRESSURE: 70 MMHG

## 2020-11-17 PROCEDURE — G0151 HHCP-SERV OF PT,EA 15 MIN: HCPCS

## 2020-11-18 ENCOUNTER — HOME CARE VISIT (OUTPATIENT)
Dept: SCHEDULING | Facility: HOME HEALTH | Age: 60
End: 2020-11-18
Payer: COMMERCIAL

## 2020-11-18 VITALS
RESPIRATION RATE: 16 BRPM | SYSTOLIC BLOOD PRESSURE: 116 MMHG | HEART RATE: 76 BPM | TEMPERATURE: 98.5 F | DIASTOLIC BLOOD PRESSURE: 70 MMHG

## 2020-11-18 PROCEDURE — G0151 HHCP-SERV OF PT,EA 15 MIN: HCPCS

## 2020-11-20 ENCOUNTER — HOME CARE VISIT (OUTPATIENT)
Dept: SCHEDULING | Facility: HOME HEALTH | Age: 60
End: 2020-11-20
Payer: COMMERCIAL

## 2020-11-20 VITALS
DIASTOLIC BLOOD PRESSURE: 66 MMHG | SYSTOLIC BLOOD PRESSURE: 116 MMHG | RESPIRATION RATE: 16 BRPM | TEMPERATURE: 98.5 F | HEART RATE: 80 BPM

## 2020-11-20 PROCEDURE — G0151 HHCP-SERV OF PT,EA 15 MIN: HCPCS

## 2020-11-24 ENCOUNTER — HOME CARE VISIT (OUTPATIENT)
Dept: SCHEDULING | Facility: HOME HEALTH | Age: 60
End: 2020-11-24
Payer: COMMERCIAL

## 2020-11-24 VITALS
DIASTOLIC BLOOD PRESSURE: 84 MMHG | SYSTOLIC BLOOD PRESSURE: 156 MMHG | TEMPERATURE: 98.7 F | RESPIRATION RATE: 16 BRPM | HEART RATE: 84 BPM

## 2020-11-24 PROCEDURE — G0151 HHCP-SERV OF PT,EA 15 MIN: HCPCS

## 2020-11-27 ENCOUNTER — HOME CARE VISIT (OUTPATIENT)
Dept: SCHEDULING | Facility: HOME HEALTH | Age: 60
End: 2020-11-27
Payer: COMMERCIAL

## 2020-11-27 VITALS
DIASTOLIC BLOOD PRESSURE: 88 MMHG | TEMPERATURE: 97.8 F | HEART RATE: 72 BPM | RESPIRATION RATE: 18 BRPM | SYSTOLIC BLOOD PRESSURE: 138 MMHG

## 2020-11-27 PROCEDURE — G0157 HHC PT ASSISTANT EA 15: HCPCS

## 2020-12-02 ENCOUNTER — HOME CARE VISIT (OUTPATIENT)
Dept: SCHEDULING | Facility: HOME HEALTH | Age: 60
End: 2020-12-02
Payer: COMMERCIAL

## 2020-12-02 VITALS
SYSTOLIC BLOOD PRESSURE: 150 MMHG | HEART RATE: 76 BPM | DIASTOLIC BLOOD PRESSURE: 86 MMHG | RESPIRATION RATE: 16 BRPM | TEMPERATURE: 98.3 F

## 2020-12-02 PROCEDURE — G0151 HHCP-SERV OF PT,EA 15 MIN: HCPCS

## 2020-12-03 ENCOUNTER — HOME CARE VISIT (OUTPATIENT)
Dept: SCHEDULING | Facility: HOME HEALTH | Age: 60
End: 2020-12-03
Payer: COMMERCIAL

## 2020-12-03 VITALS
RESPIRATION RATE: 16 BRPM | HEART RATE: 77 BPM | SYSTOLIC BLOOD PRESSURE: 118 MMHG | DIASTOLIC BLOOD PRESSURE: 64 MMHG | WEIGHT: 213 LBS | TEMPERATURE: 97.6 F | HEIGHT: 71 IN | BODY MASS INDEX: 29.82 KG/M2

## 2020-12-03 PROCEDURE — G0151 HHCP-SERV OF PT,EA 15 MIN: HCPCS

## 2020-12-08 ENCOUNTER — HOSPITAL ENCOUNTER (OUTPATIENT)
Dept: SLEEP MEDICINE | Age: 60
Discharge: HOME | End: 2020-12-08
Payer: COMMERCIAL

## 2020-12-08 PROCEDURE — 95806 SLEEP STUDY UNATT&RESP EFFT: CPT

## 2021-02-01 PROBLEM — R06.02 SHORTNESS OF BREATH ON EXERTION: Status: ACTIVE | Noted: 2021-02-01

## 2021-02-01 PROBLEM — G47.33 OSA (OBSTRUCTIVE SLEEP APNEA): Status: ACTIVE | Noted: 2021-02-01

## 2021-02-01 PROBLEM — G47.34 NOCTURNAL HYPOXEMIA: Status: ACTIVE | Noted: 2021-02-01

## 2021-02-02 ENCOUNTER — HOSPITAL ENCOUNTER (OUTPATIENT)
Dept: LAB | Age: 61
Discharge: HOME | End: 2021-02-02
Payer: COMMERCIAL

## 2021-02-02 LAB — D DIMER PPP FEU-MCNC: 1.12 UG/ML(FEU)

## 2021-02-02 PROCEDURE — 85379 FIBRIN DEGRADATION QUANT: CPT

## 2021-02-02 PROCEDURE — 36415 COLL VENOUS BLD VENIPUNCTURE: CPT

## 2021-02-04 ENCOUNTER — HOSPITAL ENCOUNTER (OUTPATIENT)
Dept: CT IMAGING | Age: 61
Discharge: HOME | End: 2021-02-04
Attending: NURSE PRACTITIONER

## 2021-02-04 DIAGNOSIS — R06.02 SHORTNESS OF BREATH: ICD-10-CM

## 2021-02-04 NOTE — PROGRESS NOTES
Patient to be r/s for CT per ordering on Friday with premedication. Ordering will call prednisone into pharmacy and he was given full instructions on how to take it.

## 2021-02-05 ENCOUNTER — HOSPITAL ENCOUNTER (OUTPATIENT)
Dept: CT IMAGING | Age: 61
Discharge: HOME | End: 2021-02-05
Attending: NURSE PRACTITIONER
Payer: COMMERCIAL

## 2021-02-05 LAB — CREAT BLD-MCNC: 1.5 MG/DL (ref 0.8–1.5)

## 2021-02-05 PROCEDURE — 74011000258 HC RX REV CODE- 258: Performed by: NURSE PRACTITIONER

## 2021-02-05 PROCEDURE — 74011000636 HC RX REV CODE- 636: Performed by: NURSE PRACTITIONER

## 2021-02-05 PROCEDURE — 82565 ASSAY OF CREATININE: CPT

## 2021-02-05 PROCEDURE — 71260 CT THORAX DX C+: CPT

## 2021-02-05 RX ORDER — SODIUM CHLORIDE 0.9 % (FLUSH) 0.9 %
10 SYRINGE (ML) INJECTION
Status: COMPLETED | OUTPATIENT
Start: 2021-02-05 | End: 2021-02-05

## 2021-02-05 RX ADMIN — SODIUM CHLORIDE 100 ML: 900 INJECTION, SOLUTION INTRAVENOUS at 12:30

## 2021-02-05 RX ADMIN — Medication 10 ML: at 12:30

## 2021-02-05 RX ADMIN — IOPAMIDOL 100 ML: 755 INJECTION, SOLUTION INTRAVENOUS at 12:30

## 2021-04-05 ENCOUNTER — HOSPITAL ENCOUNTER (EMERGENCY)
Age: 61
Discharge: HOME | End: 2021-04-05
Attending: EMERGENCY MEDICINE
Payer: COMMERCIAL

## 2021-04-05 VITALS
SYSTOLIC BLOOD PRESSURE: 141 MMHG | OXYGEN SATURATION: 98 % | WEIGHT: 200 LBS | RESPIRATION RATE: 18 BRPM | TEMPERATURE: 98 F | BODY MASS INDEX: 28 KG/M2 | HEART RATE: 86 BPM | DIASTOLIC BLOOD PRESSURE: 76 MMHG | HEIGHT: 71 IN

## 2021-04-05 DIAGNOSIS — M54.32 LEFT SIDED SCIATICA: Primary | ICD-10-CM

## 2021-04-05 PROCEDURE — 99283 EMERGENCY DEPT VISIT LOW MDM: CPT

## 2021-04-05 PROCEDURE — 74011250637 HC RX REV CODE- 250/637: Performed by: EMERGENCY MEDICINE

## 2021-04-05 RX ORDER — HYDROCODONE BITARTRATE AND ACETAMINOPHEN 5; 325 MG/1; MG/1
1 TABLET ORAL ONCE
Status: COMPLETED | OUTPATIENT
Start: 2021-04-05 | End: 2021-04-05

## 2021-04-05 RX ORDER — METHOCARBAMOL 500 MG/1
500 TABLET, FILM COATED ORAL 3 TIMES DAILY
Qty: 15 TAB | Refills: 0 | OUTPATIENT
Start: 2021-04-05 | End: 2021-04-05 | Stop reason: SDUPTHER

## 2021-04-05 RX ORDER — DICLOFENAC SODIUM 50 MG/1
50 TABLET, DELAYED RELEASE ORAL 2 TIMES DAILY
Qty: 14 TAB | Refills: 0 | Status: SHIPPED | OUTPATIENT
Start: 2021-04-05 | End: 2021-04-12

## 2021-04-05 RX ORDER — DICLOFENAC SODIUM 50 MG/1
50 TABLET, DELAYED RELEASE ORAL 2 TIMES DAILY
Qty: 14 TAB | Refills: 0 | OUTPATIENT
Start: 2021-04-05 | End: 2021-04-05 | Stop reason: SDUPTHER

## 2021-04-05 RX ORDER — METHOCARBAMOL 500 MG/1
500 TABLET, FILM COATED ORAL 3 TIMES DAILY
Qty: 15 TAB | Refills: 0 | Status: SHIPPED | OUTPATIENT
Start: 2021-04-05 | End: 2021-04-10

## 2021-04-05 RX ADMIN — HYDROCODONE BITARTRATE AND ACETAMINOPHEN 1 TABLET: 5; 325 TABLET ORAL at 15:18

## 2021-04-05 NOTE — ED TRIAGE NOTES
Patient arrives ambulatory with cane to triage with mask in place. Patient reports lower back pain radiating down left leg for several weeks. Has tried steroids without relief. Patient reports he has MRI of hip scheduled for tomorrow but pain was too severe today.

## 2021-04-05 NOTE — ED NOTES
I have reviewed discharge instructions with the patient. The patient verbalized understanding. Patient left ED via Discharge Method: wheelchair to Home with family  Opportunity for questions and clarification provided. Patient given 2 scripts. To continue your aftercare when you leave the hospital, you may receive an automated call from our care team to check in on how you are doing. This is a free service and part of our promise to provide the best care and service to meet your aftercare needs.  If you have questions, or wish to unsubscribe from this service please call [phone]. Thank you for Choosing our [facility] Emergency Department.

## 2021-04-05 NOTE — ED PROVIDER NOTES
24-year-old gentleman presents with concerns about pain going down the back of his left leg. He says the pain starts around his left hip and goes down towards his foot. With this pain he said no injury. He said no redness or swelling. He has had no weakness or numbness. He had no bowel or bladder problems. He denies any other associated symptoms. Nothing seems to make the pain any better. He says he saw his primary care doctor for it and was given some steroids without any relief. Pt says it is making him limp. He is scheduled for an MRI of his low back tomorrow for evaluation of this pain.                Past Medical History:   Diagnosis Date    Arthritis     hips    CAD (coronary artery disease) 2017, 10/02/2020    Mild CAD, normal LV function per cath dated 10/02/2020; followed by [facility]    Dyspnea on exertion     GERD (gastroesophageal reflux disease)     resolved    H/O cold sores     Hyperlipidemia     on med for control     Hypertension     on med for control     Latex allergy     Wide-complex tachycardia ([address] 2017    NSVT       Past Surgical History:   Procedure Laterality Date    HX COLONOSCOPY      HX HEART CATHETERIZATION  2017    no stents     HX HEART CATHETERIZATION  10/02/2020    Mild CAD, normal LV function    HX HIP REPLACEMENT Right 10/07/2020         Family History:   Problem Relation Age of Onset    Depression Mother     Heart Disease Father        Social History     Socioeconomic History    Marital status:      Spouse name: Not on file    Number of children: Not on file    Years of education: Not on file    Highest education level: Not on file   Occupational History    Not on file   Social Needs    Financial resource strain: Not on file    Food insecurity     Worry: Not on file     Inability: Not on file    Transportation needs     Medical: Not on file     Non-medical: Not on file   Tobacco Use    Smoking status: Never Smoker    Smokeless tobacco: Never Used   Substance and Sexual Activity    Alcohol use: Never     Frequency: Never    Drug use: Never    Sexual activity: Not on file   Lifestyle    Physical activity     Days per week: Not on file     Minutes per session: Not on file    Stress: Not on file   Relationships    Social connections     Talks on phone: Not on file     Gets together: Not on file     Attends [employer] service: Not on file     Active member of club or organization: Not on file     Attends meetings of clubs or organizations: Not on file     Relationship status: Not on file    Intimate partner violence     Fear of current or ex partner: Not on file     Emotionally abused: Not on file     Physically abused: Not on file     Forced sexual activity: Not on file   Other Topics Concern    Not on file   Social History Narrative    Not on file         ALLERGIES: Latex and Iodinated contrast media    Review of Systems   Constitutional: Negative for chills and fever. Respiratory: Negative for cough and shortness of breath. Musculoskeletal: Positive for arthralgias, gait problem and myalgias. Skin: Negative for color change and wound. Neurological: Negative for weakness, numbness and headaches. Vitals:    04/05/21 1230 04/05/21 1543   BP: (!) 162/98 (!) 141/76   Pulse: 100 86   Resp: 18 18   Temp: 98 °F (36.7 °C)    SpO2: 92% 98%   Weight: 90.7 kg (200 lb)    Height: 5' 11\" (1.803 m)             Physical Exam  Vitals signs and nursing note reviewed. Constitutional:       Appearance: Normal appearance. Cardiovascular:      Rate and Rhythm: Normal rate and regular rhythm. Pulmonary:      Effort: Pulmonary effort is normal.      Breath sounds: Normal breath sounds. Musculoskeletal:         General: No swelling, tenderness or signs of injury. Neurological:      General: No focal deficit present. Mental Status: He is alert and oriented to person, place, and time.           [facility]  Number of Diagnoses or Management Options  Left sided sciatica  Diagnosis management comments: Patient symptoms seem most consistent with sciatica. Plan to discharge him home with some muscle relaxers and anti-inflammatories. He has already tried a round of steroids I do not think he needs additional steroids. I did encourage him to follow-up for his MRI tomorrow.          Procedures

## 2021-05-06 ENCOUNTER — HOSPITAL ENCOUNTER (OUTPATIENT)
Dept: SURGERY | Age: 61
Discharge: HOME | End: 2021-05-06
Payer: COMMERCIAL

## 2021-05-06 VITALS
RESPIRATION RATE: 17 BRPM | DIASTOLIC BLOOD PRESSURE: 87 MMHG | WEIGHT: 199.7 LBS | TEMPERATURE: 97.1 F | HEIGHT: 70 IN | HEART RATE: 78 BPM | OXYGEN SATURATION: 98 % | BODY MASS INDEX: 28.59 KG/M2 | SYSTOLIC BLOOD PRESSURE: 158 MMHG

## 2021-05-06 LAB
BACTERIA SPEC CULT: ABNORMAL
SERVICE CMNT-IMP: ABNORMAL

## 2021-05-06 PROCEDURE — 87641 MR-STAPH DNA AMP PROBE: CPT

## 2021-05-06 NOTE — PERIOP NOTES
PLEASE CONTINUE TAKING ALL PRESCRIPTION MEDICATIONS UP TO THE DAY OF SURGERY UNLESS OTHERWISE DIRECTED BELOW. DISCONTINUE all vitamins and supplements 7 days prior to surgery. DISCONTINUE Non-Steriodal Anti-Inflammatory (NSAIDS) such as Advil and Aleve 5 days prior to surgery. Home Medications to take  the day of surgery   Fluticasone Furoate (Annuity elipta)   Verapamil ER (Calan-SR)           Home Medications   to Hold   Losartan   Hold Aspirin 81 mg and Meloxicam (Mobic) 5 days prior to surgery 5/8/2021     Comments    Covid test 5/6/2021 @ 2 2 [facility],[address], 0 [facility] of 1 visitor per patient discussed. Please do not bring home medications with you on the day of surgery unless otherwise directed by your nurse. If you are instructed to bring home medications, please give them to your nurse as they will be administered by the nursing staff. If you have any questions, please call [facility] [phone] or [facility] [phone]. A copy of this note was provided to the patient for reference.

## 2021-05-06 NOTE — PERIOP NOTES
Recent Results (from the past 12 hour(s))   MSSA/MRSA SC BY PCR, NASAL SWAB    Collection Time: 05/06/21 10:13 AM    Specimen: Nasal   Result Value Ref Range    Special Requests: NO SPECIAL REQUESTS      Culture result: (A)       MRSA target DNA not detected, SA target DNA detected. A MRSA negative, SA positive test result does not preclude MRSA nasal colonization. Lab routed to Dr. Jessa Erickson.

## 2021-05-06 NOTE — PERIOP NOTES
Patient verified name, , and surgery as listed in [facility]. Patient provided medical/health information and PTA medications to the best of their ability. TYPE  CASE: II  Orders per surgeon: were received  Labs per surgeon: MRSA. Labs per anesthesia protocol: none. [id number], acceptable    Patient COVID test date 2021; Patient did show for the appointment. The testing center is located at the [address], [place]. If appointment is needed patient provided telephone number of [phone]. Nasal Swab collected per MD order. Patient provided with and instructed on education handouts including Guide to Surgery, blood transfusions, pain management, and hand hygiene for the family and community, and [facility] brochure. Hibiclens and instructions given per hospital policy. Original medication prescription bottles were not visualized during patient appointment. Patient teach back successful and patient demonstrates knowledge of instruction.

## 2021-05-12 ENCOUNTER — ANESTHESIA EVENT (OUTPATIENT)
Dept: SURGERY | Age: 61
End: 2021-05-12
Payer: COMMERCIAL

## 2021-05-13 ENCOUNTER — HOSPITAL ENCOUNTER (OUTPATIENT)
Age: 61
Setting detail: OUTPATIENT SURGERY
Discharge: HOME | End: 2021-05-13
Attending: ORTHOPAEDIC SURGERY | Admitting: ORTHOPAEDIC SURGERY
Payer: COMMERCIAL

## 2021-05-13 ENCOUNTER — APPOINTMENT (OUTPATIENT)
Dept: GENERAL RADIOLOGY | Age: 61
End: 2021-05-13
Attending: ORTHOPAEDIC SURGERY
Payer: COMMERCIAL

## 2021-05-13 ENCOUNTER — ANESTHESIA (OUTPATIENT)
Dept: SURGERY | Age: 61
End: 2021-05-13
Payer: COMMERCIAL

## 2021-05-13 VITALS
HEART RATE: 78 BPM | SYSTOLIC BLOOD PRESSURE: 144 MMHG | BODY MASS INDEX: 26.88 KG/M2 | HEIGHT: 71 IN | OXYGEN SATURATION: 97 % | DIASTOLIC BLOOD PRESSURE: 82 MMHG | WEIGHT: 192 LBS | TEMPERATURE: 97.5 F | RESPIRATION RATE: 16 BRPM

## 2021-05-13 DIAGNOSIS — M51.26 LUMBAR HERNIATED DISC: Primary | ICD-10-CM

## 2021-05-13 PROCEDURE — 63030 LAMOT DCMPRN NRV RT 1 LMBR: CPT | Performed by: ORTHOPAEDIC SURGERY

## 2021-05-13 PROCEDURE — 74011250636 HC RX REV CODE- 250/636: Performed by: ORTHOPAEDIC SURGERY

## 2021-05-13 PROCEDURE — 76060000034 HC ANESTHESIA 1.5 TO 2 HR: Performed by: ORTHOPAEDIC SURGERY

## 2021-05-13 PROCEDURE — 77030025623 HC BUR RND PRECIS STRY -D: Performed by: ORTHOPAEDIC SURGERY

## 2021-05-13 PROCEDURE — 77030037088 HC TUBE ENDOTRACH ORAL NSL COVD-A: Performed by: ANESTHESIOLOGY

## 2021-05-13 PROCEDURE — 74011250636 HC RX REV CODE- 250/636: Performed by: ANESTHESIOLOGY

## 2021-05-13 PROCEDURE — 77030031139 HC SUT VCRL2 J&J -A: Performed by: ORTHOPAEDIC SURGERY

## 2021-05-13 PROCEDURE — 74011000250 HC RX REV CODE- 250: Performed by: NURSE ANESTHETIST, CERTIFIED REGISTERED

## 2021-05-13 PROCEDURE — 76010000162 HC OR TIME 1.5 TO 2 HR INTENSV-TIER 1: Performed by: ORTHOPAEDIC SURGERY

## 2021-05-13 PROCEDURE — 74011250636 HC RX REV CODE- 250/636: Performed by: NURSE ANESTHETIST, CERTIFIED REGISTERED

## 2021-05-13 PROCEDURE — 77030018673: Performed by: ORTHOPAEDIC SURGERY

## 2021-05-13 PROCEDURE — 77030034479 HC ADH SKN CLSR PRINEO J&J -B: Performed by: ORTHOPAEDIC SURGERY

## 2021-05-13 PROCEDURE — 74011250637 HC RX REV CODE- 250/637: Performed by: ANESTHESIOLOGY

## 2021-05-13 PROCEDURE — 72020 X-RAY EXAM OF SPINE 1 VIEW: CPT

## 2021-05-13 PROCEDURE — 77030028270 HC SRGFL HEMSTAT MTRX J&J -C: Performed by: ORTHOPAEDIC SURGERY

## 2021-05-13 PROCEDURE — 76210000020 HC REC RM PH II FIRST 0.5 HR: Performed by: ORTHOPAEDIC SURGERY

## 2021-05-13 PROCEDURE — 76210000016 HC OR PH I REC 1 TO 1.5 HR: Performed by: ORTHOPAEDIC SURGERY

## 2021-05-13 PROCEDURE — 77030040922 HC BLNKT HYPOTHRM STRY -A: Performed by: ANESTHESIOLOGY

## 2021-05-13 PROCEDURE — 2709999900 HC NON-CHARGEABLE SUPPLY: Performed by: ORTHOPAEDIC SURGERY

## 2021-05-13 PROCEDURE — 74011000250 HC RX REV CODE- 250: Performed by: ORTHOPAEDIC SURGERY

## 2021-05-13 PROCEDURE — 77030039425 HC BLD LARYNG TRULITE DISP TELE -A: Performed by: ANESTHESIOLOGY

## 2021-05-13 PROCEDURE — 74011250637 HC RX REV CODE- 250/637: Performed by: ORTHOPAEDIC SURGERY

## 2021-05-13 PROCEDURE — 77030012894: Performed by: ORTHOPAEDIC SURGERY

## 2021-05-13 PROCEDURE — 77030029099 HC BN WAX SSPC -A: Performed by: ORTHOPAEDIC SURGERY

## 2021-05-13 PROCEDURE — 77030019908 HC STETH ESOPH SIMS -A: Performed by: ANESTHESIOLOGY

## 2021-05-13 PROCEDURE — 74011000272 HC RX REV CODE- 272: Performed by: ORTHOPAEDIC SURGERY

## 2021-05-13 RX ORDER — MIDAZOLAM HYDROCHLORIDE 1 MG/ML
2 INJECTION, SOLUTION INTRAMUSCULAR; INTRAVENOUS
Status: DISCONTINUED | OUTPATIENT
Start: 2021-05-13 | End: 2021-05-13 | Stop reason: HOSPADM

## 2021-05-13 RX ORDER — HYDROMORPHONE HYDROCHLORIDE 1 MG/ML
0.5 INJECTION, SOLUTION INTRAMUSCULAR; INTRAVENOUS; SUBCUTANEOUS
Status: DISCONTINUED | OUTPATIENT
Start: 2021-05-13 | End: 2021-05-13 | Stop reason: HOSPADM

## 2021-05-13 RX ORDER — ACETAMINOPHEN 500 MG
1000 TABLET ORAL ONCE
Status: COMPLETED | OUTPATIENT
Start: 2021-05-13 | End: 2021-05-13

## 2021-05-13 RX ORDER — VANCOMYCIN HYDROCHLORIDE 1 G/20ML
INJECTION, POWDER, LYOPHILIZED, FOR SOLUTION INTRAVENOUS AS NEEDED
Status: DISCONTINUED | OUTPATIENT
Start: 2021-05-13 | End: 2021-05-13 | Stop reason: HOSPADM

## 2021-05-13 RX ORDER — HYDROCODONE BITARTRATE AND ACETAMINOPHEN 7.5; 325 MG/1; MG/1
1 TABLET ORAL
Qty: 21 TAB | Refills: 0 | Status: SHIPPED | OUTPATIENT
Start: 2021-05-13 | End: 2021-05-20

## 2021-05-13 RX ORDER — SODIUM CHLORIDE, SODIUM LACTATE, POTASSIUM CHLORIDE, CALCIUM CHLORIDE 600; 310; 30; 20 MG/100ML; MG/100ML; MG/100ML; MG/100ML
100 INJECTION, SOLUTION INTRAVENOUS CONTINUOUS
Status: DISCONTINUED | OUTPATIENT
Start: 2021-05-13 | End: 2021-05-13 | Stop reason: HOSPADM

## 2021-05-13 RX ORDER — DEXAMETHASONE SODIUM PHOSPHATE 4 MG/ML
INJECTION, SOLUTION INTRA-ARTICULAR; INTRALESIONAL; INTRAMUSCULAR; INTRAVENOUS; SOFT TISSUE AS NEEDED
Status: DISCONTINUED | OUTPATIENT
Start: 2021-05-13 | End: 2021-05-13 | Stop reason: HOSPADM

## 2021-05-13 RX ORDER — CEFAZOLIN SODIUM/WATER 2 G/20 ML
2 SYRINGE (ML) INTRAVENOUS ONCE
Status: COMPLETED | OUTPATIENT
Start: 2021-05-13 | End: 2021-05-13

## 2021-05-13 RX ORDER — ROCURONIUM BROMIDE 10 MG/ML
INJECTION, SOLUTION INTRAVENOUS AS NEEDED
Status: DISCONTINUED | OUTPATIENT
Start: 2021-05-13 | End: 2021-05-13 | Stop reason: HOSPADM

## 2021-05-13 RX ORDER — FENTANYL CITRATE 50 UG/ML
INJECTION, SOLUTION INTRAMUSCULAR; INTRAVENOUS AS NEEDED
Status: DISCONTINUED | OUTPATIENT
Start: 2021-05-13 | End: 2021-05-13 | Stop reason: HOSPADM

## 2021-05-13 RX ORDER — NALOXONE HYDROCHLORIDE 0.4 MG/ML
0.04 INJECTION, SOLUTION INTRAMUSCULAR; INTRAVENOUS; SUBCUTANEOUS
Status: DISCONTINUED | OUTPATIENT
Start: 2021-05-13 | End: 2021-05-13 | Stop reason: HOSPADM

## 2021-05-13 RX ORDER — PROPOFOL 10 MG/ML
INJECTION, EMULSION INTRAVENOUS AS NEEDED
Status: DISCONTINUED | OUTPATIENT
Start: 2021-05-13 | End: 2021-05-13 | Stop reason: HOSPADM

## 2021-05-13 RX ORDER — MIDAZOLAM HYDROCHLORIDE 1 MG/ML
2 INJECTION, SOLUTION INTRAMUSCULAR; INTRAVENOUS ONCE
Status: DISCONTINUED | OUTPATIENT
Start: 2021-05-13 | End: 2021-05-13 | Stop reason: HOSPADM

## 2021-05-13 RX ORDER — BUPIVACAINE HYDROCHLORIDE AND EPINEPHRINE 5; 5 MG/ML; UG/ML
INJECTION, SOLUTION EPIDURAL; INTRACAUDAL; PERINEURAL AS NEEDED
Status: DISCONTINUED | OUTPATIENT
Start: 2021-05-13 | End: 2021-05-13 | Stop reason: HOSPADM

## 2021-05-13 RX ORDER — ONDANSETRON 2 MG/ML
INJECTION INTRAMUSCULAR; INTRAVENOUS AS NEEDED
Status: DISCONTINUED | OUTPATIENT
Start: 2021-05-13 | End: 2021-05-13 | Stop reason: HOSPADM

## 2021-05-13 RX ORDER — CEPHALEXIN 500 MG/1
500 CAPSULE ORAL 3 TIMES DAILY
Qty: 40 CAP | Refills: 0 | Status: SHIPPED | OUTPATIENT
Start: 2021-05-13 | End: 2021-05-17

## 2021-05-13 RX ORDER — LIDOCAINE HYDROCHLORIDE 10 MG/ML
0.1 INJECTION INFILTRATION; PERINEURAL AS NEEDED
Status: DISCONTINUED | OUTPATIENT
Start: 2021-05-13 | End: 2021-05-13 | Stop reason: HOSPADM

## 2021-05-13 RX ORDER — EPHEDRINE SULFATE/0.9% NACL/PF 50 MG/5 ML
SYRINGE (ML) INTRAVENOUS AS NEEDED
Status: DISCONTINUED | OUTPATIENT
Start: 2021-05-13 | End: 2021-05-13 | Stop reason: HOSPADM

## 2021-05-13 RX ORDER — FENTANYL CITRATE 50 UG/ML
100 INJECTION, SOLUTION INTRAMUSCULAR; INTRAVENOUS ONCE
Status: DISCONTINUED | OUTPATIENT
Start: 2021-05-13 | End: 2021-05-13 | Stop reason: HOSPADM

## 2021-05-13 RX ORDER — OXYCODONE HYDROCHLORIDE 5 MG/1
5 TABLET ORAL
Status: DISCONTINUED | OUTPATIENT
Start: 2021-05-13 | End: 2021-05-13 | Stop reason: HOSPADM

## 2021-05-13 RX ORDER — LIDOCAINE HYDROCHLORIDE 20 MG/ML
INJECTION, SOLUTION EPIDURAL; INFILTRATION; INTRACAUDAL; PERINEURAL AS NEEDED
Status: DISCONTINUED | OUTPATIENT
Start: 2021-05-13 | End: 2021-05-13 | Stop reason: HOSPADM

## 2021-05-13 RX ADMIN — Medication 10 MG: at 08:26

## 2021-05-13 RX ADMIN — Medication 10 MG: at 08:36

## 2021-05-13 RX ADMIN — FENTANYL CITRATE 50 MCG: 50 INJECTION INTRAMUSCULAR; INTRAVENOUS at 08:00

## 2021-05-13 RX ADMIN — CEFAZOLIN 2 G: 1 INJECTION, POWDER, FOR SOLUTION INTRAVENOUS at 07:45

## 2021-05-13 RX ADMIN — ACETAMINOPHEN 1000 MG: 500 TABLET ORAL at 07:12

## 2021-05-13 RX ADMIN — Medication 10 MG: at 08:52

## 2021-05-13 RX ADMIN — FENTANYL CITRATE 50 MCG: 50 INJECTION INTRAMUSCULAR; INTRAVENOUS at 08:11

## 2021-05-13 RX ADMIN — PROPOFOL 150 MG: 10 INJECTION, EMULSION INTRAVENOUS at 07:49

## 2021-05-13 RX ADMIN — SODIUM CHLORIDE, SODIUM LACTATE, POTASSIUM CHLORIDE, AND CALCIUM CHLORIDE: 600; 310; 30; 20 INJECTION, SOLUTION INTRAVENOUS at 07:40

## 2021-05-13 RX ADMIN — ONDANSETRON 4 MG: 2 INJECTION INTRAMUSCULAR; INTRAVENOUS at 08:15

## 2021-05-13 RX ADMIN — HYDROMORPHONE HYDROCHLORIDE 0.5 MG: 1 INJECTION, SOLUTION INTRAMUSCULAR; INTRAVENOUS; SUBCUTANEOUS at 09:54

## 2021-05-13 RX ADMIN — Medication 3 AMPULE: at 07:12

## 2021-05-13 RX ADMIN — HYDROMORPHONE HYDROCHLORIDE 0.5 MG: 1 INJECTION, SOLUTION INTRAMUSCULAR; INTRAVENOUS; SUBCUTANEOUS at 09:47

## 2021-05-13 RX ADMIN — DEXAMETHASONE SODIUM PHOSPHATE 10 MG: 4 INJECTION, SOLUTION INTRAMUSCULAR; INTRAVENOUS at 08:15

## 2021-05-13 RX ADMIN — SODIUM CHLORIDE, SODIUM LACTATE, POTASSIUM CHLORIDE, AND CALCIUM CHLORIDE 100 ML/HR: 600; 310; 30; 20 INJECTION, SOLUTION INTRAVENOUS at 07:12

## 2021-05-13 RX ADMIN — ROCURONIUM BROMIDE 40 MG: 10 INJECTION, SOLUTION INTRAVENOUS at 07:49

## 2021-05-13 RX ADMIN — SUGAMMADEX 350 MG: 100 INJECTION, SOLUTION INTRAVENOUS at 09:11

## 2021-05-13 RX ADMIN — SODIUM CHLORIDE, SODIUM LACTATE, POTASSIUM CHLORIDE, AND CALCIUM CHLORIDE: 600; 310; 30; 20 INJECTION, SOLUTION INTRAVENOUS at 09:00

## 2021-05-13 RX ADMIN — LIDOCAINE HYDROCHLORIDE 100 MG: 20 INJECTION, SOLUTION EPIDURAL; INFILTRATION; INTRACAUDAL; PERINEURAL at 07:49

## 2021-05-13 RX ADMIN — PHENYLEPHRINE HYDROCHLORIDE 100 MCG: 10 INJECTION INTRAVENOUS at 08:52

## 2021-05-13 NOTE — DISCHARGE INSTRUCTIONS
Lumbar Discectomy: What to Expect at [address] is surgery to remove part or all of a bulging (herniated) disc in the spine. A bulging disc may press on the spinal cord or spinal nerves and cause leg pain and numbness. Your doctor made a 1- to 2-inch cut (incision) in the skin over the spine. He then used surgical tools through the incision to do the surgery. You can expect your back to feel stiff or sore after surgery. This should improve in the weeks after surgery. You may have relief from your symptoms right away, or you may get better over days or weeks. In the weeks after your surgery, it may be hard to sit or  one position for very long and you may need pain medicine. It may take 8 weeks or longer to get back to doing your usual activities. Your doctor may advise you to work with a physical therapist to strengthen the muscles around your spine and trunk. You will need to learn how to lift, twist, and bend so you do not put too much strain on your back. The pain or numbness you had in your legs before surgery should get better or go away completely. This care sheet gives you a general idea about how long it will take for you to recover. But each person recovers at a different pace. Follow the steps below to get better as quickly as possible. How can you care for yourself at home? Activity  · Rest when you feel tired. Getting enough sleep will help you recover. · Try to walk each day. Start by walking a little more than you did the day before. Bit by bit, increase the amount you walk. Walking is a gentle exercise and helps prevent pneumonia and constipation. Walking may also decrease your muscle soreness after surgery. · If advised by your doctor, you may need to avoid lifting anything that would cause excessive strain on your back.  This may include heavy grocery bags and milk containers, a heavy briefcase or backpack, cat litter or dog food bags, a child, or a vacuum . · Avoid strenuous activities, such as bicycle riding, jogging, weight lifting, or aerobic exercise, until your doctor says it is okay. · Ask your doctor when you can drive again. · Avoid riding in a car for more than 30 minutes at a time for 2 to 4 weeks after surgery. If you must ride in a car for a longer distance, stop often to walk and stretch your legs. · Try to change your position about every 30 minutes while you sit or stand. This will help decrease your back pain while you heal.  · Your time off from work depends on how quickly you feel better and on the type of work you do. If you work in an office, you likely can go back to work sooner than if you have a job where you are very active. Talk with your doctor about your work needs. · You may have sex as soon as you feel able, but avoid positions that put stress on your back or cause pain. Diet  · You can eat your normal diet. If your stomach is upset, try bland, low-fat foods like plain rice, broiled chicken, toast, and yogurt. · Drink plenty of fluids (unless your doctor tells you not to). · You may notice that your bowel movements are not regular right after your surgery. This is common. Try to avoid constipation and straining with bowel movements. You may want to take a fiber supplement every day. If you have not had a bowel movement after a couple of days, take a mild laxative. Medicines  · Take pain medicines exactly as directed. ¨ If the doctor gave you a prescription medicine for pain, take it as prescribed. ¨ If you are not taking a prescription pain medicine, ask your doctor if you can take an over-the-counter medicine. ¨ Do not take two or more pain medicines at the same time unless the doctor told you to. Many pain medicines have acetaminophen, which is Tylenol. Too much acetaminophen (Tylenol) can be harmful.   · If you think your pain medicine is making you sick to your stomach:  ¨ Take your medicine after meals (unless your doctor has told you not to). ¨ Ask your doctor for a different pain medicine. · If your doctor prescribed antibiotics, take them as directed. Do not stop taking them just because you feel better. You need to take the full course of antibiotics. Incision care  · If you have strips of tape on the cut (incision) the doctor made, leave the tape on for a week or until it falls off. · Gently rinse the area daily with warm, soapy water, and pat it dry. Make sure you understand how to care for your incision before you leave the hospital.  · Keep the area clean and dry. You may cover it with a gauze bandage if it weeps or rubs against clothing. Change the bandage every day. Exercise  · Do back exercises as instructed by your doctor. · Your doctor may recommend that you work with a physical therapist to improve the strength and flexibility of your back. Other instructions  · After your incision heals, about 5 to 7 days after surgery, you can use ice, a heating pad, a hot water bottle, or gentle massage on your back to reduce stiffness. Follow-up care is a key part of your treatment and safety. Be sure to make and go to all appointments, and call your doctor if you are having problems. Its also a good idea to know your test results and keep a list of the medicines you take. [address] [place] [address] [phone]    When should you call for help? Call 911 anytime you think you may need emergency care. For example, call if:  · You pass out (lose consciousness). · You have sudden chest pain and shortness of breath, or you cough up blood. · You lose bladder or bowel control. · One or both legs suddenly feel weak or numb. Call your doctor now or seek immediate medical care if:  · You have pain that does not get better after you take pain medicine. · You have a headache that does not get better after you take medicine for it. · You have loose stitches, or your incision comes open.   · You have signs of infection, such as:  ¨ Increased pain, swelling, warmth, or redness. ¨ Red streaks leading from the incision. ¨ Pus draining from the incision. ¨ Swollen lymph nodes in your neck, armpits or groin. ¨ A fever. · You have blood or fluid draining from the incision. Watch closely for changes in your health, and be sure to contact your doctor if:  · You have new numbness or tingling in your legs. · You have new pain or weakness in your legs. · You do not have a bowel movement after taking a laxative. Where can you learn more? Go to Watch Over Me. Enter G273 in the search box to learn more about \"Lumbar Microdiscectomy: What to Expect at Home. \"   After general anesthesia or intravenous sedation, for 24 hours or while taking prescription Narcotics:  · Limit your activities  · A responsible adult needs to be with you for the next 24 hours  · Do not drive and operate hazardous machinery  · Do not make important personal or business decisions  · Do not drink alcoholic beverages  · If you have not urinated within 8 hours after discharge, and you are experiencing discomfort from urinary retention, please go to the nearest ED. · If you have sleep apnea and have a CPAP machine, please use it for all naps and sleeping. · Please use caution when taking narcotics and any of your home medications that may cause drowsiness. *  Please give a list of your current medications to your Primary Care Provider. *  Please update this list whenever your medications are discontinued, doses are      changed, or new medications (including over-the-counter products) are added. *  Please carry medication information at all times in case of emergency situations. These are general instructions for a healthy lifestyle:  No smoking/ No tobacco products/ Avoid exposure to second hand smoke  Surgeon General's Warning:  Quitting smoking now greatly reduces serious risk to your health.   Obesity, smoking, and sedentary lifestyle greatly increases your risk for illness  A healthy diet, regular physical exercise & weight monitoring are important for maintaining a healthy lifestyle    You may be retaining fluid if you have a history of heart failure or if you experience any of the following symptoms:  Weight gain of 3 pounds or more overnight or 5 pounds in a week, increased swelling in our hands or feet or shortness of breath while lying flat in bed. Please call your doctor as soon as you notice any of these symptoms; do not wait until your next office visit.

## 2021-05-13 NOTE — PROGRESS NOTES
Spiritual Care visit. Initial Visit, Pre Surgery Consult. Visit and prayer before patient goes to surgery.     Visit by Blanka Carbajal M.Ed., Th.B. ,Staff

## 2021-05-13 NOTE — ANESTHESIA PREPROCEDURE EVALUATION
Relevant Problems   No relevant active problems       Anesthetic History   No history of anesthetic complications            Review of Systems / Medical History  Patient summary reviewed and pertinent labs reviewed    Pulmonary        Sleep apnea: CPAP  Shortness of breath (with exertion)         Neuro/Psych   Within defined limits           Cardiovascular    Hypertension        Dysrhythmias (NSVT 2017)   CAD (+ stress 2020, clean [facility])    Exercise tolerance: <4 METS  Comments: longstanding mild SOB with activity and stress    Denies any current angina. His SOB with exertion is unchanged. Unremarkable stress test 3/21 and cath 10/20. GI/Hepatic/Renal     GERD: well controlled           Endo/Other        Arthritis     Other Findings            Physical Exam    Airway  Mallampati: II  TM Distance: 4 - 6 cm  Neck ROM: normal range of motion   Mouth opening: Normal     Cardiovascular    Rhythm: regular  Rate: normal      Pertinent negatives: No murmur   Dental  No notable dental hx       Pulmonary  Breath sounds clear to auscultation               Abdominal         Other Findings            Anesthetic Plan    ASA: 2  Anesthesia type: general          Induction: Intravenous  Anesthetic plan and risks discussed with: Patient and Spouse      H/o non-sustained VT on holter monitor 2017. Underwent EP study but unable to reproduce V tach. Had stress test, cath and echo. Currently sxs well controlled on verapamil and last seen by cardiologist 2/21 and felt optimized. Discussed positioning and POVL.

## 2021-05-13 NOTE — ANESTHESIA POSTPROCEDURE EVALUATION
Procedure(s):  LEFT L5 S1 DISCECTOMY. general    Anesthesia Post Evaluation        Patient location during evaluation: PACU  Patient participation: complete - patient participated  Level of consciousness: awake  Pain management: satisfactory to patient  Airway patency: patent  Anesthetic complications: no  Cardiovascular status: hemodynamically stable  Respiratory status: spontaneous ventilation  Hydration status: euvolemic  Post anesthesia nausea and vomiting:  none      INITIAL Post-op Vital signs:   Vitals Value Taken Time   /82 05/13/21 1023   Temp 36.4 °C (97.5 °F) 05/13/21 1025   Pulse 72 05/13/21 1037   Resp 16 05/13/21 1000   SpO2 100 % 05/13/21 1037   Vitals shown include unvalidated device data.

## 2021-05-13 NOTE — OP NOTES
[facility]  OPERATIVE REPORT    Name:  Aline Miramontes  MR#:  [id number]  :  1960  ACCOUNT #:  [de-identified]  DATE OF SERVICE:  2021    PREOPERATIVE DIAGNOSIS:  Left-sided L5-S1 disk herniation. 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 PROCEDURE PERFORMED:  Left-sided L5-S1 laminotomy, diskectomy, decompression of the lateral recess, CPT code 90861. SURGEON:  Karla Wilson. Rudolph Higgins MD    ASSISTANT:  Staff. ANESTHESIA:  GETA. COMPLICATIONS:  None. SPECIMENS REMOVED:  None. IMPLANTS:  None. ESTIMATED BLOOD LOSS:  Minimal.    FLUIDS:  300 mL crystalloid. DRAINS:  None. INDICATIONS:  The patient is a 78-year-old gentleman that we have been following for low back and left leg pain, who got only temporary relief with epidural injections, so he is here for surgical treatment of his pain issue that is not resolved conservatively. PROCEDURE:  The patient was brought to the operating room and after administration of anesthesia, IV antibiotics and placement of monitoring lines, he was positioned prone on the Dano Sous frame with a sling. His back was prepped with Betadine and sterilely draped. At this point, surgeon called a time-out and confirmed the procedure to be performed, his allergy history and the fact he had received his preoperative IV antibiotics. C-arm was brought in. We identified the L5-S1 level, marked it on the skin and then we made a midline incision over this with a scalpel and we dissected down through the thin subcutaneous tissue with electrocautery to the deep fascia. Deep fascia was incised on the left side of the spinous processes and we dissected down along the lamina out to the facet joint. We placed an angled curette in the interlaminar space and confirmed this with lateral C-arm x-ray.   To tell right from the start, there was significant facet overgrowth, so we used the bur to bur down the inferior aspect of the L5 lamina and the medial aspect of the facet joint and then we made a hole in the ligamentum flavum and removed the ligamentum flavum and undercut the lamina and facet joint and we performed a foraminotomy. The nerve was initially being compressed by the bony structures down against the disk and after we had completed the decompression, the nerve root was mostly free. There was a small disk herniation underneath the nerve root and so we cauterized the overlying epidural veins, made a hole in the disk space with a scalpel and then we removed some disk with micropituitaries and only a small to moderate amount of disk was removed, but after we completed, there was no further compression of the nerve root, all protrusions had been removed. We irrigated the wound, suctioned it dry. Really no bleeding. We did place FloSeal over it for several minutes and suctioned it out and we placed some vancomycin powder deep in the wound. Then, we approximated the deep fascia with interrupted figure-of-eight stitches of #1 Vicryl. Anesthetized the skin and subcutaneous tissue in the left-sided paraspinal muscles with a total of 20 mL of 0.5% Marcaine with epinephrine. Subcutaneous tissue was closed with interrupted stitches of 2-0 Vicryl. The skin was closed with a running subcuticular stitch of 3-0 Vicryl. Dermabond Prineo was applied to the incision and then a dry sterile dressing was applied on top of that. The patient was then rolled supine onto the recovery room bed having tolerated the procedure well.       Harini Gatica MD      [id number]  D:  05/13/2021 9:25  T:  05/13/2021 10:44  JOB #:  [id number]  CC:  Estefanía Amaya MD

## 2021-05-13 NOTE — BRIEF OP NOTE
Brief Postoperative Note    Patient: [facility]  YOB: 1960  MRN: [id number]    Date of Procedure: 5/13/2021     Pre-Op Diagnosis: Lumbar disc herniation [M51.26]    Post-Op Diagnosis: left L5-S1 latera lrecess stenosis and HNP    Procedure(s):  LEFT L5 S1 DISCECTOMY/laminotomy    Surgeon(s):  Jose Luis Lam MD    Surgical Assistant: None    Anesthesia: General     Estimated Blood Loss (mL): minimal    Complications: none    Specimens: * No specimens in log *     Implants: * No implants in log *    Drains: * No LDAs found *    Findings: lateral recess stenosis    Electronically Signed by Nadeem Rosales MD on 5/13/2021 at 9:02 AM

## 2021-06-11 ENCOUNTER — HOSPITAL ENCOUNTER (OUTPATIENT)
Dept: PHYSICAL THERAPY | Age: 61
Discharge: HOME | End: 2021-06-11
Attending: ORTHOPAEDIC SURGERY
Payer: COMMERCIAL

## 2021-06-11 DIAGNOSIS — Z09 FOLLOW-UP EXAMINATION, FOLLOWING OTHER SURGERY: ICD-10-CM

## 2021-06-11 PROCEDURE — 97162 PT EVAL MOD COMPLEX 30 MIN: CPT

## 2021-06-11 NOTE — THERAPY EVALUATION
Eva Leone  : 1960  Payor: Kaitlin Silverman / Plan: [facility] / Product Type: PPO /  [address]  at [facility]  [address], [address], [place] [address] [address] [facility]  [phone]   [phone]                OUTPATIENT PHYSICAL THERAPY:Initial Assessment 2021    ICD-10: Treatment Diagnosis:   Low back pain (M54.5)        Lumbago with sciatica, right side (M54.41)        Lumbago with sciatica, left side (M54.42)   Difficulty in walking, not elsewhere classified (R26.2)  Muscle weakness (generalized) (M62.81)        PRECAUTIONS/ALLERGIES:   Latex and Iodinated contrast media     FALL RISK SCORE: 2 (? 5 = High Risk)    MD ORDERS: Eval and Treat MEDICAL/REFERRING DIAGNOSIS:  Follow-up examination, following other surgery [Z09]     DATE OF ONSET: Few months ago    REFERRING PHYSICIAN: Ishan Brito MD    RETURN PHYSICIAN APPOINTMENT: TBD      Ambulatory/Rehab Services H2 Model Falls Risk Assessment    Risk Factors:       (1)  Gender [Male] Ability to Rise from Chair:       (1)  Pushes up, successful in one attempt    Falls Prevention Plan:       No modifications necessary   Total: (5 or greater = High Risk): 2     [facility] [employer]. All Rights Reserved. [facility] Patent #[id number]. Federal Law prohibits the replication, distribution or use without written permission from [address] [place]:  Mr. Eva Leone has attended 1 physical therapy session including initial evaluation as of 2021. Eva Leone exhibits decreased flexibility, increased pain, decreased postural and core strength, decreased functional tolerance, and decreased general LE strength. Eva Leone will benefit from skilled PT (medically necessary) to address above deficits affecting participation in basic ADLs and overall functional tolerance.   Manual techniques (stretching, joint mobilizations, soft tissue mobilization/myofascial release), postural exercises/education, therapeutic techniques/activities, and HEP will be performed as appropriate addressing Daniel Leone's current condition. PROBLEM LIST (Impacting functional limitations):  1. Decreased Strength  2. Decreased ADL/Functional Activities  3. Decreased Transfer Abilities  4. Decreased Ambulation Ability/Technique  5. Decreased Balance  6. Increased Pain  7. Decreased Activity Tolerance  8. Increased Fatigue  9. Increased Shortness of Breath  10. Decreased Flexibility/Joint Mobility  11. Decreased [place] with Home Exercise Program INTERVENTIONS PLANNED:  1. Balance Exercise  2. Bed Mobility  3. Cold  4. Cryotherapy  5. Electrical Stimulation  6. Family Education  7. Gait Training  8. Heat  9. Home Exercise Program (HEP)  10. Manual Therapy  11. Neuromuscular Re-education/Strengthening  12. Range of Motion (ROM)  13. Therapeutic Activites  14. Therapeutic Exercise/Strengthening  15. Transfer Training  16. Mechanical traction  17. Aquatic Therapy  18. Electrical Stimulation  19. Vasopneumatic compression   20. Dry Needling for Pain control   TREATMENT PLAN:  Effective Dates: 6/11/2021 TO 9/9/2021 (90 days). Frequency/Duration: 2 times a week for 90 Days    GOALS: (Goals have been discussed and agreed upon with patient.)  Short Term Goals 4 weeks   1. Daniel Leone will be independent with HEP to promote self-management of symptoms. 2. Daniel Ellison [facility] will participate in core stabilization exercises to help with stabilization and improve posture during ADLs to help prevent future injuries. 3. Daniel Ellison [facility] will participate in LE strengthening program with weights as appropriate to help with gait and elevations. 4. Daniel Leone will participate in static and dynamic balance activities to decrease the risk for falls and improve overall QOL.   [address] [facility] will tolerate manual therapy/joint mobilizations/soft tissue to increase ROM and decrease pain to improve functional mobility during ADLs. Long Term Goals 12 weeks   1. Marjorie Leone will demonstrate an 8 point improvement on the Oswestry to show improvement in function. 2. Marjorie Mills [facility] will report <=2/10 pain at rest and during ADLs to improve QOL. 3. Marjorie Leone will demonstrate >=4+/5 LE strength on manual muscle testing to improve functional mobility. 4. Marjorie Mills [facility] will be able to demonstrate safe lifting and transfer mechanics without cueing for improved safety with home, childcare, and community activities. Rehabilitation Potential For Stated Goals: GOOD    Regarding Marjorie Leone's therapy, I certify that the treatment plan above will be carried out by a therapist or under their direction. Thank you for this referral,    Brandt Wing PT, DPT     Referring Physician Signature: Satya Cruz MD              Date                    HISTORY:   PATIENT GOAL FOR PHYSICAL THERAPY:   Improve walki ng pattern and reduce pain in back/legs    HISTORY OF PRESENT INJURY/ILLNESS (REASON FOR REFERRAL):  Patient presents to PT with complaints of radiating LBP following B LIN in Sept/Oct 2020. Patient reports that following his second LIN he noticed pain radiating down his legs bilaterally with L > R. Patient reports he has had back pain since prior to his B LIN, but his MD thought that due to his bone-on-bone imaging that his hips were the primary concern regarding his pain. Patient reports his back pain only intensified following LIN surgery. Patient had an MRI which indicated a disc herniation that was pressing upon his L sciatic nerve. Patient underwent L5-S1 discectomy with decompression foraminotomy per Dr. Linda Barraza on 05/13/2021. Per patient report and MD note, patient is suffering worse than anticipated residual symptoms.  Patient states he continues to have pain at the top of buttocks bilaterally, his L anterior hip, and along L5-S1 dermatomes on L foot. Patient reports that turning without picking his leg up and lifting for something above him is what exacerbates his pain the most. Patient states that the pain is not as constant as it was prior to surgery; however, he still experiences some sharp catches or jolts of pain with certain movement patterns. Chichole Gulling PAIN AND NATURE OF CONDITION Date:   6/11/2021   Highest pain level 10/10   Lowest pain level 0/10   Aggravating factors Turning, lifting overhead, sit <> stand   Alleviating factors/positions/motions Sitting for short bouts   Irritability Severe     MICA Post-op LIN   Leg pain Yes   Sedentary lifestyle No     PAST MEDICAL HISTORY/COMORBIDITIES:   Mr. Cindy To  has a past medical history of Arrhythmia, Arthritis, CAD (coronary artery disease) (2017, 10/02/2020), Dyspnea on exertion, GERD (gastroesophageal reflux disease), H/O cold sores, Hyperlipidemia, Hypertension, Latex allergy, Sleep apnea, and Wide-complex tachycardia ([address] (2017). Mr. Cindy To  has a past surgical history that includes hx colonoscopy; hx hip replacement (Bilateral, 10/07/2020); hx heart catheterization (2017); and hx heart catheterization (10/02/2020).     SOCIAL HISTORY/LIVING ENVIRONMENT:    Lives with his wife in a single-story home    Social History     Socioeconomic History    Marital status:      Spouse name: Not on file    Number of children: Not on file    Years of education: Not on file    Highest education level: Not on file   Occupational History    Not on file   Tobacco Use    Smoking status: Never Smoker    Smokeless tobacco: Never Used   Vaping Use    Vaping Use: Never used   Substance and Sexual Activity    Alcohol use: Never    Drug use: Never    Sexual activity: Not on file   Other Topics Concern    Not on file   Social History Narrative    Not on file     Social Determinants of Health     Financial Resource Strain:     Difficulty of Paying Living Expenses:    Food Insecurity:     Worried About Running Out of Food in the Last Year:     [address] in the Last Year:    Transportation Needs:     Lack of Transportation (Medical):      Lack of Transportation (Non-Medical):    Physical Activity:     Days of Exercise per Week:     Minutes of Exercise per Session:    Stress:     Feeling of Stress :    Social Connections:     Frequency of Communication with Friends and Family:     Frequency of Social Gatherings with Friends and Family:     Attends Restorationism Services:     Active Member of Clubs or Organizations:     Attends Club or Organization Meetings:     Marital Status:    Intimate Partner Violence:     Fear of Current or Ex-Partner:     Emotionally Abused:     Physically Abused:     Sexually Abused:        LIFESTYLE Date: 6/11/2021   Occupation: Not working anymore   Vigorous Activity: No   Expose individual to vibrations No   Unpleasant work environment N/A     PRIOR LEVEL OF FUNCTION/WORK/ACTIVITY:   Was terminated from work following [address] for being out too [address] [place]. [place] with functional mobility and supervision for ADLs following B LIN    Active Ambulatory Problems     Diagnosis Date Noted    Snoring 09/22/2020    Osteoarthritis of right hip 10/07/2020    OA (osteoarthritis) of hip 11/11/2020    ADILENE (obstructive sleep apnea) 02/01/2021    Shortness of breath on exertion 02/01/2021    Nocturnal hypoxemia 02/01/2021    Lumbar herniated disc 05/13/2021     Resolved Ambulatory Problems     Diagnosis Date Noted    No Resolved Ambulatory Problems     Past Medical History:   Diagnosis Date    Arrhythmia     Arthritis     CAD (coronary artery disease) 2017, 10/02/2020    Dyspnea on exertion     GERD (gastroesophageal reflux disease)     H/O cold sores     Hyperlipidemia     Hypertension     Latex allergy     Sleep apnea     Wide-complex tachycardia ([address] 2017     CURRENT MEDICATIONS:    Current Outpatient Medications:     traMADoL (ULTRAM) 50 mg tablet, Take 50 mg by mouth every six (6) hours as needed for Pain., Disp: , Rfl:     losartan (COZAAR) 100 mg tablet, TAKE 1 TABLET BY MOUTH ONCE DAILY, Disp: , Rfl:     meloxicam (MOBIC) 15 mg tablet, Take 1 Tab by mouth daily. , Disp: 30 Tab, Rfl: 5    fluticasone furoate (Arnuity Ellipta) 100 mcg/actuation dsdv inhaler, Take 1 Puff by inhalation daily. , Disp: 1 Inhaler, Rfl: 11    aspirin delayed-release 81 mg tablet, Take 1 Tab by mouth two (2) times a day., Disp: 60 Tab, Rfl: 0    acetaminophen (TYLENOL) 500 mg tablet, Take 2 Tabs by mouth every six (6) hours as needed for Pain., Disp: 60 Tab, Rfl: 0    verapamil ER (CALAN-SR) 240 mg CR tablet, Take 240 mg by mouth daily. , Disp: , Rfl:     atorvastatin (LIPITOR) 40 mg tablet, Take 40 mg by mouth nightly., Disp: , Rfl:     multivit-min/FA/lycopen/lutein (CENTRUM SILVER MEN PO), Take 1 Tab by mouth daily. , Disp: , Rfl:     LYSINE PO, Take 1,000 mg by mouth nightly., Disp: , Rfl:      Date Last Reviewed:  6/11/2021   Number of Personal Factors/Comorbidities that affect the Plan of Care: 3+: HIGH COMPLEXITY   EXAMINATION:      -Pt sits with forward head and rounded shoulders which indicate tight anterior chest musculature, upper trapezius, and levator scapula and weak posterior scapula musculature and deep cervical flexors. Pt displays decreased core motor control indicating weak core and low back musculature.     OBSERVATIONS and FUNCTIONAL MOBILITY Date:   6/11/2021 Date:   Transfers Increased time & effort to complete    Gait deviations Antalgic gait pattern with wide EDGARDO, decreased willie, decreased step length, and downcast gaze    Assistive device N/A    Stairs NT    Bed mobility Increased time & effort to complete        BALANCE Date: 6/11/2021 Date:    Right NT    Left NT      AROM/PROM         Joint: Date: 6/11/2021  Date:  Date:    Active LE ROM Right Left Right Left Right Left   Hip Flexion [facility]        Hip Extension [facility]       Hip ER [facility]       Hip IR [facility] WFL       Knee Extension [facility] WFL       Knee Flexion [facility] WFL       Knee Extension [facility] WFL       Lumbar Flexion 25% full; pain --       Lumbar Extension 10% full; pain --       Lumbar Side-Bending WFL; pain WFL; pain       Lumbar Rotation 25% full; pain 25% full; pain         STRENGTH         Joint: Date: 6/11/2021  Date:  Date:     Right Left Right Left Right Left   Hip Abduction 3+/5 3+/5; pain       Hip Adduction 4/5 4/5       Hip IR 4/5 4/5 pain       Hip ER 4/5 4/5 pain       Hip Flexion 3+/5; pain 3+/5; pain       Knee Extension 4-/5 4-/5       Knee Flexion 4/5 4/5; pain       Ankle DF 4+/5 3+/5; pain       Ankle PF 3+/5 3+/5       Ankle IV NT NT       Ankle EV NT NT         PALPATION Date:   6/11/2021 Date:   TTP L5-S1, B ASIS, L QL    TONE Increased lumbar paraspinals    PA GLIDES Normal    EDEMA No      SPECIAL TESTS: Assessed @ Initial Visit      -LUMBAR STENOSIS:      -Bilateral symptoms: yes      -Leg pain more than back pain: yes      -Pain during walking/standing: yes      -Pain relief upon sitting: yes      -Age greater than 48 years: yes    NEUROLOGICAL SCREEN: Assessed @ Initial Visit    -RADIATING SYMPTOMS: Yes     -DERMATOMES: NT    -REFLEXES: Date: 6/11/2021     Right Left   L4  (Quadriceps) NT    S1  (Achilles) NT        RED FLAGS: Date: 6/11/2021   Non-Mechanical pain distribution (cannot be produced, changed, or reduced during exam): NO   Cauda Equina Dysfunction: NO   Upper lumbar disc herniation in younger patients (femoral nerve tension test for lateral disc herniation in lower lumbar): NO   Lumbar compression fracture (age > 48, trauma, corticosteroid use NO   Spine Cancer (age > 48, pervious history of cancer, failure to improve in 1 month of therapy, no relief - be rest, duration > 1 month, unexplained weight loss, insidious onset, constitutional symptoms): NO   Ankylosing Spondylitis (age < 40, pain not relieved by supine, morning back stiffness, pain duration > 3 months, improved by exercise): NO   Sacral Fracture: NO      Body Structures Involved:  1. Bones  2. Joints  3. Muscles  4. Ligaments Body Functions Affected:  1. Sensory/Pain  2. Neuromusculoskeletal  3. Movement Related Activities and Participation Affected:  1. Mobility  2. Self Care  3. Domestic Life  4. Interpersonal Interactions and Relationships  5. Community, Social and [place]   Number of elements that affect the Plan of Care: 4+: HIGH COMPLEXITY   CLINICAL PRESENTATION:   Presentation: Evolving clinical presentation with changing clinical characteristics: MODERATE COMPLEXITY   CLINICAL DECISION MAKING:   OUTCOME MEASURE USED:   Tool Used: Tool Used: Modified Oswestry Low Back Pain Questionnaire  Score:  Initial: 29/50 (6/11/21) Most Recent: X/50 (Date: -- )   Interpretation of Score: Each section is scored on a 0-5 scale, 5 representing the greatest disability. The scores of each section are added together for a total score of 50. Payor: Tristan Sanches / Plan: [facility] / Product Type: PPO /     MEDICAL NECESSITY:  · Skilled intervention continues to be required due to above deficits affecting participation in basic ADLs and overall functional tolerance. REASON FOR SERVICES/OTHER COMMENTS:  · Patient continues to require skilled intervention due to  above deficits affecting participation in basic ADLs and overall functional tolerance. Use of outcome tool(s) and clinical judgement create a POC that gives a: Questionable prediction of patient's progress: MODERATE COMPLEXITY        TREATMENT/SESSION ASSESSMENT:  Jeremy Leone demonstrates decreased ROM, strength, and activity tolerance secondary to impairments of pain and weakness affecting his functional mobility.  Patient presenting with symptoms radiating down BLE (L > R) with mild improvement shown with extension-based exercises (prone on elbows, oscillations) as evident by centralization of pain. PT instructing patient on diaphragmatic breathing and posterior pelvic tilts in a hooklying position. Patient required maximal verbal, visual, and tactile cues to perform posterior pelvic tilt correctly with minimal carryover. Patient instructed in performing pelvic tilt and/or abdominal bracing when performing tasks such as transfers or squatting to increase intraabdominal pressure; patient verbalized understanding. · Pain/ Symptoms: Initial: 5/10 Post Session: 5/10 ·   Compliance with Program/Exercises: Will assess as treatment progresses. · Recommendations/Intent for next treatment session: \"Next visit will focus on advancements to more challenging activities\".     Total Treatment Duration: 45 minutes  PT Patient Time In/Time Out  Time In: 1100  Time Out: 1145     Marge De La Rosa, PT, DPT

## 2021-06-11 NOTE — PROGRESS NOTES
Caren Leone  : 1960  Primary: [facility]  Secondary:  Therapy Center at [facility] [address] [facility], [place] [address] [address] [facility]  [phone]   [phone]      OUTPATIENT PHYSICAL THERAPY: Daily Treatment Note 2021  Visit Count:  1    ICD-10: Treatment Diagnosis:   Low back pain (M54.5)        Lumbago with sciatica, right side (M54.41)        Lumbago with sciatica, left side (M54.42)   Difficulty in walking, not elsewhere classified (R26.2)  Muscle weakness (generalized) (M62.81    Pre-treatment Symptoms/Complaints: See initial evaluation note. Pain: Initial: 5/10 Post Session: 5/10     Medications Last Reviewed:  2021     Updated Objective Findings: See initial evaluation note. TREATMENT:     THERAPEUTIC EXERCISE: (0 minutes):  Exercises per grid below to improve mobility, strength and balance. Required minimal visual, verbal and tactile cues to promote proper body alignment, promote proper body posture and promote proper body mechanics. Progressed resistance, range and repetitions as indicated. Date:  21 Date:   Date:     Activity/Exercise Parameters Parameters Parameters   Patient education HEP review     Diaphragmatic breathing 1 x 10     Posterior pelvic tilts 2 x 10     PPTs with unilateral march (bracing) 1 x 10 B                           MANUAL THERAPY (0 minutes): Joint mobilization, soft tissue mobilization, and manipulation was utilized and necessary because of the patient's restricted joint motion and restricted motion of soft tissue. MODALITIES (0 minutes): Electrical Stimulation Therapy (IFC) was provided with intensity adjusted throughout treatment to patient tolerance. Hot Pack Therapy in order to provide analgesia and relieve muscle spasm.      HEP  Diaphragmatic breathing  Posterior pelvic tilts (PPTs)  PPTs with unilateral marching    [facility] Portal  Treatment/Session Summary:    · Response to Treatment: See initial evaluation note. · Communication/Consultation: PT emphasized the importance of performing HEP consistently. · Equipment provided today: None today. · Recommendations/Intent for next treatment session: Next visit will focus on progressing to more challenging activities.     Total Treatment Billable Duration: 45 minutes  PT Patient Time In/Time Out  Time In: 1100  Time Out: 3054  Dequan Etienne, PT, DPT    Visit Approval Visit # Therapist initials Date A NS / Cx < 24 hr >24 hr Cx Comments    1 ZOË 6/11/21 [x]  [] [] Initial evaluation       [] [] []        [] [] []        [] [] []        [] [] []        [] [] []        [] [] []        [] [] []        [] [] []        [] [] []        [] [] []        [] [] []        [] [] []        [] [] []        [] [] []        [] [] []        [] [] []        [] [] []         Future Appointments   Date Time Provider Jeff Rossi   6/23/2021  9:15 AM Adarsh Michele MD [facility] POA   8/20/2021 11:00 AM Jennifer Francisco NP [facility] PP   10/29/2021  9:00 AM MD LOI Juarez PP PP

## 2021-07-20 ENCOUNTER — HOSPITAL ENCOUNTER (OUTPATIENT)
Dept: PHYSICAL THERAPY | Age: 61
Discharge: HOME | End: 2021-07-20
Attending: ORTHOPAEDIC SURGERY
Payer: COMMERCIAL

## 2021-07-20 PROCEDURE — 97140 MANUAL THERAPY 1/> REGIONS: CPT

## 2021-07-20 PROCEDURE — 97110 THERAPEUTIC EXERCISES: CPT

## 2021-07-20 NOTE — PROGRESS NOTES
Julia Leone  : 1960  Primary: James Loosen Of Leann Alexander*  Secondary:  [facility], [address], [place] [address] [facility]  [phone]   [phone]      OUTPATIENT PHYSICAL THERAPY: Daily Treatment Note 2021  Visit Count:  2    ICD-10: Treatment Diagnosis:   Low back pain (M54.5)        Lumbago with sciatica, left side (M54.42)   Difficulty in walking, not elsewhere classified (R26.2)  Muscle weakness (generalized) (M62.81)    Pre-treatment Symptoms/Complaints: Patient reports he's had a lot of struggles over the past 5 weeks since his initial evaluation in trying to get insurance to approve his visits. He states he had to call the [employer] to get his job to provide him with the coverage he was eligible for after his job had denied his request. Patient states he is still having some L buttock and foot pain, but that his R buttock pain has somewhat resolved. He says he really only notices the pain whenever he is bending over. He says he has not followed up with his MD over the past 5 weeks. Pain: Initial: 0/10 Post Session: 5/10     Medications Last Reviewed:  2021     Updated Objective Findings: None today. TREATMENT:     THERAPEUTIC EXERCISE (30 minutes):  Exercises per grid below to improve mobility, strength and balance. Required minimal visual, verbal and tactile cues to promote proper body alignment, promote proper body posture and promote proper body mechanics. Progressed resistance, range and repetitions as indicated.      Date:  21 Date:  21 Date:     Activity/Exercise Parameters Parameters Parameters   Patient education HEP review     NuStep / SciFit  Level 1  6 minutes    Diaphragmatic breathing 1 x 10     Posterior pelvic tilts 2 x 10     PPTs with unilateral march (bracing) 1 x 10 B     Supine hip abduction   2 x 10 L  Pain 3-4/10    Sidelying clam shells   2 x 10 L  PT resistance    Heel slides 1 x 10 B                                      MANUAL THERAPY (10 minutes): Joint mobilization, soft tissue mobilization, and manipulation was utilized and necessary because of the patient's restricted joint motion and restricted motion of soft tissue.   - Total joint distraction of LLE with PT's hands at ankle with bimanual grasp for 3 x 30 sec  - Inferior joint mobilizations of L hip with patient's L leg resting on PT's shoulder and PT's hands at proximal L thigh with bimanual grasp - grades I-II  - Fluid resistance of sidelying ER of L hip against concentric motion    MODALITIES (0 minutes): Electrical Stimulation Therapy (IFC) was provided with intensity adjusted throughout treatment to patient tolerance. Hot Pack Therapy in order to provide analgesia and relieve muscle spasm. HEP  Diaphragmatic breathing  Posterior pelvic tilts (PPTs)  PPTs with unilateral marching  Sidelying hip abduction  Supine heel slides  Sidelying clam shells    UE strength: 4/5 or greater strength in B shoulder flexion, elbow flexion, and elbow extension    [facility] Portal  Treatment/Session Summary:    · Response to Treatment: Patient tolerated therapy fairly this morning. Patient with frequent reports of pain in his hips during activities. Patient stating he noticed pain in R sidelying due to compression into mat and a dull ache in his L hip with active ER movements. Patient reporting stiffness in his back. Patient also reporting discomfort in his neck, especially when he turns his head to the L, over the past few weeks. He says he's noticed bilateral UE weakness over the past few weeks as well. Patient demonstrating average power output of 63 lbs (R) and 68 lbs (L) on the dynamometer. Patient reporting he's noticed his  strength is particularly weak and that his R > L hand will go numb when brushing his teeth. PT planning to notify Dr. Gaurav Brock of reports/findings to collaborate on plan moving forward.  Patient says he does not follow up with Dr. Virgilio Ballard for several more weeks. · Communication/Consultation: PT emphasized the importance of performing HEP consistently. · Equipment provided today: None today. · Recommendations/Intent for next treatment session: Next visit will focus on progressing to more challenging activities.     Total Treatment Billable Duration: 42 minutes   PT Patient Time In/Time Out  Time In: 0800  Time Out: 0845  Gypsy Hernandez PT, DPT    Visit Approval Visit # Therapist initials Date A NS / Cx < 24 hr >24 hr Cx Comments    1 ZOË 6/11/21 [x]  [] [] Initial evaluation    2 ZOË 7/20/21 [x] [] [] 1       [] [] []        [] [] []        [] [] []        [] [] []        [] [] []        [] [] []        [] [] []        [] [] []        [] [] []        [] [] []        [] [] []        [] [] []        [] [] []        [] [] []        [] [] []        [] [] []         Future Appointments   Date Time Provider Jeff Mazariegos   8/20/2021 11:00 AM Fernando Nathan NP [facility] PP   8/24/2021  9:15 AM MD EUSEBIO Meneses POA   10/5/2021  8:00 AM Jaimee Lockhart MD [facility] PP PP

## 2021-07-22 ENCOUNTER — HOSPITAL ENCOUNTER (OUTPATIENT)
Dept: PHYSICAL THERAPY | Age: 61
Discharge: HOME | End: 2021-07-22
Attending: ORTHOPAEDIC SURGERY
Payer: COMMERCIAL

## 2021-07-22 PROCEDURE — 97110 THERAPEUTIC EXERCISES: CPT

## 2021-07-22 PROCEDURE — 97140 MANUAL THERAPY 1/> REGIONS: CPT

## 2021-07-22 PROCEDURE — 97010 HOT OR COLD PACKS THERAPY: CPT

## 2021-07-22 NOTE — PROGRESS NOTES
Rosalina Leone  : 1960  Primary: Grayson Baronviry Of Leann Ngalara*  Secondary:  Therapy Center at [address] [place] [address], [address] [facility], [place], [facility] [address] [facility]  [phone]   [phone]      OUTPATIENT PHYSICAL THERAPY: Daily Treatment Note 2021  Visit Count: 3    ICD-10: Treatment Diagnosis:    Low back pain (M54.5)          Lumbago with sciatica, left side (M54.42)   Difficulty in walking, not elsewhere classified (R26.2)  Muscle weakness (generalized) (M62.81)    Pre-treatment Symptoms/Complaints: Patient reports he's having some pain and stiffness this morning in his L buttock. Says the mornings are generally worse than later in the day. Pain: Initial: 3/10 Post Session: 3/10     Medications Last Reviewed: 2021     Updated Objective Findings: None today. TREATMENT:     THERAPEUTIC EXERCISE (30 minutes): Exercises per grid below to improve mobility, strength and balance. Required minimal visual, verbal and tactile cues to promote proper body alignment, promote proper body posture and promote proper body mechanics. Progressed resistance, range and repetitions as indicated. Date:  21 Date:  21 Date:  21   Activity/Exercise Parameters Parameters Parameters   Patient education HEP review  Reza method  Physiological / mechanism review - 10 min   NuStep / SciFit  Level 1  6 minutes Level 2  6 minutes   Diaphragmatic breathing 1 x 10     Posterior pelvic tilts 2 x 10     PPTs with unilateral march (bracing) 1 x 10 B     Supine hip abduction   2 x 10 L  Pain 3-4/10    Sidelying clam shells   2 x 10 L  PT resistance    Heel slides  1 x 10 B    Glute sets   2 x 10 B   Standing hip extension    2 x 10 B                       MANUAL THERAPY (8 minutes): Joint mobilization, soft tissue mobilization, and manipulation was utilized and necessary because of the patient's restricted joint motion and restricted motion of soft tissue.    NOT TODAY  - Total joint distraction of LLE with PT's hands at ankle with bimanual grasp for 3 x 30 sec  - Inferior joint mobilizations of L hip with patient's L leg resting on PT's shoulder and PT's hands at proximal L thigh with bimanual grasp - grades I-II  - Fluid resistance of sidelying ER of L hip against concentric motion  7/22/21  - Piriformis release - L side with active ER/IR of hip  - Rhythmic stabilization (PT's hands) of L hip ER/IR every 3-4 reps of 2 x 10 L side    MODALITIES (0 minutes): Electrical Stimulation Therapy (IFC) was provided with intensity adjusted throughout treatment to patient tolerance. Hot Pack Therapy in order to provide analgesia and relieve muscle spasm. HEP  Diaphragmatic breathing  Posterior pelvic tilts (PPTs)  PPTs with unilateral marching  Sidelying hip abduction  Supine heel slides  Sidelying clam shells    7/20/21  UE strength: 4/5 or greater strength in B shoulder flexion, elbow flexion, and elbow extension    [facility] Portal  Treatment/Session Summary:    · Response to Treatment: Patient tolerated therapy fairly this morning. Patient reporting increase of pain in L thigh and low back with extension-based exercises. PT spent a good bit of time educating patient on approach of Reza method in trying to centralize pain in low back and out of foot/leg. Patient encouraged to attempt prone positioning and/or extension-based exercises to see if approach is efficient at reducing pain in hip. Patient indicating more centralized pain with repeated standing trunk extensions at parallel bars this visit. · Communication/Consultation: PT emphasized the importance of performing HEP consistently. · Equipment provided today: None today. · Recommendations/Intent for next treatment session: Next visit will focus on progressing to more challenging activities.     Total Treatment Billable Duration: 38 minutes + 10 min MHP to low back/hip  PT Patient Time In/Time Out  Time In: 0800  Time Out: 0679  Miles Torres PT, DPT    Visit Approval Visit # Therapist initials Date A NS / Cx < 24 hr >24 hr Cx Comments    1  6/11/21 [x]  [] [] Initial evaluation    2  7/20/21 [x] [] [] 1    3 ZOË 7/22/21 [x] [] [] 2       [] [] []        [] [] []        [] [] []        [] [] []        [] [] []        [] [] []        [] [] []        [] [] []        [] [] []        [] [] []        [] [] []        [] [] []        [] [] []        [] [] []        [] [] []         Future Appointments   Date Time Provider Jeff Mazariegos   7/27/2021  8:00 AM [facility] PHYSICIAL THERAPIST [facility]FD   7/29/2021  8:00 AM [facility] PHYSICIAL THERAPIST [facility] SFD   8/20/2021 11:00 AM Stephanie Elizondo NP [facility] PP   8/24/2021  9:15 AM Alfie Perdue MD [facility] POA   10/5/2021  8:00 AM Cassandra Obando MD [facility] PP PP

## 2021-07-27 ENCOUNTER — HOSPITAL ENCOUNTER (OUTPATIENT)
Dept: PHYSICAL THERAPY | Age: 61
Discharge: HOME | End: 2021-07-27
Attending: ORTHOPAEDIC SURGERY
Payer: COMMERCIAL

## 2021-07-27 PROCEDURE — 97110 THERAPEUTIC EXERCISES: CPT

## 2021-07-27 NOTE — PROGRESS NOTES
Champ Leone  : 1960  Primary: Jc Breed Of Leann Alexander*  Secondary:  Therapy Center at [facility] [address], [address], [place] [address] [address] [facility]  [phone]   [phone]      OUTPATIENT PHYSICAL THERAPY: Daily Treatment Note 2021  Visit Count: 4    ICD-10: Treatment Diagnosis:    Low back pain (M54.5)          Lumbago with sciatica, left side (M54.42)   Difficulty in walking, not elsewhere classified (R26.2)  Muscle weakness (generalized) (M62.81)    Pre-treatment Symptoms/Complaints: Patient reports he's doing alright. He says he's noticing more neck pain than back pain right now and that it's generally better at rest. He says that he continues to notice numbness and tingling in his hands while shaving or brushing his teeth. Patient reports that pain seemed to centralize more to his bilateral buttocks and out of his ankles with the extension-based exercises. Pain: Initial: 1/10 back and 2/10 neck Post Session: 1/10 back and stiffness in neck     Medications Last Reviewed: 2021     Updated Objective Findings: None today. TREATMENT:     THERAPEUTIC EXERCISE (42 minutes): Exercises per grid below to improve mobility, strength and balance. Required minimal visual, verbal and tactile cues to promote proper body alignment, promote proper body posture and promote proper body mechanics. Progressed resistance, range and repetitions as indicated.      Date:  21 Date:  21 Date:  21 Date:  21   Activity/Exercise Parameters Parameters Parameters    Patient education HEP review  Reza method  Physiological / mechanism review - 10 min    NuStep / SciFit  Level 1  6 minutes Level 2  6 minutes Level 2  6 minutes   Diaphragmatic breathing 1 x 10      Posterior pelvic tilts 2 x 10      PPTs with unilateral march (bracing) 1 x 10 B      Supine hip abduction   2 x 10 L  Pain 3-4/10     Sidelying clam shells   2 x 10 L  PT resistance Heel slides  1 x 10 B     Glute sets   2 x 10 B    Standing trunk extension    2 x 10 B 2 x 10 // bars  Cervical flexion   Prone press ups    3 x 6 elbows to hands   Lateral step ups    2 x 10 B  6\" step   Monster walks    2 laps // bars  RTB midfoot                                        MANUAL THERAPY (0 minutes): Joint mobilization, soft tissue mobilization, and manipulation was utilized and necessary because of the patient's restricted joint motion and restricted motion of soft tissue. NOT TODAY  - Total joint distraction of LLE with PT's hands at ankle with bimanual grasp for 3 x 30 sec  - Inferior joint mobilizations of L hip with patient's L leg resting on PT's shoulder and PT's hands at proximal L thigh with bimanual grasp - grades I-II  - Fluid resistance of sidelying ER of L hip against concentric motion  7/22/21  - Piriformis release - L side with active ER/IR of hip  - Rhythmic stabilization (PT's hands) of L hip ER/IR every 3-4 reps of 2 x 10 L side    MODALITIES (0 minutes): Electrical Stimulation Therapy (IFC) was provided with intensity adjusted throughout treatment to patient tolerance. Hot Pack Therapy in order to provide analgesia and relieve muscle spasm. HEP  Diaphragmatic breathing  Posterior pelvic tilts (PPTs)  PPTs with unilateral marching  Sidelying hip abduction  Supine heel slides  Sidelying clam shells    7/20/21  UE strength: 4/5 or greater strength in B shoulder flexion, elbow flexion, and elbow extension    [facility] Portal  Treatment/Session Summary:    · Response to Treatment: Patient tolerated therapy fairly this morning. Patient reporting decrease in pain/symptoms in periphery with centralization of LE symptoms into low back. Patient reporting increase in cervical pain with standing trunk extension at parallel bars, so PT encouraged more cervical flexion while performing trunk extension with moderate improvement in symptoms.  Patient reporting severity of pain around 3-4/10 at neck and back. Patient stating that his  strength deficits are what causes his numbness/tingling in his hands vs raising his hands to a certain level / ROM. Patient encouraged to continue with extension-based exercises at home. PT discussed with patient that he has not heard back from Dr. Angelica Mon yet regarding his cervical pain and symptoms. PT will continue to try and get in touch with MD.  · Communication/Consultation: PT emphasized the importance of performing HEP consistently. · Equipment provided today: None today. · Recommendations/Intent for next treatment session: Next visit will focus on progressing to more challenging activities.     Total Treatment Billable Duration: 42 minutes  PT Patient Time In/Time Out  Time In: 0800  Time Out: 0845  Ellen Jaramillo, PT, DPT    Visit Approval Visit # Therapist initials Date A NS / Cx < 24 hr >24 hr Cx Comments    1  6/11/21 [x]  [] [] Initial evaluation    2  7/20/21 [x] [] [] 1    3  7/22/21 [x] [] [] 2    4  7/27/21 [x] [] [] 3       [] [] []        [] [] []        [] [] []        [] [] []        [] [] []        [] [] []        [] [] []        [] [] []        [] [] []        [] [] []        [] [] []        [] [] []        [] [] []        [] [] []         Future Appointments   Date Time Provider Jeff Rossi   7/27/2021  8:00 AM [facility] PHYSICIAL THERAPIST [facility]FD   7/29/2021  8:00 AM [facility] PHYSICIAL THERAPIST [facility]FD   8/20/2021 11:00 AM Danial Alonso NP [facility] PSCD PP   8/24/2021  9:15 AM Dimitrios Serrano MD POAG POA   9/1/2021  9:45 AM MD LOI Eason JEZ JEZ MRMD   10/5/2021  8:00 AM Tamy Corrales MD [facility] PP PP

## 2021-07-29 ENCOUNTER — HOSPITAL ENCOUNTER (OUTPATIENT)
Dept: PHYSICAL THERAPY | Age: 61
Discharge: HOME | End: 2021-07-29
Attending: ORTHOPAEDIC SURGERY
Payer: COMMERCIAL

## 2021-07-29 PROCEDURE — 97110 THERAPEUTIC EXERCISES: CPT

## 2021-07-29 NOTE — PROGRESS NOTES
Diane Leone  : 1960  Primary: Otto Humphrey Of Nora Catherine*  Secondary:  [facility], [address], [place] [address] [address] [facility]  [phone]   [phone]      OUTPATIENT PHYSICAL THERAPY: Daily Treatment Note 2021  Visit Count: 5    ICD-10: Treatment Diagnosis:    Low back pain (M54.5)          Lumbago with sciatica, left side (M54.42)   Difficulty in walking, not elsewhere classified (R26.2)  Muscle weakness (generalized) (M62.81)    Pre-treatment Symptoms/Complaints: Patient reports he's doing alright this morning. Patient reports he had significant pain in his buttocks Wednesday (yesterday) morning, but that it eased off throughout the day. The morning seems to be more problematic compared to later in the day. Pain: Initial: 1/10 back Post Session: 1/10 back     Medications Last Reviewed: 2021     Updated Objective Findings: None today. TREATMENT:     THERAPEUTIC EXERCISE (40 minutes): Exercises per grid below to improve mobility, strength and balance. Required minimal visual, verbal and tactile cues to promote proper body alignment, promote proper body posture and promote proper body mechanics. Progressed resistance, range and repetitions as indicated.      Date:  21 Date:  21 Date:  21 Date:  21 Date:  21   Activity/Exercise Parameters Parameters Parameters     Patient education HEP review  Reza method  Physiological / mechanism review - 10 min     NuStep / SciFit  Level 1  6 minutes Level 2  6 minutes Level 2  6 minutes Level 1  7 minutes   Diaphragmatic breathing 1 x 10       Posterior pelvic tilts 2 x 10       PPTs with unilateral march (bracing) 1 x 10 B       Supine hip abduction   2 x 10 L  Pain 3-4/10      Sidelying clam shells   2 x 10 L  PT resistance      Heel slides  1 x 10 B      Glute sets   2 x 10 B     Standing trunk extension    2 x 10 B 2 x 10 // bars  Cervical flexion 2 x 10 // bars   Prone press ups    3 x 6 elbows to hands    Lateral step ups    2 x 10 B  6\" step    Monster walks    2 laps // bars  RTB midfoot 3 laps // bars  RTB midfoot   Step ups     2 x 10 B  8\" step   Retro step ups     2 x 10 B  6\" step                             MANUAL THERAPY (0 minutes): Joint mobilization, soft tissue mobilization, and manipulation was utilized and necessary because of the patient's restricted joint motion and restricted motion of soft tissue. NOT TODAY  - Total joint distraction of LLE with PT's hands at ankle with bimanual grasp for 3 x 30 sec  - Inferior joint mobilizations of L hip with patient's L leg resting on PT's shoulder and PT's hands at proximal L thigh with bimanual grasp - grades I-II  - Fluid resistance of sidelying ER of L hip against concentric motion  7/22/21  - Piriformis release - L side with active ER/IR of hip  - Rhythmic stabilization (PT's hands) of L hip ER/IR every 3-4 reps of 2 x 10 L side    MODALITIES (0 minutes): Electrical Stimulation Therapy (IFC) was provided with intensity adjusted throughout treatment to patient tolerance. Hot Pack Therapy in order to provide analgesia and relieve muscle spasm. HEP  Diaphragmatic breathing  Posterior pelvic tilts (PPTs)  PPTs with unilateral marching  Sidelying hip abduction  Supine heel slides  Sidelying clam shells  Aerobic exercise 2 x 15 min bouts daily  Incentive spirometer / diaphragmatic breathing with counting talk test (exhale over 4 sec)    7/20/21  UE strength: 4/5 or greater strength in B shoulder flexion, elbow flexion, and elbow extension    [facility] Portal  Treatment/Session Summary:    · Response to Treatment: Patient tolerated therapy well this morning. Patient complaining that his pulmonary MD is not addressing his COPD like he'd like, which is affecting his tolerance of activities.  PT encouraged patient to perform 2 x 15 minute aerobic bouts at home on treadmill or exercise bike to tolerance in order to improve cardiorespiratory capacity to better handle exercises in PT. Patient verbalized understanding. Patient provided with handout on how to improve respiration and encouraged to use incentive spirometer at home 10 breaths every waking hour. · Communication/Consultation: PT emphasized the importance of performing HEP consistently. Pt educated on performing aerobic exercises at home in addition to strengthening HEP to aid with complications of COPD. · Equipment provided today: None today. · Recommendations/Intent for next treatment session: Next visit will focus on progressing to more challenging activities.     Total Treatment Billable Duration: 40 minutes   PT Patient Time In/Time Out  Time In: 0800  Time Out: 0845  Petar Gastelum, PT, DPT    Visit Approval Visit # Therapist initials Date A NS / Cx < 24 hr >24 hr Cx Comments    1  6/11/21 [x]  [] [] Initial evaluation    2  7/20/21 [x] [] [] 1    3  7/22/21 [x] [] [] 2    4  7/27/21 [x] [] [] 3    5  7/29/21 [x] [] [] 4       [] [] []        [] [] []        [] [] []        [] [] []        [] [] []        [] [] []        [] [] []        [] [] []        [] [] []        [] [] []        [] [] []        [] [] []        [] [] []         Future Appointments   Date Time Provider Jeff Mazariegos   7/29/2021  8:00 AM [facility] PHYSICIAL THERAPIST [facility] SFD   8/3/2021  8:00 AM [facility] PHYSICIAL THERAPIST [facility] SFD   8/5/2021  8:00 AM [facility] PHYSICIAL THERAPIST [facility] SFD   8/10/2021  8:00 AM [facility] PHYSICIAL THERAPIST [facility] SFD   8/12/2021  8:00 AM [facility] PHYSICIAL THERAPIST [facility] SFD   8/17/2021  8:00 AM [facility] PHYSICIAL THERAPIST [facility] SFD   8/19/2021  8:00 AM [facility] PHYSICIAL THERAPIST [facility] SFD   8/20/2021 11:00 AM Julianne Bower NP [facility] PSCD PP   8/24/2021  9:15 AM Lee Ann Hicks MD PO[facility] POA   9/1/2021  9:45 AM Alexander Workman MD [facility] JEZ JEZ MRMD   10/5/2021  8:00 AM Loco Meléndez MD [facility] PP PP

## 2021-08-03 ENCOUNTER — HOSPITAL ENCOUNTER (OUTPATIENT)
Dept: PHYSICAL THERAPY | Age: 61
Discharge: HOME | End: 2021-08-03
Attending: ORTHOPAEDIC SURGERY
Payer: COMMERCIAL

## 2021-08-03 PROCEDURE — 97110 THERAPEUTIC EXERCISES: CPT

## 2021-08-03 NOTE — PROGRESS NOTES
Regulo Leone  : 1960  Primary: [facility]  Secondary:  [facility], [address], [place] [address] [address] [facility]  [phone]   [phone]      OUTPATIENT PHYSICAL THERAPY: Daily Treatment Note 8/3/2021  Visit Count: 6    ICD-10: Treatment Diagnosis:    Low back pain (M54.5)          Lumbago with sciatica, left side (M54.42)   Difficulty in walking, not elsewhere classified (R26.2)  Muscle weakness (generalized) (M62.81)    Pre-treatment Symptoms/Complaints: Patient reports he's doing alright. Says he's having more symptoms in the morning compared to later in the day after he's moved around a bit. Says he noticed more symptoms in the foot / calf over the weekend for the first time in a while, but thinks the extension-based exercises seem to be helping. He states the weather always seems to flare up his arthritis. Says the breathing exercises are going alright and that he's counting up to 20 in one breath. Says that the aerobic activities of 15-30 minutes are fine, but he's limited by the COPD. Pain: Initial: 3/10 back and 2/10 L buttock Post Session: 3/10 back      Medications Last Reviewed: 8/3/2021     Updated Objective Findings: None today. TREATMENT:     THERAPEUTIC EXERCISE (40 minutes): Exercises per grid below to improve mobility, strength and balance. Required minimal visual, verbal and tactile cues to promote proper body alignment, promote proper body posture and promote proper body mechanics. Progressed resistance, range and repetitions as indicated.      Date:  21 Date:  21 Date:  21 Date:  8/3/21   Activity/Exercise Parameters      Patient education Reza method  Physiological / mechanism review - 10 min      NuStep / SciFit Level 2  6 minutes Level 2  6 minutes Level 1  7 minutes Level 2  7 minutes   Diaphragmatic breathing       Posterior pelvic tilts       PPTs with unilateral march (bracing)       Supine hip abduction        Sidelying clam shells        Heel slides       Glute sets 2 x 10 B      Standing trunk extension  2 x 10 B 2 x 10 // bars  Cervical flexion 2 x 10 // bars 2 x 10 // bars   Prone press ups  3 x 6 elbows to hands  2 x 10 with PT overpressure   Lateral step ups  2 x 10 B  6\" step     Monster walks  2 laps // bars  RTB midfoot 3 laps // bars  RTB midfoot    Step ups   2 x 10 B  8\" step    Retro step ups   2 x 10 B  6\" step    Bird dogs    1 x 6 B   Bridges    1 x 10 B            MANUAL THERAPY (5 minutes): Joint mobilization, soft tissue mobilization, and manipulation was utilized and necessary because of the patient's restricted joint motion and restricted motion of soft tissue. NOT TODAY  - Total joint distraction of LLE with PT's hands at ankle with bimanual grasp for 3 x 30 sec  - Inferior joint mobilizations of L hip with patient's L leg resting on PT's shoulder and PT's hands at proximal L thigh with bimanual grasp - grades I-II  - Fluid resistance of sidelying ER of L hip against concentric motion  - Piriformis release - L side with active ER/IR of hip  - Rhythmic stabilization (PT's hands) of L hip ER/IR every 3-4 reps of 2 x 10 L side  8/3/21  - Contract relax technique for L hamstring stretch x 5 sec holds moving into new ROM each rep    MODALITIES (0 minutes): Electrical Stimulation Therapy (IFC) was provided with intensity adjusted throughout treatment to patient tolerance. Hot Pack Therapy in order to provide analgesia and relieve muscle spasm.      HEP  Diaphragmatic breathing  Posterior pelvic tilts (PPTs)  PPTs with unilateral marching  Sidelying hip abduction  Supine heel slides  Sidelying clam shells  Aerobic exercise 2 x 15 min bouts daily  Incentive spirometer / diaphragmatic breathing with counting talk test (exhale over 4 sec)    7/20/21  UE strength: 4/5 or greater strength in B shoulder flexion, elbow flexion, and elbow extension    [facility] Portal  Treatment/Session Summary:    · Response to Treatment: Patient tolerated therapy fairly this morning. Patient reporting increased calf and ankle (somewhat lateral tibia) with exercises today. He has never presented with symptoms in this location before and didn't seem to respond as well to extension-based activities compared to previous visits. He states he has not noticed this site of pain before and is not sure why it's behaving like this today. Patient encouraged to continue with exercises at home as tolerated and to be mindful of any increase in symptoms with each activity. · Communication/Consultation: PT emphasized the importance of performing HEP consistently. Pt educated on performing aerobic exercises at home in addition to strengthening HEP to aid with complications of COPD. · Equipment provided today: None today. · Recommendations/Intent for next treatment session: Next visit will focus on progressing to more challenging activities.     Total Treatment Billable Duration: 45 minutes   PT Patient Time In/Time Out  Time In: 0800  Time Out: 0845  Petar Gastelum, PT, DPT    Visit Approval Visit # Therapist initials Date A NS / Cx < 24 hr >24 hr Cx Comments    1  6/11/21 [x]  [] [] Initial evaluation    2  7/20/21 [x] [] [] 1    3  7/22/21 [x] [] [] 2    4  7/27/21 [x] [] [] 3    5  7/29/21 [x] [] [] 4    6  8/3/21 [x] [] [] 5       [] [] []        [] [] []        [] [] []        [] [] []        [] [] []        [] [] []        [] [] []        [] [] []        [] [] []        [] [] []        [] [] []        [] [] []         Future Appointments   Date Time Provider Jeff Mazariegos   8/3/2021  8:00 AM [facility] PHYSICIAL THERAPIST [facility]   8/5/2021  8:00 AM [facility] PHYSICIAL THERAPIST [facility]FD   8/10/2021  8:00 AM [facility] PHYSICIAL THERAPIST [facility]FD   8/12/2021  8:00 AM [facility] PHYSICIAL THERAPIST [facility]   8/17/2021  8:00 AM [facility] PHYSICIAL THERAPIST [facility]   8/19/2021  8:00 AM RONALDO PHYSICIAL THERAPIST [facility]PT SFD   8/20/2021 11:00 AM Lilia Seymour NP [facility] PP   8/24/2021  9:15 AM Ashley Hutson MD [facility] POA   9/1/2021  9:45 AM Tolu Monte MD [facility] EJZ JEZ MRMD   10/5/2021  8:00 AM Eleno Aguiar MD [facility] PP PP

## 2021-08-05 ENCOUNTER — HOSPITAL ENCOUNTER (OUTPATIENT)
Dept: PHYSICAL THERAPY | Age: 61
Discharge: HOME | End: 2021-08-05
Attending: ORTHOPAEDIC SURGERY
Payer: COMMERCIAL

## 2021-08-05 PROCEDURE — 97110 THERAPEUTIC EXERCISES: CPT

## 2021-08-05 PROCEDURE — 97140 MANUAL THERAPY 1/> REGIONS: CPT

## 2021-08-05 NOTE — PROGRESS NOTES
Champ Leone  : 1960  Primary: [facility]  Secondary:  [facility] at [address] [facility] [address], [address], [place] [address] [address] [facility]  [phone]   [phone]      OUTPATIENT PHYSICAL THERAPY: Daily Treatment Note 2021  Visit Count: 7    ICD-10: Treatment Diagnosis:    Low back pain (M54.5)          Lumbago with sciatica, left side (M54.42)   Difficulty in walking, not elsewhere classified (R26.2)  Muscle weakness (generalized) (M62.81)    Pre-treatment Symptoms/Complaints: Patient reports he's hurting this morning and his gait is more abnormal compared to previous visits. Patient says he thinks he can recreate his symptoms whenever he points his toes and flexes laterally to the R to place L side in more extension. Patient states that he thinks there might be a correlation with him performing exercises on his recumbent bike at home with his recent flare up of symptoms. Pain: Initial: 3/10 back, L butt, and ankle Post Session: 3/10 back      Medications Last Reviewed: 2021     Updated Objective Findings: None today. TREATMENT:     THERAPEUTIC EXERCISE (30 minutes): Exercises per grid below to improve mobility, strength and balance. Required minimal visual, verbal and tactile cues to promote proper body alignment, promote proper body posture and promote proper body mechanics. Progressed resistance, range and repetitions as indicated.      Date:  21 Date:  21 Date:  21 Date:  8/3/21 Date:  21   Activity/Exercise Parameters       Patient education Reza method  Physiological / mechanism review - 10 min    Reviewed HEP and educated on mechanisms   NuStep / SciFit Level 2  6 minutes Level 2  6 minutes Level 1  7 minutes Level 2  7 minutes Level 2  7 minutes   Diaphragmatic breathing        Posterior pelvic tilts     1 x 10   Standing   PPTs with unilateral march (bracing)        Supine hip abduction         Sidelying clam shells         Heel slides        Glute sets 2 x 10 B       Standing trunk extension  2 x 10 B 2 x 10 // bars  Cervical flexion 2 x 10 // bars 2 x 10 // bars    Prone press ups  3 x 6 elbows to hands  2 x 10 with PT overpressure    Lateral step ups  2 x 10 B  6\" step      Monster walks  2 laps // bars  RTB midfoot 3 laps // bars  RTB midfoot     Step ups   2 x 10 B  8\" step     Retro step ups   2 x 10 B  6\" step     Bird dogs    1 x 6 B    Bridges    1 x 10 B 2 x 10 B   Figure-4 piriformis stretch     Attempted each side, but unable                                     MANUAL THERAPY (10 minutes): Joint mobilization, soft tissue mobilization, and manipulation was utilized and necessary because of the patient's restricted joint motion and restricted motion of soft tissue. NOT TODAY  - Total joint distraction of LLE with PT's hands at ankle with bimanual grasp for 3 x 30 sec  - Inferior joint mobilizations of L hip with patient's L leg resting on PT's shoulder and PT's hands at proximal L thigh with bimanual grasp - grades I-II  - Fluid resistance of sidelying ER of L hip against concentric motion  - Piriformis release - L side with active ER/IR of hip  - Rhythmic stabilization (PT's hands) of L hip ER/IR every 3-4 reps of 2 x 10 L side  - Contract relax technique for L hamstring stretch x 5 sec holds moving into new ROM each rep  8/5/21  - B sidelying with overpressure at upper thorax and pelvis to provide distraction and opening of lower lumbar and sacral facets; sustained hold stretch x 5 min with grade II oscillations each side    MODALITIES (0 minutes): Electrical Stimulation Therapy (IFC) was provided with intensity adjusted throughout treatment to patient tolerance. Hot Pack Therapy in order to provide analgesia and relieve muscle spasm.      HEP  Diaphragmatic breathing  Posterior pelvic tilts (PPTs)  PPTs with unilateral marching  Sidelying hip abduction  Supine heel slides  Sidelying clam shells  Aerobic exercise 2 x 15 min bouts daily  Incentive spirometer / diaphragmatic breathing with counting talk test (exhale over 4 sec)    7/20/21  UE strength: 4/5 or greater strength in B shoulder flexion, elbow flexion, and elbow extension    [facility] Portal  Treatment/Session Summary:    · Response to Treatment: Patient tolerated therapy fairly this morning. Patient responded fairly well to manual techniques, but noticed little after effects following hands-on treatment. Patient with pronounced rotation noted at sacral facets in R > L sidelying and patient reported decreased in severity with traction through thoracic spine and opening up facets with increased hip flexion in lumbar/sacral facets. Patient encouraged to perform glute strengthening and posterior pelvic tilts at home to encourage \"neutral alignment\" of pelvis with less compression and lordosis at lumbar spine. Patient verbalized understanding. Patient encouraged to try treadmill vs recumbent bike for aerobic exercise to see if there was any change in symptom provocation. · Communication/Consultation: PT emphasized the importance of performing HEP consistently. Pt educated on performing aerobic exercises at home in addition to strengthening HEP to aid with complications of COPD. · Equipment provided today: None today. · Recommendations/Intent for next treatment session: Next visit will focus on progressing to more challenging activities.     Total Treatment Billable Duration: 40 minutes   PT Patient Time In/Time Out  Time In: 0800  Time Out: 0845  Gladys Monteiro PT, DPT    Visit Approval Visit # Therapist initials Date A NS / Cx < 24 hr >24 hr Cx Comments    1  6/11/21 [x]  [] [] Initial evaluation    2  7/20/21 [x] [] [] 1    3  7/22/21 [x] [] [] 2    4  7/27/21 [x] [] [] 3    5  7/29/21 [x] [] [] 4    6  8/3/21 [x] [] [] 5    7  8/5/21 [x] [] [] 6       [] [] []        [] [] []        [] [] []        [] [] []        [] [] [] [] [] []        [] [] []        [] [] []        [] [] []        [] [] []        [] [] []         Future Appointments   Date Time Provider Jeff Mazariegos   8/10/2021  8:00 AM [facility] PHYSICIAL THERAPIST [facility]   8/12/2021  8:00 AM [facility] PHYSICIAL THERAPIST [facility]FD   8/17/2021  8:00 AM [facility] PHYSICIAL THERAPIST [facility]FD   8/19/2021  8:00 AM [facility] PHYSICIAL THERAPIST [facility]FD   8/20/2021 11:00 AM Fernando Nathan NP [facility] PSCD PP   8/24/2021  9:15 AM Abhilash Gay MD [facility] POA   9/1/2021  9:45 AM Kee Li MD [facility] JEZ JEZ MRMD   10/5/2021  8:00 AM Jaimee Lockhart MD [facility] PP PP

## 2021-08-10 ENCOUNTER — HOSPITAL ENCOUNTER (OUTPATIENT)
Dept: PHYSICAL THERAPY | Age: 61
Discharge: HOME | End: 2021-08-10
Attending: ORTHOPAEDIC SURGERY
Payer: COMMERCIAL

## 2021-08-10 PROCEDURE — 97140 MANUAL THERAPY 1/> REGIONS: CPT

## 2021-08-10 PROCEDURE — 97110 THERAPEUTIC EXERCISES: CPT

## 2021-08-10 NOTE — PROGRESS NOTES
Shell Leone  : 1960  Primary: [facility]*  Secondary:  [facility] at [facility] [address], [address], [place] [address] [address] [facility]  [phone]   [phone]      OUTPATIENT PHYSICAL THERAPY: Daily Treatment Note 8/10/2021  Visit Count: 8    ICD-10: Treatment Diagnosis:    Low back pain (M54.5)          Lumbago with sciatica, left side (M54.42)   Difficulty in walking, not elsewhere classified (R26.2)  Muscle weakness (generalized) (M62.81)    Pre-treatment Symptoms/Complaints: Patient reports he's hurting this morning and his gait is more abnormal compared to previous visits. Patient says he thinks he can recreate his symptoms whenever he points his toes and flexes laterally to the R to place L side in more extension. Patient states that he thinks there might be a correlation with him performing exercises on his recumbent bike at home with his recent flare up of symptoms. Pt states today that LLE s/s have gotten much worse. Pt ambulated into the clinic w/ antalgic gait L lateral shift was evident w/ LLE weakness. Pt cannot recall MICA. Pain: Initial: 8/10 LBP, LLE s/s radicular  Post Session: 4/10 LBP, central L5-S1 segment      Medications Last Reviewed: 8/10/2021     Updated Objective Findings: None today. TREATMENT:     THERAPEUTIC EXERCISE (45 minutes): Exercises per grid below to improve mobility, strength and balance. Required minimal visual, verbal and tactile cues to promote proper body alignment, promote proper body posture and promote proper body mechanics. Progressed resistance, range and repetitions as indicated.      Date:  21 Date:  21 Date:  21 Date:  8/3/21 Date:  8/10/21   Activity/Exercise Parameters       Patient education Reza suazo  Physiological / mechanism review - 10 min    Reviewed HEP and educated on mechanisms  15 mins    NuStep / SciFit Level 2  6 minutes Level 2  6 minutes Level 1  7 minutes Level 2  7 minutes Level 2  15 minutes   Diaphragmatic breathing        Posterior pelvic tilts        PPTs with unilateral march (bracing)        Supine hip abduction         Sidelying clam shells         Heel slides        Glute sets 2 x 10 B       Standing trunk extension  2 x 10 B 2 x 10 // bars  Cervical flexion 2 x 10 // bars 2 x 10 // bars    Prone press ups  3 x 6 elbows to hands  2 x 10 with PT overpressure 3 x 20 5 sec hold glute squeeze   15 mins    Lateral step ups  2 x 10 B  6\" step      Monster walks  2 laps // bars  RTB midfoot 3 laps // bars  RTB midfoot     Step ups   2 x 10 B  8\" step     Retro step ups   2 x 10 B  6\" step     Bird dogs    1 x 6 B    Bridges    1 x 10 B    Figure-4 piriformis stretch        Ambulation      250 ft 5 mins    Sidelying lumbar-sacral alignment maneuver grade 1 arina      10 mins    Prone press up w/ PT OP and corrective tactile pressure. 10 mins    Prone PA spinous process evaluation      5 mins      MANUAL THERAPY (25 minutes): Joint mobilization, soft tissue mobilization, and manipulation was utilized and necessary because of the patient's restricted joint motion and restricted motion of soft tissue. Pt sidelying, lumbar low grade body weight distribution through the lumbar-sacral segments was performed. Pt reported immediate radicular s/s post maneuver. Pt was screened for rebound pain, no radicular s/s present. Arina grade 1. Pt was prone, PT provided PA pressure through the sacrum while the pt was performing a prone press up when providing a glute squeeze, cold pack was placed over the lumbar-sacral segments to alleviate neurogenic pain down the LLE.        NOT TODAY  - Total joint distraction of LLE with PT's hands at ankle with bimanual grasp for 3 x 30 sec  - Inferior joint mobilizations of L hip with patient's L leg resting on PT's shoulder and PT's hands at proximal L thigh with bimanual grasp - grades I-II  - Fluid resistance of sidelying ER of L hip against concentric motion  - Piriformis release - L side with active ER/IR of hip  - Rhythmic stabilization (PT's hands) of L hip ER/IR every 3-4 reps of 2 x 10 L side  - Contract relax technique for L hamstring stretch x 5 sec holds moving into new ROM each rep  8/5/21  - B sidelying with overpressure at upper thorax and pelvis to provide distraction and opening of lower lumbar and sacral facets; sustained hold stretch x 5 min with grade II oscillations each side    MODALITIES (0 minutes): Electrical Stimulation Therapy (IFC) was provided with intensity adjusted throughout treatment to patient tolerance. Hot Pack Therapy in order to provide analgesia and relieve muscle spasm. HEP  Diaphragmatic breathing  Posterior pelvic tilts (PPTs)  PPTs with unilateral marching  Sidelying hip abduction  Supine heel slides  Sidelying clam shells  Aerobic exercise 2 x 15 min bouts daily  Incentive spirometer / diaphragmatic breathing with counting talk test (exhale over 4 sec)    7/20/21  UE strength: 4/5 or greater strength in B shoulder flexion, elbow flexion, and elbow extension    [facility] Portal  Treatment/Session Summary:    · Response to Treatment: Patient tolerated therapy fairly this morning. Patient responded fairly well to manual techniques, but noticed little after effects following hands-on treatment. Patient with pronounced rotation noted at sacral facets in R > L sidelying and patient reported decreased in severity with traction through thoracic spine and opening up facets with increased hip flexion in lumbar/sacral facets. Patient encouraged to perform glute strengthening and posterior pelvic tilts at home to encourage \"neutral alignment\" of pelvis with less compression and lordosis at lumbar spine. Patient verbalized understanding. Patient encouraged to try treadmill vs recumbent bike for aerobic exercise to see if there was any change in symptom provocation.     Pt responded to lumbar-sacral techniques. Pt's bilateral glute strength was diminished at the beginning once corrected anatomical alignment was performed, pt reported no radicular s/s down the LLE. · Communication/Consultation: PT emphasized the importance of performing HEP consistently. Pt educated on performing aerobic exercises at home in addition to strengthening HEP to aid with complications of COPD. · Equipment provided today: None today. · Recommendations/Intent for next treatment session: Next visit will focus on progressing to more challenging activities. 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 Pt also has deconditioning of the bilateral hip extensor musculature. Pt will continue to improve strength in these areas to prevent malalignment/neurogenic s/s.      Total Treatment Billable Duration: 70 minutes   PT Patient Time In/Time Out  Time In: 0845  Time Out: [address], [place], [facility]    Visit Approval Visit # Therapist initials Date A NS / Cx < 24 hr >24 hr Cx Comments    1  6/11/21 [x]  [] [] Initial evaluation    2  7/20/21 [x] [] [] 1    3  7/22/21 [x] [] [] 2    4  7/27/21 [x] [] [] 3    5  7/29/21 [x] [] [] 4    6  8/3/21 [x] [] [] 5    7  8/5/21 [x] [] [] 6    8 GP/Jf 8/10/21 [x] [] [] 7       [] [] []        [] [] []        [] [] []        [] [] []        [] [] []        [] [] []        [] [] []        [] [] []        [] [] []        [] [] []         Future Appointments   Date Time Provider Jeff Mazariegos   8/12/2021  8:00 AM [facility] PHYSICIAL THERAPIST [facility]   8/17/2021  8:00 AM [facility] PHYSICIAL THERAPIST [facility]FD   8/19/2021  8:00 AM [facility] PHYSICIAL THERAPIST [facility] SFD   8/20/2021 11:00 AM Annalee Owens NP [facility] PSCD PP   8/24/2021  9:15 AM Damien Osborne MD [facility] POA   9/1/2021  9:45 AM Nikia Garcia MD [facility] JEZ JEZ MRMD   10/5/2021  8:00 AM Conner Ramierz MD [facility] PP PP

## 2021-08-12 ENCOUNTER — HOSPITAL ENCOUNTER (OUTPATIENT)
Dept: PHYSICAL THERAPY | Age: 61
Discharge: HOME | End: 2021-08-12
Attending: ORTHOPAEDIC SURGERY
Payer: COMMERCIAL

## 2021-08-12 PROCEDURE — 97110 THERAPEUTIC EXERCISES: CPT

## 2021-08-12 NOTE — PROGRESS NOTES
Sean Leone  : 1960  Primary: Jose Jay Of Leann Alexander*  Secondary:  [facility] [address], [place] [address] [facility]  [phone]   [phone]      OUTPATIENT PHYSICAL THERAPY: Daily Treatment Note 2021  Visit Count: 9    ICD-10: Treatment Diagnosis:    Low back pain (M54.5)          Lumbago with sciatica, left side (M54.42)   Difficulty in walking, not elsewhere classified (R26.2)  Muscle weakness (generalized) (M62.81)    Pre-treatment Symptoms/Complaints: Patient reports he was feeling better after the last session. Patient states he continues to notice pain in his LLE, but it has become more manageable. Pain: Initial: 1/10 LBP, LLE Post Session: 010 LBP     Medications Last Reviewed: 2021     Updated Objective Findings: None today. TREATMENT:     THERAPEUTIC EXERCISE (40 minutes): Exercises per grid below to improve mobility, strength and balance. Required minimal visual, verbal and tactile cues to promote proper body alignment, promote proper body posture and promote proper body mechanics. Progressed resistance, range and repetitions as indicated.      Date:  21 Date:  21 Date:  8/3/21 Date:  8/10/21 Date:  21   Activity/Exercise        Patient education    Reviewed HEP and educated on mechanisms  15 mins     NuStep / SciFit Level 2  6 minutes Level 1  7 minutes Level 2  7 minutes Level 2  15 minutes Level 2  7 minutes   Diaphragmatic breathing        Posterior pelvic tilts        PPTs with unilateral march (bracing)        Supine hip abduction         Sidelying clam shells         Heel slides        Glute sets        Standing trunk extension  2 x 10 // bars  Cervical flexion 2 x 10 // bars 2 x 10 // bars  2 x 10 // bars  Height of painful area   Prone press ups 3 x 6 elbows to hands  2 x 10 with PT overpressure 3 x 20 5 sec hold glute squeeze   15 mins  3 x 20 5 sec hold with glute sets  10 min   Lateral step ups 2 x 10 B  6\" step       Monster walks 2 laps // bars  RTB midfoot 3 laps // bars  RTB midfoot      Step ups  2 x 10 B  8\" step      Retro step ups  2 x 10 B  6\" step      Bird dogs   1 x 6 B     Bridges   1 x 10 B     Figure-4 piriformis stretch        Ambulation     250 ft 5 mins  250 feet  5 min   Sidelying lumbar-sacral alignment maneuver - grade 1 arina     10 mins     Prone press up w/ PT OP and corrective tactile pressure    10 mins  1 x 10 5 sec hold with glute sets  10 min   Prone PA spinous process evaluation     5 mins                                       MANUAL THERAPY (0 minutes): Joint mobilization, soft tissue mobilization, and manipulation was utilized and necessary because of the patient's restricted joint motion and restricted motion of soft tissue. Pt sidelying, lumbar low grade body weight distribution through the lumbar-sacral segments was performed. Pt reported immediate radicular s/s post maneuver. Pt was screened for rebound pain, no radicular s/s present. Arina grade 1. Pt was prone, PT provided PA pressure through the sacrum while the pt was performing a prone press up when providing a glute squeeze, cold pack was placed over the lumbar-sacral segments to alleviate neurogenic pain down the LLE.      NOT TODAY  - Total joint distraction of LLE with PT's hands at ankle with bimanual grasp for 3 x 30 sec  - Inferior joint mobilizations of L hip with patient's L leg resting on PT's shoulder and PT's hands at proximal L thigh with bimanual grasp - grades I-II  - Fluid resistance of sidelying ER of L hip against concentric motion  - Piriformis release - L side with active ER/IR of hip  - Rhythmic stabilization (PT's hands) of L hip ER/IR every 3-4 reps of 2 x 10 L side  - Contract relax technique for L hamstring stretch x 5 sec holds moving into new ROM each rep  - B sidelying with overpressure at upper thorax and pelvis to provide distraction and opening of lower lumbar and sacral facets; sustained hold stretch x 5 min with grade II oscillations each side    MODALITIES (12 minutes): Electrical Stimulation Therapy (IFC) was provided with intensity adjusted throughout treatment to patient tolerance. Hot Pack Therapy in order to provide analgesia and relieve muscle spasm.   - 8/12/21 - cold pack to low back/sacrum    HEP  Diaphragmatic breathing  Posterior pelvic tilts (PPTs)  PPTs with unilateral marching  Sidelying hip abduction  Supine heel slides  Sidelying clam shells  Aerobic exercise 2 x 15 min bouts daily  Incentive spirometer / diaphragmatic breathing with counting talk test (exhale over 4 sec)    7/20/21  UE strength: 4/5 or greater strength in B shoulder flexion, elbow flexion, and elbow extension    [facility] Portal  Treatment/Session Summary:    · Response to Treatment: Patient tolerated therapy fairly this morning. Patient reporting little to no pain with overpressure at L sacrum while progressing into increased trunk extension from prone on elbows to prone with hands in front of shoulders and extending trunk. Patient denying reports of pain at the end of the session. Patient encouraged to perform trunk extensions with glute squeezes at home intermittently throughout the day. Patient verbalized understanding. · Communication/Consultation: PT emphasized the importance of performing HEP consistently. Pt educated on performing aerobic exercises at home in addition to strengthening HEP to aid with complications of COPD. · Equipment provided today: None today. · Recommendations/Intent for next treatment session: Next visit will focus on progressing to more challenging activities. 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 Pt also has deconditioning of the bilateral hip extensor musculature. Pt will continue to improve strength in these areas to prevent malalignment/neurogenic s/s.      Total Treatment Billable Duration: 40 minutes + 12 min cold pack  PT Patient Time In/Time Out  Time In: 0800  Time Out: 0845  Alphia Phalen, PT, DPT    Visit Approval Visit # Therapist initials Date A NS / Cx < 24 hr >24 hr Cx Comments    1  6/11/21 [x]  [] [] Initial evaluation    2  7/20/21 [x] [] [] 1    3  7/22/21 [x] [] [] 2    4  7/27/21 [x] [] [] 3    5  7/29/21 [x] [] [] 4    6  8/3/21 [x] [] [] 5    7  8/5/21 [x] [] [] 6    8 GP/Jf 8/10/21 [x] [] [] 7    9  8/12/21 [x] [] [] 8       [] [] []        [] [] []        [] [] []        [] [] []        [] [] []        [] [] []        [] [] []        [] [] []        [] [] []         Future Appointments   Date Time Provider Jeff Mazariegos   8/17/2021  8:00 AM [facility] PHYSICIAL THERAPIST [facility]   8/19/2021  8:00 AM [facility] PHYSICIAL THERAPIST [facility]   8/20/2021 11:00 AM Rolly Mo NP [facility] PP   8/24/2021  9:15 AM Gino Palafox MD [facility] POA   9/1/2021  9:45 AM Cathy Laughlin MD [facility] JEZ JEZ MRMD   10/5/2021  8:00 AM Ayo Rausch MD [facility] PP PP

## 2021-08-17 ENCOUNTER — HOSPITAL ENCOUNTER (OUTPATIENT)
Dept: PHYSICAL THERAPY | Age: 61
Discharge: HOME | End: 2021-08-17
Attending: ORTHOPAEDIC SURGERY
Payer: COMMERCIAL

## 2021-08-17 PROCEDURE — 97110 THERAPEUTIC EXERCISES: CPT

## 2021-08-17 NOTE — PROGRESS NOTES
Laury Leone  : 1960  Primary: Kaela Snowden Of Leann Alexander*  Secondary:  [facility] [address], [place], [facility]  [phone]   [phone]      OUTPATIENT PHYSICAL THERAPY: Daily Treatment Note 2021  Visit Count: 10    ICD-10: Treatment Diagnosis:    Low back pain (M54.5)          Lumbago with sciatica, left side (M54.42)   Difficulty in walking, not elsewhere classified (R26.2)  Muscle weakness (generalized) (M62.81)    Pre-treatment Symptoms/Complaints: Patient reports he was feeling better after the last session. Patient states he continues to notice pain in his LLE, but it has become more manageable. Pain: Initial: 1/10 LBP Post Session: 1/10 LBP     Updated Objective Findings: None today. TREATMENT:     THERAPEUTIC EXERCISE (40 minutes): Exercises per grid below to improve mobility, strength and balance. Required minimal visual, verbal and tactile cues to promote proper body alignment, promote proper body posture and promote proper body mechanics. Progressed resistance, range and repetitions as indicated.      Date:  8/3/21 Date:  8/10/21 Date:  21 Date:  21   Activity/Exercise       Patient education  Reviewed HEP and educated on mechanisms  15 mins   PN assessment   NuStep / SciFit Level 2  7 minutes Level 2  15 minutes Level 2  7 minutes    Diaphragmatic breathing       Posterior pelvic tilts       PPTs with unilateral march (bracing)       Supine hip abduction        Sidelying clam shells        Heel slides       Glute sets       Standing trunk extension  2 x 10 // bars  2 x 10 // bars  Height of painful area 2 x 10 // bars  Height of painful area   Prone press ups 2 x 10 with PT overpressure 3 x 20 5 sec hold glute squeeze   15 mins  3 x 20 5 sec hold with glute sets  10 min    Lateral step ups       Monster walks       Step ups       Retro step ups       Bird dogs 1 x 6 B      Bridges 1 x 10 B Figure-4 piriformis stretch       Ambulation   250 ft 5 mins  250 feet  5 min    Sidelying lumbar-sacral alignment maneuver - grade 1 ángel   10 mins      Prone press up w/ PT OP and corrective tactile pressure  10 mins  1 x 10 5 sec hold with glute sets  10 min    Prone PA spinous process evaluation   5 mins                                    MANUAL THERAPY (0 minutes): Joint mobilization, soft tissue mobilization, and manipulation was utilized and necessary because of the patient's restricted joint motion and restricted motion of soft tissue. Pt sidelying, lumbar low grade body weight distribution through the lumbar-sacral segments was performed. Pt reported immediate radicular s/s post maneuver. Pt was screened for rebound pain, no radicular s/s present. Toponas grade 1. Pt was prone, PT provided PA pressure through the sacrum while the pt was performing a prone press up when providing a glute squeeze, cold pack was placed over the lumbar-sacral segments to alleviate neurogenic pain down the LLE. NOT TODAY  - Total joint distraction of LLE with PT's hands at ankle with bimanual grasp for 3 x 30 sec  - Inferior joint mobilizations of L hip with patient's L leg resting on PT's shoulder and PT's hands at proximal L thigh with bimanual grasp - grades I-II  - Fluid resistance of sidelying ER of L hip against concentric motion  - Piriformis release - L side with active ER/IR of hip  - Rhythmic stabilization (PT's hands) of L hip ER/IR every 3-4 reps of 2 x 10 L side  - Contract relax technique for L hamstring stretch x 5 sec holds moving into new ROM each rep  - B sidelying with overpressure at upper thorax and pelvis to provide distraction and opening of lower lumbar and sacral facets; sustained hold stretch x 5 min with grade II oscillations each side    MODALITIES (12 minutes): Electrical Stimulation Therapy (IFC) was provided with intensity adjusted throughout treatment to patient tolerance.  Hot Pack Therapy in order to provide analgesia and relieve muscle spasm.   - 8/12/21 - cold pack to low back/sacrum    HEP  Diaphragmatic breathing  Posterior pelvic tilts (PPTs)  PPTs with unilateral marching  Sidelying hip abduction  Supine heel slides  Sidelying clam shells  Aerobic exercise 2 x 15 min bouts daily  Incentive spirometer / diaphragmatic breathing with counting talk test (exhale over 4 sec)    7/20/21  UE strength: 4/5 or greater strength in B shoulder flexion, elbow flexion, and elbow extension    [facility] Portal  Treatment/Session Summary:    · Response to Treatment: Patient tolerated therapy fairly this morning. Patient demonstrating improvement with strength and lumbar ROM via PN assessment. See other note for details. Patient and PT discussing taking a break next week to see what MD/PA thinks of patient presentation before resuming therapy next week. Patient encouraged to continue with HEP at home for maximal benefit. Patient verbalized understanding. · Communication/Consultation: PT emphasized the importance of performing HEP consistently. Pt educated on performing aerobic exercises at home in addition to strengthening HEP to aid with complications of COPD. · Equipment provided today: None today. · Recommendations/Intent for next treatment session: Next visit will focus on progressing to more challenging activities. 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 Pt also has deconditioning of the bilateral hip extensor musculature. Pt will continue to improve strength in these areas to prevent malalignment/neurogenic s/s.      Total Treatment Billable Duration: 40 minutes   PT Patient Time In/Time Out  Time In: 0800  Time Out: 0845  Rosa Barboza PT, DPT    Visit Approval Visit # Therapist initials Date A NS / Cx < 24 hr >24 hr Cx Comments    1  6/11/21 [x]  [] [] Initial evaluation    2  7/20/21 [x] [] [] 1    3  7/22/21 [x] [] [] 2 4  7/27/21 [x] [] [] 3    5  7/29/21 [x] [] [] 4    6  8/3/21 [x] [] [] 5    7  8/5/21 [x] [] [] 6    8 GP/Jf 8/10/21 [x] [] [] 7    9  8/12/21 [x] [] [] 8    10  8/17/21 [x] [] [] PN       [] [] []        [] [] []        [] [] []        [] [] []        [] [] []        [] [] []        [] [] []        [] [] []         Future Appointments   Date Time Provider Jeff Delilah   8/17/2021  8:00 AM [facility] PHYSICIAL THERAPIST [facility]   8/19/2021  8:00 AM [facility] PHYSICIAL THERAPIST [facility]   8/20/2021 11:00 AM Nimco Palomo NP [facility] PP   8/25/2021  2:15 PM Cherry Bates PA [facility] POA   9/1/2021  9:45 AM Sarita Vogt MD [facility] JEZ JEZ MRMD   10/5/2021  8:00 AM Melody Lutz MD [facility] PP PP

## 2021-08-17 NOTE — PROGRESS NOTES
Beto Leone  : 1960  Payor: Yeimy Obando / Plan: Critical access hospital / Product Type: PPO /  [id number]  at [facility]  [address], [place] [address] [address] [facility]  [phone]   [phone]                OUTPATIENT PHYSICAL THERAPY:Progress Report and Recertification     ICD-10: Treatment Diagnosis:   Low back pain (M54.5)        Lumbago with sciatica, right side (M54.41)        Lumbago with sciatica, left side (M54.42)   Difficulty in walking, not elsewhere classified (R26.2)  Muscle weakness (generalized) (M62.81)        PRECAUTIONS/ALLERGIES:   Latex and Iodinated contrast media     FALL RISK SCORE: 2 (? 5 = High Risk)    MD ORDERS: Eval and Treat MEDICAL/REFERRING DIAGNOSIS:  Encounter for follow-up examination after completed treatment for conditions other than malignant neoplasm [Z09]     DATE OF ONSET: Few months ago    REFERRING PHYSICIAN: Ward Ricardo MD    RETURN PHYSICIAN APPOINTMENT: TBD      Ambulatory/Rehab Services H2 Model Falls Risk Assessment    Risk Factors:       (1)  Gender [Male] Ability to Rise from Chair:       (1)  Pushes up, successful in one attempt    Falls Prevention Plan:       No modifications necessary   Total: (5 or greater = High Risk): 2     [employer]. All Rights Reserved. [facility] States Patent #[id number]. Federal Law prohibits the replication, distribution or use without written permission from [employer]: Mr. Demi Thacker demonstrates gains in LE strength, lumbar AROM, pain reduction, and functional tolerance as evident by subjective and objective measures. Patient is reporting a 6 point increase in perception of functional tolerance with LBP on the Oswestry index and reports that his once his constant pain is now intermittent and more tolerable.  The most profound change in the patient's presentation is his gait pattern, as he now ambulates with less antalgia and more fluidity. Patient continues to demonstrate concern over lack of ability to flex forward without a shooting, \"catching\" pain in his low back and is curious if his bilateral LIN operations would have any effect on his impairments. Patient seems to be responding well to extension-based exercises with centralization of LE symptoms into his low back. Patient demonstrates compliance with his HEP and has met all of his short-term goals at this time. Patient would benefit from continued physical therapy services to address his deficits and maximize his independence. TREATMENT PLAN:  Effective Dates: 8/17/2021 TO 11/15/2021 (90 days). Frequency/Duration: 2 times a week for 90 Days    GOALS:  Short Term Goals 4 weeks   1. Doroteo Leone will be independent with HEP to promote self-management of symptoms. MET 8/17/21  2. Doroteo Leone will participate in core stabilization exercises to help with stabilization and improve posture during ADLs to help prevent future injuries. MET 8/17/21  3. Doroteo Leone will participate in LE strengthening program with weights as appropriate to help with gait and elevations. MET 8/17/21  4. Doroteo Leone will participate in static and dynamic balance activities to decrease the risk for falls and improve overall QOL. MET 8/17/21  5. Doroteo Leone will tolerate manual therapy/joint mobilizations/soft tissue to increase ROM and decrease pain to improve functional mobility during ADLs. MET 8/17/21    Long Term Goals 12 weeks   1. Doroteo Leone will demonstrate an 8 point improvement on the Oswestry to show improvement in function. 2. [facility] will report <=2/10 pain at rest and during ADLs to improve QOL. 3. Doroteo Leone will demonstrate >=4+/5 LE strength on manual muscle testing to improve functional mobility.   4. [facility] will be able to demonstrate safe lifting and transfer mechanics without cueing for improved safety with home, childcare, and community activities. Rehabilitation Potential For Stated Goals: GOOD    Regarding Pete Leone's therapy, I certify that the treatment plan above will be carried out by a therapist or under their direction. Thank you for this referral,    Kiki Antonio, PT, DPT     Referring Physician Signature: dEuarda Quarles MD              Date                    HISTORY:   PATIENT GOAL FOR PHYSICAL THERAPY:   Improve walki ng pattern and reduce pain in back/legs    HISTORY OF PRESENT INJURY/ILLNESS (REASON FOR REFERRAL):  Patient presents to PT with complaints of radiating LBP following B LIN in Sept/Oct 2020. Patient reports that following his second LIN he noticed pain radiating down his legs bilaterally with L > R. Patient reports he has had back pain since prior to his B LIN, but his MD thought that due to his bone-on-bone imaging that his hips were the primary concern regarding his pain. Patient reports his back pain only intensified following LIN surgery. Patient had an MRI which indicated a disc herniation that was pressing upon his L sciatic nerve. Patient underwent L5-S1 discectomy with decompression foraminotomy per Dr. Adriana Garcia on 05/13/2021. Per patient report and MD note, patient is suffering worse than anticipated residual symptoms. Patient states he continues to have pain at the top of buttocks bilaterally, his L anterior hip, and along L5-S1 dermatomes on L foot. Patient reports that turning without picking his leg up and lifting for something above him is what exacerbates his pain the most. Patient states that the pain is not as constant as it was prior to surgery; however, he still experiences some sharp catches or jolts of pain with certain movement patterns. [employer]    PAIN AND NATURE OF CONDITION Date:   6/11/2021 Date:  8/17/2021   Highest pain level 10/10 4/10   Lowest pain level 0/10 0/10   Aggravating factors Turning, lifting overhead, sit <> stand Bending forward, twisting   Alleviating factors/positions  Sitting for short bouts Eases off through the day   Irritability Severe Mild to moderate     MICA Post-op LIN   Leg pain Yes   Sedentary lifestyle No     PAST MEDICAL HISTORY/COMORBIDITIES:   Mr. Denise Cunningham has a past medical history of Arrhythmia, Arthritis, CAD (coronary artery disease) (2017, 10/02/2020), Dyspnea on exertion, GERD (gastroesophageal reflux disease), H/O cold sores, Hyperlipidemia, Hypertension, Latex allergy, Sleep apnea, and Wide-complex tachycardia ([address] (2017). Mr. Denise Cunningham  has a past surgical history that includes hx colonoscopy; hx hip replacement (Bilateral, 10/07/2020); hx heart catheterization (2017); and hx heart catheterization (10/02/2020). SOCIAL HISTORY/LIVING ENVIRONMENT:    Lives with his wife in a single-story home    LIFESTYLE Date: 6/11/2021   Occupation: Not working anymore   Vigorous Activity: No   Expose individual to vibrations No   Unpleasant work environment N/A     PRIOR LEVEL OF FUNCTION/WORK/ACTIVITY:   Was terminated from work following [address] for being out too [address] [place]. [facility] with functional mobility and supervision for ADLs following B LIN     EXAMINATION:      -Pt sits with forward head and rounded shoulders which indicate tight anterior chest musculature, upper trapezius, and levator scapula and weak posterior scapula musculature and deep cervical flexors. Pt displays decreased core motor control indicating weak core and low back musculature.     OBSERVATIONS and FUNCTIONAL MOBILITY Date:   6/11/2021 Date:  8/17/2021   Transfers Increased time & effort to complete Improved time to completion   Gait deviations Antalgic gait pattern with wide EDGARDO, decreased willie, decreased step length, and downcast gaze Much more fluid gait pattern with decreased arm swing and trunk rotation; lifted gaze   Assistive device N/A N/A   Stairs NT NT   Bed mobility Increased time & effort to complete Increased time & effort to complete     AROM/PROM         Joint: Date: 6/11/2021  Date:  8/17/2021  Date:    Active LE ROM Right Left Right Left Right Left   Hip Flexion [facility]        Hip Extension [facility]       Hip ER [facility]       Hip IR [facility] WFL       Knee Extension [facility] WFL       Knee Flexion [facility] WFL       Knee Extension [facility] WFL       Lumbar Flexion 25% full; pain -- WFL; pain      Lumbar Extension 10% full; pain -- WFL; pain      Lumbar Side-Bending WFL; pain WFL; pain [facility] WFL     Lumbar Rotation 25% full; pain 25% full; pain 75% full; pain 75% full; pain       STRENGTH         Joint: Date: 6/11/2021  Date:  8/17/2021  Date:     Right Left Right Left Right Left   Hip Abduction 3+/5 3+/5; pain 4/5 4/5; pain     Hip Adduction 4/5 4/5 4+/5 4+/5     Hip IR 4/5 4/5; pain 4/5 4/5; pain     Hip ER 4/5 4/5; pain 4+/5 4+/5; pain     Hip Flexion 3+/5; pain 3+/5; pain 4+/5 4+/5; pain     Knee Extension 4-/5 4-/5 5/5 5/5     Knee Flexion 4/5 4/5; pain 4/5 4/5     Ankle DF 4+/5 3+/5; pain 5/5 5/5     Ankle PF 3+/5 3+/5 3+/5 3+/5        PALPATION Date:   6/11/2021 Date:  8/17/2021   TTP L5-S1, B ASIS, L QL L piriformis   TONE Increased lumbar paraspinals N/A   PA GLIDES Normal    EDEMA No      SPECIAL TESTS: Assessed @ Initial Visit      -LUMBAR STENOSIS:      -Bilateral symptoms: yes      -Leg pain more than back pain: yes      -Pain during walking/standing: yes      -Pain relief upon sitting: yes      -Age greater than 48 years: yes    NEUROLOGICAL SCREEN: Assessed @ Initial Visit    -RADIATING SYMPTOMS: Yes     -DERMATOMES: NT    -REFLEXES: Date: 6/11/2021     Right Left   L4  (Quadriceps) NT    S1  (Achilles) NT         CLINICAL DECISION MAKING:   OUTCOME MEASURE USED:   Tool Used: Modified Oswestry Low Back Pain Questionnaire  Score:  Initial: 29/50 (6/11/21) Most Recent: 23/50 (Date: 8/17/21)   Interpretation of Score: Each section is scored on a 0-5 scale, 5 representing the greatest disability. The scores of each section are added together for a total score of 50. Payor: Agata Zapata / Plan: [facility] / Product Type: PPO /     MEDICAL NECESSITY:  · Skilled intervention continues to be required due to above deficits affecting participation in basic ADLs and overall functional tolerance. REASON FOR SERVICES/OTHER COMMENTS:  · Patient continues to require skilled intervention due to above deficits affecting participation in basic ADLs and overall functional tolerance.

## 2021-08-19 ENCOUNTER — HOSPITAL ENCOUNTER (OUTPATIENT)
Dept: PHYSICAL THERAPY | Age: 61
Discharge: HOME | End: 2021-08-19
Attending: ORTHOPAEDIC SURGERY
Payer: COMMERCIAL

## 2021-08-19 PROCEDURE — 97110 THERAPEUTIC EXERCISES: CPT

## 2021-08-19 NOTE — PROGRESS NOTES
Suzie Leone  : 1960  Primary: [employer] Leann Alexander*  Secondary:  [facility] at [facility]  [address] [place], [facility] [address] [facility]  [phone]   [phone]      OUTPATIENT PHYSICAL THERAPY: Daily Treatment Note 2021  Visit Count: 11    ICD-10: Treatment Diagnosis:    Low back pain (M54.5)          Lumbago with sciatica, left side (M54.42)   Difficulty in walking, not elsewhere classified (R26.2)  Muscle weakness (generalized) (M62.81)    Pre-treatment Symptoms/Complaints: Patient reports that the past 2 days have been really rough. He says that something we did on Tuesday must have caused it to be irritated; however, all we did on Tuesday was a PN assessment and some standing trunk extensions, which we have done almost every session in the past few weeks. Pain: Initial: 2/10 ankle and dorsum of L foot Post Session: 1/10 LBP      Updated Objective Findings: None today. TREATMENT:     THERAPEUTIC EXERCISE (40 minutes): Exercises per grid below to improve mobility, strength and balance. Required minimal visual, verbal and tactile cues to promote proper body alignment, promote proper body posture and promote proper body mechanics. Progressed resistance, range and repetitions as indicated.      Date:  8/10/21 Date:  21 Date:  21 Date:  21   Activity/Exercise       Patient education Reviewed HEP and educated on mechanisms  15 mins   PN assessment    NuStep / SciFit Level 2  15 minutes Level 2  7 minutes  Level 2  10 minutes   Diaphragmatic breathing       Posterior pelvic tilts       Prone on elbows    3 minutes   PPTs with unilateral march (bracing)       Supine hip abduction        Sidelying clam shells        Heel slides       Glute sets       Standing trunk extensions  2 x 10 // bars  Height of painful area 2 x 10 // bars  Height of painful area 2 x 10 // bars  2 x 10 with PPT  \"Tuck your tail\"   Prone press ups 3 x 20 5 sec hold glute squeeze   15 mins  3 x 20 5 sec hold with glute sets  10 min  3 x 10  Overpressure with strap   Lateral step ups       Monster walks       Step ups       Retro step ups       Bird dogs       Bridges       Figure-4 piriformis stretch       Ambulation  250 ft 5 mins  250 feet  5 min     Sidelying lumbar-sacral alignment maneuver - grade 1 arina  10 mins       Prone press up w/ PT OP and corrective tactile pressure 10 mins  1 x 10 5 sec hold with glute sets  10 min     Prone PA spinous process evaluation  5 mins                                     MANUAL THERAPY (0 minutes): Joint mobilization, soft tissue mobilization, and manipulation was utilized and necessary because of the patient's restricted joint motion and restricted motion of soft tissue. Pt sidelying, lumbar low grade body weight distribution through the lumbar-sacral segments was performed. Pt reported immediate radicular s/s post maneuver. Pt was screened for rebound pain, no radicular s/s present. Arina grade 1. Pt was prone, PT provided PA pressure through the sacrum while the pt was performing a prone press up when providing a glute squeeze, cold pack was placed over the lumbar-sacral segments to alleviate neurogenic pain down the LLE.      NOT TODAY  - Total joint distraction of LLE with PT's hands at ankle with bimanual grasp for 3 x 30 sec  - Inferior joint mobilizations of L hip with patient's L leg resting on PT's shoulder and PT's hands at proximal L thigh with bimanual grasp - grades I-II  - Fluid resistance of sidelying ER of L hip against concentric motion  - Piriformis release - L side with active ER/IR of hip  - Rhythmic stabilization (PT's hands) of L hip ER/IR every 3-4 reps of 2 x 10 L side  - Contract relax technique for L hamstring stretch x 5 sec holds moving into new ROM each rep  - B sidelying with overpressure at upper thorax and pelvis to provide distraction and opening of lower lumbar and sacral facets; sustained hold stretch x 5 min with grade II oscillations each side    MODALITIES (12 minutes): Electrical Stimulation Therapy (IFC) was provided with intensity adjusted throughout treatment to patient tolerance. Hot Pack Therapy in order to provide analgesia and relieve muscle spasm.   - 8/12/21 - cold pack to low back/sacrum    HEP  Diaphragmatic breathing  Posterior pelvic tilts (PPTs)  PPTs with unilateral marching  Sidelying hip abduction  Supine heel slides  Sidelying clam shells  Aerobic exercise 2 x 15 min bouts daily  Incentive spirometer / diaphragmatic breathing with counting talk test (exhale over 4 sec)    7/20/21  UE strength: 4/5 or greater strength in B shoulder flexion, elbow flexion, and elbow extension    [employer] Portal  Treatment/Session Summary:    · Response to Treatment: Patient tolerated therapy fairly this morning. Patient reporting that he still performed his HEP at home when his pain was significant. Patient stating today that his pain reduced in his distal legs and moved to the middle of his back. Patient saying he felt like he could \"go further with less pain\" when performing standing trunk extensions at parallel bars when performing a PPT prior to extending his trunk. Patient stating that in static standing he did not have pain, but when ambulating he could feel some tightness in his hips and an ache in the middle of his back. Planning to take a break from PT next week while patient visits orthopedic office and then reassessing afterwards. · Communication/Consultation: PT emphasized the importance of performing HEP consistently. Pt educated on performing aerobic exercises at home in addition to strengthening HEP to aid with complications of COPD. · Equipment provided today: None today. · Recommendations/Intent for next treatment session: Next visit will focus on progressing to more challenging activities.  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 Pt also has deconditioning of the bilateral hip extensor musculature. Pt will continue to improve strength in these areas to prevent malalignment/neurogenic s/s.      Total Treatment Billable Duration: 40 minutes + 10 min large ice pack to low back  PT Patient Time In/Time Out  Time In: 0800  Time Out: 0845  Baylee Connolly PT, DPT    Visit Approval Visit # Therapist initials Date A NS / Cx < 24 hr >24 hr Cx Comments    1  6/11/21 [x]  [] [] Initial evaluation    2  7/20/21 [x] [] [] 1    3  7/22/21 [x] [] [] 2    4  7/27/21 [x] [] [] 3    5  7/29/21 [x] [] [] 4    6  8/3/21 [x] [] [] 5    7  8/5/21 [x] [] [] 6    8 GP/Jf 8/10/21 [x] [] [] 7    9  8/12/21 [x] [] [] 8    10  8/17/21 [x] [] [] PN    11  8/19/21 [x] [] [] 1       [] [] []        [] [] []        [] [] []        [] [] []        [] [] []        [] [] []        [] [] []         Future Appointments   Date Time Provider Jeff Mazariegos   8/20/2021 11:00 AM Renny Bang NP [facility] PSCD PP   8/25/2021  2:15 PM Cherry Bates PA POAG POA   8/31/2021  8:00 AM [facility] PHYSICIAL THERAPIST [facility]PT SFD   9/1/2021  9:45 AM Carrie Carpio MD [facility] JEZ JEZ MRMD   9/2/2021  8:00 AM [facility] PHYSICIAL THERAPIST [facility]FD   9/7/2021  8:00 AM [facility]FD PHYSICIAL THERAPIST [facility]FD   9/9/2021  8:00 AM [facility] PHYSICIAL THERAPIST [facility] SFD   9/14/2021  8:00 AM [facility] PHYSICIAL THERAPIST [facility]PT SFD   9/16/2021  8:00 AM [facility] PHYSICIAL THERAPIST [facility]PT SFD   10/5/2021  8:00 AM Belén Ibarra MD [facility] PP PP

## 2021-08-27 PROBLEM — Z09 FOLLOW-UP EXAMINATION, FOLLOWING OTHER SURGERY: Status: ACTIVE | Noted: 2021-08-27

## 2021-08-27 PROBLEM — M54.12 CERVICAL RADICULOPATHY: Status: ACTIVE | Noted: 2021-08-27

## 2021-08-27 PROBLEM — R29.2 GENERALIZED HYPERREFLEXIA: Status: ACTIVE | Noted: 2021-08-27

## 2021-08-31 ENCOUNTER — HOSPITAL ENCOUNTER (OUTPATIENT)
Dept: PHYSICAL THERAPY | Age: 61
Discharge: HOME | End: 2021-08-31
Attending: ORTHOPAEDIC SURGERY
Payer: COMMERCIAL

## 2021-08-31 PROCEDURE — 97110 THERAPEUTIC EXERCISES: CPT

## 2021-08-31 NOTE — PROGRESS NOTES
Pete Leone  : 1960  Primary: [facility]  Secondary:  [facility], [address], [place] [address] [address] [facility]  [phone]   [phone]      OUTPATIENT PHYSICAL THERAPY: Daily Treatment Note 2021  Visit Count: 12    ICD-10: Treatment Diagnosis:    Low back pain (M54.5)          Lumbago with sciatica, left side (M54.42)   Difficulty in walking, not elsewhere classified (R26.2)  Muscle weakness (generalized) (M62.81)    Pre-treatment Symptoms/Complaints: Patient reports that he had a follow-up visit with his MD where he had x-rays of his neck and back. It appears he has arthritis and suspected pinched nerve in his neck. He's getting an MRI tomorrow to evaluate further. Patient reporting there is suspicion that his hips are more of the primary issue than his low back, so he's getting referred to see his hip doctor again. Patient ambulating with increased antalgic gait this morning. Pain: Initial: 5/10 L > R hips, L calf, and L ankle Post Session: 5/10     Updated Objective Findings: None today. TREATMENT:     THERAPEUTIC EXERCISE (30 minutes): Exercises per grid below to improve mobility, strength and balance. Required minimal visual, verbal and tactile cues to promote proper body alignment, promote proper body posture and promote proper body mechanics. Progressed resistance, range and repetitions as indicated.      Date:  8/10/21 Date:  21 Date:  21 Date:  21 Date:  21   Activity/Exercise        Patient education Reviewed HEP and educated on mechanisms  15 mins   PN assessment  Education regarding physiology of arthritis and radicular symptoms from low back / hip; discussion regarding how to progress moving forward   NuStep / SciFit Level 2  15 minutes Level 2  7 minutes  Level 2  10 minutes Level 2  8 minutes   Diaphragmatic breathing        Posterior pelvic tilts        Prone on elbows    3 minutes 5 minutes   PPTs with unilateral march (bracing)        Supine hip abduction         Sidelying clam shells         Heel slides        Glute sets        Standing trunk extensions  2 x 10 // bars  Height of painful area 2 x 10 // bars  Height of painful area 2 x 10 // bars  2 x 10 with PPT  \"Tuck your tail\"    Prone press ups 3 x 20 5 sec hold glute squeeze   15 mins  3 x 20 5 sec hold with glute sets  10 min  3 x 10  Overpressure with strap 1 x 8  Increased pain   Lateral step ups        Monster walks        Step ups        Retro step ups        Bird dogs        Bridges        Figure-4 piriformis stretch        Ambulation  250 ft 5 mins  250 feet  5 min      Sidelying lumbar-sacral alignment maneuver - grade 1 arina  10 mins        Prone press up w/ PT OP and corrective tactile pressure 10 mins  1 x 10 5 sec hold with glute sets  10 min      Prone PA spinous process evaluation  5 mins                                          MANUAL THERAPY (0 minutes): Joint mobilization, soft tissue mobilization, and manipulation was utilized and necessary because of the patient's restricted joint motion and restricted motion of soft tissue. Pt sidelying, lumbar low grade body weight distribution through the lumbar-sacral segments was performed. Pt reported immediate radicular s/s post maneuver. Pt was screened for rebound pain, no radicular s/s present. Arina grade 1. Pt was prone, PT provided PA pressure through the sacrum while the pt was performing a prone press up when providing a glute squeeze, cold pack was placed over the lumbar-sacral segments to alleviate neurogenic pain down the LLE.      NOT TODAY  - Total joint distraction of LLE with PT's hands at ankle with bimanual grasp for 3 x 30 sec  - Inferior joint mobilizations of L hip with patient's L leg resting on PT's shoulder and PT's hands at proximal L thigh with bimanual grasp - grades I-II  - Fluid resistance of sidelying ER of L hip against concentric motion  - Piriformis release - L side with active ER/IR of hip  - Rhythmic stabilization (PT's hands) of L hip ER/IR every 3-4 reps of 2 x 10 L side  - Contract relax technique for L hamstring stretch x 5 sec holds moving into new ROM each rep  - B sidelying with overpressure at upper thorax and pelvis to provide distraction and opening of lower lumbar and sacral facets; sustained hold stretch x 5 min with grade II oscillations each side    MODALITIES (0 minutes): Electrical Stimulation Therapy (IFC) was provided with intensity adjusted throughout treatment to patient tolerance. Hot Pack Therapy in order to provide analgesia and relieve muscle spasm.   - 8/12/21 - cold pack to low back/sacrum    HEP  Diaphragmatic breathing  Posterior pelvic tilts (PPTs)  PPTs with unilateral marching  Sidelying hip abduction  Supine heel slides  Sidelying clam shells  Aerobic exercise 2 x 15 min bouts daily  Incentive spirometer / diaphragmatic breathing with counting talk test (exhale over 4 sec)    7/20/21  UE strength: 4/5 or greater strength in B shoulder flexion, elbow flexion, and elbow extension    [facility] Portal  Treatment/Session Summary:    · Response to Treatment: Patient tolerated therapy fairly this morning. Patient with many questions about his increased pain and lack of progress over the last couple of weeks. He has a lot of concerns about his arthritis and the varying presentations of his symptoms. He plans to follow up about his neck (MRI) tomorrow and his hip on Sept 17. PT and patient discussed at length about using remaining number of visits wisely and not running out of them too soon in case there were additional needs to address with his neck and/or hip following his visits with MD/PA. PT encouraged patient to continue with HEP to tolerance while decreasing frequency of supervised visits.  PT and patient planning to discuss new approach after hearing back from MD/PA regarding results. · Communication/Consultation: PT emphasized the importance of performing HEP consistently. · Equipment provided today: None today. · Recommendations/Intent for next treatment session: Next visit will focus on progressing to more challenging activities. 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 Pt also has deconditioning of the bilateral hip extensor musculature. Pt will continue to improve strength in these areas to prevent malalignment/neurogenic s/s.      Total Treatment Billable Duration: 30 minutes  PT Patient Time In/Time Out  Time In: 0800  Time Out: 0845  Buster Cai PT, DPT    Visit Approval Visit # Therapist initials Date A NS / Cx < 24 hr >24 hr Cx Comments    1  6/11/21 [x]  [] [] Initial evaluation    2  7/20/21 [x] [] [] 1    3  7/22/21 [x] [] [] 2    4  7/27/21 [x] [] [] 3    5  7/29/21 [x] [] [] 4    6  8/3/21 [x] [] [] 5    7  8/5/21 [x] [] [] 6    8 GP/Jf 8/10/21 [x] [] [] 7    9  8/12/21 [x] [] [] 8    10  8/17/21 [x] [] [] PN    11  8/19/21 [x] [] [] 1    12  8/31/21 [x] [] [] 2       [] [] []        [] [] []        [] [] []        [] [] []        [] [] []        [] [] []         Future Appointments   Date Time Provider Jeff Mazariegos   9/1/2021  9:45 AM Leland Owusu MD [facility] JEZ JEZ MRMD   9/1/2021  1:00 PM POA [facility] GRVMR AMB RAD SC   9/2/2021  8:00 AM SFD PHYSICIAL THERAPIST [facility]FD[facility]PT SFD   9/7/2021  8:00 AM SFD PHYSICIAL THERAPIST SFDORPT SFD   9/8/2021  2:15 PM Cherry Bates PA POAG POA   9/9/2021  8:00 AM SFD PHYSICIAL THERAPIST [facility]FDORPT SFD   9/14/2021  8:00 AM SFD PHYSICIAL THERAPIST SFDORPT SFD   9/16/2021  8:00 AM SFD PHYSICIAL THERAPIST SFDORPT D   9/17/2021 10:05 AM Deann Hernandez MD [facility] POA   10/5/2021  8:00 AM Jennie Brock MD SSA PP PP   11/8/2021  8:00 AM RODRIGO Brennan PSCD PP

## 2021-09-02 ENCOUNTER — APPOINTMENT (OUTPATIENT)
Dept: PHYSICAL THERAPY | Age: 61
End: 2021-09-02
Attending: ORTHOPAEDIC SURGERY
Payer: COMMERCIAL

## 2021-09-07 ENCOUNTER — APPOINTMENT (OUTPATIENT)
Dept: PHYSICAL THERAPY | Age: 61
End: 2021-09-07
Attending: ORTHOPAEDIC SURGERY
Payer: COMMERCIAL

## 2021-09-09 ENCOUNTER — HOSPITAL ENCOUNTER (OUTPATIENT)
Dept: PHYSICAL THERAPY | Age: 61
Discharge: HOME | End: 2021-09-09
Attending: ORTHOPAEDIC SURGERY
Payer: COMMERCIAL

## 2021-09-09 PROCEDURE — 97110 THERAPEUTIC EXERCISES: CPT

## 2021-09-09 NOTE — PROGRESS NOTES
Dipika Leone  : 1960  Primary: [facility]  Secondary:  [facility] [address], [place], [facility] [address] [facility]  [phone]   [phone]      OUTPATIENT PHYSICAL THERAPY: Daily Treatment Note 2021  Visit Count: 13    ICD-10: Treatment Diagnosis:    Low back pain (M54.5)          Lumbago with sciatica, left side (M54.42)   Difficulty in walking, not elsewhere classified (R26.2)  Muscle weakness (generalized) (M62.81)    Pre-treatment Symptoms/Complaints: Patient reports he had a little bit of ankle discomfort this morning, but it has since went away. He said the x-ray of his back indicated no remarkable results, but that the providers mentioned they might give him a steroid shot for some relief. He said it looks like there is nerve compression from a disc pathology in his cervical spine, which is causing symptoms in his bilateral arms. He was diagnosed with bilateral carpal tunnel syndrome after a nerve conduction study. He was scheduled to receive an MRI for further assessment of his neck, but he says his insurance is not cooperating again, so it was canceled. Denies reports of pain right now, but says it occurs intermittently. Pain: Initial: 0/10  Post Session: 1/10 at rest; 3/10 with full trunk extension     Updated Objective Findings: None today. TREATMENT:     THERAPEUTIC EXERCISE (40 minutes): Exercises per grid below to improve mobility, strength and balance. Required minimal visual, verbal and tactile cues to promote proper body alignment, promote proper body posture and promote proper body mechanics. Progressed resistance, range and repetitions as indicated.      Date:  21 Date:  21 Date:  21 Date:  21   Activity/Exercise       Patient education PN assessment  Education regarding physiology of arthritis and radicular symptoms from low back / hip; discussion regarding how to progress moving forward    NuStep / SciFit  Level 2  10 minutes Level 2  8 minutes Level 1  8 minutes   Diaphragmatic breathing       Posterior pelvic tilts    2 x 10   Prone on elbows  3 minutes 5 minutes    PPTs with unilateral march (bracing)       Supine hip abduction        Sidelying clam shells        Heel slides       Glute sets       Standing trunk extensions 2 x 10 // bars  Height of painful area 2 x 10 // bars  2 x 10 with PPT  \"Tuck your tail\"     Prone press ups  3 x 10  Overpressure with strap 1 x 8  Increased pain    Lateral step ups       Monster walks       Step ups       Retro step ups       Bird dogs    1 x 5 B; difficult to perform due to carpal tunnel symptoms with hand placement; feeling symptoms down LLE   Dead bugs    2 x 10 B with heel tap and OH elevation; BUE    2 x 10 B with LE extension and OH elevation; BUE   Bridges       Figure-4 piriformis stretch       Ambulation        Sidelying lumbar-sacral alignment maneuver - grade 1 arina        Prone press up w/ PT OP and corrective tactile pressure       Prone PA spinous process evaluation        TRX squats    1 x 3; unable to perform due to provocation   Dead lifts    5# weight bar with bar running down front of legs; unable to perform due to provocation in LE                   MANUAL THERAPY (0 minutes): Joint mobilization, soft tissue mobilization, and manipulation was utilized and necessary because of the patient's restricted joint motion and restricted motion of soft tissue. Pt sidelying, lumbar low grade body weight distribution through the lumbar-sacral segments was performed. Pt reported immediate radicular s/s post maneuver. Pt was screened for rebound pain, no radicular s/s present. Arina grade 1. Pt was prone, PT provided PA pressure through the sacrum while the pt was performing a prone press up when providing a glute squeeze, cold pack was placed over the lumbar-sacral segments to alleviate neurogenic pain down the LLE.      NOT TODAY  - Total joint distraction of LLE with PT's hands at ankle with bimanual grasp for 3 x 30 sec  - Inferior joint mobilizations of L hip with patient's L leg resting on PT's shoulder and PT's hands at proximal L thigh with bimanual grasp - grades I-II  - Fluid resistance of sidelying ER of L hip against concentric motion  - Piriformis release - L side with active ER/IR of hip  - Rhythmic stabilization (PT's hands) of L hip ER/IR every 3-4 reps of 2 x 10 L side  - Contract relax technique for L hamstring stretch x 5 sec holds moving into new ROM each rep  - B sidelying with overpressure at upper thorax and pelvis to provide distraction and opening of lower lumbar and sacral facets; sustained hold stretch x 5 min with grade II oscillations each side    MODALITIES (0 minutes): Electrical Stimulation Therapy (IFC) was provided with intensity adjusted throughout treatment to patient tolerance. Hot Pack Therapy in order to provide analgesia and relieve muscle spasm.   - 8/12/21 - cold pack to low back/sacrum    HEP  Diaphragmatic breathing  Posterior pelvic tilts (PPTs)  PPTs with unilateral marching  Sidelying hip abduction  Supine heel slides  Sidelying clam shells  Aerobic exercise 2 x 15 min bouts daily  Incentive spirometer / diaphragmatic breathing with counting talk test (exhale over 4 sec)    7/20/21  UE strength: 4/5 or greater strength in B shoulder flexion, elbow flexion, and elbow extension    [facility] Portal  Treatment/Session Summary:    · Response to Treatment: Patient tolerated therapy fairly well this morning. Pt performed dead bugs with excellent form and without complaints of pain. He said that today has been a good day so far in regard to symptoms. Patient attempting squats and dead lifts without weight today, but was unable to perform due to provocation of LE symptoms.  Patient encouraged to continue performing dead bugs at home, but to leave dead lifts until supervised with PT.  · Communication/Consultation: PT emphasized the importance of performing HEP consistently. · Equipment provided today: None today. · Recommendations/Intent for next treatment session: Next visit will focus on progressing to more challenging activities. 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 Pt also has deconditioning of the bilateral hip extensor musculature. Pt will continue to improve strength in these areas to prevent malalignment/neurogenic s/s.      Total Treatment Billable Duration: 40 minutes   PT Patient Time In/Time Out  Time In: 0800  Time Out: 0845  Gladys Monteiro, PT, DPT    Visit Approval Visit # Therapist initials Date A NS / Cx < 24 hr >24 hr Cx Comments    1  6/11/21 [x]  [] [] Initial evaluation    2  7/20/21 [x] [] [] 1    3  7/22/21 [x] [] [] 2    4  7/27/21 [x] [] [] 3    5  7/29/21 [x] [] [] 4    6  8/3/21 [x] [] [] 5    7  8/5/21 [x] [] [] 6    8 GP/Jf 8/10/21 [x] [] [] 7    9  8/12/21 [x] [] [] 8    10  8/17/21 [x] [] [] PN    11  8/19/21 [x] [] [] 1    12  8/31/21 [x] [] [] 2    13  9/9/21 [x] [] [] 3       [] [] []        [] [] []        [] [] []        [] [] []        [] [] []         Future Appointments   Date Time Provider Jeff Mazariegos   9/9/2021  8:00 AM SFD PHYSICIAL THERAPIST [facility]PT D   9/14/2021  8:00 AM SFD PHYSICIAL THERAPIST [facility]FD[facility]PT SFD   9/16/2021  8:00 AM SFD PHYSICIAL THERAPIST [facility]PT SFD   9/17/2021 10:05 AM Dakotah Waterman MD POAG POA   10/5/2021  8:00 AM Faviola Jang MD SSA PP PP   11/8/2021  8:00 AM RODRIGO Franks PSCD PP

## 2021-09-14 ENCOUNTER — HOSPITAL ENCOUNTER (OUTPATIENT)
Dept: PHYSICAL THERAPY | Age: 61
Discharge: HOME | End: 2021-09-14
Attending: ORTHOPAEDIC SURGERY
Payer: COMMERCIAL

## 2021-09-14 PROCEDURE — 97110 THERAPEUTIC EXERCISES: CPT

## 2021-09-14 NOTE — PROGRESS NOTES
Zacarias Leone  : 1960  Primary: [facility]*  Secondary:  Therapy Center at   [address], [address], [place], [address] [facility]  [phone]   [phone]      OUTPATIENT PHYSICAL THERAPY: Daily Treatment Note 2021  Visit Count: 14    ICD-10: Treatment Diagnosis:    Low back pain (M54.5)          Lumbago with sciatica, left side (M54.42)   Difficulty in walking, not elsewhere classified (R26.2)  Muscle weakness (generalized) (M62.81)    Pre-treatment Symptoms/Complaints: Patient reports he's having some neck, hip, and ankle pain today. He states that his neck hurts the worst when he turns it. He says his insurance approved his MRI, so he's rescheduling that for the near future. Pain: Initial: 3/10 neck, hip, ankle Post Session: 0/10 at rest     Updated Objective Findings: None today. TREATMENT:     THERAPEUTIC EXERCISE (41 minutes): Exercises per grid below to improve mobility, strength and balance. Required minimal visual, verbal and tactile cues to promote proper body alignment, promote proper body posture and promote proper body mechanics. Progressed resistance, range and repetitions as indicated.      Date:  21 Date:  21 Date:  21 Date:  21   Activity/Exercise       Patient education  Education regarding physiology of arthritis and radicular symptoms from low back / hip; discussion regarding how to progress moving forward     NuStep / SciFit Level 2  10 minutes Level 2  8 minutes Level 1  8 minutes Level 2  8 minutes   Diaphragmatic breathing       Posterior pelvic tilts   2 x 10 2 x 10   Prone on elbows 3 minutes 5 minutes     PPTs with unilateral march (bracing)       Supine hip abduction        Sidelying clam shells        Heel slides       Glute sets       Standing trunk extensions 2 x 10 // bars  2 x 10 with PPT  \"Tuck your tail\"      Prone press ups 3 x 10  Overpressure with strap 1 x 8  Increased pain Lateral step ups       Monster walks       Step ups       Retro step ups       Bird dogs   1 x 5 B; difficult to perform due to carpal tunnel symptoms with hand placement; feeling symptoms down LLE    Dead bugs   2 x 10 B with heel tap and OH elevation; BUE    2 x 10 B with LE extension and OH elevation; BUE 2 x 10 B with LE extension and OH elevation; BUE           Bridges       Figure-4 piriformis stretch       Ambulation        Sidelying lumbar-sacral alignment maneuver - grade 1 arina        Prone press up w/ PT OP and corrective tactile pressure       Prone PA spinous process evaluation        TRX squats   1 x 3; unable to perform due to provocation    Dead lifts   5# weight bar with bar running down front of legs; unable to perform due to provocation in LE    Side planks - modified    3 x 10-30 sec hold B  Knees bent; forearms    Pressure in elbows with some pain R > L   Median nerve glides    1 x 12 RUE                                 MANUAL THERAPY (0 minutes): Joint mobilization, soft tissue mobilization, and manipulation was utilized and necessary because of the patient's restricted joint motion and restricted motion of soft tissue. Pt sidelying, lumbar low grade body weight distribution through the lumbar-sacral segments was performed. Pt reported immediate radicular s/s post maneuver. Pt was screened for rebound pain, no radicular s/s present. Arina grade 1. Pt was prone, PT provided PA pressure through the sacrum while the pt was performing a prone press up when providing a glute squeeze, cold pack was placed over the lumbar-sacral segments to alleviate neurogenic pain down the LLE.      NOT TODAY  - Total joint distraction of LLE with PT's hands at ankle with bimanual grasp for 3 x 30 sec  - Inferior joint mobilizations of L hip with patient's L leg resting on PT's shoulder and PT's hands at proximal L thigh with bimanual grasp - grades I-II  - Fluid resistance of sidelying ER of L hip against concentric motion  - Piriformis release - L side with active ER/IR of hip  - Rhythmic stabilization (PT's hands) of L hip ER/IR every 3-4 reps of 2 x 10 L side  - Contract relax technique for L hamstring stretch x 5 sec holds moving into new ROM each rep  - B sidelying with overpressure at upper thorax and pelvis to provide distraction and opening of lower lumbar and sacral facets; sustained hold stretch x 5 min with grade II oscillations each side    MODALITIES (0 minutes): Electrical Stimulation Therapy (IFC) was provided with intensity adjusted throughout treatment to patient tolerance. Hot Pack Therapy in order to provide analgesia and relieve muscle spasm.   - 8/12/21 - cold pack to low back/sacrum    HEP  Diaphragmatic breathing  Posterior pelvic tilts (PPTs)  PPTs with unilateral marching  Sidelying hip abduction  Supine heel slides  Sidelying clam shells  Aerobic exercise 2 x 15 min bouts daily  Incentive spirometer / diaphragmatic breathing with counting talk test (exhale over 4 sec)    7/20/21  UE strength: 4/5 or greater strength in B shoulder flexion, elbow flexion, and elbow extension    [facility] Portal  Treatment/Session Summary:    · Response to Treatment: Patient tolerated therapy well this morning. Patient performing side planks and dead bugs well with minimal complaints of pain (at elbow) and none with his back. Patient required minimal verbal, visual, and tactile cues for proper technique. Patient encouraged to continue exercises at home on bed or couch if softer surface would relieve some of the pressure he feels, although it may add more of a challenge. Patient verbalized understanding. · Communication/Consultation: PT emphasized the importance of performing HEP consistently. · Equipment provided today: None today. · Recommendations/Intent for next treatment session: Next visit will focus on progressing to more challenging activities.  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 Pt also has deconditioning of the bilateral hip extensor musculature. Pt will continue to improve strength in these areas to prevent malalignment/neurogenic s/s.      Total Treatment Billable Duration: 41 minutes   PT Patient Time In/Time Out  Time In: 0800  Time Out: 0845  Kiki Antonio PT, DPT    Visit Approval Visit # Therapist initials Date A NS / Cx < 24 hr >24 hr Cx Comments    1  6/11/21 [x]  [] [] Initial evaluation    2  7/20/21 [x] [] [] 1    3  7/22/21 [x] [] [] 2    4  7/27/21 [x] [] [] 3    5  7/29/21 [x] [] [] 4    6  8/3/21 [x] [] [] 5    7  8/5/21 [x] [] [] 6    8 GP/Jf 8/10/21 [x] [] [] 7    9  8/12/21 [x] [] [] 8    10  8/17/21 [x] [] [] PN    11  8/19/21 [x] [] [] 1    12  8/31/21 [x] [] [] 2    13  9/9/21 [x] [] [] 3    14  9/14/21 [x] [] [] 4       [] [] []        [] [] []        [] [] []        [] [] []         Future Appointments   Date Time Provider Jeff Mazariegos   9/14/2021  8:00 AM Esteban WHIPPLE, PT [facility]PT SFD   9/16/2021  8:00 AM Esteban WHIPPLE, PT [facility]   9/17/2021 10:05 AM MD EUSEBIO Bee   9/21/2021  8:00 AM Esteban WHIPPLE, PT [facility]FD[facility]PT SFD   9/23/2021  8:00 AM Esteban WHIPPLE, PT [facility]PT SFD   9/28/2021  8:00 AM Esteban WHIPPLE, PT [facility]PT SFD   9/30/2021  8:00 AM Esteban WHIPPLE, PT [facility]PT SFD   10/5/2021  8:00 AM Basilio Cook MD SSA PP PP   11/8/2021  8:00 AM Angela Salazar NP [facility]SA PSCD PP

## 2021-09-16 ENCOUNTER — HOSPITAL ENCOUNTER (OUTPATIENT)
Dept: PHYSICAL THERAPY | Age: 61
Discharge: HOME | End: 2021-09-16
Attending: ORTHOPAEDIC SURGERY
Payer: COMMERCIAL

## 2021-09-16 PROCEDURE — 97110 THERAPEUTIC EXERCISES: CPT

## 2021-09-16 NOTE — PROGRESS NOTES
Diane Leone  : 1960  Primary: Otto Humphrey Of [facility]*  Secondary:  [facility]  [address], [address], [place], [address] [facility]  [phone]   [phone]      OUTPATIENT PHYSICAL THERAPY: Daily Treatment Note 2021  Visit Count: 15    ICD-10: Treatment Diagnosis:    Low back pain (M54.5)          Lumbago with sciatica, left side (M54.42)   Difficulty in walking, not elsewhere classified (R26.2)  Muscle weakness (generalized) (M62.81)    Pre-treatment Symptoms/Complaints: Patient reports he's having some hip and neck pain today and thinks the rainy weather is to blame. He denies any new changes or updates with his MRI    Pain: Initial: 2/10 hip and neck Post Session: 0/10 at rest     Updated Objective Findings: None today. TREATMENT:     THERAPEUTIC EXERCISE (40 minutes): Exercises per grid below to improve mobility, strength and balance. Required minimal visual, verbal and tactile cues to promote proper body alignment, promote proper body posture and promote proper body mechanics. Progressed resistance, range and repetitions as indicated.      Date:  21 Date:  21 Date:  21   Activity/Exercise      Patient education      NuStep / SciFit Level 1  8 minutes Level 2  8 minutes Level 2  8 minutes   Diaphragmatic breathing      Posterior pelvic tilts 2 x 10 2 x 10    Prone on elbows      PPTs with unilateral march (bracing)      Supine hip abduction       Sidelying clam shells       Heel slides      Glute sets      Standing trunk extensions      Prone press ups      Lateral step ups      Monster walks      Step ups      Retro step ups      Bird dogs 1 x 5 B; difficult to perform due to carpal tunnel symptoms with hand placement; feeling symptoms down LLE     Dead bugs 2 x 10 B with heel tap and OH elevation; BUE    2 x 10 B with LE extension and OH elevation; BUE 2 x 10 B with LE extension and OH elevation; BUE Gael      Figure-4 piriformis stretch      Ambulation       Sidelying lumbar-sacral alignment maneuver - grade 1 ángel       Prone press up w/ PT OP and corrective tactile pressure      Prone PA spinous process evaluation       TRX squats 1 x 3; unable to perform due to provocation     Dead lifts 5# weight bar with bar running down front of legs; unable to perform due to provocation in LE     Side planks - modified  3 x 10-30 sec hold B  Knees bent; forearms    Pressure in elbows with some pain R > L    Median nerve glides  1 x 12 RUE    Isometric hip adduction with ball in hook-lying position   2 x 10 x 5 sec hold   Reverse clam shells with ball in between knees   2 x 12 x 2 sec hold B   Static dead bug position while pulling down/out on resistance band   6 x 10 sec hold   Resisted standing marches with band around midfoot   2 x 10 B in  bars  RTB at midfoot                                   MANUAL THERAPY (0 minutes): Joint mobilization, soft tissue mobilization, and manipulation was utilized and necessary because of the patient's restricted joint motion and restricted motion of soft tissue. Pt sidelying, lumbar low grade body weight distribution through the lumbar-sacral segments was performed. Pt reported immediate radicular s/s post maneuver. Pt was screened for rebound pain, no radicular s/s present. Craig grade 1. Pt was prone, PT provided PA pressure through the sacrum while the pt was performing a prone press up when providing a glute squeeze, cold pack was placed over the lumbar-sacral segments to alleviate neurogenic pain down the LLE.      NOT TODAY  - Total joint distraction of LLE with PT's hands at ankle with bimanual grasp for 3 x 30 sec  - Inferior joint mobilizations of L hip with patient's L leg resting on PT's shoulder and PT's hands at proximal L thigh with bimanual grasp - grades I-II  - Fluid resistance of sidelying ER of L hip against concentric motion  - Piriformis release - L side with active ER/IR of hip  - Rhythmic stabilization (PT's hands) of L hip ER/IR every 3-4 reps of 2 x 10 L side  - Contract relax technique for L hamstring stretch x 5 sec holds moving into new ROM each rep  - B sidelying with overpressure at upper thorax and pelvis to provide distraction and opening of lower lumbar and sacral facets; sustained hold stretch x 5 min with grade II oscillations each side    MODALITIES (0 minutes): Electrical Stimulation Therapy (IFC) was provided with intensity adjusted throughout treatment to patient tolerance. Hot Pack Therapy in order to provide analgesia and relieve muscle spasm.   - 8/12/21 - cold pack to low back/sacrum    HEP  Diaphragmatic breathing  Posterior pelvic tilts (PPTs)  PPTs with unilateral marching  Sidelying hip abduction  Supine heel slides  Sidelying clam shells  Aerobic exercise 2 x 15 min bouts daily  Incentive spirometer / diaphragmatic breathing with counting talk test (exhale over 4 sec)    7/20/21  UE strength: 4/5 or greater strength in B shoulder flexion, elbow flexion, and elbow extension    [facility] Portal  Treatment/Session Summary:    · Response to Treatment: Patient tolerated therapy well this morning. Patient progressing with certain exercises and performing higher level tasks with better tolerance. Patient denied reports of pain throughout the session today. Patient required minimal verbal and visual cues to perform exercises with appropriate technique. · Communication/Consultation: PT emphasized the importance of performing HEP consistently. · Equipment provided today: None today. · Recommendations/Intent for next treatment session: Next visit will focus on progressing to more challenging activities. 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 Pt also has deconditioning of the bilateral hip extensor musculature.  Pt will continue to improve strength in these areas to prevent malalignment/neurogenic s/s.      Total Treatment Billable Duration: 40 minutes   PT Patient Time In/Time Out  Time In: 0800  Time Out: 0845  Елена Laser, PT, DPT    Visit Approval Visit # Therapist initials Date A NS / Cx < 24 hr >24 hr Cx Comments    1  6/11/21 [x]  [] [] Initial evaluation    2  7/20/21 [x] [] [] 1    3  7/22/21 [x] [] [] 2    4  7/27/21 [x] [] [] 3    5  7/29/21 [x] [] [] 4    6  8/3/21 [x] [] [] 5    7  8/5/21 [x] [] [] 6    8 GP/Jf 8/10/21 [x] [] [] 7    9  8/12/21 [x] [] [] 8    10  8/17/21 [x] [] [] PN    11  8/19/21 [x] [] [] 1    12  8/31/21 [x] [] [] 2    13  9/9/21 [x] [] [] 3    14  9/14/21 [x] [] [] 4    15  9/16/21 [x] [] [] 5       [] [] []        [] [] []        [] [] []         Future Appointments   Date Time Provider Jeff Rossi   9/17/2021 10:05 AM River Evangelista MD POAG POA   9/21/2021  8:45 AM Cedric Garrison, PTA [facility] SFD   9/23/2021  8:00 AM Felix WHIPPLE, PT [facility]PT SFD   9/28/2021  8:00 AM Felix WHIPPLE, PT [facility] SFD   9/30/2021  8:00 AM Felix WHIPPLE, PT [facility] SFD   10/5/2021  8:00 AM Melody Lutz MD SSA PP PP   11/8/2021  8:00 AM Nimco Palomo NP SSA PSCD PP

## 2021-09-21 ENCOUNTER — HOSPITAL ENCOUNTER (OUTPATIENT)
Dept: PHYSICAL THERAPY | Age: 61
Discharge: HOME | End: 2021-09-21
Attending: ORTHOPAEDIC SURGERY
Payer: COMMERCIAL

## 2021-09-21 PROCEDURE — 97110 THERAPEUTIC EXERCISES: CPT

## 2021-09-21 NOTE — PROGRESS NOTES
Pete Leone  : 1960  Primary: Grayson Dorado Of [facility]*  Secondary:  [facility] [address], [place] [address] [address] [facility]  [phone]   [phone]      OUTPATIENT PHYSICAL THERAPY: Daily Treatment Note 2021  Visit Count: 16    ICD-10: Treatment Diagnosis:    Low back pain (M54.5)          Lumbago with sciatica, left side (M54.42)   Difficulty in walking, not elsewhere classified (R26.2)  Muscle weakness (generalized) (M62.81)    Pre-treatment Symptoms/Complaints: Patient presents limping today (L ankle), and states it is not a good morning. Sees doctor tomorrow for results of MRI. He reports he goes for a bone scan on Monday. Pain: Initial: 3/10 hip and neck Post Session: 0/10 at rest     Updated Objective Findings: None today. TREATMENT:     THERAPEUTIC EXERCISE (41 minutes): Exercises per grid below to improve mobility, strength and balance. Required minimal visual, verbal and tactile cues to promote proper body alignment, promote proper body posture and promote proper body mechanics. Progressed resistance, range and repetitions as indicated.      Date:  21 Date:  21 Date:  21 Date:  2021   Activity/Exercise       Patient education       NuStep / SciFit Level 1  8 minutes Level 2  8 minutes Level 2  8 minutes Level 2  8 minutes    Diaphragmatic breathing       Posterior pelvic tilts 2 x 10 2 x 10  10 x 5 sec hold    Prone on elbows       PPTs with unilateral march (bracing)       Supine hip abduction        Sidelying clam shells        Heel slides       Glute sets       Standing trunk extensions       Prone press ups       Lateral step ups       Monster walks       Step ups       Retro step ups       Bird dogs 1 x 5 B; difficult to perform due to carpal tunnel symptoms with hand placement; feeling symptoms down LLE      Dead bugs 2 x 10 B with heel tap and OH elevation; BUE    2 x 10 B with LE extension and OH elevation; BUE 2 x 10 B with LE extension and OH elevation; BUE          X 10 with just legs     X 10 with UE/LE with wand above head    Bridges       Figure-4 piriformis stretch       Ambulation        Sidelying lumbar-sacral alignment maneuver - grade 1 ángel        Prone press up w/ PT OP and corrective tactile pressure       Prone PA spinous process evaluation        TRX squats 1 x 3; unable to perform due to provocation      Dead lifts 5# weight bar with bar running down front of legs; unable to perform due to provocation in LE      Side planks - modified  3 x 10-30 sec hold B  Knees bent; forearms    Pressure in elbows with some pain R > L  5 x 10 sec hold  On each side       Pressure at end was bothering each arm, but tolerable    Median nerve glides  1 x 12 RUE     Isometric hip adduction with ball in hook-lying position   2 x 10 x 5 sec hold 2 x 10 x 5 sec hold    Reverse clam shells with ball in between knees   2 x 12 x 2 sec hold B 2 x 10 B   Static dead bug position while pulling down/out on resistance band   6 x 10 sec hold 2 x 10 with blue thera-tubing   Resisted standing marches with band around midfoot   2 x 10 B in  bars  RTB at midfoot 2 x 10 in // bars with red band at midfoot. MANUAL THERAPY (0 minutes): Joint mobilization, soft tissue mobilization, and manipulation was utilized and necessary because of the patient's restricted joint motion and restricted motion of soft tissue. Pt sidelying, lumbar low grade body weight distribution through the lumbar-sacral segments was performed. Pt reported immediate radicular s/s post maneuver. Pt was screened for rebound pain, no radicular s/s present. Alexander grade 1.    Pt was prone, PT provided PA pressure through the sacrum while the pt was performing a prone press up when providing a glute squeeze, cold pack was placed over the lumbar-sacral segments to alleviate neurogenic pain down the LLE.     NOT TODAY  - Total joint distraction of LLE with PT's hands at ankle with bimanual grasp for 3 x 30 sec  - Inferior joint mobilizations of L hip with patient's L leg resting on PT's shoulder and PT's hands at proximal L thigh with bimanual grasp - grades I-II  - Fluid resistance of sidelying ER of L hip against concentric motion  - Piriformis release - L side with active ER/IR of hip  - Rhythmic stabilization (PT's hands) of L hip ER/IR every 3-4 reps of 2 x 10 L side  - Contract relax technique for L hamstring stretch x 5 sec holds moving into new ROM each rep  - B sidelying with overpressure at upper thorax and pelvis to provide distraction and opening of lower lumbar and sacral facets; sustained hold stretch x 5 min with grade II oscillations each side    MODALITIES (0 minutes): Electrical Stimulation Therapy (IFC) was provided with intensity adjusted throughout treatment to patient tolerance. Hot Pack Therapy in order to provide analgesia and relieve muscle spasm.   - 8/12/21 - cold pack to low back/sacrum    HEP  Diaphragmatic breathing  Posterior pelvic tilts (PPTs)  PPTs with unilateral marching  Sidelying hip abduction  Supine heel slides  Sidelying clam shells  Aerobic exercise 2 x 15 min bouts daily  Incentive spirometer / diaphragmatic breathing with counting talk test (exhale over 4 sec)    7/20/21  UE strength: 4/5 or greater strength in B shoulder flexion, elbow flexion, and elbow extension    [facility] Portal  Treatment/Session Summary:    · Response to Treatment: Patient stated ankle was better, but his hip region is sore at end of session. Patient required minimal verbal and visual cues to perform exercises with appropriate technique and to work on posture. · Communication/Consultation: PT emphasized the importance of performing HEP consistently. · Equipment provided today: None today.    · Recommendations/Intent for next treatment session: Next visit will focus on progressing to more challenging activities. 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 Pt also has deconditioning of the bilateral hip extensor musculature. Pt will continue to improve strength in these areas to prevent malalignment/neurogenic s/s.      Total Treatment Billable Duration: 41 minutes   PT Patient Time In/Time Out  Time In: 9536  Time Out: 0503 Israel Kelly PTA    Visit Approval Visit # Therapist initials Date A NS / Cx < 24 hr >24 hr Cx Comments    1  6/11/21 [x]  [] [] Initial evaluation    2  7/20/21 [x] [] [] 1    3  7/22/21 [x] [] [] 2    4  7/27/21 [x] [] [] 3    5  7/29/21 [x] [] [] 4    6  8/3/21 [x] [] [] 5    7  8/5/21 [x] [] [] 6    8 GP/Jf 8/10/21 [x] [] [] 7    9  8/12/21 [x] [] [] 8    10  8/17/21 [x] [] [] PN    11  8/19/21 [x] [] [] 1    12  8/31/21 [x] [] [] 2    13  9/9/21 [x] [] [] 3    14  9/14/21 [x] [] [] 4    15  9/16/21 [x] [] [] 5    16 PDE 9- [x] [] [] 6       [] [] []        [] [] []         Future Appointments   Date Time Provider Jeff Mazariegos   9/22/2021 10:00 AM POA [facility] AMB RAD SC   9/23/2021  8:00 AM Robyn Boas D, PT [facility] SFD   9/27/2021 11:30 AM SFD NM INJ/BLD DRAW RM SFDRNM SFD   9/27/2021  2:30 PM SFD NM GE RM SFDRNM SFD   9/28/2021  8:00 AM Robyn Boas D, PT [facility] SFD   9/30/2021  8:00 AM Robyn Boas D, PT [facility] SFD   10/5/2021  8:00 AM Ezra Donahue MD SSA PP PP   10/28/2021  2:20 PM Macho Melvin MD POAG POA   11/8/2021  8:00 AM Kelsi Bone NP SSA PSCD PP

## 2021-09-23 ENCOUNTER — HOSPITAL ENCOUNTER (OUTPATIENT)
Dept: PHYSICAL THERAPY | Age: 61
Discharge: HOME | End: 2021-09-23
Attending: ORTHOPAEDIC SURGERY
Payer: COMMERCIAL

## 2021-09-23 PROCEDURE — 97110 THERAPEUTIC EXERCISES: CPT

## 2021-09-23 NOTE — PROGRESS NOTES
Suzie Leone  : 1960  Primary: Michael Love Of [facility]*  Secondary:  [facility]  [address], [address] [facility], [place] [address] [address] [facility]  [phone]   [phone]      OUTPATIENT PHYSICAL THERAPY: Daily Treatment Note 2021  Visit Count: 17    ICD-10: Treatment Diagnosis:    Low back pain (M54.5)          Lumbago with sciatica, left side (M54.42)   Difficulty in walking, not elsewhere classified (R26.2)  Muscle weakness (generalized) (M62.81)    Pre-treatment Symptoms/Complaints: Patient presents his neck and shoulders are hurting a lot from yesterday's MRI. He says his left hip and ankle have been causing him grief today. He says he goes back next Tuesday to review results of his neck MRI. He says he looked at [address] for the results and that it stated there was some spinal stenosis. Pain: Initial: 3/10 hip and neck Post Session: 3/10      Updated Objective Findings: None today. TREATMENT:     THERAPEUTIC EXERCISE (41 minutes): Exercises per grid below to improve mobility, strength and balance. Required minimal visual, verbal and tactile cues to promote proper body alignment, promote proper body posture and promote proper body mechanics. Progressed resistance, range and repetitions as indicated.      Date:  21 Date:  21 Date:  21 Date:  21 Date:  21   Activity/Exercise        Patient education     Education regarding results of imaging, biomechanics, and how to address moving forward with inconsistency of results    NuStep / SciFit Level 1  8 minutes Level 2  8 minutes Level 2  8 minutes Level 2  8 minutes  Level 2  8 minutes   Diaphragmatic breathing        Posterior pelvic tilts 2 x 10 2 x 10  10 x 5 sec hold     Prone on elbows        PPTs with unilateral march (bracing)        Supine hip abduction         Sidelying clam shells         Heel slides        Glute sets        Standing trunk extensions        Prone press ups        Lateral step ups        Monster walks        Step ups        Retro step ups        Bird dogs 1 x 5 B; difficult to perform due to carpal tunnel symptoms with hand placement; feeling symptoms down LLE       Dead bugs 2 x 10 B with heel tap and OH elevation; BUE    2 x 10 B with LE extension and OH elevation; BUE 2 x 10 B with LE extension and OH elevation; BUE          X 10 with just legs     X 10 with UE/LE with wand above head     Bridges     2 x 10 with ball in between legs for isometric hip ADD   Figure-4 piriformis stretch        Ambulation         Sidelying lumbar-sacral alignment maneuver - grade 1 [place]         Prone press up w/ PT OP and corrective tactile pressure     2 x 10   Prone PA spinous process evaluation         TRX squats 1 x 3; unable to perform due to provocation       Dead lifts 5# weight bar with bar running down front of legs; unable to perform due to provocation in LE       Side planks - modified  3 x 10-30 sec hold B  Knees bent; forearms    Pressure in elbows with some pain R > L  5 x 10 sec hold  On each side       Pressure at end was bothering each arm, but tolerable     Median nerve glides  1 x 12 RUE      Isometric hip adduction with ball in hook-lying position   2 x 10 x 5 sec hold 2 x 10 x 5 sec hold     Reverse clam shells with ball in between knees   2 x 12 x 2 sec hold B 2 x 10 B    Static dead bug position while pulling down/out on resistance band   6 x 10 sec hold 2 x 10 with blue thera-tubing    Resisted standing marches with band around midfoot   2 x 10 B in  bars  RTB at midfoot 2 x 10 in // bars with red band at midfoot                                              MANUAL THERAPY (0 minutes): Joint mobilization, soft tissue mobilization, and manipulation was utilized and necessary because of the patient's restricted joint motion and restricted motion of soft tissue.      Pt sidelying, lumbar low grade body weight distribution through the lumbar-sacral segments was performed. Pt reported immediate radicular s/s post maneuver. Pt was screened for rebound pain, no radicular s/s present. Arina grade 1. Pt was prone, PT provided PA pressure through the sacrum while the pt was performing a prone press up when providing a glute squeeze, cold pack was placed over the lumbar-sacral segments to alleviate neurogenic pain down the LLE. NOT TODAY  - Total joint distraction of LLE with PT's hands at ankle with bimanual grasp for 3 x 30 sec  - Inferior joint mobilizations of L hip with patient's L leg resting on PT's shoulder and PT's hands at proximal L thigh with bimanual grasp - grades I-II  - Fluid resistance of sidelying ER of L hip against concentric motion  - Piriformis release - L side with active ER/IR of hip  - Rhythmic stabilization (PT's hands) of L hip ER/IR every 3-4 reps of 2 x 10 L side  - Contract relax technique for L hamstring stretch x 5 sec holds moving into new ROM each rep  - B sidelying with overpressure at upper thorax and pelvis to provide distraction and opening of lower lumbar and sacral facets; sustained hold stretch x 5 min with grade II oscillations each side    MODALITIES (0 minutes): Electrical Stimulation Therapy (IFC) was provided with intensity adjusted throughout treatment to patient tolerance. Hot Pack Therapy in order to provide analgesia and relieve muscle spasm.   - 8/12/21 - cold pack to low back/sacrum    HEP  Diaphragmatic breathing  Posterior pelvic tilts (PPTs)  PPTs with unilateral marching  Sidelying hip abduction  Supine heel slides  Sidelying clam shells  Aerobic exercise 2 x 15 min bouts daily  Incentive spirometer / diaphragmatic breathing with counting talk test (exhale over 4 sec)    7/20/21  UE strength: 4/5 or greater strength in B shoulder flexion, elbow flexion, and elbow extension    [facility] Portal  Treatment/Session Summary:    · Response to Treatment: Patient tolerated therapy fairly well this morning.  PT and patient discussing how to proceed moving forward with therapy due to inconsistency of results and intermittent flare-ups of pain. Patient reports that his pain generally occurs in his hip and ankle, but it is sporadic and does not generally follow a pattern in regards to frequency, intensity, or duration. Patient reports his cervical spine impairments have taken over his low back/hip complaints, so we have kind of reached a stand still in regards to addressing and moving forward. PT and patient discussed holding off of scheduled visits next week until he gets results back from imaging. PT talked to other therapist at clinic about following up with this patient over the next couple of visits to see if another pair of eyes or approach would be successful. Patient verbalized and agreed. · Communication/Consultation: PT emphasized the importance of performing HEP consistently. · Equipment provided today: None today. · Recommendations/Intent for next treatment session: Next visit will focus on progressing to more challenging activities. 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 Pt also has deconditioning of the bilateral hip extensor musculature. Pt will continue to improve strength in these areas to prevent malalignment/neurogenic s/s.      Total Treatment Billable Duration: 40 minutes  PT Patient Time In/Time Out  Time In: 0800  Time Out: 0845  Gladys Monteiro PT    Visit Approval Visit # Therapist initials Date A NS / Cx < 24 hr >24 hr Cx Comments    1  6/11/21 [x]  [] [] Initial evaluation    2  7/20/21 [x] [] [] 1    3  7/22/21 [x] [] [] 2    4  7/27/21 [x] [] [] 3    5  7/29/21 [x] [] [] 4    6  8/3/21 [x] [] [] 5    7  8/5/21 [x] [] [] 6    8 GP/Jf 8/10/21 [x] [] [] 7    9  8/12/21 [x] [] [] 8    10  8/17/21 [x] [] [] PN    11  8/19/21 [x] [] [] 1    12  8/31/21 [x] [] [] 2    13  9/9/21 [x] [] [] 3    14  9/14/21 [x] [] [] 4 15 ZOË 9/16/21 [x] [] [] 5    16 PDE 9/21/21 [x] [] [] 6    17 ZOË 9/23/21 [x] [] [] 7       [] [] []         Future Appointments   Date Time Provider Jeff Mazariegos   9/27/2021 11:30 AM SFD NM INJ/BLD DRAW RM SFDRNM SFD   9/27/2021  2:30 PM SFD NM GE RM SFDRNM SFD   9/28/2021  8:00 AM Conrado WHIPPLE, PT [facility]PT SFD   9/28/2021 10:00 AM Cherry Bates PA POAG POA   9/30/2021  8:00 AM Conrado WHIPPLE, PT [facility] SFD   10/5/2021  8:00 AM Rin Melendez MD SSA PP PP   10/28/2021  2:20 PM Colonel Ana MD POAG POA   11/8/2021  8:00 AM Michael Allen NP SSA PSCD PP

## 2021-09-27 ENCOUNTER — HOSPITAL ENCOUNTER (OUTPATIENT)
Dept: NUCLEAR MEDICINE | Age: 61
Discharge: HOME | End: 2021-09-27
Attending: ORTHOPAEDIC SURGERY
Payer: COMMERCIAL

## 2021-09-27 DIAGNOSIS — M25.551 BILATERAL HIP PAIN: ICD-10-CM

## 2021-09-27 DIAGNOSIS — M25.552 BILATERAL HIP PAIN: ICD-10-CM

## 2021-09-27 PROCEDURE — 78306 BONE IMAGING WHOLE BODY: CPT

## 2021-09-28 ENCOUNTER — APPOINTMENT (OUTPATIENT)
Dept: PHYSICAL THERAPY | Age: 61
End: 2021-09-28
Attending: ORTHOPAEDIC SURGERY
Payer: COMMERCIAL

## 2021-09-28 PROBLEM — M48.02 CERVICAL SPINAL STENOSIS: Status: ACTIVE | Noted: 2021-09-28

## 2021-09-30 ENCOUNTER — APPOINTMENT (OUTPATIENT)
Dept: PHYSICAL THERAPY | Age: 61
End: 2021-09-30
Attending: ORTHOPAEDIC SURGERY
Payer: COMMERCIAL

## 2021-10-01 PROBLEM — G56.01 RIGHT CARPAL TUNNEL SYNDROME: Status: ACTIVE | Noted: 2021-10-01

## 2021-10-01 PROBLEM — M65.331 TRIGGER FINGER, RIGHT MIDDLE FINGER: Status: ACTIVE | Noted: 2021-10-01

## 2021-10-01 PROBLEM — G56.02 LEFT CARPAL TUNNEL SYNDROME: Status: ACTIVE | Noted: 2021-10-01

## 2021-10-01 NOTE — H&P (VIEW-ONLY)
Orthopaedic Hand Surgery Note    Name: Chintan Jacques [facility]  Age: 63 y.o. YOB: 1960  Gender: male  MRN: [id number]    CC: Follow up of hand numbness    HPI: Patient is a 63 y.o. male who is here regarding follow up for  hand numbness and tingling. I had previously seen the patient and performed a middle finger A1 pulley steroid injection, he has been evaluated by the spine surgery team for cervical radiculopathy, he recently had a nerve conduction study which showed severe right carpal tunnel syndrome and the spine team referred him back to me, he complains of numbness and paresthesias in median distribution of the right hand, she says that sometimes all the fingers go numb, he has inability make a full fist with the right hand, the middle finger is the most stiff and it locks in flexion sometimes. ROS/Meds/PSH/PMH/FH/SH: I personally reviewed the patients standard intake form. Pertinents are discussed in the HPI    Physical Examination:  General: Awake and alert. HEENT: Normocephalic, atraumatic  CV/Pulm: Breathing even and unlabored  Skin: No obvious rashes noted. Lymphatic: No obvious evidence of lymphedema or lymphadenopathy    Musculoskeletal:     Examination of the Right upper extremity demonstrates Decreased sensation to light touch in the median distribution, normal sensation in ulnar and radial distribution, positive carpal tunnel compression testing and Phalen testing, cap refill < 5 seconds in all fingers. Inspection reveals no thenar atrophy. Negative Tinel and elbow flexion compression test of the cubital tunnel, negative Tinel over Guyon's canal. Sensation to light touch in the ulnar 2 digits is normal with no intrinsic atrophy/weakness. No tenderness to palpation or masses noted in the forearm.   Tenderness and palpable clicking with locking of the right middle finger A1 pulley    Imaging / Electrodiagnostic Tests:     Nerve conduction study was reviewed which demonstrates severe right carpal tunnel syndrome with sensory latency not recorded, mild left carpal tunnel syndrome with sensory latency of 3.5 and motor latency of 4.3    Assessment:   1. Right carpal tunnel syndrome    2. Carpal tunnel syndrome, bilateral    3. Left carpal tunnel syndrome    4. Trigger finger, right middle finger        Plan:  We discussed the diagnosis and different treatment options. We discussed observation, EMG/NCV, night splinting, cortisone injections and surgical decompression and the risks and benefits of all were clearly outlined. After discussing in detail the patient elects to proceed with right carpal tunnel release, right middle trigger finger release, we will assess the left carpal tunnel syndrome during the postoperative period and determine further care. Patient voiced accordance and understanding of the information provided and the formulated plan. All questions were answered to the patient's satisfaction during the encounter. 4 This is a chronic illness/condition with exacerbation and progression  Treatment at this time: Elective major surgery with procedural risk factors     Patient understands risks and benefits of RIGHT CARPAL TUNNEL RELEASE, RIGHT MIDDLE TRIGGER FINGER RELEASE including but not limited to nerve injury, vessel injury, infection, failure to achieve desired results and possible need for additional surgery. Patient understands and wishes to proceed with surgery.      On Exam:   The patient is alert and oriented; ;   Lung auscultation is clear bilaterally   Heart has RRR without murmurs      Michell Hernandez MD  Orthopaedic Surgery  10/01/21  12:03 PM

## 2021-10-04 ENCOUNTER — HOSPITAL ENCOUNTER (OUTPATIENT)
Dept: PHYSICAL THERAPY | Age: 61
Discharge: HOME | End: 2021-10-04
Attending: ORTHOPAEDIC SURGERY
Payer: COMMERCIAL

## 2021-10-04 PROCEDURE — 97110 THERAPEUTIC EXERCISES: CPT

## 2021-10-04 PROCEDURE — 97140 MANUAL THERAPY 1/> REGIONS: CPT

## 2021-10-04 NOTE — PROGRESS NOTES
Jorge Leone  : 1960  Primary: [place] [facility]  Secondary:  [facility] [address], [address], [place], [facility]  [phone]   [phone]      OUTPATIENT PHYSICAL THERAPY: Daily Treatment Note 10/4/2021  Visit Count: 18    ICD-10: Treatment Diagnosis:    Low back pain (M54.5)          Lumbago with sciatica, left side (M54.42)   Difficulty in walking, not elsewhere classified (R26.2)  Muscle weakness (generalized) (M62.81)    Pre-treatment Symptoms/Complaints:     Pain: Initial: 4/10 L glute into posterior shin and dorsal aspect of food. Post Session: 3/10      Updated Objective Findings: None today. TREATMENT:     THERAPEUTIC EXERCISE (15 minutes): Exercises per grid below to improve mobility, strength and balance. Required minimal visual, verbal and tactile cues to promote proper body alignment, promote proper body posture and promote proper body mechanics. Progressed resistance, range and repetitions as indicated.      Date:  21 Date:  21 Date:  21 Date:  21 Date:  21 Date:  10/4/21   Activity/Exercise         Patient education     Education regarding results of imaging, biomechanics, and how to address moving forward with inconsistency of results     NuStep / SciFit Level 1  8 minutes Level 2  8 minutes Level 2  8 minutes Level 2  8 minutes  Level 2  8 minutes    Diaphragmatic breathing         Posterior pelvic tilts 2 x 10 2 x 10  10 x 5 sec hold      Prone on elbows         PPTs with unilateral march (bracing)         Supine hip abduction       S/l 10 X 2   Sidelying clam shells       10 X 2   Heel slides         Glute sets         Standing trunk extensions         Prone press ups         Lateral step ups         Monster walks         Step ups         Retro step ups         Bird dogs 1 x 5 B; difficult to perform due to carpal tunnel symptoms with hand placement; feeling symptoms down LLE Dead bugs 2 x 10 B with heel tap and OH elevation; BUE    2 x 10 B with LE extension and OH elevation; BUE 2 x 10 B with LE extension and OH elevation; BUE          X 10 with just legs     X 10 with UE/LE with wand above head      Bridges     2 x 10 with ball in between legs for isometric hip ADD 10 X   Figure-4 piriformis stretch         Ambulation          Sidelying lumbar-sacral alignment maneuver - grade 1 ángel          Prone press up w/ PT OP and corrective tactile pressure     2 x 10    Prone PA spinous process evaluation          TRX squats 1 x 3; unable to perform due to provocation        Dead lifts 5# weight bar with bar running down front of legs; unable to perform due to provocation in LE        Side planks - modified  3 x 10-30 sec hold B  Knees bent; forearms    Pressure in elbows with some pain R > L  5 x 10 sec hold  On each side       Pressure at end was bothering each arm, but tolerable      Median nerve glides  1 x 12 RUE       Isometric hip adduction with ball in hook-lying position   2 x 10 x 5 sec hold 2 x 10 x 5 sec hold      Reverse clam shells with ball in between knees   2 x 12 x 2 sec hold B 2 x 10 B     Static dead bug position while pulling down/out on resistance band   6 x 10 sec hold 2 x 10 with blue thera-tubing     Resisted standing marches with band around midfoot   2 x 10 B in  bars  RTB at midfoot 2 x 10 in // bars with red band at midfoot     ASLR      10 X AA                                         MANUAL THERAPY (25 minutes): Joint mobilization, soft tissue mobilization, and manipulation was utilized and necessary because of the patient's restricted joint motion and restricted motion of soft tissue.   +manual stretching B HS, piriformis, hip flexors,quads  + STM L glute, thera roller to L ITB  +Grade II-III CPA to L SIJ    NOT TODAY  - Total joint distraction of LLE with PT's hands at ankle with bimanual grasp for 3 x 30 sec  - Inferior joint mobilizations of L hip with patient's L leg resting on PT's shoulder and PT's hands at proximal L thigh with bimanual grasp - grades I-II  - Fluid resistance of sidelying ER of L hip against concentric motion  - Piriformis release - L side with active ER/IR of hip  - Rhythmic stabilization (PT's hands) of L hip ER/IR every 3-4 reps of 2 x 10 L side  - Contract relax technique for L hamstring stretch x 5 sec holds moving into new ROM each rep  - B sidelying with overpressure at upper thorax and pelvis to provide distraction and opening of lower lumbar and sacral facets; sustained hold stretch x 5 min with grade II oscillations each side    MODALITIES (0 minutes): Electrical Stimulation Therapy (IFC) was provided with intensity adjusted throughout treatment to patient tolerance. Hot Pack Therapy in order to provide analgesia and relieve muscle spasm.   - 8/12/21 - cold pack to low back/sacrum    HEP  Diaphragmatic breathing  Posterior pelvic tilts (PPTs)  PPTs with unilateral marching  Sidelying hip abduction  Supine heel slides  Sidelying clam shells  Aerobic exercise 2 x 15 min bouts daily  Incentive spirometer / diaphragmatic breathing with counting talk test (exhale over 4 sec)    7/20/21  UE strength: 4/5 or greater strength in B shoulder flexion, elbow flexion, and elbow extension    MedBridge Portal  Treatment/Session Summary:    · Response to Treatment: Pt reporting no pain into calf and foot by end of session but reporting more soreness at hip than pain into glute region. Pt demonstrating \"cogwheel\" mm contractions with both concentric and eccentric range. Requiring AA for ASLR. · Communication/Consultation: PT emphasized the importance of performing HEP consistently. · Equipment provided today: None today. · Recommendations/Intent for next treatment session: Next visit will focus on progressing to more challenging activities. 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  Pt also has deconditioning of the bilateral hip extensor musculature. Pt will continue to improve strength in these areas to prevent malalignment/neurogenic s/s.      Total Treatment Billable Duration:   PT Patient Time In/Time Out  Time In: 0930  Time Out: 1012     Arlen Rockwell PT, DPT    Visit Approval Visit # Therapist initials Date A NS / Cx < 24 hr >24 hr Cx Comments   20 visits total 1  6/11/21 [x]  [] [] Initial evaluation    2  7/20/21 [x] [] [] 1    3  7/22/21 [x] [] [] 2    4  7/27/21 [x] [] [] 3    5  7/29/21 [x] [] [] 4    6  8/3/21 [x] [] [] 5    7  8/5/21 [x] [] [] 6    8 GP/Jf 8/10/21 [x] [] [] 7    9  8/12/21 [x] [] [] 8    10  8/17/21 [x] [] [] PN    11  8/19/21 [x] [] [] 1    12  8/31/21 [x] [] [] 2    13  9/9/21 [x] [] [] 3    14  9/14/21 [x] [] [] 4    15 ZOË 9/16/21 [x] [] [] 5    16 PDE 9/21/21 [x] [] [] 6    17 ZOË 9/23/21 [x] [] [] 7    18 SANDY 10/4/21 [x] [] [] 8                            Future Appointments   Date Time Provider Jeff Delilah   10/4/2021  9:30 AM Laine Fritzs A [facility]FD[facility]PT SFD   10/5/2021  8:00 AM Jodi Hatfield MD SSA PP PP   10/6/2021  8:00 AM Laine Fritzs A [facility]FDORPT SFD   10/22/2021  9:20 AM Christopher Landry NP POAG POA   10/28/2021  2:20 PM Hank [place] MD POAG POA   11/8/2021  8:00 AM Tyesha Hubbard NP SSA PSCD PP

## 2021-10-04 NOTE — PROGRESS NOTES
Kandis Leone  : 1960  Payor: Nikia Vazquez / Plan: [place] [facility] / Product Type: PPO /  [id number]  at [facility]  [address], [address], [place] [address] [facility]  [phone]   [phone]                OUTPATIENT PHYSICAL THERAPY:Recertification [id number]    ICD-10: Treatment Diagnosis:   Low back pain (M54.5)        Lumbago with sciatica, right side (M54.41)        Lumbago with sciatica, left side (M54.42)   Difficulty in walking, not elsewhere classified (R26.2)  Muscle weakness (generalized) (M62.81)        PRECAUTIONS/ALLERGIES:   Latex and Iodinated contrast media     FALL RISK SCORE: 2 (? 5 = High Risk)    MD ORDERS: Eval and Treat MEDICAL/REFERRING DIAGNOSIS:  Encounter for follow-up examination after completed treatment for conditions other than malignant neoplasm [Z09]     DATE OF ONSET: Few months ago    REFERRING PHYSICIAN: Dee Ayers MD    RETURN PHYSICIAN APPOINTMENT: TBD      Ambulatory/Rehab Services H2 Model Falls Risk Assessment    Risk Factors:       (1)  Gender [Male] Ability to Rise from Chair:       (1)  Pushes up, successful in one attempt    Falls Prevention Plan:       No modifications necessary   Total: (5 or greater = High Risk): 2     [facility] of [employer]. All Rights Reserved. [facility] States Patent #9,[id number]. Federal Law prohibits the replication, distribution or use without written permission from [facility] of Jeff Mccarty: Mr. Joby Fisher has participated in 17 physical therapy sessions since his initial evaluation on 2021. Patient demonstrates gains in LE strength, lumbar AROM, pain reduction, and functional tolerance as evident by subjective and objective measures. Patient is reporting a 6 point increase in perception of functional tolerance with LBP on the Oswestry index and reports that his once his constant pain is now intermittent and more tolerable.  The most profound change in the patient's presentation is his gait pattern, as he now ambulates with less antalgia and more fluidity. Patient continues to demonstrate concern over lack of ability to flex forward without a shooting, \"catching\" pain in his low back and is curious if his bilateral LIN operations would have any effect on his impairments. Patient seems to be responding well to extension-based exercises with centralization of LE symptoms into his low back. Patient demonstrates compliance with his HEP and has met all of his short-term goals at this time. Patient would benefit from continued physical therapy services to address his deficits and maximize his independence. TREATMENT PLAN:  Effective Dates: 9/9/2021 TO 12/8/2021 (90 days). Frequency/Duration: 2 times a week for 90 Days    GOALS:  Short Term Goals 4 weeks   1. Sharon Leone will be independent with HEP to promote self-management of symptoms. MET 8/17/21  2. Sharon Leone will participate in core stabilization exercises to help with stabilization and improve posture during ADLs to help prevent future injuries. MET 8/17/21  3. Sharon Leone will participate in LE strengthening program with weights as appropriate to help with gait and elevations. MET 8/17/21  4. Sharon Leone will participate in static and dynamic balance activities to decrease the risk for falls and improve overall QOL. MET 8/17/21  5. Sharon Leone will tolerate manual therapy/joint mobilizations/soft tissue to increase ROM and decrease pain to improve functional mobility during ADLs. MET 8/17/21    Long Term Goals 12 weeks   1. Sharon Leone will demonstrate an 8 point improvement on the Oswestry to show improvement in function. 2. Sharon Hinton [facility] will report <=2/10 pain at rest and during ADLs to improve QOL.   3. Sharon Leone will demonstrate >=4+/5 LE strength on manual muscle testing to improve functional mobility. 4. Derek Bautista [facility] will be able to demonstrate safe lifting and transfer mechanics without cueing for improved safety with home, childcare, and community activities. Thank you for this referral,    Calderon Best, PT, DPT     Referring Physician Signature: Damien Osborne MD              Date                    HISTORY:   PATIENT GOAL FOR PHYSICAL THERAPY:   Improve walki ng pattern and reduce pain in back/legs    HISTORY OF PRESENT INJURY/ILLNESS (REASON FOR REFERRAL):  Patient presents to PT with complaints of radiating LBP following B LIN in Sept/Oct 2020. Patient reports that following his second LIN he noticed pain radiating down his legs bilaterally with L > R. Patient reports he has had back pain since prior to his B LIN, but his MD thought that due to his bone-on-bone imaging that his hips were the primary concern regarding his pain. Patient reports his back pain only intensified following LIN surgery. Patient had an MRI which indicated a disc herniation that was pressing upon his L sciatic nerve. Patient underwent L5-S1 discectomy with decompression foraminotomy per Dr. Stevenson Muniz on 05/13/2021. Per patient report and MD note, patient is suffering worse than anticipated residual symptoms. Patient states he continues to have pain at the top of buttocks bilaterally, his L anterior hip, and along L5-S1 dermatomes on L foot. Patient reports that turning without picking his leg up and lifting for something above him is what exacerbates his pain the most. Patient states that the pain is not as constant as it was prior to surgery; however, he still experiences some sharp catches or jolts of pain with certain movement patterns. Delaneyy Grad    PAIN AND NATURE OF CONDITION Date:   6/11/2021 Date:  8/17/2021   Highest pain level 10/10 4/10   Lowest pain level 0/10 0/10   Aggravating factors Turning, lifting overhead, sit <> stand Bending forward, twisting   Alleviating factors/positions  Sitting for short bouts Eases off through the day   Irritability Severe Mild to moderate     MICA Post-op LIN   Leg pain Yes   Sedentary lifestyle No     PAST MEDICAL HISTORY/COMORBIDITIES:   Mr. Analilia Byrnes has a past medical history of Arrhythmia, Arthritis, CAD (coronary artery disease) (2017, 10/02/2020), Dyspnea on exertion, GERD (gastroesophageal reflux disease), H/O cold sores, Hyperlipidemia, Hypertension, Latex allergy, Sleep apnea, and Wide-complex tachycardia ([address] (2017). Mr. Analilia Byrnes  has a past surgical history that includes hx colonoscopy; hx hip replacement (Bilateral, 10/07/2020); hx heart catheterization (2017); and hx heart catheterization (10/02/2020). SOCIAL HISTORY/LIVING ENVIRONMENT:    Lives with his wife in a single-story home    LIFESTYLE Date: 6/11/2021   Occupation: Not working anymore   Vigorous Activity: No   Expose individual to vibrations No   Unpleasant work environment N/A     PRIOR LEVEL OF FUNCTION/WORK/ACTIVITY:   Was terminated from work following [address] for being out too [address] [place] with functional mobility and supervision for ADLs following B LIN     EXAMINATION:      -Pt sits with forward head and rounded shoulders which indicate tight anterior chest musculature, upper trapezius, and levator scapula and weak posterior scapula musculature and deep cervical flexors. Pt displays decreased core motor control indicating weak core and low back musculature.     OBSERVATIONS and FUNCTIONAL MOBILITY Date:   6/11/2021 Date:  8/17/2021   Transfers Increased time & effort to complete Improved time to completion   Gait deviations Antalgic gait pattern with wide EDGARDO, decreased willie, decreased step length, and downcast gaze Much more fluid gait pattern with decreased arm swing and trunk rotation; lifted gaze   Assistive device N/A N/A   Stairs NT NT   Bed mobility Increased time & effort to complete Increased time & effort to complete     AROM/PROM         Joint: Date: 6/11/2021  Date:  8/17/2021  Date: Active LE ROM Right Left Right Left Right Left   [facility]        [facility]       Hip ER [facility]       Hip IR [facility]       Knee Extension [facility]       Knee Flexion [facility] WFL       Knee Extension [facility]       Lumbar Flexion 25% full; pain -- WFL; pain      Lumbar Extension 10% full; pain -- WFL; pain      Lumbar Side-Bending WFL; pain WFL; pain [facility] WFL     Lumbar Rotation 25% full; pain 25% full; pain 75% full; pain 75% full; pain       STRENGTH         Joint: Date: 6/11/2021  Date:  8/17/2021  Date:     Right Left Right Left Right Left   Hip Abduction 3+/5 3+/5; pain 4/5 4/5; pain     Hip Adduction 4/5 4/5 4+/5 4+/5     Hip IR 4/5 4/5; pain 4/5 4/5; pain     Hip ER 4/5 4/5; pain 4+/5 4+/5; pain     Hip Flexion 3+/5; pain 3+/5; pain 4+/5 4+/5; pain     Knee Extension 4-/5 4-/5 5/5 5/5     Knee Flexion 4/5 4/5; pain 4/5 4/5     Ankle DF 4+/5 3+/5; pain 5/5 5/5     Ankle PF 3+/5 3+/5 3+/5 3+/5        PALPATION Date:   6/11/2021 Date:  8/17/2021   TTP L5-S1, B ASIS, L QL L piriformis   TONE Increased lumbar paraspinals N/A   PA GLIDES Normal    EDEMA No      SPECIAL TESTS: Assessed @ Initial Visit      -LUMBAR STENOSIS:      -Bilateral symptoms: yes      -Leg pain more than back pain: yes      -Pain during walking/standing: yes      -Pain relief upon sitting: yes      -Age greater than 48 years: yes    NEUROLOGICAL SCREEN: Assessed @ Initial Visit    -RADIATING SYMPTOMS: Yes     -DERMATOMES: NT    -REFLEXES: Date: 6/11/2021     Right Left   L4  (Quadriceps) NT    S1  (Achilles) NT         CLINICAL DECISION MAKING:   OUTCOME MEASURE USED:   Tool Used: Modified Oswestry Low Back Pain Questionnaire  Score:  Initial: 29/50 (6/11/21) Most Recent: 23/50 (Date: 8/17/21)   Interpretation of Score: Each section is scored on a 0-5 scale, 5 representing the greatest disability. The scores of each section are added together for a total score of 50.      Payor: Aydee Trent / Plan: [facility] [place] / Product Type: PPO /     MEDICAL NECESSITY:  · Skilled intervention continues to be required due to above deficits affecting participation in basic ADLs and overall functional tolerance. REASON FOR SERVICES/OTHER COMMENTS:  · Patient continues to require skilled intervention due to above deficits affecting participation in basic ADLs and overall functional tolerance.

## 2021-10-06 ENCOUNTER — HOSPITAL ENCOUNTER (OUTPATIENT)
Dept: PHYSICAL THERAPY | Age: 61
Discharge: HOME | End: 2021-10-06
Attending: ORTHOPAEDIC SURGERY
Payer: COMMERCIAL

## 2021-10-06 PROCEDURE — 97140 MANUAL THERAPY 1/> REGIONS: CPT

## 2021-10-06 PROCEDURE — 97110 THERAPEUTIC EXERCISES: CPT

## 2021-10-06 NOTE — PROGRESS NOTES
Ariella Leone  : 1960  Primary: [facility] [place] [facility]  Secondary:  [facility] at [facility], [address], [place] [address] [address] [facility]  [phone]   [phone]      OUTPATIENT PHYSICAL THERAPY: Daily Treatment Note 10/6/2021  Visit Count: 19    ICD-10: Treatment Diagnosis:    Low back pain (M54.5)          Lumbago with sciatica, left side (M54.42)   Difficulty in walking, not elsewhere classified (R26.2)  Muscle weakness (generalized) (M62.81)    Pre-treatment Symptoms/Complaints: Pt having some centralization of symptoms Nothing in the shin but some in the foot. Did drive 45 minutes which seemed to exacerbate lower back pain    Pain: Initial: 3/10 L glute , 1/10 at ankle Post Session: 1/10 (soreness at glute) 0/10 at ankle      Updated Objective Findings: None today. TREATMENT:     THERAPEUTIC EXERCISE (15 minutes): Exercises per grid below to improve mobility, strength and balance. Required minimal visual, verbal and tactile cues to promote proper body alignment, promote proper body posture and promote proper body mechanics. Progressed resistance, range and repetitions as indicated.      Date:  21 Date:  21 Date:  21 Date:  21 Date:  21 Date:  10/4/21 Date:   10/6/21   Activity/Exercise          Patient education     Education regarding results of imaging, biomechanics, and how to address moving forward with inconsistency of results      NuStep / SciFit Level 1  8 minutes Level 2  8 minutes Level 2  8 minutes Level 2  8 minutes  Level 2  8 minutes     Diaphragmatic breathing          Posterior pelvic tilts 2 x 10 2 x 10  10 x 5 sec hold       Prone on elbows          PPTs with unilateral march (bracing)          Supine hip abduction       S/l 10 X 2 S/l 10 X   Sidelying clam shells       10 X 2 15 X   Heel slides          Glute sets          Standing trunk extensions          Prone press ups          Lateral step ups Monster walks          Step ups          Retro step ups          Bird dogs 1 x 5 B; difficult to perform due to carpal tunnel symptoms with hand placement; feeling symptoms down LLE         Dead bugs 2 x 10 B with heel tap and OH elevation; BUE    2 x 10 B with LE extension and OH elevation; BUE 2 x 10 B with LE extension and OH elevation; BUE          X 10 with just legs     X 10 with UE/LE with wand above head       Bridges     2 x 10 with ball in between legs for isometric hip ADD 10 X 10 X    Figure-4 piriformis stretch          Ambulation           Sidelying lumbar-sacral alignment maneuver - grade 1 [place]           Prone press up w/ PT OP and corrective tactile pressure     2 x 10     Prone PA spinous process evaluation           TRX squats 1 x 3; unable to perform due to provocation         Dead lifts 5# weight bar with bar running down front of legs; unable to perform due to provocation in LE         Side planks - modified  3 x 10-30 sec hold B  Knees bent; forearms    Pressure in elbows with some pain R > L  5 x 10 sec hold  On each side       Pressure at end was bothering each arm, but tolerable       Median nerve glides  1 x 12 RUE        Isometric hip adduction with ball in hook-lying position   2 x 10 x 5 sec hold 2 x 10 x 5 sec hold       Reverse clam shells with ball in between knees   2 x 12 x 2 sec hold B 2 x 10 B      Static dead bug position while pulling down/out on resistance band   6 x 10 sec hold 2 x 10 with blue thera-tubing      Resisted standing marches with band around midfoot   2 x 10 B in  bars  RTB at midfoot 2 x 10 in // bars with red band at midfoot      ASLR      10 X AA 10 X A- L only able to get to mid range w/ increased effort                                             MANUAL THERAPY (25 minutes): Joint mobilization, soft tissue mobilization, and manipulation was utilized and necessary because of the patient's restricted joint motion and restricted motion of soft tissue.   +manual stretching B HS, piriformis, hip flexors,quads  + STM L glute, thera roller to L ITB  +Grade II-III CPA to L SIJ  + Pt with L anterior innominate rotation- performed MET to correct    NOT TODAY  - Total joint distraction of LLE with PT's hands at ankle with bimanual grasp for 3 x 30 sec  - Inferior joint mobilizations of L hip with patient's L leg resting on PT's shoulder and PT's hands at proximal L thigh with bimanual grasp - grades I-II  - Fluid resistance of sidelying ER of L hip against concentric motion  - Piriformis release - L side with active ER/IR of hip  - Rhythmic stabilization (PT's hands) of L hip ER/IR every 3-4 reps of 2 x 10 L side  - Contract relax technique for L hamstring stretch x 5 sec holds moving into new ROM each rep  - B sidelying with overpressure at upper thorax and pelvis to provide distraction and opening of lower lumbar and sacral facets; sustained hold stretch x 5 min with grade II oscillations each side    MODALITIES (0 minutes): Electrical Stimulation Therapy (IFC) was provided with intensity adjusted throughout treatment to patient tolerance. Hot Pack Therapy in order to provide analgesia and relieve muscle spasm.   - 8/12/21 - cold pack to low back/sacrum    HEP  Diaphragmatic breathing  Posterior pelvic tilts (PPTs)  PPTs with unilateral marching  Sidelying hip abduction  Supine heel slides  Sidelying clam shells  Aerobic exercise 2 x 15 min bouts daily  Incentive spirometer / diaphragmatic breathing with counting talk test (exhale over 4 sec)    7/20/21  UE strength: 4/5 or greater strength in B shoulder flexion, elbow flexion, and elbow extension    MedBridge Portal  Treatment/Session Summary:    · Response to Treatment: Mild soreness at glute following end of session with no radicular symptoms into foot. Pt continues with fairly significant hip weakness. Also dealing with SOB and under care of pulmonologist. Pt having surgery on wrist Monday.  Discharge next visit unfortunately due to meeting maximum visits approved by insurance for the year. · Communication/Consultation: PT emphasized the importance of performing HEP consistently. · Equipment provided today: None today. · Recommendations/Intent for next treatment session: Next visit will focus on progressing to more challenging activities. 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 Pt also has deconditioning of the bilateral hip extensor musculature. Pt will continue to improve strength in these areas to prevent malalignment/neurogenic s/s.      Total Treatment Billable Duration:   PT Patient Time In/Time Out  Time In: 0800  Time Out: 1442  Jaylan Vines, PT, DPT    Visit Approval Visit # Therapist initials Date A NS / Cx < 24 hr >24 hr Cx Comments   20 visits total 1  6/11/21 [x]  [] [] Initial evaluation    2  7/20/21 [x] [] [] 1    3  7/22/21 [x] [] [] 2    4  7/27/21 [x] [] [] 3    5  7/29/21 [x] [] [] 4    6  8/3/21 [x] [] [] 5    7  8/5/21 [x] [] [] 6    8 GP/Jf 8/10/21 [x] [] [] 7    9  8/12/21 [x] [] [] 8    10  8/17/21 [x] [] [] PN    11  8/19/21 [x] [] [] 1    12  8/31/21 [x] [] [] 2    13  9/9/21 [x] [] [] 3    14  9/14/21 [x] [] [] 4    15 ZOË 9/16/21 [x] [] [] 5    16 PDE 9/21/21 [x] [] [] 6    17 ZOË 9/23/21 [x] [] [] 7    18 SANDY 10/4/21 [x] [] [] 8    19 SANDY 10/6/21 x   9, discharge next visit with HEP                  Future Appointments   Date Time Provider Jeff Mazariegos   10/13/2021  8:00 AM Bere PERDOMORPT [facility]   10/22/2021  9:20 AM Asher Landry, NP [facility]   10/28/2021  2:20 PM Doug De La Cruz MD [facility]   11/8/2021  8:00 AM Linda Deutsch NP [facility] PP   1/13/2022  8:20 AM Jacky Adam MD [facility] PP

## 2021-10-10 ENCOUNTER — ANESTHESIA EVENT (OUTPATIENT)
Dept: SURGERY | Age: 61
End: 2021-10-10
Payer: COMMERCIAL

## 2021-10-11 ENCOUNTER — HOSPITAL ENCOUNTER (OUTPATIENT)
Age: 61
Setting detail: OUTPATIENT SURGERY
Discharge: HOME | End: 2021-10-11
Attending: ORTHOPAEDIC SURGERY | Admitting: ORTHOPAEDIC SURGERY
Payer: COMMERCIAL

## 2021-10-11 ENCOUNTER — ANESTHESIA (OUTPATIENT)
Dept: SURGERY | Age: 61
End: 2021-10-11
Payer: COMMERCIAL

## 2021-10-11 VITALS
OXYGEN SATURATION: 97 % | DIASTOLIC BLOOD PRESSURE: 94 MMHG | TEMPERATURE: 97.4 F | SYSTOLIC BLOOD PRESSURE: 183 MMHG | RESPIRATION RATE: 16 BRPM | WEIGHT: 220 LBS | HEART RATE: 62 BPM | BODY MASS INDEX: 30.68 KG/M2

## 2021-10-11 PROCEDURE — 77030006586 HC BLD ARTHSC BVR BD -A: Performed by: ORTHOPAEDIC SURGERY

## 2021-10-11 PROCEDURE — 26055 INCISE FINGER TENDON SHEATH: CPT | Performed by: ORTHOPAEDIC SURGERY

## 2021-10-11 PROCEDURE — 64721 CARPAL TUNNEL SURGERY: CPT | Performed by: ORTHOPAEDIC SURGERY

## 2021-10-11 PROCEDURE — 76060000032 HC ANESTHESIA 0.5 TO 1 HR: Performed by: ORTHOPAEDIC SURGERY

## 2021-10-11 PROCEDURE — 76210000063 HC OR PH I REC FIRST 0.5 HR: Performed by: ORTHOPAEDIC SURGERY

## 2021-10-11 PROCEDURE — 74011250637 HC RX REV CODE- 250/637: Performed by: ANESTHESIOLOGY

## 2021-10-11 PROCEDURE — 77030002986 HC SUT PROL J&J -A: Performed by: ORTHOPAEDIC SURGERY

## 2021-10-11 PROCEDURE — 77030000032 HC CUF TRNQT ZIMM -B: Performed by: ORTHOPAEDIC SURGERY

## 2021-10-11 PROCEDURE — 74011250636 HC RX REV CODE- 250/636: Performed by: ANESTHESIOLOGY

## 2021-10-11 PROCEDURE — 2709999900 HC NON-CHARGEABLE SUPPLY: Performed by: ORTHOPAEDIC SURGERY

## 2021-10-11 PROCEDURE — 76010000159 HC OR TIME FIRST 0.5 HR INTENSV-TIER 1: Performed by: ORTHOPAEDIC SURGERY

## 2021-10-11 PROCEDURE — 76210000020 HC REC RM PH II FIRST 0.5 HR: Performed by: ORTHOPAEDIC SURGERY

## 2021-10-11 PROCEDURE — 74011000250 HC RX REV CODE- 250: Performed by: NURSE ANESTHETIST, CERTIFIED REGISTERED

## 2021-10-11 PROCEDURE — 74011250636 HC RX REV CODE- 250/636: Performed by: NURSE ANESTHETIST, CERTIFIED REGISTERED

## 2021-10-11 RX ORDER — HYDROMORPHONE HYDROCHLORIDE 1 MG/ML
0.5 INJECTION, SOLUTION INTRAMUSCULAR; INTRAVENOUS; SUBCUTANEOUS
Status: DISCONTINUED | OUTPATIENT
Start: 2021-10-11 | End: 2021-10-11 | Stop reason: HOSPADM

## 2021-10-11 RX ORDER — PROPOFOL 10 MG/ML
INJECTION, EMULSION INTRAVENOUS AS NEEDED
Status: DISCONTINUED | OUTPATIENT
Start: 2021-10-11 | End: 2021-10-11 | Stop reason: HOSPADM

## 2021-10-11 RX ORDER — LIDOCAINE HYDROCHLORIDE 5 MG/ML
INJECTION, SOLUTION INFILTRATION; INTRAVENOUS AS NEEDED
Status: DISCONTINUED | OUTPATIENT
Start: 2021-10-11 | End: 2021-10-11 | Stop reason: HOSPADM

## 2021-10-11 RX ORDER — MIDAZOLAM HYDROCHLORIDE 1 MG/ML
2 INJECTION, SOLUTION INTRAMUSCULAR; INTRAVENOUS
Status: DISCONTINUED | OUTPATIENT
Start: 2021-10-11 | End: 2021-10-11 | Stop reason: HOSPADM

## 2021-10-11 RX ORDER — MIDAZOLAM HYDROCHLORIDE 1 MG/ML
2 INJECTION, SOLUTION INTRAMUSCULAR; INTRAVENOUS ONCE
Status: DISCONTINUED | OUTPATIENT
Start: 2021-10-11 | End: 2021-10-11 | Stop reason: HOSPADM

## 2021-10-11 RX ORDER — SODIUM CHLORIDE 0.9 % (FLUSH) 0.9 %
5-40 SYRINGE (ML) INJECTION AS NEEDED
Status: DISCONTINUED | OUTPATIENT
Start: 2021-10-11 | End: 2021-10-11 | Stop reason: HOSPADM

## 2021-10-11 RX ORDER — OXYCODONE AND ACETAMINOPHEN 5; 325 MG/1; MG/1
1 TABLET ORAL AS NEEDED
Status: DISCONTINUED | OUTPATIENT
Start: 2021-10-11 | End: 2021-10-11 | Stop reason: HOSPADM

## 2021-10-11 RX ORDER — CEFAZOLIN SODIUM/WATER 2 G/20 ML
SYRINGE (ML) INTRAVENOUS AS NEEDED
Status: DISCONTINUED | OUTPATIENT
Start: 2021-10-11 | End: 2021-10-11 | Stop reason: HOSPADM

## 2021-10-11 RX ORDER — LIDOCAINE HYDROCHLORIDE 10 MG/ML
0.1 INJECTION INFILTRATION; PERINEURAL AS NEEDED
Status: DISCONTINUED | OUTPATIENT
Start: 2021-10-11 | End: 2021-10-11 | Stop reason: HOSPADM

## 2021-10-11 RX ORDER — SODIUM CHLORIDE 0.9 % (FLUSH) 0.9 %
5-40 SYRINGE (ML) INJECTION EVERY 8 HOURS
Status: DISCONTINUED | OUTPATIENT
Start: 2021-10-11 | End: 2021-10-11 | Stop reason: HOSPADM

## 2021-10-11 RX ORDER — CEFAZOLIN SODIUM/WATER 2 G/20 ML
2 SYRINGE (ML) INTRAVENOUS ONCE
Status: DISCONTINUED | OUTPATIENT
Start: 2021-10-11 | End: 2021-10-11 | Stop reason: HOSPADM

## 2021-10-11 RX ORDER — DIPHENHYDRAMINE HYDROCHLORIDE 50 MG/ML
12.5 INJECTION, SOLUTION INTRAMUSCULAR; INTRAVENOUS
Status: DISCONTINUED | OUTPATIENT
Start: 2021-10-11 | End: 2021-10-11 | Stop reason: HOSPADM

## 2021-10-11 RX ORDER — OXYCODONE HYDROCHLORIDE 5 MG/1
5 TABLET ORAL
Status: DISCONTINUED | OUTPATIENT
Start: 2021-10-11 | End: 2021-10-11 | Stop reason: HOSPADM

## 2021-10-11 RX ORDER — LIDOCAINE HYDROCHLORIDE 20 MG/ML
INJECTION, SOLUTION EPIDURAL; INFILTRATION; INTRACAUDAL; PERINEURAL AS NEEDED
Status: DISCONTINUED | OUTPATIENT
Start: 2021-10-11 | End: 2021-10-11 | Stop reason: HOSPADM

## 2021-10-11 RX ORDER — FAMOTIDINE 20 MG/1
20 TABLET, FILM COATED ORAL ONCE
Status: COMPLETED | OUTPATIENT
Start: 2021-10-11 | End: 2021-10-11

## 2021-10-11 RX ORDER — SODIUM CHLORIDE, SODIUM LACTATE, POTASSIUM CHLORIDE, CALCIUM CHLORIDE 600; 310; 30; 20 MG/100ML; MG/100ML; MG/100ML; MG/100ML
75 INJECTION, SOLUTION INTRAVENOUS CONTINUOUS
Status: DISCONTINUED | OUTPATIENT
Start: 2021-10-11 | End: 2021-10-11 | Stop reason: HOSPADM

## 2021-10-11 RX ORDER — SODIUM CHLORIDE 9 MG/ML
50 INJECTION, SOLUTION INTRAVENOUS CONTINUOUS
Status: DISCONTINUED | OUTPATIENT
Start: 2021-10-11 | End: 2021-10-11 | Stop reason: HOSPADM

## 2021-10-11 RX ORDER — FENTANYL CITRATE 50 UG/ML
25 INJECTION, SOLUTION INTRAMUSCULAR; INTRAVENOUS ONCE
Status: DISCONTINUED | OUTPATIENT
Start: 2021-10-11 | End: 2021-10-11 | Stop reason: HOSPADM

## 2021-10-11 RX ORDER — SODIUM CHLORIDE, SODIUM LACTATE, POTASSIUM CHLORIDE, CALCIUM CHLORIDE 600; 310; 30; 20 MG/100ML; MG/100ML; MG/100ML; MG/100ML
100 INJECTION, SOLUTION INTRAVENOUS CONTINUOUS
Status: DISCONTINUED | OUTPATIENT
Start: 2021-10-11 | End: 2021-10-11 | Stop reason: HOSPADM

## 2021-10-11 RX ADMIN — FAMOTIDINE 20 MG: 20 TABLET ORAL at 06:43

## 2021-10-11 RX ADMIN — CEFAZOLIN 2 G: 1 INJECTION, POWDER, FOR SOLUTION INTRAVENOUS at 07:15

## 2021-10-11 RX ADMIN — PROPOFOL 140 MCG/KG/MIN: 10 INJECTION, EMULSION INTRAVENOUS at 07:14

## 2021-10-11 RX ADMIN — PROPOFOL 50 MG: 10 INJECTION, EMULSION INTRAVENOUS at 07:13

## 2021-10-11 RX ADMIN — SODIUM CHLORIDE, SODIUM LACTATE, POTASSIUM CHLORIDE, AND CALCIUM CHLORIDE 75 ML/HR: 600; 310; 30; 20 INJECTION, SOLUTION INTRAVENOUS at 06:45

## 2021-10-11 RX ADMIN — LIDOCAINE HYDROCHLORIDE 30 MG: 20 INJECTION, SOLUTION EPIDURAL; INFILTRATION; INTRACAUDAL; PERINEURAL at 07:13

## 2021-10-11 RX ADMIN — LIDOCAINE HYDROCHLORIDE 35 ML: 5 INJECTION, SOLUTION INFILTRATION at 07:12

## 2021-10-11 NOTE — DISCHARGE INSTRUCTIONS
Postoperative  Instructions:      Weightbearing or Lifting:  Limit  weight  lifting  to  less  than  1  pound  (coffee  mug)  for  the  first  2  weeks  after  surgery. Dressing  instructions:    Keep  your  dressing  and/or  splint  clean  and  dry  at  all  times. You  can  remove  your  dressing  on  post-operative  day  #5  and  change  with  a  dry/sterile  dressing  or  Band-Aids  as  needed  thereafter. Showering  Instructions:  May  shower  But keep surgical dressing clean and dry until removed as explained above. After dressing is removed, you may allow soapy water to run through the incision during showers but do not scrub. After each shower, pat dry and apply a dry dressing. Do  not  soak  your  Incision in still water or bathtub  for  3  weeks  after  surgery. If  the  incision  gets  wet otherwise,  pat  dry  and  do  not  scrub  the  incision. Do  not  apply  cream  or  lotion  to  incision      Pain  Control:  - You  have  been  given  a  prescription  to  be  taken  as  directed  for  post-operative  pain  control. In  addition,  elevate  the  operative  extremity  above  the  heart  at  all  times  to  prevent  swelling  and  throbbing  pain. - If you develop constipation while taking narcotic pain medications (Norco, Hydrocodone, Percocet, Oxycodone, Dilaudid, Hydromorphone) take  over-the-counter  Colace,  100mg  by  mouth  twice  a  Day. - Nausea  is  a  common  side  effect  of  many  pain  medications. You  will  want  to  eat something  before  taking  your  pain  medicine  to  help  prevent  Nausea. - If  you  are  taking  a  prescription  pain  medication  that  contains  acetaminophen,  we  recommend  that  you  do  not  take  additional  over  the  counter  acetaminophen  (Tylenol®).       Other  pain  relieving  options:   - Using  a  cold  pack  to  ice  the  affected  area  a  few  times  a  day  (15  to  20  minutes  at  a  time)  can  help  to  relieve pain,  reduce  swelling  and  bruising.      - Elevation  of  the  affected  area  can  also  help  to  reduce  pain  and  swelling. Did  you  receive  a  nerve  Block? A  nerve  block  can  provide  pain  relief  for  one  hour  to  two  days  after  your  surgery. As  long  as  the  nerve  block  is  working,  you  will  experience  little  or  no  sensation  in  the  area  the  surgeon  operated  on. As  the  nerve  block  wears  off,  you  will  begin  to  experience  pain  or  discomfort. It  is  very  important  that  you  begin  taking  your  prescribed  pain  medication  before  the  nerve  block  fully  wears  off. The first sign that the nerve block is wearing off is tingling in your fingers. Treating  your  pain  at  the  first  sign  of  the  block  wearing  off  will  ensure  your  pain  is  better  controlled  and  more  tolerable  when  full-sensation  returns. Do  not  wait  until  the  pain  is  intolerable,  as  the  medicine  will  be  less  effective. It  is  better  to  treat  pain  in  advance  than  to  try  and  catch  up. General  Anesthesia or Sedation:      If  you  did  not  receive  a  nerve  block  during  your  surgery,  you  will  need  to  start  taking  your  pain  medication  shortly  after  your  surgery  and  should  continue  to  do  so  as  prescribed  by  your  surgeon. Please  call  [phone]  with any concern and ask to speak with Sasha Kruse. Concerning problems include:      -  Excessive  redness  of  the  incisions      -  Drainage  for  more  than  2  Days after surgery or any foul smelling drainage  -  Fever  of  more  than  101.5  F      Please  call  [phone]  if  you  do  not  receive  or  are  unsure  of  your  first  follow-up  appointment. You  should  see  the  doctor  10-14  days  after  your  Surgery. Thank you for choosing me and [address] [place] [address] for your care.  I will go above and beyond to ensure you receive the best care possible. Daniel Morales MD, PhD    MEDICATION INTERACTION:  During your procedure you potentially received a medication or medications which may reduce the effectiveness of oral contraceptives. Please consider other forms of contraception for 1 month following your procedure if you are currently using oral contraceptives as your primary form of birth control. In addition to this, we recommend continuing your oral contraceptive as prescribed, unless otherwise instructed by your physician, during this time    After general anesthesia or intravenous sedation, for 24 hours or while taking prescription Narcotics:  · Limit your activities  · A responsible adult needs to be with you for the next 24 hours  · Do not drive and operate hazardous machinery  · Do not make important personal or business decisions  · Do not drink alcoholic beverages  · If you have not urinated within 8 hours after discharge, and you are experiencing discomfort from urinary retention, please go to the nearest ED. · If you have sleep apnea and have a CPAP machine, please use it for all naps and sleeping. · Please use caution when taking narcotics and any of your home medications that may cause drowsiness. *  Please give a list of your current medications to your Primary Care Provider. *  Please update this list whenever your medications are discontinued, doses are      changed, or new medications (including over-the-counter products) are added. *  Please carry medication information at all times in case of emergency situations. These are general instructions for a healthy lifestyle:  No smoking/ No tobacco products/ Avoid exposure to second hand smoke  Surgeon General's Warning:  Quitting smoking now greatly reduces serious risk to your health.   Obesity, smoking, and sedentary lifestyle greatly increases your risk for illness  A healthy diet, regular physical exercise & weight monitoring are important for maintaining a healthy lifestyle    You may be retaining fluid if you have a history of heart failure or if you experience any of the following symptoms:  Weight gain of 3 pounds or more overnight or 5 pounds in a week, increased swelling in our hands or feet or shortness of breath while lying flat in bed. Please call your doctor as soon as you notice any of these symptoms; do not wait until your next office visit.

## 2021-10-11 NOTE — PERIOP NOTES
Dr Ruma Abdul made aware that discharge medications have not been updated in the computer.  Pt and wife asked to wait until notification from pharmacy the medicines are ready for , if problems ,to call Dr Ruma Abdul office

## 2021-10-11 NOTE — OP NOTES
Hand Surgery Operative Note      Conrado Leone   63 y.o.   male      Pre-op diagnosis: Right middle trigger finger  Right Carpal Tunnel syndrome  Post op diagnosis: same      Procedure: Right middle trigger finger release 08225  Right Carpal Tunnel release cpt 25551      Surgeon: Veronica Lam MD, PhD      Anesthesia: Post [place] block      Tourniquet time:   Total Tourniquet Time Documented:  Forearm (Right) - 23 minutes  Total: Forearm (Right) - 23 minutes        Procedure indications: Patient with radial digit numbness recalcitrant to conservative measures with positive documentation of NCV findings consistent with carpal tunnel syndrome. After Thorough discussion, the patient decided to proceed with surgical management. We discussed in detail surgical risks including scar, pain, bleeding, infection, anesthetic risks, neurovascular injury, need for further surgery,  weakness, stiffness, risk of death and potential risk of other unforseen complication. Procedure description:      Patient was placed in the supine position and after appropriate time-out and side, site and procedure confirmed. The incision was made in the palm in line with the radial border of the ring finger under loupe magnification and palmar fascia was incised longitudinally. Blunt retraction used to identify the transverse carpal ligament, which was incised, visualizing the median nerve beneath, which was protected with a slotted freer. The remaining ligament was released with a [place] meniscal blade under direct visualization. The ligament was released in its entirety, and visualized at its most proximal and distal extent. A longitudinal incision was made along the right middle finger A1 pulley under loupe magnification. Blunt dissection was used to identify the A1 pulley as well as the neurovascular bundle on each side of the flexor tendon. Blunt retraction was used to protect the neurovascular bundles.  Sharp incision was made on top of the A1 pulley and scissor dissection was used to extend the sheath incision proximally to release the palmar pulley and distally until the A2 pulley was encountered. The flexor tendons were then mobilized and not catching or clicking was identified. Wounds were irrigated, tourniquet released and hemostasis was obtained with bipolar cautery. Skin edges were infiltrated with . 25% Bupivacaine. Wound then closed with 4-0 prolene and sterile dressing applied. Disposition: To PACU with no complications and follow up per routine. Patient is instructed to remove dressings in five days and other precautions include avoidance of heavy and repetitive lifting for 2 weeks, when an appointment for follow up and suture removal will take place.      Edwin Caballero MD  10/11/21  8:57 AM

## 2021-10-11 NOTE — INTERVAL H&P NOTE
H&P Update:  Evelia Leone was seen and examined. History and physical has been reviewed. The patient has been examined.  There have been no significant clinical changes since the completion of the originally dated History and Physical.    Florencio Rico MD  Orthopaedic Surgery  10/11/21  6:53 AM

## 2021-10-11 NOTE — ANESTHESIA PREPROCEDURE EVALUATION
Anesthetic History   No history of anesthetic complications            Review of Systems / Medical History  Patient summary reviewed and pertinent labs reviewed    Pulmonary    COPD: mild    Sleep apnea: CPAP           Neuro/Psych   Within defined limits           Cardiovascular    Hypertension: well controlled        Dysrhythmias (hx SVT ; no occurance since BP under control)   CAD and hyperlipidemia    Exercise tolerance: >4 METS     GI/Hepatic/Renal     GERD: well controlled           Endo/Other        Arthritis     Other Findings              Physical Exam    Airway  Mallampati: I  TM Distance: 4 - 6 cm  Neck ROM: normal range of motion   Mouth opening: Normal     Cardiovascular    Rhythm: regular  Rate: normal         Dental    Dentition: Upper partial plate     Pulmonary  Breath sounds clear to auscultation               Abdominal  GI exam deferred       Other Findings            Anesthetic Plan    ASA: 2  Anesthesia type: total IV anesthesia - Jennifer block          Induction: Intravenous  Anesthetic plan and risks discussed with: Patient

## 2021-10-13 ENCOUNTER — HOSPITAL ENCOUNTER (OUTPATIENT)
Dept: PHYSICAL THERAPY | Age: 61
Discharge: HOME | End: 2021-10-13
Attending: ORTHOPAEDIC SURGERY
Payer: COMMERCIAL

## 2021-10-13 NOTE — PROGRESS NOTES
CANCELLATION NOTE    Mr. Elana Villagomez left without being seen this appointment. Pt stating last night he received a notification that he had been dropped by his insurance policy. Pt did not have chance to follow up with insurance this morning prior to appointment. Today was supposed to be pt's last visit due to 20 visit limit. Provided pt with HEP in case he is unable to return.      # Recent No Shows/Same-Day Cancellations: 1    10/13/2021  Denisse Gomez

## 2021-10-21 ENCOUNTER — HOSPITAL ENCOUNTER (OUTPATIENT)
Dept: LAB | Age: 61
Discharge: HOME | End: 2021-10-21
Payer: COMMERCIAL

## 2021-10-21 DIAGNOSIS — M25.552 BILATERAL HIP PAIN: ICD-10-CM

## 2021-10-21 DIAGNOSIS — M25.551 BILATERAL HIP PAIN: ICD-10-CM

## 2021-10-21 LAB
CRP SERPL-MCNC: <0.3 MG/DL (ref 0–0.9)
ERYTHROCYTE [SEDIMENTATION RATE] IN BLOOD: 10 MM/HR (ref 0–20)

## 2021-10-21 PROCEDURE — 86140 C-REACTIVE PROTEIN: CPT

## 2021-10-21 PROCEDURE — 85652 RBC SED RATE AUTOMATED: CPT

## 2021-10-21 PROCEDURE — 36415 COLL VENOUS BLD VENIPUNCTURE: CPT

## 2021-12-02 PROBLEM — R25.1 TREMORS OF NERVOUS SYSTEM: Status: ACTIVE | Noted: 2021-12-02

## 2021-12-02 PROBLEM — M54.42 ACUTE LEFT-SIDED LOW BACK PAIN WITH LEFT-SIDED SCIATICA: Status: ACTIVE | Noted: 2021-12-02

## 2021-12-02 PROBLEM — R29.898 COGWHEEL RIGIDITY: Status: ACTIVE | Noted: 2021-12-02

## 2021-12-02 PROBLEM — R21 RASH AND NONSPECIFIC SKIN ERUPTION: Status: ACTIVE | Noted: 2021-12-02

## 2021-12-30 NOTE — THERAPY DISCHARGE
Columba Leone  : 1960  Payor: Siddharth Aguilar / Plan: [facility] / Product Type: PPO /  [address]  at [facility]  [address], [address], [place] [address] [address] [facility]  [phone]   [phone]                OUTPATIENT PHYSICAL THERAPY:Discontinuation Summary 2021    ICD-10: Treatment Diagnosis:   Low back pain (M54.5)        Lumbago with sciatica, right side (M54.41)        Lumbago with sciatica, left side (M54.42)   Difficulty in walking, not elsewhere classified (R26.2)  Muscle weakness (generalized) (M62.81)        PRECAUTIONS/ALLERGIES:   Latex and Iodinated contrast media     FALL RISK SCORE: 2 (? 5 = High Risk)    MD ORDERS: Eval and Treat MEDICAL/REFERRING DIAGNOSIS:  Encounter for follow-up examination after completed treatment for conditions other than malignant neoplasm [Z09]     DATE OF ONSET: Few months ago    REFERRING PHYSICIAN: Molina Glez MD    RETURN PHYSICIAN APPOINTMENT: TBD      Ambulatory/Rehab Services H2 Model Falls Risk Assessment    Risk Factors:       (1)  Gender [Male] Ability to Rise from Chair:       (1)  Pushes up, successful in one attempt    Falls Prevention Plan:       No modifications necessary   Total: (5 or greater = High Risk): 2     [facility] of [employer]. All Rights Reserved. [facility] States Patent #[id number]. Federal Law prohibits the replication, distribution or use without written permission from [employer]      Discontinuation Summary:  Pt had been seen for a total of 19 visits with last session being on 10/6/21. Pt unfortunately at that time making inconsistent progress. Pt was having difficulty with insurance coverage and has not returned at this time. Will discharge case. PROGRESS ASSESSMENT: Mr. Rajwinder Ohara demonstrates gains in LE strength, lumbar AROM, pain reduction, and functional tolerance as evident by subjective and objective measures.  Patient is reporting a 6 point increase in perception of functional tolerance with LBP on the Oswestry index and reports that his once his constant pain is now intermittent and more tolerable. The most profound change in the patient's presentation is his gait pattern, as he now ambulates with less antalgia and more fluidity. Patient continues to demonstrate concern over lack of ability to flex forward without a shooting, \"catching\" pain in his low back and is curious if his bilateral LIN operations would have any effect on his impairments. Patient seems to be responding well to extension-based exercises with centralization of LE symptoms into his low back. Patient demonstrates compliance with his HEP and has met all of his short-term goals at this time. Patient would benefit from continued physical therapy services to address his deficits and maximize his independence. TREATMENT PLAN:  Effective Dates: 8/17/2021 TO 11/15/2021 (90 days). Frequency/Duration: 2 times a week for 90 Days    GOALS:  Short Term Goals 4 weeks   1. Musa Leone will be independent with HEP to promote self-management of symptoms. MET 8/17/21  2. Musa Leone will participate in core stabilization exercises to help with stabilization and improve posture during ADLs to help prevent future injuries. MET 8/17/21  3. Musa Lopezs will participate in LE strengthening program with weights as appropriate to help with gait and elevations. MET 8/17/21  4. Musa Lopezs will participate in static and dynamic balance activities to decrease the risk for falls and improve overall QOL. MET 8/17/21  5. Musa Leone will tolerate manual therapy/joint mobilizations/soft tissue to increase ROM and decrease pain to improve functional mobility during ADLs. MET 8/17/21    Long Term Goals 12 weeks   1. Musa Leone will demonstrate an 8 point improvement on the Oswestry to show improvement in function.   2. Musa Andujar Vasu will report <=2/10 pain at rest and during ADLs to improve QOL. 3. Oletha Curling Holbrooks will demonstrate >=4+/5 LE strength on manual muscle testing to improve functional mobility. 4. Oletha Curling [facility] will be able to demonstrate safe lifting and transfer mechanics without cueing for improved safety with home, childcare, and community activities. Rehabilitation Potential For Stated Goals: GOOD    Regarding Oletha Curling Holbrooks's therapy, I certify that the treatment plan above will be carried out by a therapist or under their direction. Thank you for this referral,    Arlen Rockwell DPT     Referring Physician Signature: Edward Lewis MD            HISTORY:   PATIENT GOAL FOR PHYSICAL THERAPY:   Improve walki ng pattern and reduce pain in back/legs    HISTORY OF PRESENT INJURY/ILLNESS (REASON FOR REFERRAL):  Patient presents to PT with complaints of radiating LBP following B LIN in Sept/Oct 2020. Patient reports that following his second LIN he noticed pain radiating down his legs bilaterally with L > R. Patient reports he has had back pain since prior to his B LIN, but his MD thought that due to his bone-on-bone imaging that his hips were the primary concern regarding his pain. Patient reports his back pain only intensified following LIN surgery. Patient had an MRI which indicated a disc herniation that was pressing upon his L sciatic nerve. Patient underwent L5-S1 discectomy with decompression foraminotomy per Dr. Aydee Trejo on 05/13/2021. Per patient report and MD note, patient is suffering worse than anticipated residual symptoms. Patient states he continues to have pain at the top of buttocks bilaterally, his L anterior hip, and along L5-S1 dermatomes on L foot.  Patient reports that turning without picking his leg up and lifting for something above him is what exacerbates his pain the most. Patient states that the pain is not as constant as it was prior to surgery; however, he still experiences some sharp catches or jolts of pain with certain movement patterns. Alicia Randhawa PAIN AND NATURE OF CONDITION Date:   6/11/2021 Date:  8/17/2021   Highest pain level 10/10 4/10   Lowest pain level 0/10 0/10   Aggravating factors Turning, lifting overhead, sit <> stand Bending forward, twisting   Alleviating factors/positions  Sitting for short bouts Eases off through the day   Irritability Severe Mild to moderate     MICA Post-op LIN   Leg pain Yes   Sedentary lifestyle No     PAST MEDICAL HISTORY/COMORBIDITIES:   Mr. Josefa Linn has a past medical history of Arrhythmia, Arthritis, CAD (coronary artery disease) (2017, 10/02/2020), Dyspnea on exertion, GERD (gastroesophageal reflux disease), H/O cold sores, Hyperlipidemia, Hypertension, Latex allergy, Sleep apnea, and Wide-complex tachycardia ([address] (2017). Mr. Josefa Linn  has a past surgical history that includes hx colonoscopy; hx hip replacement (Bilateral, 10/07/2020); hx heart catheterization (2017); and hx heart catheterization (10/02/2020). SOCIAL HISTORY/LIVING ENVIRONMENT:    Lives with his wife in a single-story home    LIFESTYLE Date: 6/11/2021   Occupation: Not working anymore   Vigorous Activity: No   Expose individual to vibrations No   Unpleasant work environment N/A     PRIOR LEVEL OF FUNCTION/WORK/ACTIVITY:   Was terminated from work following [address] for being out too [address] with functional mobility and supervision for ADLs following B LIN     EXAMINATION:      -Pt sits with forward head and rounded shoulders which indicate tight anterior chest musculature, upper trapezius, and levator scapula and weak posterior scapula musculature and deep cervical flexors. Pt displays decreased core motor control indicating weak core and low back musculature.     OBSERVATIONS and FUNCTIONAL MOBILITY Date:   6/11/2021 Date:  8/17/2021   Transfers Increased time & effort to complete Improved time to completion   Gait deviations Antalgic gait pattern with wide EDGARDO, decreased willie, decreased step length, and downcast gaze Much more fluid gait pattern with decreased arm swing and trunk rotation; lifted gaze   Assistive device N/A N/A   Stairs NT NT   Bed mobility Increased time & effort to complete Increased time & effort to complete     AROM/PROM         Joint: Date: 6/11/2021  Date:  8/17/2021  Date:    Active LE ROM Right Left Right Left Right Left   Hip Flexion [facility]        Hip Extension [facility]       Hip ER [facility]       Hip IR [facility] WFL       Knee Extension [facility] WFL       Knee Flexion [facility] WFL       Knee Extension [facility] WFL       Lumbar Flexion 25% full; pain -- WFL; pain      Lumbar Extension 10% full; pain -- WFL; pain      Lumbar Side-Bending WFL; pain WFL; pain [facility] WFL     Lumbar Rotation 25% full; pain 25% full; pain 75% full; pain 75% full; pain       STRENGTH         Joint: Date: 6/11/2021  Date:  8/17/2021  Date:     Right Left Right Left Right Left   Hip Abduction 3+/5 3+/5; pain 4/5 4/5; pain     Hip Adduction 4/5 4/5 4+/5 4+/5     Hip IR 4/5 4/5; pain 4/5 4/5; pain     Hip ER 4/5 4/5; pain 4+/5 4+/5; pain     Hip Flexion 3+/5; pain 3+/5; pain 4+/5 4+/5; pain     Knee Extension 4-/5 4-/5 5/5 5/5     Knee Flexion 4/5 4/5; pain 4/5 4/5     Ankle DF 4+/5 3+/5; pain 5/5 5/5     Ankle PF 3+/5 3+/5 3+/5 3+/5        PALPATION Date:   6/11/2021 Date:  8/17/2021   TTP L5-S1, B ASIS, L QL L piriformis   TONE Increased lumbar paraspinals N/A   PA GLIDES Normal    EDEMA No      SPECIAL TESTS: Assessed @ Initial Visit      -LUMBAR STENOSIS:      -Bilateral symptoms: yes      -Leg pain more than back pain: yes      -Pain during walking/standing: yes      -Pain relief upon sitting: yes      -Age greater than 48 years: yes    NEUROLOGICAL SCREEN: Assessed @ Initial Visit    -RADIATING SYMPTOMS: Yes     -DERMATOMES: NT    -REFLEXES: Date: 6/11/2021     Right Left   L4  (Quadriceps) NT    S1  (Achilles) NT         CLINICAL DECISION MAKING:   OUTCOME MEASURE USED:   Tool Used: Modified Oswestry Low Back Pain Questionnaire  Score:  Initial: 29/50 (6/11/21) Most Recent: 23/50 (Date: 8/17/21)   Interpretation of Score: Each section is scored on a 0-5 scale, 5 representing the greatest disability. The scores of each section are added together for a total score of 50.      Payor: BLUE CROSS / Plan: [facility] / Product Type: PPO /

## 2022-02-02 ENCOUNTER — HOSPITAL ENCOUNTER (OUTPATIENT)
Dept: NUCLEAR MEDICINE | Age: 62
Discharge: HOME | End: 2022-02-02
Attending: INTERNAL MEDICINE
Payer: COMMERCIAL

## 2022-02-02 ENCOUNTER — HOSPITAL ENCOUNTER (OUTPATIENT)
Dept: GENERAL RADIOLOGY | Age: 62
Discharge: HOME | End: 2022-02-02
Attending: INTERNAL MEDICINE
Payer: COMMERCIAL

## 2022-02-02 DIAGNOSIS — R06.02 SOB (SHORTNESS OF BREATH): ICD-10-CM

## 2022-02-02 PROCEDURE — 77030032008 NM LUNG PERFUSION W VENT

## 2022-02-02 PROCEDURE — 71046 X-RAY EXAM CHEST 2 VIEWS: CPT

## 2022-02-25 PROBLEM — G20 PARKINSON DISEASE (HCC): Status: ACTIVE | Noted: 2022-02-25

## 2022-03-02 PROBLEM — G20 PARKINSON'S DISEASE (HCC): Status: ACTIVE | Noted: 2022-03-02

## 2022-03-10 ENCOUNTER — HOSPITAL ENCOUNTER (OUTPATIENT)
Dept: MRI IMAGING | Age: 62
Discharge: HOME | End: 2022-03-10
Attending: PSYCHIATRY & NEUROLOGY
Payer: COMMERCIAL

## 2022-03-10 DIAGNOSIS — G20 PARKINSON'S DISEASE (HCC): ICD-10-CM

## 2022-03-10 PROCEDURE — 70551 MRI BRAIN STEM W/O DYE: CPT

## 2022-03-18 PROBLEM — M54.42 ACUTE LEFT-SIDED LOW BACK PAIN WITH LEFT-SIDED SCIATICA: Status: ACTIVE | Noted: 2021-12-02

## 2022-03-18 PROBLEM — R25.1 TREMORS OF NERVOUS SYSTEM: Status: ACTIVE | Noted: 2021-12-02

## 2022-03-18 PROBLEM — Z09 FOLLOW-UP EXAMINATION, FOLLOWING OTHER SURGERY: Status: ACTIVE | Noted: 2021-08-27

## 2022-03-19 PROBLEM — M51.26 LUMBAR HERNIATED DISC: Status: ACTIVE | Noted: 2021-05-13

## 2022-03-19 PROBLEM — G56.01 RIGHT CARPAL TUNNEL SYNDROME: Status: ACTIVE | Noted: 2021-10-01

## 2022-03-19 PROBLEM — G56.02 LEFT CARPAL TUNNEL SYNDROME: Status: ACTIVE | Noted: 2021-10-01

## 2022-03-19 PROBLEM — G20.A1 PARKINSON'S DISEASE: Status: ACTIVE | Noted: 2022-03-02

## 2022-03-19 PROBLEM — R29.898 COGWHEEL RIGIDITY: Status: ACTIVE | Noted: 2021-12-02

## 2022-03-19 PROBLEM — G47.34 NOCTURNAL HYPOXEMIA: Status: ACTIVE | Noted: 2021-02-01

## 2022-03-19 PROBLEM — G20 PARKINSON DISEASE (HCC): Status: ACTIVE | Noted: 2022-02-25

## 2022-03-19 PROBLEM — M65.331 TRIGGER FINGER, RIGHT MIDDLE FINGER: Status: ACTIVE | Noted: 2021-10-01

## 2022-03-19 PROBLEM — M54.12 CERVICAL RADICULOPATHY: Status: ACTIVE | Noted: 2021-08-27

## 2022-03-19 PROBLEM — G47.33 OSA (OBSTRUCTIVE SLEEP APNEA): Status: ACTIVE | Noted: 2021-02-01

## 2022-03-19 PROBLEM — G20 PARKINSON'S DISEASE (HCC): Status: ACTIVE | Noted: 2022-03-02

## 2022-03-19 PROBLEM — R06.83 SNORING: Status: ACTIVE | Noted: 2020-09-22

## 2022-03-19 PROBLEM — G20.A1 PARKINSON DISEASE: Status: ACTIVE | Noted: 2022-02-25

## 2022-03-20 PROBLEM — R29.2 GENERALIZED HYPERREFLEXIA: Status: ACTIVE | Noted: 2021-08-27

## 2022-03-20 PROBLEM — M16.11 OSTEOARTHRITIS OF RIGHT HIP: Status: ACTIVE | Noted: 2020-10-07

## 2022-03-20 PROBLEM — M16.9 OA (OSTEOARTHRITIS) OF HIP: Status: ACTIVE | Noted: 2020-11-11

## 2022-03-20 PROBLEM — R06.02 SHORTNESS OF BREATH ON EXERTION: Status: ACTIVE | Noted: 2021-02-01

## 2022-03-20 PROBLEM — R21 RASH AND NONSPECIFIC SKIN ERUPTION: Status: ACTIVE | Noted: 2021-12-02

## 2022-03-20 PROBLEM — M48.02 CERVICAL SPINAL STENOSIS: Status: ACTIVE | Noted: 2021-09-28

## 2022-05-09 PROBLEM — N18.30 CHRONIC RENAL DISEASE, STAGE III (HCC): Status: ACTIVE | Noted: 2022-05-09

## 2022-05-19 ENCOUNTER — PREP FOR PROCEDURE (OUTPATIENT)
Dept: SURGERY | Age: 62
End: 2022-05-19

## 2022-05-31 ENCOUNTER — TELEPHONE (OUTPATIENT)
Dept: SURGERY | Age: 62
End: 2022-05-31

## 2022-06-01 ENCOUNTER — OFFICE VISIT (OUTPATIENT)
Dept: PRIMARY CARE CLINIC | Facility: CLINIC | Age: 62
End: 2022-06-01
Payer: COMMERCIAL

## 2022-06-01 VITALS
SYSTOLIC BLOOD PRESSURE: 109 MMHG | DIASTOLIC BLOOD PRESSURE: 75 MMHG | TEMPERATURE: 97.3 F | BODY MASS INDEX: 30.74 KG/M2 | WEIGHT: 219.6 LBS | HEIGHT: 71 IN | OXYGEN SATURATION: 95 % | HEART RATE: 71 BPM

## 2022-06-01 DIAGNOSIS — N18.30 STAGE 3 CHRONIC KIDNEY DISEASE, UNSPECIFIED WHETHER STAGE 3A OR 3B CKD (HCC): ICD-10-CM

## 2022-06-01 DIAGNOSIS — G47.33 OSA (OBSTRUCTIVE SLEEP APNEA): ICD-10-CM

## 2022-06-01 DIAGNOSIS — J44.9 CHRONIC OBSTRUCTIVE PULMONARY DISEASE, UNSPECIFIED COPD TYPE (HCC): Primary | ICD-10-CM

## 2022-06-01 DIAGNOSIS — R25.1 TREMORS OF NERVOUS SYSTEM: ICD-10-CM

## 2022-06-01 DIAGNOSIS — R06.02 SHORTNESS OF BREATH ON EXERTION: ICD-10-CM

## 2022-06-01 DIAGNOSIS — G20 PARKINSON'S DISEASE (HCC): ICD-10-CM

## 2022-06-01 DIAGNOSIS — M16.11 PRIMARY OSTEOARTHRITIS OF RIGHT HIP: ICD-10-CM

## 2022-06-01 PROCEDURE — 99214 OFFICE O/P EST MOD 30 MIN: CPT | Performed by: FAMILY MEDICINE

## 2022-06-01 RX ORDER — VERAPAMIL HYDROCHLORIDE 240 MG/1
240 TABLET, FILM COATED, EXTENDED RELEASE ORAL DAILY
Qty: 30 TABLET | Status: CANCELLED | OUTPATIENT
Start: 2022-06-01

## 2022-06-01 RX ORDER — ATORVASTATIN CALCIUM 40 MG/1
40 TABLET, FILM COATED ORAL
Qty: 30 TABLET | Status: CANCELLED | OUTPATIENT
Start: 2022-06-01

## 2022-06-01 RX ORDER — ALBUTEROL SULFATE 90 UG/1
2 AEROSOL, METERED RESPIRATORY (INHALATION) 4 TIMES DAILY PRN
Qty: 54 G | Refills: 1 | Status: SHIPPED | OUTPATIENT
Start: 2022-06-01

## 2022-06-01 ASSESSMENT — PATIENT HEALTH QUESTIONNAIRE - PHQ9
SUM OF ALL RESPONSES TO PHQ9 QUESTIONS 1 & 2: 0
2. FEELING DOWN, DEPRESSED OR HOPELESS: 0
SUM OF ALL RESPONSES TO PHQ QUESTIONS 1-9: 0
SUM OF ALL RESPONSES TO PHQ QUESTIONS 1-9: 0
1. LITTLE INTEREST OR PLEASURE IN DOING THINGS: 0
SUM OF ALL RESPONSES TO PHQ QUESTIONS 1-9: 0
SUM OF ALL RESPONSES TO PHQ QUESTIONS 1-9: 0

## 2022-06-01 NOTE — PROGRESS NOTES
Maricela Harada, MD  [address] [place], [address]  Ph No:  [phone]  Fax:  [phone]    CHIEF COMPLAINT:  Chief Complaint   Patient presents with    Follow-up     Pt in for 3 month follow up. Pt has no issues today. HISTORY OF PRESENT ILLNESS:  Mr. Ayaz Shahid is a 57 y.o. male. Pt presents for 3 months follow up visit. Pt says last time he was in this office he thought might have a heart attack and die. Pt presents with wife. Since last office visit, pt has seen (or is about to see) three specialists, cardiology (who has cleared him of any heart problems, EF 50-65% after echo and stress testing). Pt has seen pulmonologist, who reports pt has mild copd, but did not treat, so pt is given albuterol HFA today to see if helps as pre-treatment to activity. Pt is also seeing neurologist for Parkinson's disease (treated with levo-dopa). Pt says his primary concern has been his lack of stamina, that when doing activities (as mowing yard), he can only do so much and then must stop and rest.    Pt is reviewed by physician of labs and testing (including office notes) of both cardiology and pulmonary physicians. Pt advised based on this physician's review of their office notes/testing, we can find no reason for pt's fatigue and lack of stamina. Pt is advised we cannot say about how Parkinson's is affecting him as he will need to discuss with neurology about that (and this could be reason for lack of stamina, also increased fall risk), but we are not sure. Based on cardiology and pulmonology reports pt is advised to get out and do whatever he wishes and not be bound back by fear of any adverse event, except we do recommend he try pre-treatment with albuterol hfa for best breathing. Pt is advised he will need to continue to work within his own physical limitations. Pt is reviewed as to prior labs and no specific concerns noted.     Pt reports he would still like to know why he is so fatigued with activity. Pt is advised at this point we have no certain explanation, unless the neurologist has one and pt is advised to do whatever normal activities he is able to do on a daily basis. Pt will RTC in 6 months, recheck, cmp, lipid, cbc, tsh, free t4, hgba1c, psa, vit d.       HISTORY:  Allergies   Allergen Reactions    Latex Rash     Past Medical History:   Diagnosis Date    Arrhythmia     Arthritis     hips    CAD (coronary artery disease) 2017, 10/02/2020    Mild CAD, normal LV function per cath dated 10/02/2020; followed by [facility]    Chronic obstructive pulmonary disease ([facility]     per pt mild and treated with inhaler    Dyspnea on exertion     GERD (gastroesophageal reflux disease)     resolved    H/O cold sores     Hyperlipidemia     on med for control     Hypertension     on med for control     Latex allergy     Sleep apnea     APAP    Wide-complex tachycardia ([facility] 2017    NSVT; per pt no problems since put on verapamil     Past Surgical History:   Procedure Laterality Date    BACK SURGERY  05/2021    CARDIAC CATHETERIZATION  2017    no stents     CARDIAC CATHETERIZATION  10/02/2020    Mild CAD, normal LV function    COLONOSCOPY      TOTAL HIP ARTHROPLASTY Bilateral 10/07/2020     Family History   Problem Relation Age of Onset    Depression Mother     Heart Disease Father      Social History     Socioeconomic History    Marital status:      Spouse name: Not on file    Number of children: Not on file    Years of education: Not on file    Highest education level: Not on file   Occupational History    Not on file   Tobacco Use    Smoking status: Never Smoker    Smokeless tobacco: Never Used   Vaping Use    Vaping Use: Never used   Substance and Sexual Activity    Alcohol use: Never    Drug use: Never    Sexual activity: Not on file   Other Topics Concern    Not on file   Social History Narrative    Not on file     Social Determinants of Health     Financial Resource Strain:     Difficulty of Paying Living Expenses: Not on file   Food Insecurity:     Worried About Running Out of Food in the Last Year: Not on file    Jason of Food in the Last Year: Not on file   Transportation Needs:     Lack of Transportation (Medical): Not on file    Lack of Transportation (Non-Medical): Not on file   Physical Activity:     Days of Exercise per Week: Not on file    Minutes of Exercise per Session: Not on file   Stress:     Feeling of Stress : Not on file   Social Connections:     Frequency of Communication with Friends and Family: Not on file    Frequency of Social Gatherings with Friends and Family: Not on file    Attends Latter day Services: Not on file    Active Member of [address] [place] [address] [employer] or Organizations: Not on file    Attends Club or Organization Meetings: Not on file    Marital Status: Not on file   Intimate Partner Violence:     Fear of Current or Ex-Partner: Not on file    Emotionally Abused: Not on file    Physically Abused: Not on file    Sexually Abused: Not on file   Housing Stability:     Unable to Pay for Housing in the Last Year: Not on file    Number of [place] in the Last Year: Not on file    Unstable Housing in the Last Year: Not on file     Current Outpatient Medications   Medication Sig Dispense Refill    albuterol sulfate HFA (VENTOLIN HFA) 108 (90 Base) MCG/ACT inhaler Inhale 2 puffs into the lungs 4 times daily as needed for Wheezing 54 g 1    LYSINE PO Take 50 mg by mouth      acetaminophen (TYLENOL) 500 MG tablet Take 1,000 mg by mouth every 6 hours as needed      aspirin 81 MG EC tablet Take 81 mg by mouth 2 times daily      atorvastatin (LIPITOR) 40 MG tablet Take 40 mg by mouth      carbidopa-levodopa (SINEMET)  MG per tablet Take 2 tablets by mouth 3 times daily      traMADol (ULTRAM) 50 MG tablet Take 50 mg by mouth every 6 hours as needed.       verapamil (CALAN SR) 240 MG extended release tablet Take 240 mg by mouth daily      losartan (COZAAR) 100 MG tablet TAKE 1 TABLET BY MOUTH ONCE DAILY       No current facility-administered medications for this visit. REVIEW OF SYSTEMS:  Review of systems is as indicated in HPI otherwise negative. PHYSICAL EXAM:  Vital Signs - /75 (Site: Left Upper Arm, Position: Sitting, Cuff Size: Medium Adult)   Pulse 71   Temp 97.3 °F (36.3 °C)   Ht 5' 11\" (1.803 m)   Wt 219 lb 9.6 oz (99.6 kg)   SpO2 95%   BMI 30.63 kg/m²      Physical Exam  Vitals and nursing note reviewed. Constitutional:       General: He is in acute distress. Appearance: Normal appearance. He is obese. Comments: Pt continues to report concern about lack of stamina and episodes of fatigue after activity, see HPI   HENT:      Head: Normocephalic and atraumatic. Nose: Nose normal.      Mouth/Throat:      Mouth: Mucous membranes are moist.      Pharynx: Oropharynx is clear. Eyes:      Extraocular Movements: Extraocular movements intact. Conjunctiva/sclera: Conjunctivae normal.      Pupils: Pupils are equal, round, and reactive to light. Cardiovascular:      Rate and Rhythm: Normal rate and regular rhythm. Pulses: Normal pulses. Heart sounds: Normal heart sounds. Pulmonary:      Effort: Pulmonary effort is normal.      Comments: Coarse breath sounds, with slightly increased expiratory phase, indicating mild copd. Abdominal:      General: Bowel sounds are normal.      Palpations: Abdomen is soft. Comments: Obese, BMI 30.63, weight 219lbs   Musculoskeletal:         General: Normal range of motion. Cervical back: Normal range of motion and neck supple. Skin:     General: Skin is warm and dry. Neurological:      General: No focal deficit present. Mental Status: He is alert and oriented to person, place, and time. Comments: See HPI,pt reports weakness with activity, fatigued after not so long a time. Psychiatric:         Behavior: Behavior normal.         Thought Content: Thought content normal.         Judgment: Judgment normal.      Comments: Health anxiety and concerns for health, see HPI             LABS  No results found for this visit on 06/01/22. IMPRESSION/PLAN     Diagnosis Orders   1. Chronic obstructive pulmonary disease, unspecified COPD type (HCC)  albuterol sulfate HFA (VENTOLIN HFA) 108 (90 Base) MCG/ACT inhaler   2. Parkinson's disease ([facility]     3. AJAY (obstructive sleep apnea)     4. Primary osteoarthritis of right hip     5. Stage 3 chronic kidney disease, unspecified whether stage 3a or 3b CKD ([facility]     6. Tremors of nervous system     7. Shortness of breath on exertion         No follow-up provider specified. Tanay Bentley MD            Dictated using voice recognition software.  Proofread, but unrecognized voice recognition errors may exist.

## 2022-06-09 RX ORDER — SODIUM CHLORIDE 0.9 % (FLUSH) 0.9 %
5-40 SYRINGE (ML) INJECTION EVERY 12 HOURS SCHEDULED
Status: CANCELLED | OUTPATIENT
Start: 2022-06-09

## 2022-06-09 RX ORDER — SODIUM CHLORIDE 9 MG/ML
INJECTION, SOLUTION INTRAVENOUS PRN
Status: CANCELLED | OUTPATIENT
Start: 2022-06-09

## 2022-06-09 RX ORDER — SODIUM CHLORIDE 0.9 % (FLUSH) 0.9 %
5-40 SYRINGE (ML) INJECTION PRN
Status: CANCELLED | OUTPATIENT
Start: 2022-06-09

## 2022-06-28 DIAGNOSIS — M54.12 CERVICAL RADICULOPATHY: Primary | ICD-10-CM

## 2022-07-13 ENCOUNTER — OFFICE VISIT (OUTPATIENT)
Dept: NEUROLOGY | Age: 62
End: 2022-07-13
Payer: COMMERCIAL

## 2022-07-13 VITALS
WEIGHT: 218 LBS | HEIGHT: 71 IN | DIASTOLIC BLOOD PRESSURE: 77 MMHG | SYSTOLIC BLOOD PRESSURE: 136 MMHG | BODY MASS INDEX: 30.52 KG/M2 | HEART RATE: 80 BPM

## 2022-07-13 DIAGNOSIS — G31.84 MILD COGNITIVE IMPAIRMENT: ICD-10-CM

## 2022-07-13 DIAGNOSIS — R26.9 GAIT DISTURBANCE: ICD-10-CM

## 2022-07-13 DIAGNOSIS — G20 PARKINSON'S DISEASE (HCC): Primary | ICD-10-CM

## 2022-07-13 PROCEDURE — 99215 OFFICE O/P EST HI 40 MIN: CPT | Performed by: PSYCHIATRY & NEUROLOGY

## 2022-07-13 RX ORDER — LOSARTAN POTASSIUM AND HYDROCHLOROTHIAZIDE 25; 100 MG/1; MG/1
TABLET ORAL
COMMUNITY
Start: 2022-07-07

## 2022-07-13 RX ORDER — MELOXICAM 15 MG/1
TABLET ORAL
Qty: 90 TABLET | Refills: 1 | Status: SHIPPED | OUTPATIENT
Start: 2022-07-13

## 2022-07-13 RX ORDER — MELOXICAM 15 MG/1
TABLET ORAL
COMMUNITY
Start: 2022-04-21 | End: 2022-07-13 | Stop reason: SDUPTHER

## 2022-07-13 ASSESSMENT — ENCOUNTER SYMPTOMS
COUGH: 0
CONSTIPATION: 1
VOICE CHANGE: 1

## 2022-07-13 NOTE — PROGRESS NOTES
Candace Hoskins   [place], [address], [address] [facility]  Phone: [phone] Fax [phone]  Cee Fung MD    Patient: Naveed Rubio [facility]  Provider: Cee Fung MD    CC:   Chief Complaint   Patient presents with    Neurologic Problem     Parkinsons 3 month follow up     Referring Provider:    History of Present Illness:     Maria Guadalupe Newton is a 57 y.o. RH male who presents for follow-up of Parkinson's disease. He is unaccompanied by her visit. He was last seen April 2022.      He presents for follow-up and continued management of a suspected primary parkinsonian syndrome highlighted by hand tremors, progressive generalized stiffness and gait disturbance. His father had PD in his late 76s.    Current medications include:  Sinemet  mg 2 tablets 3 times a day (7 am, 1:30 pm, 7 pm)     Previous medication trials include:  N/A     Presents today for follow-up. Unfortunately continues to have difficulty with several different motor symptoms. He has a gait disturbance with a tendency to drag the left leg when he walks and much of this is related to chronic low back pain. He is able to walk without assistance and no falls have been noted though there have been near falls. He does feel his balance is impaired. He does have generalized stiffness in both upper and lower extremities and there is a general paucity of movement and has made a suspicious for parkinsonism. However increases in levodopa have not translated to any real functional improvement. There may be some mild benefit with stiffness but otherwise patient has not felt any real benefit. Note that additionally there has been some complaints of softening of his voice and chronic constipation. He takes MiraLAX as needed. No RBD. No psychotic symptoms though he does note some mild short-term memory loss and word finding difficulty.   He denies any depressed mood though he did recently experience the passing of his brother just over the last week.      His medical history includes coronary artery disease, mild COPD, and obstructive sleep apnea requiring APAP. He has a history of prior back surgery (Left L5-S1 discectomy) and bilateral hip replacements and he has had a carpal tunnel release. He denies alcohol use. Review of Systems:   Review of Systems   Constitutional: Negative for fever. HENT: Positive for voice change. Negative for hearing loss. Eyes: Negative for visual disturbance. Respiratory: Negative for cough. Cardiovascular: Negative for chest pain. Gastrointestinal: Positive for constipation. Genitourinary: Positive for frequency. Musculoskeletal: Positive for gait problem. Skin: Negative for rash. Allergic/Immunologic: Negative for immunocompromised state. Neurological: Positive for tremors. Psychiatric/Behavioral: Positive for confusion and decreased concentration. Lab/Imaging Review:   I REVIEWED PERTINENT LABS, IMAGES, AND REPORTS WITH THE PATIENT PERSONALLY, DIRECTLY AND FULLY. THE MOST PERTINENT FINDINGS ARE NOTED BELOW:    MRI Brain March 2022:  IMPRESSION   1. Mild burden of chronic microangiopathy.   2. Otherwise unremarkable MRI of the brain for patient age.     MRI Cervical Spine September 2021:  IMPRESSION  Multilevel cervical spondylosis with mild spinal stenosis and severe left neural foraminal narrowing at C5-6. At C6-7, there is mild spinal stenosis and moderate bilateral neural foraminal compromise.     MRI Lumbar Spine April 2021:  IMPRESSION  1. Moderate size left posterior lateral disc extrusion at L5-S1 that compresses the traversing left S1 nerve root. Moderate to severe left foramina narrowing is also present at L5-S1.  2. Right foraminal disc protrusion at L4-L5 which enters the foramen primarily underneath the exiting nerve root and results in only mild right foramina narrowing. 3. Additional foramina narrowing as noted above.        Past the Last Year: Not on file    Ran Out of Food in the Last Year: Not on file   Transportation Needs:     Lack of Transportation (Medical): Not on file    Lack of Transportation (Non-Medical): Not on file   Physical Activity:     Days of Exercise per Week: Not on file    Minutes of Exercise per Session: Not on file   Stress:     Feeling of Stress : Not on file   Social Connections:     Frequency of Communication with Friends and Family: Not on file    Frequency of Social Gatherings with Friends and Family: Not on file    Attends Temple Services: Not on file    Active Member of [address] [place] [address] or Organizations: Not on file    Attends Club or Organization Meetings: Not on file    Marital Status: Not on file   Intimate Partner Violence:     Fear of Current or Ex-Partner: Not on file    Emotionally Abused: Not on file    Physically Abused: Not on file    Sexually Abused: Not on file   Housing Stability:     Unable to Pay for Housing in the Last Year: Not on file    Number of [place] in the Last Year: Not on file    Unstable Housing in the Last Year: Not on file       Medications/Allergies:     MEDICATIONS:   Outpatient Encounter Medications as of 7/13/2022   Medication Sig Dispense Refill    Multiple Vitamins-Minerals (MULTI COMPLETE PO) Take by mouth      albuterol sulfate HFA (VENTOLIN HFA) 108 (90 Base) MCG/ACT inhaler Inhale 2 puffs into the lungs 4 times daily as needed for Wheezing 54 g 1    LYSINE PO Take 50 mg by mouth      acetaminophen (TYLENOL) 500 MG tablet Take 1,000 mg by mouth every 6 hours as needed      aspirin 81 MG EC tablet Take 81 mg by mouth 2 times daily      atorvastatin (LIPITOR) 40 MG tablet Take 40 mg by mouth      carbidopa-levodopa (SINEMET)  MG per tablet Take 2 tablets by mouth 3 times daily      traMADol (ULTRAM) 50 MG tablet Take 50 mg by mouth every 6 hours as needed.       verapamil (CALAN SR) 240 MG extended release tablet Take 240 mg by mouth daily      antalgic gait secondary to back pain. Otherwise he looks fairly fluid and there is slightly diminished but reasonable arm swing bilaterally. Turns were relatively fluid. Assessment and Plan:     Becky Gipson is a 57 y.o. male who presents with the following issues:     Cherylene Leas was seen today for neurologic problem. Diagnoses and all orders for this visit:    Parkinson's disease ([facility] [address]  -     NM BRAIN SPECT; Future    Gait disturbance    Mild cognitive impairment      Patient presents for follow-up and continued management of suspected parkinsonism. He has had several motor signs including bradykinesia and some rigidity although there are some slight inconsistencies noted in his exam today. He also has not had as much of a response to levodopa as we would have liked despite 600 mg/day. MRI of the brain has been reviewed and is relatively unremarkable with only age-appropriate changes noted. Further spine imaging has revealed multilevel degenerative changes but no evidence of spinal cord compromise or myelopathy. Moving forward I have suggested that we confirm the diagnosis with a DaTscan. I am hesitant to make any other further changes until we at least confirm the diagnosis. He will continue Sinemet  mg 2 tablets 3 times a day for now. He has been counseled on the benefits of aerobic exercise with respect to delaying disease progression. Formal trials of physical therapy could certainly be considered for his gait dysfunction in the future.     Mild cognitive symptoms are noted but still remains independent and we are holding on any medications for memory at this time.          Follow-up to be arranged.        Signature: Demetria Menendez MD      Date:  7/13/2022    [facility]   [address] [place] [address]  Ph: [phone]  Fax: [phone]         I have personally interviewed and examined Mr. Lolita Hanks and I have personally reviewed all relevant records including labs and imaging as noted above. I have written all aspects of this note. More than 50% of this time was used for counseling regarding my diagnosis, prognosis, and plans for management. Total visit time: 42 minutes.

## 2022-07-14 ENCOUNTER — OFFICE VISIT (OUTPATIENT)
Dept: PULMONOLOGY | Age: 62
End: 2022-07-14
Payer: COMMERCIAL

## 2022-07-14 VITALS
TEMPERATURE: 98 F | HEART RATE: 83 BPM | SYSTOLIC BLOOD PRESSURE: 120 MMHG | RESPIRATION RATE: 20 BRPM | BODY MASS INDEX: 30.52 KG/M2 | DIASTOLIC BLOOD PRESSURE: 80 MMHG | WEIGHT: 218 LBS | OXYGEN SATURATION: 96 % | HEIGHT: 71 IN

## 2022-07-14 DIAGNOSIS — J44.9 CHRONIC OBSTRUCTIVE PULMONARY DISEASE, UNSPECIFIED COPD TYPE (HCC): Primary | ICD-10-CM

## 2022-07-14 DIAGNOSIS — R06.02 SHORTNESS OF BREATH ON EXERTION: ICD-10-CM

## 2022-07-14 PROCEDURE — 99214 OFFICE O/P EST MOD 30 MIN: CPT | Performed by: INTERNAL MEDICINE

## 2022-07-14 NOTE — PROGRESS NOTES
[address] Adolfo Mitchell. [address], [address] [facility]  [phone]    Patient Name:  Cinthia [facility]      YOB: 1960  Office Visit 7/14/2022    ASSESSMENT AND PLAN:  (Medical Decision Making)    Pt with DICKEY with obstruction and reduced DLCO c/w COPD. Did not improve with arnuity. -willing to try spiriva. rx sent and sample given.   -also now has prn albuterol inhaler to use.   -cont to f/u with neuro regarding Parkinson's diagnosis. -could not afford pulm rehab. Encouraged him in efforts at exercise on his own. F/u in 6 months. Diagnoses and all orders for this visit:  Chronic obstructive pulmonary disease, unspecified COPD type ([address]  Shortness of breath on exertion  Other orders  -     tiotropium (SPIRIVA RESPIMAT) 2.5 MCG/ACT AERS inhaler; Inhale 2 puffs into the lungs daily    Sandip Contreras MD    Clinical time for encounter was 20 minutes. _________________________________________________________________________    HISTORY OF PRESENT ILLNESS:    Mr. Teresita Gusman in our clinic today who presents with Shortness of Breath and COPD  . No history of tobacco abuse. Complete PFTs showed mild obstruction, air trapping, and reduced DLCO. He was started on Arnuity without much improvement. VQ scan was performed and was normal.  He is also diagnosed with Parkinson's disease. He returns today for follow-up appointment. At his last appointment plan was to check a 6-minute walk test with O2 sat reading which showed a distance covered of 201 m with no desaturation. He was referred to pulmonary rehab. We had offered him a different inhaler but he declined. He states he was not able to participate in pulmonary rehab due to cost.   He states he continues to get short of breath with any exertion. Minimal wheeze at times. Discussed additional inhaled therapy. He has been trying albuterol before exertion but does not notice any improvement. He was seen by neurology yesterday. Had one of his medications doubled without improvement. Is now being sent for a new scan to make sure this is actually Parkinson's. Trying to walk a mile a day inside. REVIEW OF SYSTEMS:  10 point review of systems is negative except as reported in HPI. PHYSICAL EXAM:  Vitals:    07/14/22 1453   BP: 120/80   Pulse: 83   Resp: 20   Temp: 98 °F (36.7 °C)   SpO2: 96%       PERTINENT FINDINGS:         DIAGNOSTIC TESTS:                                                                                                             LABS: No results for input(s): HGB, HCT, TSH, NTPROBNP in the last 72 hours. Imaging:  CXR: XR CHEST (2 VW) 02/02/2022    Narrative  PA LATERAL CHEST  2/2/2022 10:54 AM    HISTORY: Shortness of breath;    COMPARISON: CT chest 2/5/2021    FINDINGS:  The heart size is enlarged. There is no lobar consolidation, pleural  effusions or pulmonary edema. Impression  Cardiomegaly. CT WITHOUT CONTRAST: No results found for this or any previous visit from the past 365 days. CT WITH CONTRAST: No results found for this or any previous visit from the past 365 days. CT HIGH RES: No results found for this or any previous visit from the past 365 days. CT PE PROTOCOL: No results found for this or any previous visit from the past 365 days. LDCT SCREENING: No results found for this or any previous visit from the past 365 days. PET SCAN: No results found for this or any previous visit from the past 365 days. PFTs:   No flowsheet data found. Exercise Oximetry:   Echo: No results found for this or any previous visit.     [facility] Reference Info:                                                                                                                  Past Medical History:   Diagnosis Date    Arrhythmia     Arthritis     hips    CAD (coronary artery disease) 2017, 10/02/2020    Mild CAD, normal LV function per cath dated 10/02/2020; followed by [facility]    Chronic obstructive pulmonary disease ([facility] [address]     per pt mild and treated with inhaler    Dyspnea on exertion     GERD (gastroesophageal reflux disease)     resolved    H/O cold sores     Hyperlipidemia     on med for control     Hypertension     on med for control     Latex allergy     Sleep apnea     APAP    Wide-complex tachycardia ([facility] [address] 2017    NSVT; per pt no problems since put on verapamil         Tobacco Use: Low Risk     Smoking Tobacco Use: Never Smoker    Smokeless Tobacco Use: Never Used     Allergies   Allergen Reactions    Latex Rash     Current Outpatient Medications   Medication Instructions    acetaminophen (TYLENOL) 1,000 mg, Oral, EVERY 6 HOURS PRN    albuterol sulfate HFA (VENTOLIN HFA) 108 (90 Base) MCG/ACT inhaler 2 puffs, Inhalation, 4 TIMES DAILY PRN    aspirin 81 mg, Oral, 2 TIMES DAILY    atorvastatin (LIPITOR) 40 mg, Oral    carbidopa-levodopa (SINEMET)  MG per tablet 2 tablets, Oral, 3 TIMES DAILY    losartan-hydroCHLOROthiazide (HYZAAR) 100-25 MG per tablet TAKE 1 TABLET BY MOUTH ONCE DAILY    LYSINE PO 50 mg, Oral    meloxicam (MOBIC) 15 MG tablet TAKE 1 TABLET BY MOUTH ONCE DAILY    Multiple Vitamins-Minerals (MULTI COMPLETE PO) Oral    tiotropium (SPIRIVA RESPIMAT) 2.5 MCG/ACT AERS inhaler 2 puffs, Inhalation, DAILY    traMADol (ULTRAM) 50 mg, EVERY 6 HOURS PRN    verapamil (CALAN SR) 240 mg, Oral, DAILY

## 2022-07-27 ENCOUNTER — HOSPITAL ENCOUNTER (OUTPATIENT)
Dept: NUCLEAR MEDICINE | Age: 62
Discharge: HOME | End: 2022-07-30
Payer: COMMERCIAL

## 2022-07-27 ENCOUNTER — APPOINTMENT (OUTPATIENT)
Dept: NUCLEAR MEDICINE | Age: 62
End: 2022-07-27
Payer: COMMERCIAL

## 2022-07-27 DIAGNOSIS — G20 PARKINSON'S DISEASE (HCC): ICD-10-CM

## 2022-07-27 PROCEDURE — 3430000000 HC RX DIAGNOSTIC RADIOPHARMACEUTICAL: Performed by: PSYCHIATRY & NEUROLOGY

## 2022-07-27 PROCEDURE — 78803 RP LOCLZJ TUM SPECT 1 AREA: CPT

## 2022-07-27 PROCEDURE — A9584 IODINE I-123 IOFLUPANE: HCPCS | Performed by: PSYCHIATRY & NEUROLOGY

## 2022-07-27 PROCEDURE — 2580000003 HC RX 258: Performed by: PSYCHIATRY & NEUROLOGY

## 2022-07-27 RX ORDER — SODIUM CHLORIDE 0.9 % (FLUSH) 0.9 %
10 SYRINGE (ML) INJECTION ONCE AS NEEDED
Status: COMPLETED | OUTPATIENT
Start: 2022-07-27 | End: 2022-07-27

## 2022-07-27 RX ADMIN — IOFLUPANE I-123 4.98 MILLICURIE: 2 INJECTION, SOLUTION INTRAVENOUS at 09:39

## 2022-07-27 RX ADMIN — SODIUM CHLORIDE, PRESERVATIVE FREE 10 ML: 5 INJECTION INTRAVENOUS at 09:39

## 2022-08-16 ENCOUNTER — TELEPHONE (OUTPATIENT)
Dept: ORTHOPEDIC SURGERY | Age: 62
End: 2022-08-16

## 2022-08-16 DIAGNOSIS — M54.17 LUMBOSACRAL RADICULOPATHY: Primary | ICD-10-CM

## 2022-08-16 DIAGNOSIS — M48.062 SPINAL STENOSIS, LUMBAR REGION WITH NEUROGENIC CLAUDICATION: ICD-10-CM

## 2022-08-16 NOTE — TELEPHONE ENCOUNTER
l    Name: Deja Barnes [facility]  YOB: 1960  Gender: male  MRN: [id number]      This patient has requested repeat epidural spinal injection. .    The most recent injection provided 80% pain reduction and improvement in functioning for at least 6 months. The patient's current pain scale is 9/10. .    As a result of the current recurrence of pain, the patient is having the following difficulties:  Difficulties with bathing and/or dressing  Difficulty sleeping at night  Difficulty transferring into or out of a car  Difficulty performing housework  Difficulty with sitting or performing work related activities    The patient has severe pain limiting function despite on-going, conservative, physician-guided treatment. The patient is currently regularly engaging in physician or PT directed lumbar spine exercies.       Fransico Mejia MD

## 2022-08-16 NOTE — TELEPHONE ENCOUNTER
Patient in lots of pain and is wanting an injection however, hasn't been seen since 12/2021. Next available is 9/13.  Please advise

## 2022-08-16 NOTE — TELEPHONE ENCOUNTER
Lonza Manifold  I  saw  the    other messages  and  I   helped  schedule  this  pts  injection  for  next Tuesday 23rd     If  you  need me to  move it or  anything  please  let  me know.   He  wanted  GR  1st  available

## 2022-08-23 ENCOUNTER — OFFICE VISIT (OUTPATIENT)
Dept: ORTHOPEDIC SURGERY | Age: 62
End: 2022-08-23
Payer: COMMERCIAL

## 2022-08-23 DIAGNOSIS — M54.17 LUMBOSACRAL RADICULOPATHY: Primary | ICD-10-CM

## 2022-08-23 PROCEDURE — 62323 NJX INTERLAMINAR LMBR/SAC: CPT | Performed by: PHYSICAL MEDICINE & REHABILITATION

## 2022-08-23 RX ORDER — TRIAMCINOLONE ACETONIDE 40 MG/ML
100 INJECTION, SUSPENSION INTRA-ARTICULAR; INTRAMUSCULAR ONCE
Status: COMPLETED | OUTPATIENT
Start: 2022-08-23 | End: 2022-08-23

## 2022-08-23 RX ADMIN — TRIAMCINOLONE ACETONIDE 100 MG: 40 INJECTION, SUSPENSION INTRA-ARTICULAR; INTRAMUSCULAR at 08:53

## 2022-08-23 NOTE — PROGRESS NOTES
Date: 08/23/22   Name: Faizan Herrmann [facility]    Pre-Procedural Diagnosis:    Diagnosis Orders   1. Lumbosacral radiculopathy  XR INJ SPINE THER SUBST LUM/SAC W IMG    triamcinolone acetonide (KENALOG-40) injection 100 mg          Procedure: Lumbar Epidural Steroid Injection (RANJIT)    Precautions: [facility] Precautions spine injections: Patient allergic to contrast dye. No contrast administered during this procedure. Hibiclens used for skin prep. The procedure was discussed at length with the patient and informed consent was signed. The patient was placed in a prone position on the fluoroscopy table and the skin was prepped and draped in a routine sterile fashion. The area to be injected was anesthetized with approximately 5 cc of 1% Lidocaine. Initially a 22-gauge 3.5 inch inch spinal needle was carefully advanced under fluoroscopic guidance to the left L4-L5 epidural space. As above, no contrast administered. . Once proper placement was confirmed, 3 cc of sterile water and 100 mg of Kenalog were injected through the spinal needle. Fluoroscopic guidance was used intermittently over a 10-minute period to insure proper needle placement and patient safety. A hard copy of the fluoroscopic  images has been placed in the patient's chart. The patient was monitored after the procedure and discharged home without complication.      Resume Meds:  Pt remains on asa 81 mg.    Marissa Lemos MD  08/23/22

## 2022-09-14 ENCOUNTER — PATIENT MESSAGE (OUTPATIENT)
Dept: NEUROLOGY | Age: 62
End: 2022-09-14

## 2022-09-14 DIAGNOSIS — N52.9 ERECTILE DYSFUNCTION, UNSPECIFIED ERECTILE DYSFUNCTION TYPE: Primary | ICD-10-CM

## 2022-09-14 RX ORDER — SILDENAFIL 100 MG/1
100 TABLET, FILM COATED ORAL PRN
Qty: 30 TABLET | Refills: 3 | Status: SHIPPED | OUTPATIENT
Start: 2022-09-14

## 2022-09-19 NOTE — TELEPHONE ENCOUNTER
[address] [place] 9/14/2022 4:17 PM EDT      ----- Message -----  From: Zechariah Jimenez  Sent: 9/14/2022 2:48 PM EDT  To: , *  Subject: Test results     I am not sure what the results of the spec scan means. Did it confirm Pd? What is next step?  Thanks

## 2022-09-30 NOTE — PROGRESS NOTES
[address] [place] [address] Court, [address] [facility]  [phone]    Patient Name:  Neeta Floyd [facility]  YOB: 1960    Pursuant to the emergency declaration under the [facility], [facility] waiver authority and the [employer] Act, this Virtual  Visit was conducted, with patient's consent, to reduce the patient's risk of exposure to COVID-19 and provide continuity of care for an established patient. Telehealth encounter is a billable service, with coverage as determined by the insurance carrier. Services were provided through a video synchronous discussion virtually to substitute for in-person clinic visit. Madhav Moreno is a 57 y.o. male who was seen by synchronous (real-time) audio-video technology on 10/3/2022. Consent:  He and/or his healthcare decision maker is aware that this patient-initiated Telehealth encounter is a billable service, with coverage as determined by his insurance carrier. He is aware that he may receive a bill and has provided verbal consent to proceed: Yes        Office Visit 10/3/2022    CHIEF COMPLAINT:    Chief Complaint   Patient presents with    Sleep Apnea       HISTORY OF PRESENT ILLNESS:  Patient is being seen today via virtual visit. HST 12/8/2020 with AHI 8.2/hr with desaturations to 80%. He is prescribed APAP 8-10 cm using a full face mask. He has only used his machine one day out of the past 4 months. AHI is 1.2/hr. A mask fitting was ordered but was not done. His current mask is causing irritation on his ears and he is unable to use it. He has lost an additional 10 lbs with a current weight of 208 lbs. He is walking 1 mile on the treadmill every morning. He is unsure of his current weight at the time of study but states that his max weight prior to weight loss was 230 lbs.  In reviewing his chart, Sept 2020, his weight was 208 lbs. His sleep study was December 2020. His wife reports his snoring is less. His HST was done in supine position. He sleeps laterally now. He has been treated for Parkinsons over the past several months but states he is no longer diagnosed with it. He still has tremors and is undergoing testing.            Past Medical History:   Diagnosis Date    Arrhythmia     Arthritis     hips    CAD (coronary artery disease) 2017, 10/02/2020    Mild CAD, normal LV function per cath dated 10/02/2020; followed by [facility]    Chronic obstructive pulmonary disease ([facility]     per pt mild and treated with inhaler    Dyspnea on exertion     GERD (gastroesophageal reflux disease)     resolved    H/O cold sores     Hyperlipidemia     on med for control     Hypertension     on med for control     Latex allergy     Sleep apnea     APAP    Wide-complex tachycardia 2017    NSVT; per pt no problems since put on verapamil       Patient Active Problem List   Diagnosis    Tremors of nervous system    Acute left-sided low back pain with left-sided sciatica    Follow-up examination, following other surgery    Left carpal tunnel syndrome    Parkinson's disease ([facility] [address]    Lumbar herniated disc    Nocturnal hypoxemia    AJAY (obstructive sleep apnea)    Parkinson disease ([address]    Cogwheel rigidity    Cervical radiculopathy    Trigger finger, right middle finger    Right carpal tunnel syndrome    Snoring    Osteoarthritis of right hip    OA (osteoarthritis) of hip    Generalized hyperreflexia    Cervical spinal stenosis    Shortness of breath on exertion    Rash and nonspecific skin eruption    Chronic renal disease, stage III [facility])           Past Surgical History:   Procedure Laterality Date    BACK SURGERY  05/2021    CARDIAC CATHETERIZATION  2017    no stents     CARDIAC CATHETERIZATION  10/02/2020    Mild CAD, normal LV function    COLONOSCOPY      TOTAL HIP ARTHROPLASTY Bilateral 10/07/2020           Social History Socioeconomic History    Marital status:      Spouse name: Not on file    Number of children: Not on file    Years of education: Not on file    Highest education level: Not on file   Occupational History    Not on file   Tobacco Use    Smoking status: Never    Smokeless tobacco: Never   Vaping Use    Vaping Use: Never used   Substance and Sexual Activity    Alcohol use: Never    Drug use: Never    Sexual activity: Not on file   Other Topics Concern    Not on file   Social History Narrative    Not on file     Social Determinants of Health     Financial Resource Strain: Not on file   Food Insecurity: Not on file   Transportation Needs: Not on file   Physical Activity: Not on file   Stress: Not on file   Social Connections: Not on file   Intimate Partner Violence: Not on file   Housing Stability: Not on file         Family History   Problem Relation Age of Onset    Depression Mother     Heart Disease Father          Allergies   Allergen Reactions    Latex Rash         Current Outpatient Medications   Medication Sig    sildenafil (VIAGRA) 100 MG tablet Take 1 tablet by mouth as needed for Erectile Dysfunction    tiotropium (SPIRIVA RESPIMAT) 2.5 MCG/ACT AERS inhaler Inhale 2 puffs into the lungs daily    losartan-hydroCHLOROthiazide (HYZAAR) 100-25 MG per tablet TAKE 1 TABLET BY MOUTH ONCE DAILY    Multiple Vitamins-Minerals (MULTI COMPLETE PO) Take by mouth    meloxicam (MOBIC) 15 MG tablet TAKE 1 TABLET BY MOUTH ONCE DAILY    albuterol sulfate HFA (VENTOLIN HFA) 108 (90 Base) MCG/ACT inhaler Inhale 2 puffs into the lungs 4 times daily as needed for Wheezing    LYSINE PO Take 50 mg by mouth    acetaminophen (TYLENOL) 500 MG tablet Take 1,000 mg by mouth every 6 hours as needed    aspirin 81 MG EC tablet Take 81 mg by mouth 2 times daily    atorvastatin (LIPITOR) 40 MG tablet Take 40 mg by mouth    carbidopa-levodopa (SINEMET)  MG per tablet Take 2 tablets by mouth 3 times daily    traMADol (ULTRAM) 50 MG tablet Take 50 mg by mouth every 6 hours as needed. verapamil (CALAN SR) 240 MG extended release tablet Take 240 mg by mouth daily     No current facility-administered medications for this visit. REVIEW OF SYSTEMS:   CONSTITUTIONAL:   There is no history of fever, chills, night sweats. Weight loss    CARDIAC:   No chest pain, pressure, discomfort, palpitations, orthopnea, murmurs, or edema. GI:   No dysphagia, heartburn reflux, nausea/vomiting, diarrhea, abdominal pain, or bleeding. NEURO:   There is no history of AMS, persistent headache, decreased level of consciousness, seizures, or motor or sensory deficits. VIRTUAL EXAM  PHYSICAL EXAMINATION:  [ INSTRUCTIONS:  \"[x]\" Indicates a positive item  \"[]\" Indicates a negative item   Vital Signs: (As obtained by patient/caregiver at home)  There were no vitals taken for this visit.      Constitutional: [x] Appears well-developed and well-nourished [x] No apparent distress      [] Abnormal     Mental status: [x] Alert and awake  [x] Oriented to person/place/time [x] Able to follow commands    [] Abnormal -     Eyes:   EOM    [x]  Normal    [] Abnormal -   Sclera  [x]  Normal    [] Abnormal -          Discharge [x]  None visible   [] Abnormal -    HENT: [x] Normocephalic, atraumatic  [] Abnormal -  [x] Mouth/Throat: Mucous membranes are moist    External Ears [x] Normal  [] Abnormal -    Neck: [x] No visualized mass [] Abnormal -     Pulmonary/Chest: [x] Respiratory effort normal   [x] No visualized signs of difficulty breathing or respiratory distress        [] Abnormal -      Musculoskeletal:   [x] Normal gait with no signs of ataxia         [x] Normal range of motion of neck        [] Abnormal -     Neurological:        [x] No Facial Asymmetry (Cranial nerve 7 motor function) (limited exam due to video visit)          [x] No gaze palsy        [] Abnormal -         Skin:        [x] No significant exanthematous lesions or discoloration noted on facial skin         [] Abnormal -            Psychiatric:       [x] Normal Affect [] Abnormal -       [x] No Hallucinations    Other pertinent observable physical exam findings:-      ASSESSMENT:  (Medical Decision Making)      Diagnosis Orders   1. AJAY (obstructive sleep apnea)  Ambulatory Referral to Sleep Studies   Not able to use due to mask. Marissa Richardson is calling patient today to set up mask fitting for alternate full face mask. 2. Nocturnal hypoxemia  Ambulatory Referral to Sleep Studies   Improved on pap therapy   3. Snoring  Ambulatory Referral to Sleep Studies   He states snoring is less. He has lost 20+ lbs and wants to retest with HST. He is now sleeping laterally, study was done in supine position in 2020. PLAN:  Continue APAP 8-10 cm  Mask fitting- Marissa Richardson reaching out to patient to schedule fitting from [facility]  HST- re-evaluate AJAY given weight loss  Follow up in 4 months or sooner if needed     Orders Placed This Encounter   Procedures    Ambulatory Referral to Sleep Studies     Referral Priority:   Routine     Referral Type:   Consult for Advice and Opinion     Referral Reason:   Specialty Services Required     Number of Visits Requested:   1       No orders of the defined types were placed in this encounter. Collaborating Physician: Dr. Miguel Angel Castro      I spent at least 20 minutes with this established patient, and >50% of the time was spent counseling and/or coordinating care regarding ajay.     DEB Bryant - CNP  Electronically signed

## 2022-10-03 ENCOUNTER — TELEMEDICINE (OUTPATIENT)
Dept: SLEEP MEDICINE | Age: 62
End: 2022-10-03
Payer: COMMERCIAL

## 2022-10-03 DIAGNOSIS — R06.83 SNORING: ICD-10-CM

## 2022-10-03 DIAGNOSIS — G47.33 OSA (OBSTRUCTIVE SLEEP APNEA): Primary | ICD-10-CM

## 2022-10-03 DIAGNOSIS — G47.34 NOCTURNAL HYPOXEMIA: ICD-10-CM

## 2022-10-03 PROCEDURE — 99213 OFFICE O/P EST LOW 20 MIN: CPT | Performed by: NURSE PRACTITIONER

## 2022-10-03 ASSESSMENT — SLEEP AND FATIGUE QUESTIONNAIRES
HOW LIKELY ARE YOU TO NOD OFF OR FALL ASLEEP WHILE SITTING AND READING: 0
ESS TOTAL SCORE: 6
HOW LIKELY ARE YOU TO NOD OFF OR FALL ASLEEP WHILE SITTING INACTIVE IN A PUBLIC PLACE: 0
HOW LIKELY ARE YOU TO NOD OFF OR FALL ASLEEP WHILE WATCHING TV: 1
HOW LIKELY ARE YOU TO NOD OFF OR FALL ASLEEP IN A CAR, WHILE STOPPED FOR A FEW MINUTES IN TRAFFIC: 0
HOW LIKELY ARE YOU TO NOD OFF OR FALL ASLEEP WHEN YOU ARE A PASSENGER IN A CAR FOR AN HOUR WITHOUT A BREAK: 0
HOW LIKELY ARE YOU TO NOD OFF OR FALL ASLEEP WHILE LYING DOWN TO REST IN THE AFTERNOON WHEN CIRCUMSTANCES PERMIT: 3
HOW LIKELY ARE YOU TO NOD OFF OR FALL ASLEEP WHILE SITTING QUIETLY AFTER LUNCH WITHOUT ALCOHOL: 2
HOW LIKELY ARE YOU TO NOD OFF OR FALL ASLEEP WHILE SITTING AND TALKING TO SOMEONE: 0

## 2022-10-06 ENCOUNTER — OFFICE VISIT (OUTPATIENT)
Dept: NEUROLOGY | Age: 62
End: 2022-10-06
Payer: COMMERCIAL

## 2022-10-06 VITALS
WEIGHT: 218 LBS | HEART RATE: 83 BPM | SYSTOLIC BLOOD PRESSURE: 135 MMHG | BODY MASS INDEX: 30.52 KG/M2 | DIASTOLIC BLOOD PRESSURE: 81 MMHG | HEIGHT: 71 IN

## 2022-10-06 DIAGNOSIS — R20.2 NUMBNESS AND TINGLING OF BOTH UPPER EXTREMITIES: Primary | ICD-10-CM

## 2022-10-06 DIAGNOSIS — R26.9 GAIT DISTURBANCE: ICD-10-CM

## 2022-10-06 DIAGNOSIS — M25.60 GENERALIZED STIFFNESS: ICD-10-CM

## 2022-10-06 DIAGNOSIS — R20.0 NUMBNESS AND TINGLING OF BOTH UPPER EXTREMITIES: Primary | ICD-10-CM

## 2022-10-06 PROCEDURE — 99215 OFFICE O/P EST HI 40 MIN: CPT | Performed by: PSYCHIATRY & NEUROLOGY

## 2022-10-06 ASSESSMENT — ENCOUNTER SYMPTOMS
COUGH: 0
CONSTIPATION: 1
VOICE CHANGE: 1

## 2022-10-06 NOTE — PROGRESS NOTES
Matthew Newton  [address], [address] [facility]  Phone: [phone] Fax [phone]  Fabienne Rivera MD    Patient: Pinky Ahmadi [facility]  Provider: Fabienne Rivera MD    CC:   Chief Complaint   Patient presents with    Neurologic Problem     Follow up Parkinson's     Referring Provider:    History of Present Illness:     Patrick Ji is a 57 y.o. RH male who presents for follow-up of Parkinson's disease. He is unaccompanied by her visit. He was last seen July 2022. He presents for follow-up and continued management of a suspected primary parkinsonian syndrome highlighted by hand tremors, progressive generalized stiffness and gait disturbance. His father had PD in his late 76s. Current medications include:  Sinemet  mg 2 tablets 3 times a day (7 am, 1:30 pm, 7 pm)     Previous medication trials include:  N/A     Patient presents today for follow-up. There has been suspected parkinsonism for which we have attempted trials of levodopa that have not provided much subjective benefit. Recent DaTscan was obtained which was found to be normal and these results were reviewed and discussed. Since the last visit he does note mild improvement with respect to his gait symptoms. He had an injection in his lower back which seems to have helped some with his gait as he no longer drags his left leg as much. Denies any recent falls. He still has generalized stiffness in the upper extremities and increases in levodopa have not translated to any real functional improvement. Of note, he has noted some softening of his voice, chronic constipation. There has been some short-term memory lapses. No RBD. His medical history includes coronary artery disease, mild COPD, and obstructive sleep apnea requiring APAP. He has a history of prior back surgery (Left L5-S1 discectomy) and bilateral hip replacements and he has had a carpal tunnel release.   He denies alcohol use.      Review of Systems:   Review of Systems   Constitutional:  Negative for fever. HENT:  Positive for voice change. Negative for hearing loss. Eyes:  Negative for visual disturbance. Respiratory:  Negative for cough. Cardiovascular:  Negative for chest pain. Gastrointestinal:  Positive for constipation. Genitourinary:  Positive for frequency. Musculoskeletal:  Positive for gait problem. Skin:  Negative for rash. Allergic/Immunologic: Negative for immunocompromised state. Neurological:  Positive for tremors. Psychiatric/Behavioral:  Positive for confusion and decreased concentration. Lab/Imaging Review:   I REVIEWED PERTINENT LABS, IMAGES, AND REPORTS WITH THE PATIENT PERSONALLY, DIRECTLY AND FULLY. THE MOST PERTINENT FINDINGS ARE NOTED BELOW:    DaTscan July 2022:  Normal tracer activity in both basal ganglia. MRI Brain March 2022:  IMPRESSION   1. Mild burden of chronic microangiopathy. 2. Otherwise unremarkable MRI of the brain for patient age. MRI Cervical Spine September 2021:  IMPRESSION  Multilevel cervical spondylosis with mild spinal stenosis and severe left neural foraminal narrowing at C5-6. At C6-7, there is mild spinal stenosis and moderate bilateral neural foraminal compromise. MRI Lumbar Spine April 2021:  IMPRESSION  1. Moderate size left posterior lateral disc extrusion at L5-S1 that compresses the traversing left S1 nerve root. Moderate to severe left foramina narrowing is also present at L5-S1.  2. Right foraminal disc protrusion at L4-L5 which enters the foramen primarily underneath the exiting nerve root and results in only mild right foramina narrowing. 3. Additional foramina narrowing as noted above. Past Medical History:     Past medical history, surgical history, social history, family history, medications, and allergies were reviewed and updated as appropriate.      PAST MEDICAL HISTORY:  Past Medical History:   Diagnosis Date Arrhythmia     Arthritis     hips    CAD (coronary artery disease) 2017, 10/02/2020    Mild CAD, normal LV function per cath dated 10/02/2020; followed by [address] [facility]    Chronic obstructive pulmonary disease ([address]     per pt mild and treated with inhaler    Dyspnea on exertion     GERD (gastroesophageal reflux disease)     resolved    H/O cold sores     Hyperlipidemia     on med for control     Hypertension     on med for control     Latex allergy     Sleep apnea     APAP    Wide-complex tachycardia 2017    NSVT; per pt no problems since put on verapamil     PAST SURGICAL HISTORY:   Past Surgical History:   Procedure Laterality Date    BACK SURGERY  05/2021    CARDIAC CATHETERIZATION  2017    no stents     CARDIAC CATHETERIZATION  10/02/2020    Mild CAD, normal LV function    COLONOSCOPY      TOTAL HIP ARTHROPLASTY Bilateral 10/07/2020     FAMILY HISTORY:  Family History   Problem Relation Age of Onset    Depression Mother     Heart Disease Father       SOCIAL HISTORY:  Social History     Socioeconomic History    Marital status:      Spouse name: None    Number of children: None    Years of education: None    Highest education level: None   Tobacco Use    Smoking status: Never    Smokeless tobacco: Never   Vaping Use    Vaping Use: Never used   Substance and Sexual Activity    Alcohol use: Never    Drug use: Never       Medications/Allergies:     MEDICATIONS:   Outpatient Encounter Medications as of 10/6/2022   Medication Sig Dispense Refill    sildenafil (VIAGRA) 100 MG tablet Take 1 tablet by mouth as needed for Erectile Dysfunction 30 tablet 3    tiotropium (SPIRIVA RESPIMAT) 2.5 MCG/ACT AERS inhaler Inhale 2 puffs into the lungs daily 1 each 11    losartan-hydroCHLOROthiazide (HYZAAR) 100-25 MG per tablet TAKE 1 TABLET BY MOUTH ONCE DAILY      Multiple Vitamins-Minerals (MULTI COMPLETE PO) Take by mouth      meloxicam (MOBIC) 15 MG tablet TAKE 1 TABLET BY MOUTH ONCE DAILY 90 tablet 1 albuterol sulfate HFA (VENTOLIN HFA) 108 (90 Base) MCG/ACT inhaler Inhale 2 puffs into the lungs 4 times daily as needed for Wheezing 54 g 1    LYSINE PO Take 50 mg by mouth      acetaminophen (TYLENOL) 500 MG tablet Take 1,000 mg by mouth every 6 hours as needed      aspirin 81 MG EC tablet Take 81 mg by mouth 2 times daily      atorvastatin (LIPITOR) 40 MG tablet Take 40 mg by mouth      carbidopa-levodopa (SINEMET)  MG per tablet Take 2 tablets by mouth 3 times daily      traMADol (ULTRAM) 50 MG tablet Take 50 mg by mouth every 6 hours as needed. verapamil (CALAN SR) 240 MG extended release tablet Take 240 mg by mouth daily       No facility-administered encounter medications on file as of 10/6/2022. ALLERGIES:  Allergies   Allergen Reactions    Latex Rash       Physical Exam:     /81   Pulse 83   Ht 5' 11\" (1.803 m)   Wt 218 lb (98.9 kg)   BMI 30.40 kg/m²     General Exam:  General: Well developed, well nourished, in no apparent distress. HEENT: Normocephalic, atraumatic. Sclera anicteric. Oropharynx clear. Neck: Supple without masses  Cardiovascular: Regular rate and rhythm. No carotid bruits. Lungs: Somewhat labored breathing intermittently. Abdomen: Soft, nontender, nondistended. Extremities: Peripheral pulses intact. No edema and no rashes. Neurological Exam:      MS/Language/Speech:  Alert. Oriented to person, place, and time. He exhibits poor eye contact throughout the examination. Language fluent. Speech is relatively clear. Cranial Nerves: PERRL. Eye movements full. No nystagmus. There is some mildly diminished facial expression overall. No facial asymmetry noted. Tongue and palate were midline. Shoulder shrug sluggish bilaterally. Motor: When fully relaxed rigidity is felt to be only relatively mild in both upper and lower extremities. There is some increased axial tone. There is a general paucity of movement overall.   Rapid alternating movements are met with mild tremulousness of the hands and there is a dramatic slowing with handgrips and finger taps that do not necessarily involve a clear amplitude decrement. Sensory: Normal to light touch throughout. Cerebellar: No ataxia or dysmetria. Reflexes (R/L): Biceps (3+/3+), Brachioradialis (2+/2+), Patellar (2+/2+), Ankle (2+/2+). Jimenez's was negative and plantar responses were flexor. Gait: He is slow to rise from a seated position. Gait is primarily an antalgic gait secondary to back pain. Otherwise he looks fairly fluid and there is slightly diminished but reasonable arm swing bilaterally. Turns were relatively fluid. Assessment and Plan:     Mae Valdez is a 57 y.o. male who presents with the following issues:     Hill Arnett was seen today for neurologic problem. Diagnoses and all orders for this visit:    Numbness and tingling of both upper extremities    Generalized stiffness    Gait disturbance      Patient presents for follow-up and continued management of suspected parkinsonism however it has been noted that higher doses of levodopa have not resulted in much functional benefit and recent DaTscan was normal.  There have been some slight inconsistencies in his exam.  Of note, there has been some interval improvement in his gait as his lower back pain has been better managed. Overall, I suspect an alternative etiology to his generalized stiffness which may have more to do with underlying joint or neuropathic pain. I have suggested a nerve conduction study of the upper extremities for further evaluation. MRI of the brain has been reviewed and is relatively unremarkable with only age-appropriate changes noted. Further spine imaging has revealed multilevel degenerative changes but no evidence of spinal cord compromise or myelopathy. Provided with a schedule to wean and discontinue levodopa and we will monitor for any change in symptoms as he does so. Follow-up to be arranged. Signature: Deuce Waters MD      Date:  10/9/2022    [facility]   [address], [facility], [address] [place] [address]  Ph: [phone]  Fax: [phone]         I have personally interviewed and examined Mr. Brandy Tomlinson and I have personally reviewed all relevant records including labs and imaging as noted above. I have written all aspects of this note. More than 50% of this time was used for counseling regarding my diagnosis, prognosis, and plans for management. Total visit time: 42 minutes.

## 2022-10-19 ENCOUNTER — TELEPHONE (OUTPATIENT)
Dept: NEUROLOGY | Age: 62
End: 2022-10-19

## 2022-10-19 ENCOUNTER — OFFICE VISIT (OUTPATIENT)
Dept: NEUROLOGY | Age: 62
End: 2022-10-19
Payer: COMMERCIAL

## 2022-10-19 DIAGNOSIS — G56.03 BILATERAL CARPAL TUNNEL SYNDROME: Primary | ICD-10-CM

## 2022-10-19 DIAGNOSIS — R20.0 NUMBNESS AND TINGLING IN BOTH HANDS: ICD-10-CM

## 2022-10-19 DIAGNOSIS — G56.21 ULNAR NERVE ENTRAPMENT AT ELBOW, RIGHT: ICD-10-CM

## 2022-10-19 DIAGNOSIS — R25.1 TREMOR OF BOTH HANDS: ICD-10-CM

## 2022-10-19 DIAGNOSIS — R20.2 NUMBNESS AND TINGLING IN BOTH HANDS: ICD-10-CM

## 2022-10-19 PROCEDURE — 95886 MUSC TEST DONE W/N TEST COMP: CPT | Performed by: PSYCHIATRY & NEUROLOGY

## 2022-10-19 PROCEDURE — 95913 NRV CNDJ TEST 13/> STUDIES: CPT | Performed by: PSYCHIATRY & NEUROLOGY

## 2022-10-19 NOTE — PROGRESS NOTES
[address], [address], [address] [facility]       Nerve Conduction Study and Electromyogram Report           Hx: 57 y.o. RH male referred by Dr Brenda Kahn for EMG/NCV of the upper extremities for numbness, tingling of hands as well as stiffness and tremors. Hx of RCTR October 2021. No hx of DM, no neck pain but some stiffness. Study was compared with the previous one on September 1, 2021. Clinical summary was reviewed. Neurological examination: Motor power showed normal. There was no atrophy. There were no fasciculations. Tremor noted, tone normal. Gait normal.         Description: Nerve conduction studies were performed on the right upper extremity and left upper extremity, using standard technique. Bilateral median sensory amplitudes mildly reduced (previously absent on R), CV significantly reduced at 31 right and 38 left. Bilateral ulnar and radial sensory nerves are normal.  Transcarpal study showed median peak latency delay by 1.54 MS on the right and 1.10 MS on the left. Median motor distal latency prolonged 6.31 MS, similar to the previous study, amplitude and CV were normal; left distal latency also prolonged 4.92 MS worse than previous study, amplitude and CV were normal.  Note is that today's median motor amplitudes completely normal and higher than previous, technical versus improvement. Right ulnar motor recording from ADM and FDI showed normal distal latencies and amplitudes, CV reduced across the elbow ADM 53/45 and FDI 51/47, new since the last study. Left ulnar motor recording from ADM and FDI normal.  F wave latencies mildly prolonged and worse on the right median 34.4 MS, the rest 31.1-31.7 MS. Needle electromyography was performed on the right upper extremity and cervical paraspinal muscles, using standard concentric electrodes. Results were normal including FDI and APB. Conclusion:  This study showed neurophysiologic evidence of several abnormalities 1) bilateral carpal tunnel syndrome, moderate in degree and worse on the right. Comparison was made showing partial improvement of the right median nerve and mildly worse on the left median. 2) left ulnar nerve conduction slowness across the elbow without motor amplitude drop. Needle EMG to the right upper limb and paraspinal muscles was normal showing no evidence of C5/6/7/8/T1 radiculopathy or brachial plexopathy. No myopathic process.  FDI and APB were normal.           Procedure Details: Under procedure category          Sebastian Reyna MD

## 2022-10-20 NOTE — TELEPHONE ENCOUNTER
Spoke to patient. Moderate carpal tunnel changes are seen bilaterally, slightly worse on the right which is the same side as his previous carpal tunnel release about a year ago. He does have follow-up with his hand surgeon. We are going to bring him back for nerve studies of the lower extremities due to some other sensory symptoms.

## 2022-10-26 ENCOUNTER — HOSPITAL ENCOUNTER (OUTPATIENT)
Dept: SLEEP CENTER | Age: 62
Discharge: HOME | End: 2022-10-29
Payer: COMMERCIAL

## 2022-10-26 PROCEDURE — 95806 SLEEP STUDY UNATT&RESP EFFT: CPT

## 2022-11-04 ENCOUNTER — OFFICE VISIT (OUTPATIENT)
Dept: ORTHOPEDIC SURGERY | Age: 62
End: 2022-11-04
Payer: COMMERCIAL

## 2022-11-04 DIAGNOSIS — G56.01 RIGHT CARPAL TUNNEL SYNDROME: Primary | ICD-10-CM

## 2022-11-04 DIAGNOSIS — G56.02 LEFT CARPAL TUNNEL SYNDROME: ICD-10-CM

## 2022-11-04 PROCEDURE — 99214 OFFICE O/P EST MOD 30 MIN: CPT | Performed by: ORTHOPAEDIC SURGERY

## 2022-11-08 NOTE — PROGRESS NOTES
Orthopaedic Hand Clinic Note    Name: Fransico Staley [facility]  Age: 60 y.o. YOB: 1960  Gender: male  MRN: [id number]      Follow up visit:   1. Right carpal tunnel syndrome    2. Left carpal tunnel syndrome        HPI: Mae Valdez is a 60 y.o. male who is following up for bilateral hand tremors, numbness and paresthesias, he is status post right carpal tunnel release and right middle trigger finger release a year ago. He reports ongoing symptoms in the left hand although the numbness and paresthesias are not severe enough to worry him or bother him enough to have surgery, he is more concerned about his tremors, he is under the impression that his neurologist told him that his carpal tunnel syndrome on the right side had returned and he needed to see me for evaluation and that this could be related to his tremors      ROS/Meds/PSH/PMH/FH/SH: I personally reviewed the patients standard intake form. Pertinents are discussed in the HPI    Physical Examination:  General: Awake and alert. HEENT: Normocephalic, atraumatic  CV/Pulm: Breathing even and unlabored  Skin: No obvious rashes noted. Lymphatic: No obvious evidence of lymphedema or lymphadenopathy    Musculoskeletal Examination:  Examination on the right upper extremity demonstrates cap refill < 5 seconds in all fingers, slightly decree sensation median distribution, negative Tinel negative carpal tunnel compression test, well-healed surgical scar over the carpal tunnel. Examination of the left hand demonstrates normal sensation median and ulnar distribution, he does have a positive Tinel and positive carpal tunnel compression test, no thenar or interossei weakness or wasting.     Imaging / Electrodiagnostic Tests:     Nerve conduction study from a month ago was reviewed and independently interpreted, this demonstrates bilateral carpal tunnel syndrome, there is interval improvement in the right-sided carpal tunnel syndrome compared to a nerve conduction study from before surgery, before surgery his sensory latency was not recorded and a year later it is 5.7, sensory amplitude was not recorded before surgery and it is now 18.1, motor latency was 6.6 before surgery and it is now 6.3, motor amplitude improved from 4.6-8.4. There is interval worsening of the left-sided carpal tunnel syndrome, sensory latency increased from 4.4-4.5 and motor latency increased from 4.3-4.9    Assessment:   1. Right carpal tunnel syndrome    2. Left carpal tunnel syndrome        Plan:   We discussed the diagnosis and different treatment options. We discussed observation, therapy, antiinflammatory medications and other pertinent treatment modalities. After discussing in detail the patient elects to proceed with observation for now, we discussed all treatment options in detail, patient does not report any worsening symptoms on the right side so I do not believe he has recurrent right carpal tunnel syndrome, this is further reinforced by the fact that there is interval improvement in all objective measures on nerve conduction study on the right side, he does have slight interval worsening of the left carpal tunnel syndrome however, his symptoms are still mild, he was under the impression that his carpal tunnel syndrome had recurred in the right side and that he would need repeat surgery in order to cure his tremors, I discussed the case with his neurologist Dr. Michelle Kim who simply stated he wanted to be thorough and ensure that all possible culprits of tremors are addressed but that he does not have a strong viewpoint that his tremors are caused solely by carpal tunnel syndrome.   I called the patient back discussed all treatment options with the patient, he understands that although unlikely that carpal tunnel syndrome is causing tremor it is possible, I inquired further history about what bothers him the most on the left side and he reports his tremor, shaking as well as cramping sensations especially when he does activities that require repetitive hand movements such as weeding. I offered a steroid injection as both a diagnostic and therapeutic tool on the patient would like to come in to try injections in both hands. Patient voiced accordance and understanding of the information provided and the formulated plan. All questions were answered to the patient's satisfaction during the encounter.     Te Hernandez MD  Orthopaedic Surgery  11/08/22  8:11 AM

## 2022-11-18 ENCOUNTER — OFFICE VISIT (OUTPATIENT)
Dept: ORTHOPEDIC SURGERY | Age: 62
End: 2022-11-18
Payer: COMMERCIAL

## 2022-11-18 DIAGNOSIS — G56.02 LEFT CARPAL TUNNEL SYNDROME: ICD-10-CM

## 2022-11-18 DIAGNOSIS — G56.01 RIGHT CARPAL TUNNEL SYNDROME: Primary | ICD-10-CM

## 2022-11-18 PROCEDURE — 99214 OFFICE O/P EST MOD 30 MIN: CPT | Performed by: ORTHOPAEDIC SURGERY

## 2022-11-18 PROCEDURE — 20526 THER INJECTION CARP TUNNEL: CPT | Performed by: ORTHOPAEDIC SURGERY

## 2022-11-18 RX ORDER — MELOXICAM 15 MG/1
15 TABLET ORAL DAILY
Qty: 30 TABLET | Refills: 0 | Status: SHIPPED | OUTPATIENT
Start: 2022-11-18 | End: 2022-12-18

## 2022-11-18 NOTE — PROGRESS NOTES
Orthopaedic Hand Surgery Note    Name: Chanelle Pascal  Age: 60 y.o. YOB: 1960  Gender: male  MRN: [id number]    CC: Follow up of hand numbness    HPI: Patient is a 60 y.o. male who is here regarding follow up for  hand tremors, numbness and tingling. ROS/Meds/PSH/PMH/FH/SH: I personally reviewed the patients standard intake form. Pertinents are discussed in the HPI    Physical Examination:  General: Awake and alert. HEENT: Normocephalic, atraumatic  CV/Pulm: Breathing even and unlabored  Skin: No obvious rashes noted. Lymphatic: No obvious evidence of lymphedema or lymphadenopathy    Musculoskeletal:   Examination on the right upper extremity demonstrates cap refill < 5 seconds in all fingers, slightly decree sensation median distribution, negative Tinel negative carpal tunnel compression test, well-healed surgical scar over the carpal tunnel. Examination of the left hand demonstrates normal sensation median and ulnar distribution, he does have a positive Tinel and positive carpal tunnel compression test, no thenar or interossei weakness or wasting. Imaging / Electrodiagnostic Tests:     Nerve conduction study from a month ago was reviewed and independently interpreted, this demonstrates bilateral carpal tunnel syndrome, there is interval improvement in the right-sided carpal tunnel syndrome compared to a nerve conduction study from before surgery, before surgery his sensory latency was not recorded and a year later it is 5.7, sensory amplitude was not recorded before surgery and it is now 18.1, motor latency was 6.6 before surgery and it is now 6.3, motor amplitude improved from 4.6-8.4. There is interval worsening of the left-sided carpal tunnel syndrome, sensory latency increased from 4.4-4.5 and motor latency increased from 4.3-4.9    Assessment:   1. Right carpal tunnel syndrome    2.  Left carpal tunnel syndrome        Plan:  We discussed the diagnosis and different treatment options. We discussed observation, EMG/NCV, night splinting, cortisone injections and surgical decompression of the carpal canal and the risks and benefits of all were clearly outlined. We discussed the fact that carpal tunnel syndrome is a progressive disease and the vast majority of patients will eventually require surgery to prevent progression including permanent numbness and weakness that can impair hand function in its most severe stage. After discussing in detail the patient elects to proceed with bilateral carpal tunnel steroid injections, Mobic 15 daily for 4 weeks, I again discussed that electrophysiologically his testing on the right demonstrates improvement in carpal tunnel syndrome which was severe before surgery, on the left side it does show slight worsening, the injections will help us figure out if the carpal tunnel syndrome on either hand is playing a role in his tremors, I will reassess in 2 months and at that point the patient will communicate how much of these injections helped and it what since, if the injections help with either tremors, numbness or paresthesias then we may discuss more advanced treatment such as surgery. Procedure Note    The risk, benefits and alternatives of injection and no injection therapy were discussed. The patient consented for an injection. The patient has been identified by name and birthdate. The injection site was identified, marked and prepped with alcohol swabs. Time out completed. The Bilateral carpal tunnel was injected with 1ml of 6mg/ml Betamethasone and 1ml Lidocaine plain 1%. The injection site was then dressed with a bandaid. The patient tolerated the injection well. The patient was instructed to monitor their blood sugars if diabetic and call if any concerns. The patient was instructed to call the office if any adverse local effects occurred or any if any questions or concerns arise.            Patient voiced accordance and understanding of the information provided and the formulated plan. All questions were answered to the patient's satisfaction during the encounter.     Gisela Newman MD  Orthopaedic Surgery  11/18/22  7:55 AM

## 2022-11-28 DIAGNOSIS — R79.9 ABNORMAL BLOOD CHEMISTRY: ICD-10-CM

## 2022-11-28 DIAGNOSIS — R53.82 CHRONIC FATIGUE: ICD-10-CM

## 2022-11-28 DIAGNOSIS — R97.20 ELEVATED PROSTATE SPECIFIC ANTIGEN (PSA): ICD-10-CM

## 2022-11-28 DIAGNOSIS — R73.01 IFG (IMPAIRED FASTING GLUCOSE): Primary | ICD-10-CM

## 2022-11-28 DIAGNOSIS — Z12.5 SCREENING PSA (PROSTATE SPECIFIC ANTIGEN): ICD-10-CM

## 2022-11-28 DIAGNOSIS — Z13.6 SCREENING FOR ISCHEMIC HEART DISEASE: ICD-10-CM

## 2022-11-28 DIAGNOSIS — R73.01 IFG (IMPAIRED FASTING GLUCOSE): ICD-10-CM

## 2022-11-28 DIAGNOSIS — Z13.1 SCREENING FOR DIABETES MELLITUS: ICD-10-CM

## 2022-11-28 DIAGNOSIS — Z13.228 SCREENING FOR PHENYLKETONURIA (PKU): ICD-10-CM

## 2022-11-28 LAB
ALBUMIN SERPL-MCNC: 3.7 G/DL (ref 3.2–4.6)
ALBUMIN/GLOB SERPL: 1.4 {RATIO} (ref 0.4–1.6)
ALP SERPL-CCNC: 50 U/L (ref 50–136)
ALT SERPL-CCNC: 32 U/L (ref 12–65)
ANION GAP SERPL CALC-SCNC: 8 MMOL/L (ref 2–11)
AST SERPL-CCNC: 22 U/L (ref 15–37)
BASOPHILS # BLD: 0.1 K/UL (ref 0–0.2)
BASOPHILS NFR BLD: 1 % (ref 0–2)
BILIRUB SERPL-MCNC: 0.9 MG/DL (ref 0.2–1.1)
BUN SERPL-MCNC: 23 MG/DL (ref 8–23)
CALCIUM SERPL-MCNC: 9.5 MG/DL (ref 8.3–10.4)
CHLORIDE SERPL-SCNC: 107 MMOL/L (ref 101–110)
CHOLEST SERPL-MCNC: 121 MG/DL
CO2 SERPL-SCNC: 28 MMOL/L (ref 21–32)
CREAT SERPL-MCNC: 1.6 MG/DL (ref 0.8–1.5)
DIFFERENTIAL METHOD BLD: ABNORMAL
EOSINOPHIL # BLD: 0.3 K/UL (ref 0–0.8)
EOSINOPHIL NFR BLD: 3 % (ref 0.5–7.8)
ERYTHROCYTE [DISTWIDTH] IN BLOOD BY AUTOMATED COUNT: 14.3 % (ref 11.9–14.6)
GLOBULIN SER CALC-MCNC: 2.7 G/DL (ref 2.8–4.5)
GLUCOSE SERPL-MCNC: 88 MG/DL (ref 65–100)
HCT VFR BLD AUTO: 42.1 % (ref 41.1–50.3)
HDLC SERPL-MCNC: 45 MG/DL (ref 40–60)
HDLC SERPL: 2.7 {RATIO}
HGB BLD-MCNC: 12.8 G/DL (ref 13.6–17.2)
IMM GRANULOCYTES # BLD AUTO: 0.3 K/UL (ref 0–0.5)
IMM GRANULOCYTES NFR BLD AUTO: 3 % (ref 0–5)
LDLC SERPL CALC-MCNC: 63.6 MG/DL
LYMPHOCYTES # BLD: 1.7 K/UL (ref 0.5–4.6)
LYMPHOCYTES NFR BLD: 17 % (ref 13–44)
MCH RBC QN AUTO: 29.2 PG (ref 26.1–32.9)
MCHC RBC AUTO-ENTMCNC: 30.4 G/DL (ref 31.4–35)
MCV RBC AUTO: 95.9 FL (ref 82–102)
MONOCYTES # BLD: 0.9 K/UL (ref 0.1–1.3)
MONOCYTES NFR BLD: 9 % (ref 4–12)
NEUTS SEG # BLD: 6.8 K/UL (ref 1.7–8.2)
NEUTS SEG NFR BLD: 67 % (ref 43–78)
NRBC # BLD: 0 K/UL (ref 0–0.2)
PLATELET # BLD AUTO: 170 K/UL (ref 150–450)
PMV BLD AUTO: 11.1 FL (ref 9.4–12.3)
POTASSIUM SERPL-SCNC: 4.2 MMOL/L (ref 3.5–5.1)
PROT SERPL-MCNC: 6.4 G/DL (ref 6.3–8.2)
RBC # BLD AUTO: 4.39 M/UL (ref 4.23–5.6)
SODIUM SERPL-SCNC: 143 MMOL/L (ref 133–143)
T4 FREE SERPL-MCNC: 1 NG/DL (ref 0.78–1.46)
TRIGL SERPL-MCNC: 62 MG/DL (ref 35–150)
TSH, 3RD GENERATION: 1.57 UIU/ML (ref 0.36–3.74)
VLDLC SERPL CALC-MCNC: 12.4 MG/DL (ref 6–23)
WBC # BLD AUTO: 10.1 K/UL (ref 4.3–11.1)

## 2022-11-29 LAB
EST. AVERAGE GLUCOSE BLD GHB EST-MCNC: 108 MG/DL
HBA1C MFR BLD: 5.4 % (ref 4.8–5.6)
PSA FREE MFR SERPL: 11.5 %
PSA FREE SERPL-MCNC: 0.3 NG/ML
PSA SERPL-MCNC: 2.6 NG/ML

## 2022-12-01 ENCOUNTER — TELEPHONE (OUTPATIENT)
Dept: SLEEP MEDICINE | Age: 62
End: 2022-12-01

## 2022-12-01 DIAGNOSIS — G47.33 OSA (OBSTRUCTIVE SLEEP APNEA): Primary | ICD-10-CM

## 2022-12-01 NOTE — TELEPHONE ENCOUNTER
Referral to dentistry for oral appliance per patient request  Orders Placed This Encounter   Procedures    External Referral To Pediatric Dentistry     Referral Priority:   Routine     Referral Type:   Eval and Treat     Referral Reason:   Specialty Services Required     Referred to Provider:   Jon Colvin.  Adriana Aguilera DMD     Requested Specialty:   Dentistry     Number of Visits Requested:   [id number] [facility], APRN - [facility]

## 2022-12-01 NOTE — TELEPHONE ENCOUNTER
----- Message from Kip Morales sent at 11/23/2022  2:20 PM EST -----  Regarding: FW: Mask    ----- Message -----  From: Lorenzo Jimenez  Sent: 11/22/2022   4:41 PM EST  To: , #  Subject: Mask                                             Yes if you would send the order I would like to try the device.  Thanks

## 2022-12-05 ENCOUNTER — OFFICE VISIT (OUTPATIENT)
Dept: PRIMARY CARE CLINIC | Facility: CLINIC | Age: 62
End: 2022-12-05
Payer: COMMERCIAL

## 2022-12-05 VITALS
WEIGHT: 211.8 LBS | OXYGEN SATURATION: 98 % | SYSTOLIC BLOOD PRESSURE: 134 MMHG | DIASTOLIC BLOOD PRESSURE: 80 MMHG | HEART RATE: 87 BPM | HEIGHT: 71 IN | TEMPERATURE: 98.1 F | BODY MASS INDEX: 29.65 KG/M2

## 2022-12-05 DIAGNOSIS — J44.9 CHRONIC OBSTRUCTIVE PULMONARY DISEASE, UNSPECIFIED COPD TYPE (HCC): ICD-10-CM

## 2022-12-05 DIAGNOSIS — K21.00 GASTROESOPHAGEAL REFLUX DISEASE WITH ESOPHAGITIS WITHOUT HEMORRHAGE: Primary | ICD-10-CM

## 2022-12-05 DIAGNOSIS — N18.30 STAGE 3 CHRONIC KIDNEY DISEASE, UNSPECIFIED WHETHER STAGE 3A OR 3B CKD (HCC): ICD-10-CM

## 2022-12-05 DIAGNOSIS — R06.5 CHRONIC MOUTH BREATHING: ICD-10-CM

## 2022-12-05 DIAGNOSIS — M54.12 CERVICAL RADICULOPATHY: ICD-10-CM

## 2022-12-05 DIAGNOSIS — R25.1 TREMORS OF NERVOUS SYSTEM: ICD-10-CM

## 2022-12-05 DIAGNOSIS — M16.11 PRIMARY OSTEOARTHRITIS OF RIGHT HIP: ICD-10-CM

## 2022-12-05 DIAGNOSIS — G47.33 OSA (OBSTRUCTIVE SLEEP APNEA): ICD-10-CM

## 2022-12-05 PROCEDURE — 99214 OFFICE O/P EST MOD 30 MIN: CPT | Performed by: FAMILY MEDICINE

## 2022-12-05 RX ORDER — FAMOTIDINE 20 MG/1
20 TABLET, FILM COATED ORAL 2 TIMES DAILY PRN
Qty: 60 TABLET | Refills: 5 | Status: SHIPPED | OUTPATIENT
Start: 2022-12-05

## 2022-12-05 RX ORDER — OMEPRAZOLE 40 MG/1
40 CAPSULE, DELAYED RELEASE ORAL
Qty: 30 CAPSULE | Refills: 5 | Status: SHIPPED | OUTPATIENT
Start: 2022-12-05

## 2022-12-05 ASSESSMENT — PATIENT HEALTH QUESTIONNAIRE - PHQ9
SUM OF ALL RESPONSES TO PHQ QUESTIONS 1-9: 0
1. LITTLE INTEREST OR PLEASURE IN DOING THINGS: 0
2. FEELING DOWN, DEPRESSED OR HOPELESS: 0
SUM OF ALL RESPONSES TO PHQ9 QUESTIONS 1 & 2: 0
SUM OF ALL RESPONSES TO PHQ QUESTIONS 1-9: 0

## 2022-12-05 NOTE — PROGRESS NOTES
Ethan Valentine MD  [address] [place], [place] [address]  Ph No:  [phone]  Fax:  [phone]:  Chief Complaint   Patient presents with    Follow-up     PT IN FOR 6 MONTH FOLLOW UP. Pt needs a refill on moloxcam    Gastroesophageal Reflux     Pt states when he take a couple of bites of food it gets stuck in his check. Skin Tag     Pt has some moles on his back that he wants looked at. HISTORY OF PRESENT ILLNESS:  Mr. Laura Rowley is a 60 y.o. male. Pt presents for 6 months follow up. Pt reports food getting stuck in throat and acid reflux. Pt says is not sure of reason. Pt is advised he may have smaller esophagus, needing stretching or possible a hiatal hernia, endorses acid  reflux. Pt is given prilosec and pepcid to use initially at home. Pt is referred to GI for investigation of gastritis and or other etiology of stomach distress. Pt also missed his screening colonoscopy, so is advised to discuss having both EGD and screening colonoscopy done at same time. Pt says he has always been a mouth breather, and this has become much more noticeable. Pt says he wants some investigative studies and treatment. Pt is referred to ENT for mouth breathing. Pt has large warty (mostly black) moles on back, 5 or more, only one is itchy. Pt advised these are benign, but if wants removed will need to see dermatology for excision, as are too large to freeze. Pt will consider later is desires removal.    Pt reports working history as . Pt has been exposed to many fumes and smoke. Pt is seeing a pulmonologist for COPD, and is current taking spiriva and albuterol for treatment of lung. Pt is advised to continue same. Pt is also taking meloxicam for arthritis in joints. Pt is advised to discontinue use except as really needed due to decline over two years of GFR from >60 to now 48.  Pt is also advised to drink plenty of fluids for a healthy kidney function. Pt's labs are reviewed as follows:  Gfr 48, chronic kidney disease, stage 3a, needs better fluid balance, less use of mobic, except occasional only. Pt is advised the GI physician may recommend against any NSAID due to stomach gastritis, so can use tylenol based products instead for pain. Liver enzymes normal alt 32, ast 22. Total cholesterol 121, hdl 45, ldl 63.6 ratio of cardiac risk 2.7. Fasting glucose 88, hgba1c 5.4%, non diabetic. Hgb 12.8 within range of normal, MCV 95.9 okay. Pt will RTC in 6 months, cmp, cbc, lipid, tsh, free t4 psa, vit d to be reviewed.         HISTORY:  Allergies   Allergen Reactions    Latex Rash     Past Medical History:   Diagnosis Date    Arrhythmia     Arthritis     hips    CAD (coronary artery disease) 2017, 10/02/2020    Mild CAD, normal LV function per cath dated 10/02/2020; followed by [facility]    Chronic obstructive pulmonary disease ([address]     per pt mild and treated with inhaler    Dyspnea on exertion     GERD (gastroesophageal reflux disease)     resolved    H/O cold sores     Hyperlipidemia     on med for control     Hypertension     on med for control     Latex allergy     Sleep apnea     APAP    Wide-complex tachycardia 2017    NSVT; per pt no problems since put on verapamil     Past Surgical History:   Procedure Laterality Date    BACK SURGERY  05/2021    CARDIAC CATHETERIZATION  2017    no stents     CARDIAC CATHETERIZATION  10/02/2020    Mild CAD, normal LV function    COLONOSCOPY      TOTAL HIP ARTHROPLASTY Bilateral 10/07/2020     Family History   Problem Relation Age of Onset    Depression Mother     Heart Disease Father      Social History     Socioeconomic History    Marital status:      Spouse name: Not on file    Number of children: Not on file    Years of education: Not on file    Highest education level: Not on file   Occupational History    Not on file   Tobacco Use    Smoking status: Never    Smokeless tobacco: Never   Vaping Use    Vaping Use: Never used   Substance and Sexual Activity    Alcohol use: Never    Drug use: Never    Sexual activity: Not on file   Other Topics Concern    Not on file   Social History Narrative    Not on file     Social Determinants of Health     Financial Resource Strain: Not on file   Food Insecurity: Not on file   Transportation Needs: Not on file   Physical Activity: Not on file   Stress: Not on file   Social Connections: Not on file   Intimate Partner Violence: Not on file   Housing Stability: Not on file     Current Outpatient Medications   Medication Sig Dispense Refill    famotidine (PEPCID) 20 MG tablet Take 1 tablet by mouth 2 times daily as needed (acid reflux) 60 tablet 5    omeprazole (PRILOSEC) 40 MG delayed release capsule Take 1 capsule by mouth every morning (before breakfast) 30 capsule 5    sildenafil (VIAGRA) 100 MG tablet Take 1 tablet by mouth as needed for Erectile Dysfunction 30 tablet 3    tiotropium (SPIRIVA RESPIMAT) 2.5 MCG/ACT AERS inhaler Inhale 2 puffs into the lungs daily 1 each 11    losartan-hydroCHLOROthiazide (HYZAAR) 100-25 MG per tablet TAKE 1 TABLET BY MOUTH ONCE DAILY      Multiple Vitamins-Minerals (MULTI COMPLETE PO) Take by mouth      meloxicam (MOBIC) 15 MG tablet TAKE 1 TABLET BY MOUTH ONCE DAILY 90 tablet 1    albuterol sulfate HFA (VENTOLIN HFA) 108 (90 Base) MCG/ACT inhaler Inhale 2 puffs into the lungs 4 times daily as needed for Wheezing 54 g 1    LYSINE PO Take 50 mg by mouth      acetaminophen (TYLENOL) 500 MG tablet Take 1,000 mg by mouth every 6 hours as needed      aspirin 81 MG EC tablet Take 81 mg by mouth 2 times daily      atorvastatin (LIPITOR) 40 MG tablet Take 40 mg by mouth      carbidopa-levodopa (SINEMET)  MG per tablet Take 2 tablets by mouth 3 times daily      verapamil (CALAN SR) 240 MG extended release tablet Take 240 mg by mouth daily       No current facility-administered medications for this visit. REVIEW OF SYSTEMS:  Review of systems is as indicated in HPI otherwise negative. PHYSICAL EXAM:  Vital Signs - /80   Pulse 87   Temp 98.1 °F (36.7 °C) (Temporal)   Ht 5' 11\" (1.803 m)   Wt 211 lb 12.8 oz (96.1 kg)   SpO2 98%   BMI 29.54 kg/m²      Physical Exam  Vitals and nursing note reviewed. Constitutional:       General: He is in acute distress. Appearance: Normal appearance. Comments: See HPI, multiple medical concerns, problems swallowing, gastritis, mouth breathing, copd   HENT:      Head: Normocephalic and atraumatic. Nose: Nose normal.      Mouth/Throat:      Mouth: Mucous membranes are moist.      Pharynx: Oropharynx is clear. Eyes:      Extraocular Movements: Extraocular movements intact. Conjunctiva/sclera: Conjunctivae normal.      Pupils: Pupils are equal, round, and reactive to light. Cardiovascular:      Rate and Rhythm: Normal rate and regular rhythm. Pulses: Normal pulses. Heart sounds: Normal heart sounds. Pulmonary:      Effort: Pulmonary effort is normal.      Comments: Coarse breath sounds, but otherwise mostly clear to ascultation, increased expiratory phase, consistent with moderate copd. Abdominal:      General: Bowel sounds are normal.      Palpations: Abdomen is soft. Musculoskeletal:         General: Normal range of motion. Cervical back: Normal range of motion and neck supple. Skin:     General: Skin is warm and dry. Capillary Refill: Capillary refill takes 2 to 3 seconds. Neurological:      General: No focal deficit present. Mental Status: He is alert and oriented to person, place, and time. Psychiatric:         Behavior: Behavior normal.         Thought Content: Thought content normal.         Judgment: Judgment normal.      Comments: Acute health anxiety, see HPI           LABS  No results found for this visit on 12/05/22. IMPRESSION/PLAN     Diagnosis Orders   1.  Gastroesophageal reflux disease with esophagitis without hemorrhage  [address] [facility]    famotidine (PEPCID) 20 MG tablet    omeprazole (PRILOSEC) 40 MG delayed release capsule      2. Chronic mouth breathing  [address] [facility], [place]      3. AJAY (obstructive sleep apnea)        4. Stage 3 chronic kidney disease, unspecified whether stage 3a or 3b CKD ([facility]        5. Tremors of nervous system        6. Primary osteoarthritis of right hip        7. Cervical radiculopathy        8. Chronic obstructive pulmonary disease, unspecified COPD type ([facility]            No follow-up provider specified. Niya Shipman MD            Dictated using voice recognition software.  Proofread, but unrecognized voice recognition errors may exist.

## 2022-12-06 PROBLEM — R06.5 CHRONIC MOUTH BREATHING: Status: ACTIVE | Noted: 2022-12-06

## 2022-12-06 PROBLEM — K21.00 GASTROESOPHAGEAL REFLUX DISEASE WITH ESOPHAGITIS WITHOUT HEMORRHAGE: Status: ACTIVE | Noted: 2022-12-06

## 2022-12-06 PROBLEM — J44.9 CHRONIC OBSTRUCTIVE PULMONARY DISEASE (HCC): Status: ACTIVE | Noted: 2022-12-06

## 2022-12-15 ENCOUNTER — OFFICE VISIT (OUTPATIENT)
Dept: ENT CLINIC | Age: 62
End: 2022-12-15
Payer: COMMERCIAL

## 2022-12-15 VITALS — BODY MASS INDEX: 29.54 KG/M2 | WEIGHT: 211 LBS | HEIGHT: 71 IN

## 2022-12-15 DIAGNOSIS — J34.2 NASAL SEPTAL DEVIATION: Primary | ICD-10-CM

## 2022-12-15 DIAGNOSIS — J34.3 HYPERTROPHY OF INFERIOR NASAL TURBINATE: ICD-10-CM

## 2022-12-15 DIAGNOSIS — J34.89 NASAL VALVE COLLAPSE: ICD-10-CM

## 2022-12-15 DIAGNOSIS — G47.33 OBSTRUCTIVE SLEEP APNEA SYNDROME: ICD-10-CM

## 2022-12-15 PROCEDURE — 99244 OFF/OP CNSLTJ NEW/EST MOD 40: CPT | Performed by: OTOLARYNGOLOGY

## 2022-12-15 RX ORDER — FLUTICASONE PROPIONATE 50 MCG
2 SPRAY, SUSPENSION (ML) NASAL DAILY
Qty: 48 G | Refills: 1 | Status: SHIPPED | OUTPATIENT
Start: 2022-12-15

## 2022-12-15 NOTE — PROGRESS NOTES
Lian Acosta MD [address]  [address]  P: [phone]          OFFICE VISIT       12/15/2022    Chief Complaint   Patient presents with    New Patient     Trouble breathing through the nose       HPI:  Suzie Barnett is a 60 y.o. male seen in consultation today Lamont Pedraza. MD for   Chief Complaint   Patient presents with    New Patient     Trouble breathing through the nose   . Onset of symptoms: Several years   Frequency (daily, weekly,every night, every morning, constant): intermittently. Patient notices breathing through the nose is worse with exercise.    Alleviating factors or medications: Taking breaks while exercising  Associated with pain and if so scale (1-10): 0  Is condition becoming progressively worse: yes  Associated symptoms within upper aerodigestive tract: mouth breathing, wheezing, trouble breathing through nose      Current Outpatient Medications:     famotidine (PEPCID) 20 MG tablet, Take 1 tablet by mouth 2 times daily as needed (acid reflux), Disp: 60 tablet, Rfl: 5    omeprazole (PRILOSEC) 40 MG delayed release capsule, Take 1 capsule by mouth every morning (before breakfast), Disp: 30 capsule, Rfl: 5    sildenafil (VIAGRA) 100 MG tablet, Take 1 tablet by mouth as needed for Erectile Dysfunction, Disp: 30 tablet, Rfl: 3    tiotropium (SPIRIVA RESPIMAT) 2.5 MCG/ACT AERS inhaler, Inhale 2 puffs into the lungs daily, Disp: 1 each, Rfl: 11    losartan-hydroCHLOROthiazide (HYZAAR) 100-25 MG per tablet, TAKE 1 TABLET BY MOUTH ONCE DAILY, Disp: , Rfl:     Multiple Vitamins-Minerals (MULTI COMPLETE PO), Take by mouth, Disp: , Rfl:     meloxicam (MOBIC) 15 MG tablet, TAKE 1 TABLET BY MOUTH ONCE DAILY, Disp: 90 tablet, Rfl: 1    albuterol sulfate HFA (VENTOLIN HFA) 108 (90 Base) MCG/ACT inhaler, Inhale 2 puffs into the lungs 4 times daily as needed for Wheezing, Disp: 54 g, Rfl: 1    LYSINE PO, Take 50 mg by mouth, Disp: , Rfl:     acetaminophen (TYLENOL) 500 MG tablet, Take 1,000 mg by mouth every 6 hours as needed, Disp: , Rfl:     aspirin 81 MG EC tablet, Take 81 mg by mouth 2 times daily, Disp: , Rfl:     atorvastatin (LIPITOR) 40 MG tablet, Take 40 mg by mouth, Disp: , Rfl:     carbidopa-levodopa (SINEMET)  MG per tablet, Take 2 tablets by mouth 3 times daily, Disp: , Rfl:     verapamil (CALAN SR) 240 MG extended release tablet, Take 240 mg by mouth daily, Disp: , Rfl:     Past Medical History:   Diagnosis Date    Arrhythmia     Arthritis     hips    CAD (coronary artery disease) 2017, 10/02/2020    Mild CAD, normal LV function per cath dated 10/02/2020; followed by [facility]    Chronic obstructive pulmonary disease ([address]     per pt mild and treated with inhaler    Dyspnea on exertion     GERD (gastroesophageal reflux disease)     resolved    H/O cold sores     Hyperlipidemia     on med for control     Hypertension     on med for control     Latex allergy     Sleep apnea     APAP    Wide-complex tachycardia 2017    NSVT; per pt no problems since put on verapamil       Past Surgical History:   Procedure Laterality Date    BACK SURGERY  05/2021    CARDIAC CATHETERIZATION  2017    no stents     CARDIAC CATHETERIZATION  10/02/2020    Mild CAD, normal LV function    COLONOSCOPY      TOTAL HIP ARTHROPLASTY Bilateral 10/07/2020        Family History   Problem Relation Age of Onset    Depression Mother     Heart Disease Father         Social History     Socioeconomic History    Marital status:      Spouse name: Not on file    Number of children: Not on file    Years of education: Not on file    Highest education level: Not on file   Occupational History    Not on file   Tobacco Use    Smoking status: Never    Smokeless tobacco: Never   Vaping Use    Vaping Use: Never used   Substance and Sexual Activity    Alcohol use: Never    Drug use: Never    Sexual activity: Not on file   Other Topics Concern    Not on file   Social History Narrative    Not on file     Social Determinants of Health     Financial Resource Strain: Not on file   Food Insecurity: Not on file   Transportation Needs: Not on file   Physical Activity: Not on file   Stress: Not on file   Social Connections: Not on file   Intimate Partner Violence: Not on file   Housing Stability: Not on file        Allergies   Allergen Reactions    Latex Rash    Iv Dye [Iodides]         ROS:  The patient was asked specifically about the following. General: Fever, chills, night sweats, unexplained weight loss or weight gain  Eyes: Blurry vision, double vision, floaters, loss or decrease of peripheral vision.    Ears: Difficulty hearing, ringing or buzzing in the ears, ear fullness, frequent ear infections, ear pain or drainage, hearing aid  Nose/Face: Drainage, frequent nosebleeds, sinus pain, pressure or fullness, difficulty breathing through the nose, facial pain, swelling or masses  Mouth: Sores in mouth, tongue soreness, bleeding gums, wears dentures, growths in mouth  Throat: Sore throat, hoarseness, difficulty swallowing, lump in throat, sore throat frequency  Respiratory: Difficulty breathing, frequent cough, productive cough, wheezing, recent abnormal chest X-RAY  Cardiovascular: Blocked arteries, chest pain, shortness of breath, abnormal heart beat, pacemaker  Digestive: Frequent indigestion, burning in throat,chest or stomach after a meal, burning that wakes you in the night, abdominal pain  Neuropsychiatric: Headaches, seizures, facial numbness or tingling, weakness, depressed mood or feeling sad, anxiety, inability to cope  Hematologic/Lymphatic: Anemia, easy bruising or bleeding, swollen glands, transfusions  Allergy/Immunologic: Hay fever, environmental allergies, food allergies, allergy testing, immunodeficiency  Endocrine: Heat or cold intolerance, fatigue, heart racing, profuse sweating, thyroid swelling, over or underactive thyroid, pituitary problems  Other: other problems not mentioned  All systems were negative with the exception of the following pertinent positives: Snoring, mouth breathing, sleep apnea    Vitals: There were no vitals filed for this visit. PHYSICAL EXAM: A comprehensive physical exam was performed in the following manner. Unless otherwise indicated in pertinent findings section below, findings were within normal limits. APPEARANCE:   General assessment for development status, nutritional status, and for pain or distress was performed. COMMUNICATION:   Ability to communicate effectively including vocal quality was assessed. HEAD AND FACE:   General exam of the face and scalp for any gross masses or lesions was performed. Palpation of the sinuses for any sign of pain or tenderness was performed. Facial nerve examination for any facial mimetic muscle asymmetry at rest and with effort was performed. Palpation of the submandibular and parotid glands was performed to assess for asymmetry, nodule or masses. EYES:   Extraocular motility was assessed for medial, lateral, superior and inferior rectus function as well as inferior and superior oblique function. The conjunctiva and eyelids were examined for injection, pallor or swelling. Pupil reactivity and accomodation was assessed. EARS:   External inspection and palpation of the auricular skin and cartilage was performed for lesion or abnormality. Otoscopy of the external auditory and tympanic membranes was performed to assess for patency, induration, erythema, tympanic membrane health and mobility and the presence of any middle ear fluid or abnormality. Speech reception thresholds were grossly assessed through communication at normal conversational levels. NOSE:   External exam for gross deformity of the nasal bones and upper and lower lateral cartilages was performed.    Anterior rhinoscopy was performed to assess the patency of the nasal airway, the anatomy of the nasal septum and turbinates as well as the nasal valve region, and the general mucosal health. The presence of any rhinorrhea and its consistency was noted. Any abnormalities requiring further evaluation by nasal endoscopy will be described below. MOUTH/PHARYNX/LARYNX:   Assessment of the lips, gums, hard/soft palate, tongue, tonsillar fossae and oropharynx for mass, lesions or mucosal abnormalities was performed. The base of tongue and floor of mouth were inspected for lesions and palpated for mass or nodularity. Mirror exam of the larynx to assess for vocal fold mobility and any gross mass or lesion was performed. Mirror exam of the nasopharynx was attempted, to assess for gross mass or lesion of the nasopharynx or any of adenoidal hyperplasia or inflammation. Any abnormalities requiring further examination by flexible endoscopy will be described below. NECK:   Gross inspection of the neck was performed to assess for mass or asymmetry. Palpation of the level I-IV lymph nodes was performed to assess for any grossly enlarged, or abnormally firm lymphadenopathy. The skin of the neck was examined for any induration or swelling and palpated for any crepitus. The larynx and trachea were palpated to assess position in the neck and continuity. The thyroid was palpated to assess for any mass, nodularity or asymmetry. NEURO/PSYCH:   Cranial nerves II-XII were grossly assessed for any weakness or asymmetry. If indicated, CN I was assessed by administration of a standardized smell test (UPSIT). Orientation to person, place and time was assessed. Mood and affect were assessed. RESPIRATION:   Respiratory effort was assessed for increased work of breathing and inspiratory or expiratory wheezing. Chest expansion was noted for symmetry. CARDIOVASCULAR:   Gross examination for peripheral vascular edema and jugular venous distension was performed.         PERTINENT PHYSICAL EXAM FINDINGS -

## 2022-12-21 ENCOUNTER — TELEPHONE (OUTPATIENT)
Dept: PRIMARY CARE CLINIC | Facility: CLINIC | Age: 62
End: 2022-12-21

## 2023-01-16 ENCOUNTER — OFFICE VISIT (OUTPATIENT)
Dept: ENT CLINIC | Age: 63
End: 2023-01-16
Payer: COMMERCIAL

## 2023-01-16 ENCOUNTER — PREP FOR PROCEDURE (OUTPATIENT)
Dept: ENT CLINIC | Age: 63
End: 2023-01-16

## 2023-01-16 VITALS — HEIGHT: 71 IN | WEIGHT: 211 LBS | BODY MASS INDEX: 29.54 KG/M2

## 2023-01-16 DIAGNOSIS — J34.2 NASAL SEPTAL DEVIATION: ICD-10-CM

## 2023-01-16 DIAGNOSIS — J34.3 HYPERTROPHY OF INFERIOR NASAL TURBINATE: ICD-10-CM

## 2023-01-16 DIAGNOSIS — J34.89 REFRACTORY OBSTRUCTION OF NASAL AIRWAY: Primary | ICD-10-CM

## 2023-01-16 DIAGNOSIS — G47.33 OBSTRUCTIVE SLEEP APNEA SYNDROME: ICD-10-CM

## 2023-01-16 DIAGNOSIS — J34.89 NASAL VALVE COLLAPSE: ICD-10-CM

## 2023-01-16 PROCEDURE — 99214 OFFICE O/P EST MOD 30 MIN: CPT | Performed by: OTOLARYNGOLOGY

## 2023-01-16 NOTE — PROGRESS NOTES
Peterson Gutierrez MD [address] [place] [address]  P: [phone]          1/16/2023    Chief Complaint   Patient presents with    Follow-up     Nasal congestion         HPI:  Berlin Flores is a 60year old male returning to clinic following a 1 month trial of nasal corticosteroid. He does report compliance with nightly use. Patient denies improvement in his symptoms and would like to discuss nasal airway surgery today. He did note some benefit with use of nasal cones for breathing at night.       Current Outpatient Medications   Medication Sig Dispense Refill    fluticasone (FLONASE) 50 MCG/ACT nasal spray 2 sprays by Each Nostril route daily 48 g 1    famotidine (PEPCID) 20 MG tablet Take 1 tablet by mouth 2 times daily as needed (acid reflux) 60 tablet 5    omeprazole (PRILOSEC) 40 MG delayed release capsule Take 1 capsule by mouth every morning (before breakfast) 30 capsule 5    sildenafil (VIAGRA) 100 MG tablet Take 1 tablet by mouth as needed for Erectile Dysfunction 30 tablet 3    tiotropium (SPIRIVA RESPIMAT) 2.5 MCG/ACT AERS inhaler Inhale 2 puffs into the lungs daily 1 each 11    losartan-hydroCHLOROthiazide (HYZAAR) 100-25 MG per tablet TAKE 1 TABLET BY MOUTH ONCE DAILY      Multiple Vitamins-Minerals (MULTI COMPLETE PO) Take by mouth      meloxicam (MOBIC) 15 MG tablet TAKE 1 TABLET BY MOUTH ONCE DAILY 90 tablet 1    albuterol sulfate HFA (VENTOLIN HFA) 108 (90 Base) MCG/ACT inhaler Inhale 2 puffs into the lungs 4 times daily as needed for Wheezing 54 g 1    LYSINE PO Take 50 mg by mouth      acetaminophen (TYLENOL) 500 MG tablet Take 1,000 mg by mouth every 6 hours as needed      aspirin 81 MG EC tablet Take 81 mg by mouth 2 times daily      atorvastatin (LIPITOR) 40 MG tablet Take 40 mg by mouth      carbidopa-levodopa (SINEMET)  MG per tablet Take 2 tablets by mouth 3 times daily      verapamil (CALAN SR) 240 MG extended release tablet Take 240 mg by mouth daily       No current facility-administered medications for this visit. Past Medical History:   Diagnosis Date    Arrhythmia     Arthritis     hips    CAD (coronary artery disease) 2017, 10/02/2020    Mild CAD, normal LV function per cath dated 10/02/2020; followed by [address] [facility]    Chronic obstructive pulmonary disease ([address]     per pt mild and treated with inhaler    Dyspnea on exertion     GERD (gastroesophageal reflux disease)     resolved    H/O cold sores     Hyperlipidemia     on med for control     Hypertension     on med for control     Latex allergy     Sleep apnea     APAP    Wide-complex tachycardia 2017    NSVT; per pt no problems since put on verapamil        Past Surgical History:   Procedure Laterality Date    BACK SURGERY  05/2021    CARDIAC CATHETERIZATION  2017    no stents     CARDIAC CATHETERIZATION  10/02/2020    Mild CAD, normal LV function    COLONOSCOPY      TOTAL HIP ARTHROPLASTY Bilateral 10/07/2020        Family History   Problem Relation Age of Onset    Depression Mother     Heart Disease Father         Past Surgical History:   Procedure Laterality Date    BACK SURGERY  05/2021    CARDIAC CATHETERIZATION  2017    no stents     CARDIAC CATHETERIZATION  10/02/2020    Mild CAD, normal LV function    COLONOSCOPY      TOTAL HIP ARTHROPLASTY Bilateral 10/07/2020        Allergies   Allergen Reactions    Latex Rash    Iv Dye [Iodides]           ROS:  As noted per HPI. PHYSICAL EXAM:    Vitals: There were no vitals filed for this visit. GENERAL:   PHYSICAL EXAM: An expanded physical exam was performed in the following manner. Unless otherwise indicated in pertinent findings section below, findings were within normal limits. APPEARANCE:   General assessment for development status, nutritional status, and for pain or distress was performed. COMMUNICATION:   Ability to communicate effectively including vocal quality was assessed.         EARS:   External inspection and palpation of the auricular skin and cartilage was performed for lesion or abnormality. Otoscopy of the external auditory and tympanic membranes was performed to assess for patency, induration, erythema, tympanic membrane health and mobility and the presence of any middle ear fluid or abnormality. Speech reception thresholds were grossly assessed through communication at normal conversational levels. NOSE:   External exam for gross deformity of the nasal bones and upper and lower lateral cartilages was performed. Anterior rhinoscopy was performed to assess the patency of the nasal airway, the anatomy of the nasal septum and turbinates as well as the nasal valve region, and the general mucosal health. The presence of any rhinorrhea and its consistency was noted. Any abnormalities requiring further evaluation by nasal endoscopy will be described below. MOUTH/PHARYNX/LARYNX:   Assessment of the lips, gums, hard/soft palate, tongue, tonsillar fossae and oropharynx for mass, lesions or mucosal abnormalities was performed. The base of tongue and floor of mouth were inspected for lesions and palpated for mass or nodularity. Mirror exam of the larynx to assess for vocal fold mobility and any gross mass or lesion was performed. Mirror exam of the nasopharynx was attempted, to assess for gross mass or lesion of the nasopharynx or any of adenoidal hyperplasia or inflammation. Any abnormalities requiring further examination by flexible endoscopy will be described below. RESPIRATION:   Respiratory effort was assessed for increased work of breathing and inspiratory or expiratory wheezing. Chest expansion was noted for symmetry. CARDIOVASCULAR:   Gross examination for peripheral vascular edema and jugular venous distension was performed. PERTINENT PHYSICAL EXAM FINDINGS   Complex septal deviation with nasal valve collapse.   Bilateral inferior turbinate hypertrophy      STUDIES REVIEWED:   Chart Review      ASSESSMENT AND PLAN:       ICD-10-CM    1. Refractory obstruction of nasal airway  J34.89       2. Nasal septal deviation  J34.2       3. Hypertrophy of inferior nasal turbinate  J34.3              Discussed risk benefits and alternatives to nasal airway surgery for refractory nasal airway obstruction. Patient demonstrates a good understanding and desires to proceed with surgery this time. Please schedule accordingly. He should hold aspirin 1 week prior to surgery. The patient diagnoses and management plan were discussed at length. They  demonstrated and understanding of the plan and stated that all questions were answered to their satisfaction.      PATIENT EDUCATION / INSTRUCTIONS GIVEN FOR:   Septum/SMR*

## 2023-01-16 NOTE — H&P (VIEW-ONLY)
Maria Victoria Doty  [address]  P: [phone]          1/16/2023    Chief Complaint   Patient presents with    Follow-up     Nasal congestion         HPI:  Lawerance Bernheim is a 60year old male returning to clinic following a 1 month trial of nasal corticosteroid. He does report compliance with nightly use. Patient denies improvement in his symptoms and would like to discuss nasal airway surgery today. He did note some benefit with use of nasal cones for breathing at night.       Current Outpatient Medications   Medication Sig Dispense Refill    fluticasone (FLONASE) 50 MCG/ACT nasal spray 2 sprays by Each Nostril route daily 48 g 1    famotidine (PEPCID) 20 MG tablet Take 1 tablet by mouth 2 times daily as needed (acid reflux) 60 tablet 5    omeprazole (PRILOSEC) 40 MG delayed release capsule Take 1 capsule by mouth every morning (before breakfast) 30 capsule 5    sildenafil (VIAGRA) 100 MG tablet Take 1 tablet by mouth as needed for Erectile Dysfunction 30 tablet 3    tiotropium (SPIRIVA RESPIMAT) 2.5 MCG/ACT AERS inhaler Inhale 2 puffs into the lungs daily 1 each 11    losartan-hydroCHLOROthiazide (HYZAAR) 100-25 MG per tablet TAKE 1 TABLET BY MOUTH ONCE DAILY      Multiple Vitamins-Minerals (MULTI COMPLETE PO) Take by mouth      meloxicam (MOBIC) 15 MG tablet TAKE 1 TABLET BY MOUTH ONCE DAILY 90 tablet 1    albuterol sulfate HFA (VENTOLIN HFA) 108 (90 Base) MCG/ACT inhaler Inhale 2 puffs into the lungs 4 times daily as needed for Wheezing 54 g 1    LYSINE PO Take 50 mg by mouth      acetaminophen (TYLENOL) 500 MG tablet Take 1,000 mg by mouth every 6 hours as needed      aspirin 81 MG EC tablet Take 81 mg by mouth 2 times daily      atorvastatin (LIPITOR) 40 MG tablet Take 40 mg by mouth      carbidopa-levodopa (SINEMET)  MG per tablet Take 2 tablets by mouth 3 times daily      verapamil (CALAN SR) 240 MG extended release tablet Take 240 mg by mouth daily       No current facility-administered medications for this visit. Past Medical History:   Diagnosis Date    Arrhythmia     Arthritis     hips    CAD (coronary artery disease) 2017, 10/02/2020    Mild CAD, normal LV function per cath dated 10/02/2020; followed by [facility]    Chronic obstructive pulmonary disease ([address]     per pt mild and treated with inhaler    Dyspnea on exertion     GERD (gastroesophageal reflux disease)     resolved    H/O cold sores     Hyperlipidemia     on med for control     Hypertension     on med for control     Latex allergy     Sleep apnea     APAP    Wide-complex tachycardia 2017    NSVT; per pt no problems since put on verapamil        Past Surgical History:   Procedure Laterality Date    BACK SURGERY  05/2021    CARDIAC CATHETERIZATION  2017    no stents     CARDIAC CATHETERIZATION  10/02/2020    Mild CAD, normal LV function    COLONOSCOPY      TOTAL HIP ARTHROPLASTY Bilateral 10/07/2020        Family History   Problem Relation Age of Onset    Depression Mother     Heart Disease Father         Past Surgical History:   Procedure Laterality Date    BACK SURGERY  05/2021    CARDIAC CATHETERIZATION  2017    no stents     CARDIAC CATHETERIZATION  10/02/2020    Mild CAD, normal LV function    COLONOSCOPY      TOTAL HIP ARTHROPLASTY Bilateral 10/07/2020        Allergies   Allergen Reactions    Latex Rash    Iv Dye [Iodides]           ROS:  As noted per HPI. PHYSICAL EXAM:    Vitals: There were no vitals filed for this visit. GENERAL:   PHYSICAL EXAM: An expanded physical exam was performed in the following manner. Unless otherwise indicated in pertinent findings section below, findings were within normal limits. APPEARANCE:   General assessment for development status, nutritional status, and for pain or distress was performed. COMMUNICATION:   Ability to communicate effectively including vocal quality was assessed.         EARS:   External inspection and palpation of the auricular skin and cartilage was performed for lesion or abnormality. Otoscopy of the external auditory and tympanic membranes was performed to assess for patency, induration, erythema, tympanic membrane health and mobility and the presence of any middle ear fluid or abnormality. Speech reception thresholds were grossly assessed through communication at normal conversational levels. NOSE:   External exam for gross deformity of the nasal bones and upper and lower lateral cartilages was performed. Anterior rhinoscopy was performed to assess the patency of the nasal airway, the anatomy of the nasal septum and turbinates as well as the nasal valve region, and the general mucosal health. The presence of any rhinorrhea and its consistency was noted. Any abnormalities requiring further evaluation by nasal endoscopy will be described below. MOUTH/PHARYNX/LARYNX:   Assessment of the lips, gums, hard/soft palate, tongue, tonsillar fossae and oropharynx for mass, lesions or mucosal abnormalities was performed. The base of tongue and floor of mouth were inspected for lesions and palpated for mass or nodularity. Mirror exam of the larynx to assess for vocal fold mobility and any gross mass or lesion was performed. Mirror exam of the nasopharynx was attempted, to assess for gross mass or lesion of the nasopharynx or any of adenoidal hyperplasia or inflammation. Any abnormalities requiring further examination by flexible endoscopy will be described below. RESPIRATION:   Respiratory effort was assessed for increased work of breathing and inspiratory or expiratory wheezing. Chest expansion was noted for symmetry. CARDIOVASCULAR:   Gross examination for peripheral vascular edema and jugular venous distension was performed. PERTINENT PHYSICAL EXAM FINDINGS   Complex septal deviation with nasal valve collapse.   Bilateral inferior turbinate hypertrophy      STUDIES REVIEWED:   Chart Review      ASSESSMENT AND PLAN:       ICD-10-CM    1. Refractory obstruction of nasal airway  J34.89       2. Nasal septal deviation  J34.2       3. Hypertrophy of inferior nasal turbinate  J34.3              Discussed risk benefits and alternatives to nasal airway surgery for refractory nasal airway obstruction. Patient demonstrates a good understanding and desires to proceed with surgery this time. Please schedule accordingly. He should hold aspirin 1 week prior to surgery. The patient diagnoses and management plan were discussed at length. They  demonstrated and understanding of the plan and stated that all questions were answered to their satisfaction.      PATIENT EDUCATION / INSTRUCTIONS GIVEN FOR:   Septum/SMR*

## 2023-01-17 ENCOUNTER — OFFICE VISIT (OUTPATIENT)
Dept: NEUROLOGY | Age: 63
End: 2023-01-17
Payer: COMMERCIAL

## 2023-01-17 VITALS
SYSTOLIC BLOOD PRESSURE: 118 MMHG | WEIGHT: 211 LBS | DIASTOLIC BLOOD PRESSURE: 80 MMHG | HEIGHT: 71 IN | BODY MASS INDEX: 29.54 KG/M2

## 2023-01-17 DIAGNOSIS — G20 PRIMARY PARKINSONISM (HCC): Primary | ICD-10-CM

## 2023-01-17 DIAGNOSIS — G56.03 BILATERAL CARPAL TUNNEL SYNDROME: ICD-10-CM

## 2023-01-17 DIAGNOSIS — R26.9 GAIT DISTURBANCE: ICD-10-CM

## 2023-01-17 PROCEDURE — 99215 OFFICE O/P EST HI 40 MIN: CPT | Performed by: PSYCHIATRY & NEUROLOGY

## 2023-01-17 ASSESSMENT — ENCOUNTER SYMPTOMS
VOICE CHANGE: 1
COUGH: 0
CONSTIPATION: 1

## 2023-01-17 NOTE — PROGRESS NOTES
Amy Cotto  [address], [address] [facility], [address] [place] [address]  Phone: [phone] Fax [phone]  Jamar San MD    Patient: Delta Enamorado [facility]  Provider: Jamar San MD    CC:   Chief Complaint   Patient presents with    Follow-up     Parkinson's disease     Referring Provider:    History of Present Illness:     Jd Ball is a 60 y.o. RH male who presents for follow-up of Parkinson's disease. He is unaccompanied by her visit. He was last seen October 2022. He presents for follow-up and continued management of a suspected primary parkinsonian syndrome highlighted by hand tremors, progressive generalized stiffness and gait disturbance. His father had PD in his late 76s. Current medications include:  N/A     Previous medication trials include:  Sinemet     Patient presents today for follow-up. There has been a case made for suspected parkinsonism although still unclear. He was recently weaned off of levodopa from a dose of up to 600 mg/day and it was never very clear if it was effective and so now has been discontinued. At today's visit he notes worsening symptoms. He notes increasing stiffness particularly in both hands as well as in the shoulders. He is more tremulous than he was before. A recent DaTscan was obtained which was found to be normal and these results have been reviewed with him and this is partly why we decided to wean levodopa. He continues to have a chronic gait disturbance but most of this appears to be related more to lower back pain although there is somewhat of a tendency for him to drag his left lower extremity. He is walking unassisted and denies any recent falls. Of note he there is some complaints of softening of his voice that he does have some trouble with chronic constipation although this appears to be well managed.   There have been a few short-term memory lapses but overall his cognitive function has been relatively intact and there have been no other neuropsychiatric disturbances. No RBD. He had recent nerve conduction studies which still show moderate carpal tunnel changes bilaterally with comparisons showing partial improvement on the right (though still moderate in severity) and mildly worse on the left. Some slowness in the left ulnar nerve conduction around the elbow. EMG was unremarkable. His medical history includes coronary artery disease, mild COPD, and obstructive sleep apnea requiring APAP. He informs me that he will be having a an ENT surgery for deviated septum soon. He has a history of prior back surgery (Left L5-S1 discectomy) and bilateral hip replacements and he has had a carpal tunnel release on the right. He denies alcohol use. Review of Systems:   Review of Systems   Constitutional:  Negative for fever. HENT:  Positive for voice change. Negative for hearing loss. Eyes:  Negative for visual disturbance. Respiratory:  Negative for cough. Cardiovascular:  Negative for chest pain. Gastrointestinal:  Positive for constipation. Genitourinary:  Positive for frequency. Musculoskeletal:  Positive for gait problem. Skin:  Negative for rash. Allergic/Immunologic: Negative for immunocompromised state. Neurological:  Positive for tremors. Psychiatric/Behavioral:  Positive for confusion and decreased concentration. Lab/Imaging Review:   I REVIEWED PERTINENT LABS, IMAGES, AND REPORTS WITH THE PATIENT PERSONALLY, DIRECTLY AND FULLY. THE MOST PERTINENT FINDINGS ARE NOTED BELOW:    EMG/NCV October 2022:  Conclusion: This study showed neurophysiologic evidence of several abnormalities 1) bilateral carpal tunnel syndrome, moderate in degree and worse on the right. Comparison was made showing partial improvement of the right median nerve and mildly worse on the left median. 2) left ulnar nerve conduction slowness across the elbow without motor amplitude drop.   Needle EMG to the right upper limb and paraspinal muscles was normal showing no evidence of C5/6/7/8/T1 radiculopathy or brachial plexopathy. No myopathic process. FDI and APB were normal.     DaTscan July 2022:  Normal tracer activity in both basal ganglia. MRI Brain March 2022:  IMPRESSION   1. Mild burden of chronic microangiopathy. 2. Otherwise unremarkable MRI of the brain for patient age. MRI Cervical Spine September 2021:  IMPRESSION  Multilevel cervical spondylosis with mild spinal stenosis and severe left neural foraminal narrowing at C5-6. At C6-7, there is mild spinal stenosis and moderate bilateral neural foraminal compromise. MRI Lumbar Spine April 2021:  IMPRESSION  1. Moderate size left posterior lateral disc extrusion at L5-S1 that compresses the traversing left S1 nerve root. Moderate to severe left foramina narrowing is also present at L5-S1.  2. Right foraminal disc protrusion at L4-L5 which enters the foramen primarily underneath the exiting nerve root and results in only mild right foramina narrowing. 3. Additional foramina narrowing as noted above. Past Medical History:     Past medical history, surgical history, social history, family history, medications, and allergies were reviewed and updated as appropriate.      PAST MEDICAL HISTORY:  Past Medical History:   Diagnosis Date    Arrhythmia     Arthritis     hips    CAD (coronary artery disease) 2017, 10/02/2020    Mild CAD, normal LV function per cath dated 10/02/2020; followed by [address] [facility]    Chronic obstructive pulmonary disease ([address]     per pt mild and treated with inhaler    Dyspnea on exertion     GERD (gastroesophageal reflux disease)     resolved    H/O cold sores     Hyperlipidemia     on med for control     Hypertension     on med for control     Latex allergy     Sleep apnea     APAP    Wide-complex tachycardia 2017    NSVT; per pt no problems since put on verapamil     PAST SURGICAL HISTORY:   Past Surgical History:   Procedure Laterality Date    BACK SURGERY  05/2021    CARDIAC CATHETERIZATION  2017    no stents     CARDIAC CATHETERIZATION  10/02/2020    Mild CAD, normal LV function    COLONOSCOPY      TOTAL HIP ARTHROPLASTY Bilateral 10/07/2020     FAMILY HISTORY:  Family History   Problem Relation Age of Onset    Depression Mother     Heart Disease Father       SOCIAL HISTORY:  Social History     Socioeconomic History    Marital status:      Spouse name: None    Number of children: None    Years of education: None    Highest education level: None   Tobacco Use    Smoking status: Never    Smokeless tobacco: Never   Vaping Use    Vaping Use: Never used   Substance and Sexual Activity    Alcohol use: Never    Drug use: Never       Medications/Allergies:     MEDICATIONS:   Outpatient Encounter Medications as of 1/17/2023   Medication Sig Dispense Refill    carbidopa-levodopa (SINEMET)  MG per tablet Take 2 tablets by mouth 3 times daily 180 tablet 5    fluticasone (FLONASE) 50 MCG/ACT nasal spray 2 sprays by Each Nostril route daily 48 g 1    omeprazole (PRILOSEC) 40 MG delayed release capsule Take 1 capsule by mouth every morning (before breakfast) 30 capsule 5    sildenafil (VIAGRA) 100 MG tablet Take 1 tablet by mouth as needed for Erectile Dysfunction 30 tablet 3    tiotropium (SPIRIVA RESPIMAT) 2.5 MCG/ACT AERS inhaler Inhale 2 puffs into the lungs daily 1 each 11    losartan-hydroCHLOROthiazide (HYZAAR) 100-25 MG per tablet TAKE 1 TABLET BY MOUTH ONCE DAILY      Multiple Vitamins-Minerals (MULTI COMPLETE PO) Take by mouth      albuterol sulfate HFA (VENTOLIN HFA) 108 (90 Base) MCG/ACT inhaler Inhale 2 puffs into the lungs 4 times daily as needed for Wheezing 54 g 1    LYSINE PO Take 50 mg by mouth      acetaminophen (TYLENOL) 500 MG tablet Take 1,000 mg by mouth every 6 hours as needed      aspirin 81 MG EC tablet Take 81 mg by mouth 2 times daily      atorvastatin (LIPITOR) 40 MG tablet Take 40 mg by mouth      verapamil (CALAN SR) 240 MG extended release tablet Take 240 mg by mouth daily      [DISCONTINUED] famotidine (PEPCID) 20 MG tablet Take 1 tablet by mouth 2 times daily as needed (acid reflux) 60 tablet 5    [DISCONTINUED] meloxicam (MOBIC) 15 MG tablet TAKE 1 TABLET BY MOUTH ONCE DAILY 90 tablet 1    [DISCONTINUED] carbidopa-levodopa (SINEMET)  MG per tablet Take 2 tablets by mouth 3 times daily       No facility-administered encounter medications on file as of 1/17/2023. ALLERGIES:  Allergies   Allergen Reactions    Latex Rash    Iv Dye [Iodides]        Physical Exam:     /80   Ht 5' 11\" (1.803 m)   Wt 211 lb (95.7 kg)   BMI 29.43 kg/m²     General Exam:  General: Well developed, well nourished, in no apparent distress. HEENT: Normocephalic, atraumatic. Sclera anicteric. Oropharynx clear. Neck: Supple without masses  Cardiovascular: Regular rate and rhythm. No carotid bruits. Lungs: Somewhat labored breathing intermittently. Abdomen: Soft, nontender, nondistended. Extremities: Peripheral pulses intact. No edema and no rashes. Neurological Exam:      MS/Language/Speech:  Alert. Oriented to person, place, and time. Language fluent. Speech is somewhat soft and but intelligible. Cranial Nerves: PERRL. Eye movements full. No nystagmus. Facial expression appears intact though may be mildly diminished and no asymmetry noted. Tongue and palate were midline. Shoulder shrug sluggish bilaterally. Motor: There is some difficulty with relaxation. But there is a mild increase in tone without any spasticity of the upper extremities proximally. At the wrist there does appear to be some cogwheeling rigidity particularly with contralateral activation. There is rather dramatic slowing with handgrips and finger taps that again have this sort of cogwheeling appearance.   With hands outstretched there is a jerky tremulousness of the hands and the same is also seen with finger-nose-finger bilaterally. Even with distraction I cannot bring out any obvious resting tremor. The lower extremities show perhaps a mild increase in tone but nothing to the extent of the upper extremities. With writing there is a general shakiness in his handwriting but no micrographia and spiral drawing show a very low amplitude and jerky appearing tremor. Sensory: Normal to light touch throughout. Cerebellar: No ataxia or dysmetria. Reflexes (R/L): Biceps (2+/2+), Brachioradialis (2+/2+), Patellar (2+/2+), Ankle (2+/2+). Jimenez's was negative and plantar responses were flexor. Gait: He is slow to rise from a seated position. Gait is primarily an antalgic gait secondary to back pain. Otherwise he looks fairly fluid and there is slightly diminished but reasonable arm swing bilaterally. Turns were relatively fluid. Assessment and Plan:     Manpreet Carbajal is a 60 y.o. male who presents with the following issues:     Corbin Salinas was seen today for follow-up. Diagnoses and all orders for this visit:    Primary parkinsonism ([address]  -     carbidopa-levodopa (SINEMET)  MG per tablet; Take 2 tablets by mouth 3 times daily    Bilateral carpal tunnel syndrome    Gait disturbance      Patient presents for follow-up and continued management of suspected parkinsonism however recent DaTscan was normal.  He was weaned off of Sinemet and unfortunately has only experienced interval worsening of symptoms. I am able to detect more cogwheeling rigidity than at previous visits and so the question of a primary parkinsonian syndrome still remains. I still cannot rule out other etiologies to his symptoms including arthritis or other neuropathic pain. Nerve conduction studies did show some evidence of carpal tunnel syndrome bilaterally. MRI of the brain has been reviewed and is relatively unremarkable with only age-appropriate changes noted.   Further spine imaging has revealed multilevel degenerative changes but no evidence of spinal cord compromise or myelopathy. Moving forward we are going to set him up for a Syn-One skin biopsy to further rule out the diagnosis of PD. Since it is apparent the symptoms worsened since weaning off of levodopa, I would like to have him restart it and given him a schedule to titrate back up to Sinemet 2 tablets 3 times a day. I have encouraged him to monitor for any improvement of symptoms as we do so. Follow-up to be arranged. Signature: Kassie Solares MD      Date:  1/21/2023    [address] [place] [address] Neurology   [facility]  Ph: [phone]  Fax: [phone]         I have personally interviewed and examined Mr. Bre Oconnell and I have personally reviewed all relevant records including labs and imaging as noted above. I have written all aspects of this note. More than 50% of this time was used for counseling regarding my diagnosis, prognosis, and plans for management. Total visit time: 44 minutes.

## 2023-01-17 NOTE — PATIENT INSTRUCTIONS
Start carbidopa/levodopa  mg tablets.    Week 1: Take 1/2 tablet in the morning, 1/2 tablet in the afternoon, 1/2 tablet in the evening  Week 2: Take 1 tablet in the morning, 1 tablet in the afternoon, 1 tablet in the evening  Week 3: Take 1.5 tablet in the morning, 1.5 tablet in the afternoon, 1.5 tablet in the evening  Week 4: Take 2 tablet in the morning, 2 tablet in the afternoon, 2 tablet in the evening

## 2023-01-24 ENCOUNTER — OFFICE VISIT (OUTPATIENT)
Dept: ORTHOPEDIC SURGERY | Age: 63
End: 2023-01-24
Payer: COMMERCIAL

## 2023-01-24 DIAGNOSIS — G56.01 RIGHT CARPAL TUNNEL SYNDROME: Primary | ICD-10-CM

## 2023-01-24 DIAGNOSIS — G56.02 LEFT CARPAL TUNNEL SYNDROME: ICD-10-CM

## 2023-01-24 PROCEDURE — 99214 OFFICE O/P EST MOD 30 MIN: CPT | Performed by: ORTHOPAEDIC SURGERY

## 2023-01-24 NOTE — PROGRESS NOTES
Orthopaedic Hand Clinic Note    Name: Deyvi Richard [facility]  Age: 60 y.o. YOB: 1960  Gender: male  MRN: [id number]      Follow up visit:   1. Right carpal tunnel syndrome    2. Left carpal tunnel syndrome        HPI: Daniella Dietz is a 60 y.o. male who is following up for bilateral hand tremors, on the last visit we performed bilateral carpal tunnel injections to rule out carpal tunnel as a source of tremors, he reports injection did not provide any relief, he was recently put back on treatment for Parkinson's disease, he is going to undergo further treatment and testing. ROS/Meds/PSH/PMH/FH/SH: I personally reviewed the patients standard intake form. Pertinents are discussed in the HPI    Physical Examination:  General: Awake and alert. HEENT: Normocephalic, atraumatic  CV/Pulm: Breathing even and unlabored  Skin: No obvious rashes noted. Lymphatic: No obvious evidence of lymphedema or lymphadenopathy    Musculoskeletal Examination:  Examination on the right upper extremity demonstrates cap refill < 5 seconds in all fingers, slightly decree sensation median distribution, negative Tinel negative carpal tunnel compression test, well-healed surgical scar over the carpal tunnel. Examination of the left hand demonstrates normal sensation median and ulnar distribution, he does have a positive Tinel and positive carpal tunnel compression test, no thenar or interossei weakness or wasting.     Imaging / Electrodiagnostic Tests:     Nerve conduction study from a month ago was reviewed and independently interpreted, this demonstrates bilateral carpal tunnel syndrome, there is interval improvement in the right-sided carpal tunnel syndrome compared to a nerve conduction study from before surgery, before surgery his sensory latency was not recorded and a year later it is 5.7, sensory amplitude was not recorded before surgery and it is now 18.1, motor latency was 6.6 before surgery and it is now 6.3, motor amplitude improved from 4.6-8.4. There is interval worsening of the left-sided carpal tunnel syndrome, sensory latency increased from 4.4-4.5 and motor latency increased from 4.3-4.9    Assessment:   1. Right carpal tunnel syndrome    2. Left carpal tunnel syndrome        Plan:   We discussed the diagnosis and different treatment options. We discussed observation, therapy, antiinflammatory medications and other pertinent treatment modalities. After discussing in detail the patient elects to proceed with referral back to neurology, I explained that I do not believe the carpal tunnel syndrome is causing his tremors, we again reviewed the fact that the nerve conduction study shows improvement in the right carpal tunnel testing after surgery, we also discussed that he does have active carpal tunnel syndrome on the left which is mild to moderate but at this point he does not complain of burning, numbness or paresthesias. I offered to reevaluate the patient when the left hand at symptoms become more consistent with carpal tunnel syndrome. I discussed the case with his neurologist.     Patient voiced accordance and understanding of the information provided and the formulated plan. All questions were answered to the patient's satisfaction during the encounter.     Becca Elizalde MD  Orthopaedic Surgery  01/24/23  8:34 AM

## 2023-01-27 NOTE — PERIOP NOTE
Dear  Tony,      Thank you for completing your phone assessment with me today. Here are your requested surgery instructions. Please call #[phone] with any questions/concerns. Your surgery is scheduled at [facility]: [address], [id number]. Please arrive at Outpatient Entrance. The Pre-op department ([phone] for Outpatient) will call you on the business day before your surgery with your arrival time. If you have any questions on the day of surgery, please call the pre-op department at the telephone number above. If you have not received a telephone call from Outpatient by 2 pm the business day before surgery, please call Outpatient # listed above. If you are sick the day of surgery: fever >100 deg F, coughing up colored mucus, or have abdominal sickness (intractable nausea, vomiting or diarrhea) please call [phone] the day of surgery as early as possible and speak to a nurse about your symptoms. They will advise you on next steps. No food or drink after midnight which includes any gum, mints, candy, or ice chips. Please take these medications on the morning of surgery with a small sip of water: bring albuterol and spiriva inhalers on day of surgery; take carbidopa/levodopa, omeprazole, and verapamil day of surgery only. On the day before surgery take Acetaminophen 1000mg in the morning and at bedtime OR Acetaminophen 650mg in the morning, afternoon and bedtime. Please stop all vitamins/supplements 7 days prior to surgery and stop all NSAIDS (ibuprofen, naproxen, aleve, motrin, advil) 5 days before your surgery. A responsible adult must drive you to the hospital, remain in the building during surgery and you will need adult supervision for 24 hours after anesthesia. Please use an antibacterial soap (Dial, Safeguard, etc.) the night before surgery and on the morning of surgery.  Do NOT wear: deodorant, make-up, nail polish, lotions, cologne, perfumes, powders or oil on your skin. All piercings/metal/jewelry must be removed prior to arrival.  If you wear contacts then you will need to bring a case to store them in or wear your glasses. Artificial nails are not permitted. Please leave all your valuables at home but be sure to bring your insurance card and ID on the day of surgery for registration/identification. Our Guide to Surgery with additional information can be found:  [email or web address]    Emailed to: [email or web address] and to [email or web address] account

## 2023-01-27 NOTE — PERIOP NOTE
Patient verified name and . Order for consent NOT found in EHR; patient verifies procedure from case posting. Type 1B surgery, phone assessment complete. Orders NOT received. Labs per surgeon: Unknown  Labs per anesthesia protocol: Potassium DOS    Patient answered medical/surgical history questions at their best of ability. All prior to admission medications documented in [facility]. If you have not heard from Onofre MAI James by 2 pm, please call [phone]. Patient instructed to take the following medications the day of surgery according to anesthesia guidelines with a small sip of water: bring albuterol and spiriva inhalers DOS; take carbidopa/levodopa, omeprazole, and verapamil day of surgery. On the day before surgery please take Acetaminophen 1000mg in the morning and then again before bed. You may substitute for Tylenol 650 mg. Hold all vitamins 7 days prior to surgery and NSAIDS 5 days prior to surgery. Prescription meds to hold: losartan/HCTZ    Patient instructed on the following:    > Arrive at [facility], time of arrival to be called the day before by 1700  > NPO after midnight, unless otherwise indicated, including gum, mints, and ice chips  > Responsible adult must drive patient to the hospital, stay during surgery, and patient will need supervision 24 hours after anesthesia  > Use antibacterial soap in shower the night before surgery and on the morning of surgery  > All piercings must be removed prior to arrival.    > Leave all valuables (money and jewelry) at home but bring insurance card and ID on DOS.   > You may be required to pay a deductible or co-pay on the day of your procedure. You can pre-pay by calling [phone] if your surgery is at the [facility] or [phone] if your surgery is at the [facility]. > Do not wear make-up, nail polish, lotions, cologne, perfumes, powders, or oil on skin. Artificial nails are not permitted.

## 2023-02-02 ENCOUNTER — PROCEDURE VISIT (OUTPATIENT)
Dept: NEUROLOGY | Age: 63
End: 2023-02-02
Payer: COMMERCIAL

## 2023-02-02 VITALS
OXYGEN SATURATION: 93 % | HEART RATE: 93 BPM | SYSTOLIC BLOOD PRESSURE: 114 MMHG | WEIGHT: 208.8 LBS | BODY MASS INDEX: 29.12 KG/M2 | DIASTOLIC BLOOD PRESSURE: 78 MMHG

## 2023-02-02 DIAGNOSIS — G20 PARKINSON DISEASE (HCC): Primary | ICD-10-CM

## 2023-02-02 PROCEDURE — 11104 PUNCH BX SKIN SINGLE LESION: CPT | Performed by: PSYCHIATRY & NEUROLOGY

## 2023-02-02 PROCEDURE — 11105 PUNCH BX SKIN EA SEP/ADDL: CPT | Performed by: PSYCHIATRY & NEUROLOGY

## 2023-02-02 NOTE — PROGRESS NOTES
{Blank single:19197::\"BILATERAL\",\"RIGHT\",\"LEFT\"} OCCIPITAL NERVE BLOCK & TRIGGER POINT INJECTION PROCEDURE:        History: ***. Today will be the {Blank single:19197::\"1st\",\"2nd\",\"3rd\",\"***th\"} treatment. Procedure performed by: Jossie Yusuf MD      Assistants: JERSON ***              CONSENT:        Informed consent was obtained prior to the procedure after discussion of the risks, benefits were discussed with the patient; consent placed in chart. The possibilities of reaction to medication, bleeding, infection were discussed with the patient. The patient concurred with the proposed plan, giving informed consent. LOCATION: [facility]        PROCEDURE DETAILS:    The patient was placed in the sitting position. The area of occipital nerve taking off and branch regions, the trigger points were identified and the skin prepped 2 times with alcohol and the alcohol allowed to dry. Medication used: *** mL Kenalog 40 mg/mL; *** mL 2% Lidocaine. Total *** mL mixed solution. 27 gauge 1/2 inch for occipital nerve block, and 1'1/4\" inch needle for trigger point injection. Once reproduction of the pain was elicited and negative aspiration confirmed, the block sites and trigger points were injected with the solution, 0.3-0.5 mL per site. Following sites were injected, see the picture. Occipital nerve block: Occipital nerve trunk taking off region, below the ridge and the above the ridge bilaterally. Both medial and lateral branch bilaterally. Trigger points injection: Total 4 sites injected including bilateral semispinalis capitis one on each side, bilateral mid level cervical paraspinal muscles one on each side. The patient tolerated the procedure well without complications.

## 2023-02-02 NOTE — PROGRESS NOTES
Skin Biopsy Procedure Note:       Indication: Parkinson's Disease       Consent: Written consent was completed     Assistant: Vandana Kraus        The patient was placed on an examining table to the left side. Total 3 biopsy sites, all on the right side. The first site was at the lateral lower leg 10 cm above the lateral malleolus. The second site was at the distal thigh 10 cm above the lateral knee cap upper edge. The third site was at C7 vertebral process level and 3 cm away from the center laterally. All sites were steriled with Betadine swabs x 3, and then alcohol patch x 1.  2% Lidocaine was injected intra and subcutaneously in V shape to each site. The biopsy punch and other instrument were provided by [facility]. About 3-4 mm diameter and 5-6 mm depth (full length of the punch end) of dermis/ epidermis was sampled from the above sites, and each placed into individual solution of a test tube provided by [facility]. The label of each tube was correctly matched with the sample. Minimal bleeding was easily stopped by pressure with sterile 4 x 4, then the each biopsy site was covered by Quikclot gauze then 2 wide Band-Aid crossed. Patient tolerated the procedure well. Postprocedural instruction was given. Patient was advised to call for any discomfort related to today's procedure.

## 2023-02-02 NOTE — PROGRESS NOTES
[address] [place] [address] Court, [address] [facility]  [phone]    Patient Name:  Doni Solares [facility]  YOB: 1960    Pursuant to the emergency declaration under the [facility], [facility] waiver authority and the [employer] and [employer] Act, this Virtual  Visit was conducted, with patient's consent, to reduce the patient's risk of exposure to COVID-19 and provide continuity of care for an established patient. Telehealth encounter is a billable service, with coverage as determined by the insurance carrier. Services were provided through a video synchronous discussion virtually to substitute for in-person clinic visit. Charu Rolle is a 60 y.o. male who was seen by synchronous (real-time) audio-video technology on 2/3/2023. Consent:  He and/or his healthcare decision maker is aware that this patient-initiated Telehealth encounter is a billable service, with coverage as determined by his insurance carrier. He is aware that he may receive a bill and has provided verbal consent to proceed: Yes        Office Visit 2/3/2023    CHIEF COMPLAINT:    Chief Complaint   Patient presents with    Follow-up       HISTORY OF PRESENT ILLNESS:  Patient is being seen today via virtual visit. Patient was diagnosed with sleep apnea in 2020 with AHI 8.2 events per hour. He had lost weight and wanted to reassess his sleep apnea. He had a home sleep test since his last visit revealing an AHI of 6.2 events per hour with lowest oxygen saturation 84%. SPO2 was less than 89% for total of 92 minutes of the test.    He has not use CPAP therapy since his last visit here. He was on auto pap 8 to 10 cm. He was referred to dentistry for an oral appliance. He states that the appliance is ready for pickup. He will start that soon.     He is also seen ENT, Dr. Nick Chaudhary, diagnosed with nasal septal deviation and hypertrophy of inferior nasal terminate. He will have surgery on Tuesday. He is hopeful that his sleep apnea will be treated with oral appliance and his nasal surgery. He continues to be followed by neurology and is being worked up for his Parkinson -like symptoms. He does have underlying COPD and is on Spiriva and albuterol. He has an upcoming appointment with pulmonary as he continues to have shortness of breath with exertion. He did have significant hypoxemia noted on his last sleep study. We discussed the importance of treating his nocturnal hypoxemia and in the interim would use CPAP therapy. He may need nocturnal oxygen due to his COPD.       HST               Past Medical History:   Diagnosis Date    Arrhythmia     NSVT taking verapamil for this    Arthritis     hips    CAD (coronary artery disease) 2017, 10/02/2020    NO MI, NO STENTS NO CABG per patient; Mild CAD, normal LV function per cath dated 10/02/2020; followed by [facility]    Chronic obstructive pulmonary disease ([facility] [address] [address]     per pt mild and treated with inhaler    CKD (chronic kidney disease)     stage 3 per patient    Dyspnea on exertion     only with exertion not at rest    GERD (gastroesophageal reflux disease)     taking omeprazole daily; does not sleep elevated    H/O cold sores     Hx of echocardiogram 03/2021    LVEF 55-60%    Hyperlipidemia     statin taken    Hypertension     controlled w/ med    Latex allergy     Sleep apnea     APAP cannot tolerate apap right now; oral device is being made as of 1/27/2023    Wide-complex tachycardia 2017    NSVT; per pt no problems since put on verapamil       Patient Active Problem List   Diagnosis    Tremors of nervous system    Acute left-sided low back pain with left-sided sciatica    Follow-up examination, following other surgery    Left carpal tunnel syndrome    Parkinson's disease ([facility] [address]    Lumbar herniated disc    Nocturnal hypoxemia    AJAY (obstructive sleep apnea) Parkinson disease ([facility] [address]    Cogwheel rigidity    Cervical radiculopathy    Trigger finger, right middle finger    Right carpal tunnel syndrome    Snoring    Osteoarthritis of right hip    OA (osteoarthritis) of hip    Generalized hyperreflexia    Cervical spinal stenosis    Shortness of breath on exertion    Rash and nonspecific skin eruption    Chronic renal disease, stage III (HCC)    Bilateral carpal tunnel syndrome    Ulnar nerve entrapment at elbow, right    Chronic obstructive pulmonary disease (HCC)    Chronic mouth breathing    Gastroesophageal reflux disease with esophagitis without hemorrhage    Deviated nasal septum    Hypertrophy of both inferior nasal turbinates    Refractory obstruction of nasal airway           Past Surgical History:   Procedure Laterality Date    BACK SURGERY  05/2021    CARDIAC CATHETERIZATION  2017    no stents     CARDIAC CATHETERIZATION  10/02/2020    Mild CAD, normal LV function    COLONOSCOPY      TOTAL HIP ARTHROPLASTY Bilateral 10/07/2020           Social History     Socioeconomic History    Marital status:      Spouse name: Not on file    Number of children: Not on file    Years of education: Not on file    Highest education level: Not on file   Occupational History    Not on file   Tobacco Use    Smoking status: Never    Smokeless tobacco: Never   Vaping Use    Vaping Use: Never used   Substance and Sexual Activity    Alcohol use: Never    Drug use: Never    Sexual activity: Not on file   Other Topics Concern    Not on file   Social History Narrative    Not on file     Social Determinants of Health     Financial Resource Strain: Not on file   Food Insecurity: Not on file   Transportation Needs: Not on file   Physical Activity: Not on file   Stress: Not on file   Social Connections: Not on file   Intimate Partner Violence: Not on file   Housing Stability: Not on file         Family History   Problem Relation Age of Onset    Depression Mother     Heart Disease Father Allergies   Allergen Reactions    Latex Rash    Iv Dye [Iodides] Hives and Itching         Current Outpatient Medications   Medication Sig    carbidopa-levodopa (SINEMET)  MG per tablet Take 2 tablets by mouth 3 times daily (Patient taking differently: Take 1 tablet by mouth 3 times daily)    omeprazole (PRILOSEC) 40 MG delayed release capsule Take 1 capsule by mouth every morning (before breakfast)    sildenafil (VIAGRA) 100 MG tablet Take 1 tablet by mouth as needed for Erectile Dysfunction    tiotropium (SPIRIVA RESPIMAT) 2.5 MCG/ACT AERS inhaler Inhale 2 puffs into the lungs daily    losartan-hydroCHLOROthiazide (HYZAAR) 100-25 MG per tablet TAKE 1 TABLET BY MOUTH ONCE DAILY    Multiple Vitamins-Minerals (MULTI COMPLETE PO) Take by mouth    albuterol sulfate HFA (VENTOLIN HFA) 108 (90 Base) MCG/ACT inhaler Inhale 2 puffs into the lungs 4 times daily as needed for Wheezing    LYSINE PO Take 50 mg by mouth as needed    acetaminophen (TYLENOL) 500 MG tablet Take 1,000 mg by mouth every 6 hours as needed    aspirin 81 MG EC tablet Take 81 mg by mouth 2 times daily    atorvastatin (LIPITOR) 40 MG tablet Take 40 mg by mouth at bedtime    verapamil (CALAN SR) 240 MG extended release tablet Take 240 mg by mouth daily    fluticasone (FLONASE) 50 MCG/ACT nasal spray 2 sprays by Each Nostril route daily (Patient not taking: Reported on 2/3/2023)     No current facility-administered medications for this visit. REVIEW OF SYSTEMS:   CONSTITUTIONAL:   There is no history of fever, chills, night sweats. CARDIAC:   No chest pain, pressure, discomfort, palpitations, orthopnea, murmurs, or edema. GI:   No dysphagia, heartburn reflux, nausea/vomiting, diarrhea, abdominal pain, or bleeding. NEURO:   There is no history of AMS, persistent headache, decreased level of consciousness, seizures, or motor or sensory deficits.     VIRTUAL EXAM  PHYSICAL EXAMINATION:  [ INSTRUCTIONS:  \"[x]\" Indicates a positive item  \"[]\" Indicates a negative item   Vital Signs: (As obtained by patient/caregiver at home)  There were no vitals taken for this visit. Constitutional: [x] Appears well-developed and well-nourished [x] No apparent distress      [] Abnormal     Mental status: [x] Alert and awake  [x] Oriented to person/place/time [x] Able to follow commands    [] Abnormal -     Eyes:   EOM    [x]  Normal    [] Abnormal -   Sclera  [x]  Normal    [] Abnormal -          Discharge [x]  None visible   [] Abnormal -    HENT: [x] Normocephalic, atraumatic  [] Abnormal -  [x] Mouth/Throat: Mucous membranes are moist    External Ears [x] Normal  [] Abnormal -    Neck: [x] No visualized mass [] Abnormal -     Pulmonary/Chest: [x] Respiratory effort normal   [x] No visualized signs of difficulty breathing or respiratory distress        [] Abnormal -      Musculoskeletal:   [x] Normal gait with no signs of ataxia         [x] Normal range of motion of neck        [] Abnormal -     Neurological:        [x] No Facial Asymmetry (Cranial nerve 7 motor function) (limited exam due to video visit)          [x] No gaze palsy        [] Abnormal -         Skin:        [x] No significant exanthematous lesions or discoloration noted on facial skin         [] Abnormal -            Psychiatric:       [x] Normal Affect [] Abnormal -       [x] No Hallucinations    Other pertinent observable physical exam findings:-      ASSESSMENT:  (Medical Decision Making)      Diagnosis Orders   1. Obstructive sleep apnea syndrome     patient has overall mild sleep apnea but severe nocturnal hypoxemia. He was evaluated by dentistry for an oral appliance and also has upcoming nasal surgery on Tuesday. He is not use CPAP therapy since his last visit   2. Nocturnal hypoxemia     this is severe. We discussed the importance of treating nocturnal hypoxemia and in the interim would you CPAP therapy. He may need nocturnal oxygen with his oral appliance.       3. Deviated septum     Will have surgery on Tuesday         PLAN:  Oral appliance per Dr. Gertrude Camara  Upcoming nasal turbinate reduction and septoplasty on Tuesday. Positional therapy   Use pap therapy in the interim     Follow up in 4 months. Will need to ensure nocturnal hypoxemia has resolved post nasal surgery and with oral appliance      No orders of the defined types were placed in this encounter. No orders of the defined types were placed in this encounter. Collaborating Physician: Dr. Silvia Valadez I spent at least 20  minutes with this established patient, and >50% of the time was spent counseling and/or coordinating care regarding dennis.     DEB Rizzo CNP  Electronically signed

## 2023-02-03 ENCOUNTER — TELEMEDICINE (OUTPATIENT)
Dept: SLEEP MEDICINE | Age: 63
End: 2023-02-03
Payer: COMMERCIAL

## 2023-02-03 DIAGNOSIS — J34.2 DEVIATED SEPTUM: ICD-10-CM

## 2023-02-03 DIAGNOSIS — G47.33 OBSTRUCTIVE SLEEP APNEA SYNDROME: Primary | ICD-10-CM

## 2023-02-03 DIAGNOSIS — G47.34 NOCTURNAL HYPOXEMIA: ICD-10-CM

## 2023-02-03 PROCEDURE — 99213 OFFICE O/P EST LOW 20 MIN: CPT | Performed by: NURSE PRACTITIONER

## 2023-02-03 ASSESSMENT — SLEEP AND FATIGUE QUESTIONNAIRES
HOW LIKELY ARE YOU TO NOD OFF OR FALL ASLEEP WHILE WATCHING TV: 1
HOW LIKELY ARE YOU TO NOD OFF OR FALL ASLEEP WHILE SITTING INACTIVE IN A PUBLIC PLACE: 0
HOW LIKELY ARE YOU TO NOD OFF OR FALL ASLEEP IN A CAR, WHILE STOPPED FOR A FEW MINUTES IN TRAFFIC: 0
HOW LIKELY ARE YOU TO NOD OFF OR FALL ASLEEP WHILE SITTING AND READING: 0
HOW LIKELY ARE YOU TO NOD OFF OR FALL ASLEEP WHEN YOU ARE A PASSENGER IN A CAR FOR AN HOUR WITHOUT A BREAK: 0
HOW LIKELY ARE YOU TO NOD OFF OR FALL ASLEEP WHILE SITTING AND TALKING TO SOMEONE: 0
ESS TOTAL SCORE: 4
HOW LIKELY ARE YOU TO NOD OFF OR FALL ASLEEP WHILE LYING DOWN TO REST IN THE AFTERNOON WHEN CIRCUMSTANCES PERMIT: 2
HOW LIKELY ARE YOU TO NOD OFF OR FALL ASLEEP WHILE SITTING QUIETLY AFTER LUNCH WITHOUT ALCOHOL: 1

## 2023-02-06 ENCOUNTER — ANESTHESIA EVENT (OUTPATIENT)
Dept: SURGERY | Age: 63
End: 2023-02-06
Payer: COMMERCIAL

## 2023-02-07 ENCOUNTER — ANESTHESIA (OUTPATIENT)
Dept: SURGERY | Age: 63
End: 2023-02-07
Payer: COMMERCIAL

## 2023-02-07 ENCOUNTER — HOSPITAL ENCOUNTER (OUTPATIENT)
Age: 63
Setting detail: OUTPATIENT SURGERY
Discharge: HOME | End: 2023-02-07
Attending: OTOLARYNGOLOGY | Admitting: OTOLARYNGOLOGY
Payer: COMMERCIAL

## 2023-02-07 VITALS
SYSTOLIC BLOOD PRESSURE: 113 MMHG | TEMPERATURE: 97.6 F | HEART RATE: 78 BPM | WEIGHT: 210 LBS | HEIGHT: 71 IN | OXYGEN SATURATION: 93 % | RESPIRATION RATE: 16 BRPM | BODY MASS INDEX: 29.4 KG/M2 | DIASTOLIC BLOOD PRESSURE: 62 MMHG

## 2023-02-07 DIAGNOSIS — J34.89 NASAL VALVE COLLAPSE: ICD-10-CM

## 2023-02-07 DIAGNOSIS — J34.3 HYPERTROPHY OF BOTH INFERIOR NASAL TURBINATES: ICD-10-CM

## 2023-02-07 DIAGNOSIS — J34.3 HYPERTROPHY OF INFERIOR NASAL TURBINATE: ICD-10-CM

## 2023-02-07 DIAGNOSIS — J34.89 REFRACTORY OBSTRUCTION OF NASAL AIRWAY: ICD-10-CM

## 2023-02-07 DIAGNOSIS — J34.2 DEVIATED NASAL SEPTUM: ICD-10-CM

## 2023-02-07 DIAGNOSIS — G47.33 OBSTRUCTIVE SLEEP APNEA SYNDROME: ICD-10-CM

## 2023-02-07 DIAGNOSIS — J34.2 NASAL SEPTAL DEVIATION: ICD-10-CM

## 2023-02-07 DIAGNOSIS — G89.18 POST-OP PAIN: Primary | ICD-10-CM

## 2023-02-07 LAB — POTASSIUM BLD-SCNC: 3.3 MMOL/L (ref 3.5–5.1)

## 2023-02-07 PROCEDURE — 7100000001 HC PACU RECOVERY - ADDTL 15 MIN: Performed by: OTOLARYNGOLOGY

## 2023-02-07 PROCEDURE — 3600000004 HC SURGERY LEVEL 4 BASE: Performed by: OTOLARYNGOLOGY

## 2023-02-07 PROCEDURE — 84132 ASSAY OF SERUM POTASSIUM: CPT

## 2023-02-07 PROCEDURE — 2709999900 HC NON-CHARGEABLE SUPPLY: Performed by: OTOLARYNGOLOGY

## 2023-02-07 PROCEDURE — 3600000014 HC SURGERY LEVEL 4 ADDTL 15MIN: Performed by: OTOLARYNGOLOGY

## 2023-02-07 PROCEDURE — 2500000003 HC RX 250 WO HCPCS: Performed by: OTOLARYNGOLOGY

## 2023-02-07 PROCEDURE — 6360000002 HC RX W HCPCS

## 2023-02-07 PROCEDURE — 2580000003 HC RX 258: Performed by: ANESTHESIOLOGY

## 2023-02-07 PROCEDURE — 2500000003 HC RX 250 WO HCPCS

## 2023-02-07 PROCEDURE — 7100000000 HC PACU RECOVERY - FIRST 15 MIN: Performed by: OTOLARYNGOLOGY

## 2023-02-07 PROCEDURE — 3700000001 HC ADD 15 MINUTES (ANESTHESIA): Performed by: OTOLARYNGOLOGY

## 2023-02-07 PROCEDURE — 6370000000 HC RX 637 (ALT 250 FOR IP): Performed by: ANESTHESIOLOGY

## 2023-02-07 PROCEDURE — 7100000010 HC PHASE II RECOVERY - FIRST 15 MIN: Performed by: OTOLARYNGOLOGY

## 2023-02-07 PROCEDURE — 3700000000 HC ANESTHESIA ATTENDED CARE: Performed by: OTOLARYNGOLOGY

## 2023-02-07 PROCEDURE — 2580000003 HC RX 258

## 2023-02-07 PROCEDURE — 6370000000 HC RX 637 (ALT 250 FOR IP): Performed by: OTOLARYNGOLOGY

## 2023-02-07 RX ORDER — BACITRACIN ZINC 500 [USP'U]/G
OINTMENT TOPICAL PRN
Status: DISCONTINUED | OUTPATIENT
Start: 2023-02-07 | End: 2023-02-07 | Stop reason: HOSPADM

## 2023-02-07 RX ORDER — LIDOCAINE HYDROCHLORIDE AND EPINEPHRINE 10; 10 MG/ML; UG/ML
INJECTION, SOLUTION INFILTRATION; PERINEURAL PRN
Status: DISCONTINUED | OUTPATIENT
Start: 2023-02-07 | End: 2023-02-07 | Stop reason: HOSPADM

## 2023-02-07 RX ORDER — OXYCODONE HYDROCHLORIDE AND ACETAMINOPHEN 5; 325 MG/1; MG/1
1 TABLET ORAL EVERY 6 HOURS PRN
Qty: 12 TABLET | Refills: 0 | Status: SHIPPED | OUTPATIENT
Start: 2023-02-07 | End: 2023-02-15

## 2023-02-07 RX ORDER — CEFDINIR 300 MG/1
300 CAPSULE ORAL 2 TIMES DAILY
Qty: 20 CAPSULE | Refills: 0 | Status: SHIPPED | OUTPATIENT
Start: 2023-02-07 | End: 2023-02-17

## 2023-02-07 RX ORDER — SODIUM CHLORIDE 0.9 % (FLUSH) 0.9 %
5-40 SYRINGE (ML) INJECTION PRN
Status: DISCONTINUED | OUTPATIENT
Start: 2023-02-07 | End: 2023-02-07 | Stop reason: HOSPADM

## 2023-02-07 RX ORDER — SODIUM CHLORIDE 9 MG/ML
INJECTION, SOLUTION INTRAVENOUS PRN
Status: DISCONTINUED | OUTPATIENT
Start: 2023-02-07 | End: 2023-02-07 | Stop reason: HOSPADM

## 2023-02-07 RX ORDER — OXYMETAZOLINE HYDROCHLORIDE 0.05 G/100ML
SPRAY NASAL PRN
Status: DISCONTINUED | OUTPATIENT
Start: 2023-02-07 | End: 2023-02-07 | Stop reason: HOSPADM

## 2023-02-07 RX ORDER — LABETALOL HYDROCHLORIDE 5 MG/ML
10 INJECTION, SOLUTION INTRAVENOUS
Status: DISCONTINUED | OUTPATIENT
Start: 2023-02-07 | End: 2023-02-07 | Stop reason: HOSPADM

## 2023-02-07 RX ORDER — ROCURONIUM BROMIDE 10 MG/ML
INJECTION, SOLUTION INTRAVENOUS PRN
Status: DISCONTINUED | OUTPATIENT
Start: 2023-02-07 | End: 2023-02-07 | Stop reason: SDUPTHER

## 2023-02-07 RX ORDER — MIDAZOLAM HYDROCHLORIDE 2 MG/2ML
2 INJECTION, SOLUTION INTRAMUSCULAR; INTRAVENOUS
Status: DISCONTINUED | OUTPATIENT
Start: 2023-02-07 | End: 2023-02-07 | Stop reason: HOSPADM

## 2023-02-07 RX ORDER — FENTANYL CITRATE 50 UG/ML
100 INJECTION, SOLUTION INTRAMUSCULAR; INTRAVENOUS
Status: DISCONTINUED | OUTPATIENT
Start: 2023-02-07 | End: 2023-02-07 | Stop reason: HOSPADM

## 2023-02-07 RX ORDER — LIDOCAINE HYDROCHLORIDE 20 MG/ML
INJECTION, SOLUTION EPIDURAL; INFILTRATION; INTRACAUDAL; PERINEURAL PRN
Status: DISCONTINUED | OUTPATIENT
Start: 2023-02-07 | End: 2023-02-07 | Stop reason: SDUPTHER

## 2023-02-07 RX ORDER — PROCHLORPERAZINE EDISYLATE 5 MG/ML
5 INJECTION INTRAMUSCULAR; INTRAVENOUS
Status: DISCONTINUED | OUTPATIENT
Start: 2023-02-07 | End: 2023-02-07 | Stop reason: HOSPADM

## 2023-02-07 RX ORDER — LIDOCAINE HYDROCHLORIDE 10 MG/ML
1 INJECTION, SOLUTION INFILTRATION; PERINEURAL
Status: DISCONTINUED | OUTPATIENT
Start: 2023-02-07 | End: 2023-02-07 | Stop reason: HOSPADM

## 2023-02-07 RX ORDER — HYDROMORPHONE HYDROCHLORIDE 1 MG/ML
0.5 INJECTION, SOLUTION INTRAMUSCULAR; INTRAVENOUS; SUBCUTANEOUS EVERY 5 MIN PRN
Status: DISCONTINUED | OUTPATIENT
Start: 2023-02-07 | End: 2023-02-07 | Stop reason: HOSPADM

## 2023-02-07 RX ORDER — PROPOFOL 10 MG/ML
INJECTION, EMULSION INTRAVENOUS PRN
Status: DISCONTINUED | OUTPATIENT
Start: 2023-02-07 | End: 2023-02-07 | Stop reason: SDUPTHER

## 2023-02-07 RX ORDER — HYDROMORPHONE HYDROCHLORIDE 1 MG/ML
0.25 INJECTION, SOLUTION INTRAMUSCULAR; INTRAVENOUS; SUBCUTANEOUS EVERY 5 MIN PRN
Status: DISCONTINUED | OUTPATIENT
Start: 2023-02-07 | End: 2023-02-07 | Stop reason: HOSPADM

## 2023-02-07 RX ORDER — ONDANSETRON 2 MG/ML
4 INJECTION INTRAMUSCULAR; INTRAVENOUS
Status: DISCONTINUED | OUTPATIENT
Start: 2023-02-07 | End: 2023-02-07 | Stop reason: HOSPADM

## 2023-02-07 RX ORDER — DEXAMETHASONE SODIUM PHOSPHATE 10 MG/ML
INJECTION INTRAMUSCULAR; INTRAVENOUS PRN
Status: DISCONTINUED | OUTPATIENT
Start: 2023-02-07 | End: 2023-02-07 | Stop reason: SDUPTHER

## 2023-02-07 RX ORDER — SODIUM CHLORIDE 0.9 % (FLUSH) 0.9 %
5-40 SYRINGE (ML) INJECTION EVERY 12 HOURS SCHEDULED
Status: DISCONTINUED | OUTPATIENT
Start: 2023-02-07 | End: 2023-02-07 | Stop reason: HOSPADM

## 2023-02-07 RX ORDER — ACETAMINOPHEN 500 MG
1000 TABLET ORAL ONCE
Status: COMPLETED | OUTPATIENT
Start: 2023-02-07 | End: 2023-02-07

## 2023-02-07 RX ORDER — SODIUM CHLORIDE, SODIUM LACTATE, POTASSIUM CHLORIDE, CALCIUM CHLORIDE 600; 310; 30; 20 MG/100ML; MG/100ML; MG/100ML; MG/100ML
INJECTION, SOLUTION INTRAVENOUS CONTINUOUS
Status: DISCONTINUED | OUTPATIENT
Start: 2023-02-07 | End: 2023-02-07 | Stop reason: HOSPADM

## 2023-02-07 RX ORDER — ONDANSETRON 2 MG/ML
INJECTION INTRAMUSCULAR; INTRAVENOUS PRN
Status: DISCONTINUED | OUTPATIENT
Start: 2023-02-07 | End: 2023-02-07 | Stop reason: SDUPTHER

## 2023-02-07 RX ORDER — HYDRALAZINE HYDROCHLORIDE 20 MG/ML
10 INJECTION INTRAMUSCULAR; INTRAVENOUS
Status: DISCONTINUED | OUTPATIENT
Start: 2023-02-07 | End: 2023-02-07 | Stop reason: HOSPADM

## 2023-02-07 RX ORDER — OXYCODONE HYDROCHLORIDE 5 MG/1
5 TABLET ORAL
Status: DISCONTINUED | OUTPATIENT
Start: 2023-02-07 | End: 2023-02-07 | Stop reason: HOSPADM

## 2023-02-07 RX ADMIN — SUGAMMADEX 200 MG: 100 INJECTION, SOLUTION INTRAVENOUS at 13:46

## 2023-02-07 RX ADMIN — PROPOFOL 200 MG: 10 INJECTION, EMULSION INTRAVENOUS at 13:08

## 2023-02-07 RX ADMIN — LIDOCAINE HYDROCHLORIDE 80 MG: 20 INJECTION, SOLUTION EPIDURAL; INFILTRATION; INTRACAUDAL; PERINEURAL at 13:08

## 2023-02-07 RX ADMIN — ACETAMINOPHEN 1000 MG: 500 TABLET ORAL at 11:28

## 2023-02-07 RX ADMIN — ONDANSETRON 4 MG: 2 INJECTION INTRAMUSCULAR; INTRAVENOUS at 13:17

## 2023-02-07 RX ADMIN — PHENYLEPHRINE HYDROCHLORIDE 100 MCG: 10 INJECTION INTRAVENOUS at 13:22

## 2023-02-07 RX ADMIN — FENTANYL CITRATE 100 MCG: 50 INJECTION INTRAMUSCULAR; INTRAVENOUS at 13:08

## 2023-02-07 RX ADMIN — PHENYLEPHRINE HYDROCHLORIDE 150 MCG: 10 INJECTION INTRAVENOUS at 13:16

## 2023-02-07 RX ADMIN — SODIUM CHLORIDE, POTASSIUM CHLORIDE, SODIUM LACTATE AND CALCIUM CHLORIDE: 600; 310; 30; 20 INJECTION, SOLUTION INTRAVENOUS at 11:28

## 2023-02-07 RX ADMIN — DEXAMETHASONE SODIUM PHOSPHATE 10 MG: 10 INJECTION INTRAMUSCULAR; INTRAVENOUS at 13:17

## 2023-02-07 RX ADMIN — ROCURONIUM BROMIDE 40 MG: 50 INJECTION, SOLUTION INTRAVENOUS at 13:08

## 2023-02-07 RX ADMIN — Medication 2 G: at 13:15

## 2023-02-07 RX ADMIN — PHENYLEPHRINE HYDROCHLORIDE 150 MCG: 10 INJECTION INTRAVENOUS at 13:37

## 2023-02-07 ASSESSMENT — PAIN - FUNCTIONAL ASSESSMENT: PAIN_FUNCTIONAL_ASSESSMENT: 0-10

## 2023-02-07 NOTE — OP NOTE
Operative Note    Operative Note    Admit Service: ENT    Name: Angel Rosario [facility]  MRN: [id number]  : 1960     Date:2023                  Pre-operative Diagnosis:  Nasal septal deviation  Bilateral inferior turbinate hypertrophy  Refractory nasal airway obstruction     Post-op Diagnosis:  Nasal septal deviation  Bilateral inferior turbinate hypertrophy  Refractory nasal airway obstruction     Procedure:  Nasal septoplasty  Bilateral endoscopic submucous resection of the inferior nasal turbinates    Surgeon:  Ashlee Arango. Yordan Grove MD     Assistant:  None    Anesthesia Type:  Gen. EBL:  20 mL    Specimen:  Bilateral Nasal contents    Splints/Implants:  Tafoya's    Findings  Complex septal deviation   Inferior turbinate hypertrophy   Severely atrophic/thinned septal mucosa. Description of Procedure: Following discussion of the risks, benefits, indications, and alternatives  of the above-mentioned procedure, the patient was taken back to the  operating room and placed supine on the operating room table. General endotracheal  anesthesia was induced by the Anesthesia Service and consent  was confirmed and time-out was performed. The head of the patient was then prepped and draped in the usual fashion with the eyes protected. Next, the nasal septum and bilateral inferior turbinate were infiltrated with 1% lidocaine with 1 100,000 epinephrine. The nasal mucosa was decongested with Afrin-soaked pledgets bilaterally. A left caudal hemitransfixion incision was executed on the nasal septum and a mucoperichondrial and mucoperiosteal flap was elevated over the deviated portions of septal cartilage and bone. I then carefully incised through the cartilage, preserving an ample caudal strut, and a contralateral mucoperichondrial mucoperiosteal flap was elevated. The deviated portions of septal cartilage and bone were then resected, including the use of an osteotome to resect an inferior bony spur.  The incision site was then closed anteriorly using two 4-0 chromic simple interrupted sutures. Next, a 15 blade was used to make an incision from the tail to the tip of the undersurface of the left inferior turbinate. Medial and lateral mucoperiosteal flaps were elevated off the turbinate bone, which was then resected. The flaps were then cauterized, reapproximated, and lateralized to the lateral nasal wall. In an identical manner I performed right endoscopic submucous resection of the inferior nasal turbinate. The nasal passages were then copiously lavaged with saline solution and the mucosa again decongested with Afrin-soaked pledgets. Finally, Tafoya splints were placed in either side of the nasal septum and secured anteriorly with a single 3-0 nylon suture in horizontal mattress fashion. A small drip pad was placed. This concluded the operative portion of procedure. Patient care was hen turned back to the Anesthesia Service, who allowed the patient to awaken without difficulty. There were no immediate postoperative complications. Disposition:  PACU and home.

## 2023-02-07 NOTE — ANESTHESIA PROCEDURE NOTES
Airway  Date/Time: 2/7/2023 1:10 PM  Urgency: elective    Airway not difficult    General Information and Staff    Patient location during procedure: OR  Resident/CRNA: DEB Greenberg - CRNA  Performed: resident/CRNA     Indications and Patient Condition  Indications for airway management: anesthesia  Spontaneous Ventilation: absent  Sedation level: deep  Preoxygenated: yes  Patient position: sniffing  MILS not maintained throughout  Mask difficulty assessment: vent by bag mask    Final Airway Details  Final airway type: endotracheal airway      Successful airway: ETT  Cuffed: yes   Successful intubation technique: direct laryngoscopy  Facilitating devices/methods: intubating stylet  Endotracheal tube insertion site: oral  Blade: Lori  Blade size: #4  ETT size (mm): 7.5  Cormack-Lehane Classification: grade I - full view of glottis  Placement verified by: chest auscultation and capnometry   Measured from: lips  ETT to lips (cm): 25  Number of attempts at approach: 1  Ventilation between attempts: bag mask

## 2023-02-07 NOTE — INTERVAL H&P NOTE
Update History & Physical    The patient's History and Physical of January 16, 2023 was reviewed with the patient and I examined the patient. There was no change. The surgical site was confirmed by the patient and me. Plan: The risks, benefits, expected outcome, and alternative to the recommended procedure have been discussed with the patient. Patient understands and wants to proceed with the procedure.      Electronically signed by Lamont Ang MD on 2/7/2023 at 12:43 PM

## 2023-02-07 NOTE — ANESTHESIA PRE PROCEDURE
Department of Anesthesiology  Preprocedure Note       Name:  Joanie Moreno [facility]   Age:  60 y.o.  :  1960                                          MRN:  [id number]         Date:  2023      Surgeon: Jasmin Raymundo):  Pat Whittaker MD    Procedure: Procedure(s):  SEPTOPLASTY GRAFT  INFERIOR TURBINATE REDUCTION    Medications prior to admission:   Prior to Admission medications    Medication Sig Start Date End Date Taking?  Authorizing Provider   carbidopa-levodopa (SINEMET)  MG per tablet Take 2 tablets by mouth 3 times daily  Patient taking differently: Take 1 tablet by mouth 3 times daily 23   Meek Shaver MD   fluticasone Tanya Valenzuela) 50 MCG/ACT nasal spray 2 sprays by Each Nostril route daily  Patient not taking: Reported on 2/3/2023 12/15/22   Pat Whittaker MD   omeprazole (PRILOSEC) 40 MG delayed release capsule Take 1 capsule by mouth every morning (before breakfast) 22   Kiera Lanza MD   sildenafil (VIAGRA) 100 MG tablet Take 1 tablet by mouth as needed for Erectile Dysfunction 22   Kiera Lanza MD   tiotropium (SPIRIVA RESPIMAT) 2.5 MCG/ACT AERS inhaler Inhale 2 puffs into the lungs daily 22   Teetee Nuñez MD   losartan-hydroCHLOROthiazide (HYZAAR) 100-25 MG per tablet TAKE 1 TABLET BY MOUTH ONCE DAILY 22   Historical Provider, MD   Multiple Vitamins-Minerals (MULTI COMPLETE PO) Take by mouth    Historical Provider, MD   albuterol sulfate HFA (VENTOLIN HFA) 108 (90 Base) MCG/ACT inhaler Inhale 2 puffs into the lungs 4 times daily as needed for Wheezing 22   Kiera Lanza MD   LYSINE PO Take 50 mg by mouth as needed    Ar Automatic Reconciliation   acetaminophen (TYLENOL) 500 MG tablet Take 1,000 mg by mouth every 6 hours as needed 20   Ar Automatic Reconciliation   aspirin 81 MG EC tablet Take 81 mg by mouth 2 times daily 20   Ar Automatic Reconciliation   atorvastatin (LIPITOR) 40 MG tablet Take 40 mg by mouth at bedtime 10/22/19   Ar Automatic Reconciliation   verapamil (CALAN SR) 240 MG extended release tablet Take 240 mg by mouth daily    Ar Automatic Reconciliation       Current medications:    Current Facility-Administered Medications   Medication Dose Route Frequency Provider Last Rate Last Admin    lidocaine 1 % injection 1 mL  1 mL IntraDERmal Once PRN [place] Janyce Bias, DO        fentaNYL (SUBLIMAZE) injection 100 mcg  100 mcg IntraVENous Once PRN [place] Friskey, DO        lactated ringers IV soln infusion   IntraVENous Continuous [place] Friskey,  mL/hr at 02/07/23 1128 New Bag at 02/07/23 1128    sodium chloride flush 0.9 % injection 5-40 mL  5-40 mL IntraVENous 2 times per day [place] Friskey, DO        sodium chloride flush 0.9 % injection 5-40 mL  5-40 mL IntraVENous PRN [place] Janyce Bias, DO        0.9 % sodium chloride infusion   IntraVENous PRN [place] Janyce Bias, DO        midazolam PF (VERSED) injection 2 mg  2 mg IntraVENous Once PRN [place] Janyce Bias, DO           Allergies:     Allergies   Allergen Reactions    Latex Rash    Iv Dye [Iodides] Hives and Itching       Problem List:    Patient Active Problem List   Diagnosis Code    Tremors of nervous system R25.1    Acute left-sided low back pain with left-sided sciatica M54.42    Follow-up examination, following other surgery Z09    Left carpal tunnel syndrome G56.02    Parkinson's disease ([address] G20    Lumbar herniated disc M51.26    Nocturnal hypoxemia G47.34    AJAY (obstructive sleep apnea) G47.33    Parkinson disease ([address] G20    Cogwheel rigidity R29.898    Cervical radiculopathy M54.12    Trigger finger, right middle finger M65.331    Right carpal tunnel syndrome G56.01    Snoring R06.83    Osteoarthritis of right hip M16.11    OA (osteoarthritis) of hip M16.9    Generalized hyperreflexia R29.2    Cervical spinal stenosis M48.02    Shortness of breath on exertion R06.02    Rash and nonspecific skin eruption R21    Chronic renal disease, stage III ([facility]) N18.30    Bilateral carpal tunnel syndrome G56.03    Ulnar nerve entrapment at elbow, right G56.21    Chronic obstructive pulmonary disease (HCC) J44.9    Chronic mouth breathing R06.5    Gastroesophageal reflux disease with esophagitis without hemorrhage K21.00    Deviated nasal septum J34.2    Hypertrophy of both inferior nasal turbinates J34.3    Refractory obstruction of nasal airway J34.89       Past Medical History:        Diagnosis Date    Arrhythmia     NSVT taking verapamil for this    Arthritis     hips    CAD (coronary artery disease) 2017, 10/02/2020    NO MI, NO STENTS NO CABG per patient; Mild CAD, normal LV function per cath dated 10/02/2020; followed by [facility]    Chronic obstructive pulmonary disease [address] [address]     per pt mild and treated with inhaler    CKD (chronic kidney disease)     stage 3 per patient    Dyspnea on exertion     only with exertion not at rest    GERD (gastroesophageal reflux disease)     taking omeprazole daily; does not sleep elevated    H/O cold sores     Hx of echocardiogram 03/2021    LVEF 55-60%    Hyperlipidemia     statin taken    Hypertension     controlled w/ med    Latex allergy     Sleep apnea     APAP cannot tolerate apap right now; oral device is being made as of 1/27/2023    Wide-complex tachycardia 2017    NSVT; per pt no problems since put on verapamil       Past Surgical History:        Procedure Laterality Date    BACK SURGERY  05/2021    CARDIAC CATHETERIZATION  2017    no stents     CARDIAC CATHETERIZATION  10/02/2020    Mild CAD, normal LV function    COLONOSCOPY      TOTAL HIP ARTHROPLASTY Bilateral 10/07/2020       Social History:    Social History     Tobacco Use    Smoking status: Never    Smokeless tobacco: Never   Substance Use Topics    Alcohol use: Never                                Counseling given: Not Answered      Vital Signs (Current):   Vitals:    01/27/23 0935 02/07/23 1132   BP:  139/82   Pulse:  96   Resp:  16   Temp:  97.8 °F (36.6 °C)   TempSrc:  Oral   SpO2:  96%   Weight: 210 lb (95.3 kg)    Height: 5' 11\" (1.803 m)                                               BP Readings from Last 3 Encounters:   02/07/23 139/82   02/02/23 114/78   01/17/23 118/80       NPO Status: Time of last liquid consumption: 2200                        Time of last solid consumption: 2200                        Date of last liquid consumption: 02/06/23                        Date of last solid food consumption: 02/06/23    BMI:   Wt Readings from Last 3 Encounters:   01/27/23 210 lb (95.3 kg)   02/02/23 208 lb 12.8 oz (94.7 kg)   01/17/23 211 lb (95.7 kg)     Body mass index is 29.29 kg/m².     CBC:   Lab Results   Component Value Date/Time    WBC 10.1 11/28/2022 08:40 AM    RBC 4.39 11/28/2022 08:40 AM    HGB 12.8 11/28/2022 08:40 AM    HCT 42.1 11/28/2022 08:40 AM    MCV 95.9 11/28/2022 08:40 AM    RDW 14.3 11/28/2022 08:40 AM     11/28/2022 08:40 AM       CMP:   Lab Results   Component Value Date/Time     11/28/2022 08:40 AM    K 4.2 11/28/2022 08:40 AM     11/28/2022 08:40 AM    CO2 28 11/28/2022 08:40 AM    BUN 23 11/28/2022 08:40 AM    CREATININE 1.60 11/28/2022 08:40 AM    GFRAA 58 12/01/2021 09:00 AM    AGRATIO 2.5 12/01/2021 09:00 AM    LABGLOM 48 11/28/2022 08:40 AM    GLUCOSE 88 11/28/2022 08:40 AM    PROT 6.4 11/28/2022 08:40 AM    CALCIUM 9.5 11/28/2022 08:40 AM    BILITOT 0.9 11/28/2022 08:40 AM    ALKPHOS 50 11/28/2022 08:40 AM    ALKPHOS 67 12/01/2021 09:00 AM    AST 22 11/28/2022 08:40 AM    ALT 32 11/28/2022 08:40 AM       POC Tests:   Recent Labs     02/07/23  1125   POCK 3.3*       Coags:   Lab Results   Component Value Date/Time    PROTIME 13.4 09/21/2020 08:37 AM    INR 1.0 09/21/2020 08:37 AM    APTT 28.0 09/21/2020 08:37 AM       HCG (If Applicable): No results found for: PREGTESTUR, PREGSERUM, HCG, HCGQUANT     ABGs: No results found for: PHART, PO2ART, ISR9QGG, YZG5WSY, BEART, B7WKDKLE     Type & Screen (If Applicable):  No results found for: LABABO, LABRH    Drug/Infectious Status (If Applicable):  Lab Results   Component Value Date/Time    HEPCAB <0.1 04/08/2021 11:10 AM       COVID-19 Screening (If Applicable):   Lab Results   Component Value Date/Time    COVID19 Not Detected 05/06/2021 08:43 AM    COVID19 Performed 05/06/2021 08:43 AM           Anesthesia Evaluation  Patient summary reviewed  Airway: Mallampati: II          Dental: normal exam         Pulmonary: breath sounds clear to auscultation  (+) COPD:  sleep apnea: on noncompliant,                             Cardiovascular:  Exercise tolerance: good (>4 METS),   (+) hypertension:, CAD: non-obstructive, dysrhythmias:, hyperlipidemia        Rhythm: regular  Rate: normal                 ROS comment: NSVT--no issues since 2017 per record     Neuro/Psych:   (+) neuromuscular disease: Parkinson's disease,             GI/Hepatic/Renal:   (+) GERD: well controlled,           Endo/Other: Negative Endo/Other ROS                    Abdominal:             Vascular: negative vascular ROS. Other Findings:           Anesthesia Plan      general     ASA 3       Induction: intravenous. MIPS: Postoperative opioids intended and Prophylactic antiemetics administered. Anesthetic plan and risks discussed with patient and spouse. Plan discussed with CRNA.                     Benjy Sanchez DO   2/7/2023

## 2023-02-07 NOTE — DISCHARGE INSTRUCTIONS
No nose blowing  Sneeze with mouth open  Change drip pad PRN  Has prescriptions for Cefdinir and percocet  Clean nasal vestibule with 1/2 water and 1/2 peroxide solution twice daily and apply mupirocin  Discharge when stable   Return to office in one week for splint removal.        ACTIVITY  As tolerated and as directed by your doctor. Bathe or shower as directed by your doctor. DIET  Clear liquids until no nausea or vomiting; then light diet for the first day. Advance to regular diet on second day, unless your doctor orders otherwise. If nausea and vomiting continues, call your doctor. PAIN  Take pain medication as directed by your doctor. Call your doctor if pain is NOT relieved by medication. DO NOT take aspirin of blood thinners unless directed by your doctor. MEDICATION INTERACTION:During your procedure you potentially received a medication or medications which may reduce the effectiveness of oral contraceptives. Please consider other forms of contraception for 1 month following your procedure if you are currently using oral contraceptives as your primary form of birth control.  In addition to this, we recommend continuing your oral contraceptive as prescribed, unless otherwise instructed by your physician, during this time      [address] IF   Excessive bleeding that does not stop after holding pressure over the area  Temperature of 101 degrees F or above  Excessive redness, swelling or bruising, and/ or green or yellow, smelly discharge from incision    After general anesthesia or intravenous sedation, for 24 hours or while taking prescription Narcotics:  Limit your activities  A responsible adult needs to be with you for the next 24 hours  Do not drive and operate hazardous machinery  Do not make important personal or business decisions  Do not drink alcoholic beverages  If you have not urinated within 8 hours after discharge, and you are experiencing discomfort from urinary retention, please go to the nearest ED. If you have sleep apnea and have a CPAP machine, please use it for all naps and sleeping. Please use caution when taking narcotics and any of your home medications that may cause drowsiness. *  Please give a list of your current medications to your Primary Care Provider. *  Please update this list whenever your medications are discontinued, doses are      changed, or new medications (including over-the-counter products) are added. *  Please carry medication information at all times in case of emergency situations. These are general instructions for a healthy lifestyle:  No smoking/ No tobacco products/ Avoid exposure to second hand smoke  Surgeon General's Warning:  Quitting smoking now greatly reduces serious risk to your health. Obesity, smoking, and sedentary lifestyle greatly increases your risk for illness  A healthy diet, regular physical exercise & weight monitoring are important for maintaining a healthy lifestyle    You may be retaining fluid if you have a history of heart failure or if you experience any of the following symptoms:  Weight gain of 3 pounds or more overnight or 5 pounds in a week, increased swelling in our hands or feet or shortness of breath while lying flat in bed. Please call your doctor as soon as you notice any of these symptoms; do not wait until your next office visit.

## 2023-02-07 NOTE — ANESTHESIA POSTPROCEDURE EVALUATION
Department of Anesthesiology  Postprocedure Note    Patient: Eldon Gregory  MRN: [id number]  YOB: 1960  Date of evaluation: 2/7/2023      Procedure Summary     Date: 02/07/23 Room / Location: D OP OR 04 / [facility]    Anesthesia Start: 1300 Anesthesia Stop: 1591    Procedures:       SEPTOPLASTY (Nose)      INFERIOR TURBINATE REDUCTION (Bilateral: Nose) Diagnosis:       Deviated nasal septum      Hypertrophy of both inferior nasal turbinates      Refractory obstruction of nasal airway      (Deviated nasal septum [J34.2])      (Hypertrophy of both inferior nasal turbinates [J34.3])      (Refractory obstruction of nasal airway [J34.89])    Surgeons: Se Diggs MD Responsible Provider: Maribel De Jesus DO    Anesthesia Type: general ASA Status: 3          Anesthesia Type: No value filed.     Jack Phase I: Jack Score: 10    Jack Phase II:        Anesthesia Post Evaluation    Patient location during evaluation: PACU  Level of consciousness: awake and alert  Airway patency: patent  Nausea & Vomiting: no nausea  Complications: no  Cardiovascular status: hemodynamically stable  Respiratory status: acceptable  Hydration status: euvolemic

## 2023-02-08 ENCOUNTER — TELEPHONE (OUTPATIENT)
Dept: ENT CLINIC | Age: 63
End: 2023-02-08

## 2023-02-08 NOTE — TELEPHONE ENCOUNTER
Spoke with patient via phone as a courtesy post operative call. He reports a slight wheeze but is doing well and was able to obtain medications.

## 2023-02-14 ENCOUNTER — OFFICE VISIT (OUTPATIENT)
Dept: ENT CLINIC | Age: 63
End: 2023-02-14
Payer: COMMERCIAL

## 2023-02-14 VITALS — BODY MASS INDEX: 29.12 KG/M2 | HEIGHT: 71 IN | WEIGHT: 208 LBS

## 2023-02-14 DIAGNOSIS — Z09 SURGERY FOLLOW-UP: Primary | ICD-10-CM

## 2023-02-14 PROCEDURE — 99024 POSTOP FOLLOW-UP VISIT: CPT | Performed by: OTOLARYNGOLOGY

## 2023-02-14 PROCEDURE — 31237 NSL/SINS NDSC SURG BX POLYPC: CPT | Performed by: OTOLARYNGOLOGY

## 2023-02-14 NOTE — PROGRESS NOTES
02/14/23    Chief Complaint   Patient presents with    Post-Op Check       HPI:  Visit for postoperative exam.  Patient denies significant bleeding or pain. Outpatient Encounter Medications as of 2/14/2023   Medication Sig Dispense Refill    oxyCODONE-acetaminophen (PERCOCET) 5-325 MG per tablet Take 1 tablet by mouth every 6 hours as needed for Pain for up to 12 doses. Intended supply: 3 days. Take lowest dose possible to manage pain Max Daily Amount: 4 tablets 12 tablet 0    cefdinir (OMNICEF) 300 MG capsule Take 1 capsule by mouth 2 times daily for 10 days 20 capsule 0    carbidopa-levodopa (SINEMET)  MG per tablet Take 2 tablets by mouth 3 times daily (Patient taking differently: Take 1 tablet by mouth 3 times daily) 180 tablet 5    fluticasone (FLONASE) 50 MCG/ACT nasal spray 2 sprays by Each Nostril route daily (Patient not taking: Reported on 2/3/2023) 48 g 1    omeprazole (PRILOSEC) 40 MG delayed release capsule Take 1 capsule by mouth every morning (before breakfast) 30 capsule 5    sildenafil (VIAGRA) 100 MG tablet Take 1 tablet by mouth as needed for Erectile Dysfunction 30 tablet 3    tiotropium (SPIRIVA RESPIMAT) 2.5 MCG/ACT AERS inhaler Inhale 2 puffs into the lungs daily 1 each 11    losartan-hydroCHLOROthiazide (HYZAAR) 100-25 MG per tablet TAKE 1 TABLET BY MOUTH ONCE DAILY      Multiple Vitamins-Minerals (MULTI COMPLETE PO) Take by mouth      albuterol sulfate HFA (VENTOLIN HFA) 108 (90 Base) MCG/ACT inhaler Inhale 2 puffs into the lungs 4 times daily as needed for Wheezing 54 g 1    LYSINE PO Take 50 mg by mouth as needed      acetaminophen (TYLENOL) 500 MG tablet Take 1,000 mg by mouth every 6 hours as needed      atorvastatin (LIPITOR) 40 MG tablet Take 40 mg by mouth at bedtime      verapamil (CALAN SR) 240 MG extended release tablet Take 240 mg by mouth daily       No facility-administered encounter medications on file as of 2/14/2023.        Past Medical History:   Diagnosis Date Arrhythmia     NSVT taking verapamil for this    Arthritis     hips    CAD (coronary artery disease) 2017, 10/02/2020    NO MI, NO STENTS NO CABG per patient; Mild CAD, normal LV function per cath dated 10/02/2020; followed by [facility]    Chronic obstructive pulmonary disease ([address]     per pt mild and treated with inhaler    CKD (chronic kidney disease)     stage 3 per patient    Dyspnea on exertion     only with exertion not at rest    GERD (gastroesophageal reflux disease)     taking omeprazole daily; does not sleep elevated    H/O cold sores     Hx of echocardiogram 03/2021    LVEF 55-60%    Hyperlipidemia     statin taken    Hypertension     controlled w/ med    Latex allergy     Sleep apnea     APAP cannot tolerate apap right now; oral device is being made as of 1/27/2023    Wide-complex tachycardia 2017    NSVT; per pt no problems since put on verapamil       Past Surgical History:   Procedure Laterality Date    BACK SURGERY  05/2021    CARDIAC CATHETERIZATION  2017    no stents     CARDIAC CATHETERIZATION  10/02/2020    Mild CAD, normal LV function    COLONOSCOPY      NOSE SURGERY Bilateral 2/7/2023    INFERIOR TURBINATE REDUCTION performed by Riley Alicia MD at [facility]    SEPTOPLASTY N/A 2/7/2023    SEPTOPLASTY performed by Riley Alicia MD at [address] Bilateral 10/07/2020       Family History   Problem Relation Age of Onset    Depression Mother     Heart Disease Father        Social History     Socioeconomic History    Marital status:      Spouse name: None    Number of children: None    Years of education: None    Highest education level: None   Tobacco Use    Smoking status: Never    Smokeless tobacco: Never   Vaping Use    Vaping Use: Never used   Substance and Sexual Activity    Alcohol use: Never    Drug use: Never       Allergies   Allergen Reactions    Latex Rash    Iv Dye [Iodides] Hives and Itching         PHYSICAL EXAM:    Vitals: Vitals:    02/14/23 1622   Weight: 208 lb (94.3 kg)   Height: 5' 11\" (1.803 m)       NASAL CAVITY:   Following bilateral endonasal debridment, the nasal cavities are examined with the 30-degree endoscope. The nasal airway are widely patent. The mucosa is healing nicely. Procedures:  Endoscopic debridement was performed in the following fashion:  The nasal cavity was topicalized with Afrin spray. Then, using the zero-degree telescope, each nasal cavity was inspected endoscopically. Using suction, the inferior meatal regions were debrided of blood cots and fibromembranous exudates. There is excellent healing of the septum. Nasal airway is widely patent. ASSESSMENT AND PLAN:       ICD-10-CM    1. Surgery follow-up  Z09            Discussed daily nasal hygiene and nasal humidification. Nightly use of mupirocin salve. Rx provided. Recheck in 1 month. The patient diagnoses and management plan were discussed at length. They  demonstrated and understanding of the plan and stated that all questions were answered to their satisfaction.      PATIENT EDUCATION / INSTRUCTIONS GIVEN FOR:  Nasal Hygiene

## 2023-02-27 ENCOUNTER — OFFICE VISIT (OUTPATIENT)
Dept: PULMONOLOGY | Age: 63
End: 2023-02-27
Payer: COMMERCIAL

## 2023-02-27 VITALS
WEIGHT: 209.6 LBS | DIASTOLIC BLOOD PRESSURE: 62 MMHG | TEMPERATURE: 97 F | SYSTOLIC BLOOD PRESSURE: 114 MMHG | RESPIRATION RATE: 15 BRPM | OXYGEN SATURATION: 97 % | HEART RATE: 73 BPM | HEIGHT: 71 IN | BODY MASS INDEX: 29.34 KG/M2

## 2023-02-27 DIAGNOSIS — G20 PARKINSON'S DISEASE (HCC): ICD-10-CM

## 2023-02-27 DIAGNOSIS — J44.9 CHRONIC OBSTRUCTIVE PULMONARY DISEASE, UNSPECIFIED COPD TYPE (HCC): Primary | ICD-10-CM

## 2023-02-27 PROCEDURE — 99213 OFFICE O/P EST LOW 20 MIN: CPT | Performed by: INTERNAL MEDICINE

## 2023-02-27 NOTE — PROGRESS NOTES
[address] [place]. [address], [address] [facility]  [phone]    Patient Name:  Leila Luna [facility]      YOB: 1960  Office Visit 2023    ASSESSMENT AND PLAN:  (Medical Decision Making)    Pt with DICKEY with obstruction and reduced DLCO c/w COPD. Did not improve with arnuity. Somewhat better with spiriva. Also with question of Parkinson's in the background, still seems somewhat uncertain. Back and hip pain seem to be the main limiter of his activity.     -cont daily spiriva and prn albuterol. Spiriva refilled today.   -cont to work with ortho regarding his back pain management.   -cont to work with neurology regarding possible Parkinson's.   -could not afford pulm rehab. Encouraged him in efforts at exercise on his own. F/u in 6 months. Diagnoses and all orders for this visit:  Chronic obstructive pulmonary disease, unspecified COPD type ([address]  -     tiotropium (SPIRIVA RESPIMAT) 2.5 MCG/ACT AERS inhaler; Inhale 2 puffs into the lungs daily  Parkinson's disease (HCC)    Tesha Maier MD    Clinical time for encounter was 20 minutes. _________________________________________________________________________    HISTORY OF PRESENT ILLNESS:    Mr. Blanche Perez in our clinic today who presents with Follow-up and COPD. No history of tobacco abuse. Complete PFTs showed mild obstruction, air trapping, and reduced DLCO. He was started on Arnuity without much improvement. VQ scan was performed and was normal.  He was also diagnosed with Parkinson's disease. 6-minute walk test with O2 sat reading which showed a distance covered of 201 m with no desaturation. He was not able to participate in pulmonary rehab due to cost.     He returns today for follow-up appointment. He tells me that his breathing has been stable. Some better with spiriva. Not having to use his albuterol very often.    Tells me he has ongoing issues with back pain despite previous surgery. Also the dx of Parkinson's is now being questioned by his neurologist.     He had sinus surgery since his last appointment. He has started to use his oral device for AJAY. REVIEW OF SYSTEMS:  10 point review of systems is negative except as reported in HPI. PHYSICAL EXAM:  Vitals:    02/27/23 0755   BP: 114/62   Pulse: 73   Resp: 15   Temp: 97 °F (36.1 °C)   SpO2: 97%       PERTINENT FINDINGS:   GENERAL APPEARANCE:  The patient is normal weight and in no respiratory distress. HEENT:  PERRL. Conjunctivae unremarkable. Nasal mucosa is without epistaxis, exudate, or polyps. Gums and dentition are unremarkable. NECK/LYMPHATIC:  Symmetrical with no elevation of jugular venous pulsation. Trachea midline. No thyroid enlargement. No cervical adenopathy. LUNGS:  Normal respiratory effort with symmetrical lung expansion. Breath sounds are clear bilaterally. HEART:  There is a regular rate and rhythm. No murmur, rub, or gallop. ABDOMEN:  Soft and non-tender. No hepatosplenomegaly. Bowel sounds are normal.    NEURO/PSYCH:  The patient is alert and oriented to person, place, and time. Memory appears intact and mood is normal.  No gross sensorimotor deficits are present. MS/SKIN:  There is no edema in the lower extremities. No rashes, bruises, lesions, ulcers visible. DIAGNOSTIC TESTS:                                                                                                             LABS: No results for input(s): HGB, HCT, TSH, NTPROBNP in the last 72 hours. Imaging:  CXR: XR CHEST (2 VW) 02/02/2022    Narrative  PA LATERAL CHEST  2/2/2022 10:54 AM    HISTORY: Shortness of breath;    COMPARISON: CT chest 2/5/2021    FINDINGS:  The heart size is enlarged. There is no lobar consolidation, pleural  effusions or pulmonary edema. Impression  Cardiomegaly. CT WITHOUT CONTRAST: No results found for this or any previous visit from the past 365 days.     CT WITH CONTRAST: No results found for this or any previous visit from the past 365 days.    CT HIGH RES: No results found for this or any previous visit from the past 365 days.    CT PE PROTOCOL: No results found for this or any previous visit from the past 365 days.    LDCT SCREENING: No results found for this or any previous visit from the past 365 days.    PET SCAN: No results found for this or any previous visit from the past 365 days.      PFTs:   No flowsheet data found.  Exercise Oximetry:   Echo: No results found for this or any previous visit.    [facility] Reference Info:                                                                                                                  Past Medical History:   Diagnosis Date    Arrhythmia     NSVT taking verapamil for this    Arthritis     hips    CAD (coronary artery disease) 2017, 10/02/2020    NO MI, NO STENTS NO CABG per patient; Mild CAD, normal LV function per cath dated 10/02/2020; followed by [facility]    Chronic obstructive pulmonary disease (HCC)     per pt mild and treated with inhaler    CKD (chronic kidney disease)     stage 3 per patient    Dyspnea on exertion     only with exertion not at rest    GERD (gastroesophageal reflux disease)     taking omeprazole daily; does not sleep elevated    H/O cold sores     Hx of echocardiogram 03/2021    LVEF 55-60%    Hyperlipidemia     statin taken    Hypertension     controlled w/ med    Latex allergy     Sleep apnea     APAP cannot tolerate apap right now; oral device is being made as of 1/27/2023    Wide-complex tachycardia 2017    NSVT; per pt no problems since put on verapamil      Tobacco Use: Low Risk     Smoking Tobacco Use: Never    Smokeless Tobacco Use: Never    Passive Exposure: Not on file     Allergies   Allergen Reactions    Latex Rash    Iv Dye [Iodides] Hives and Itching     Current Outpatient Medications   Medication Instructions    acetaminophen (TYLENOL) 1,000 mg, Oral, EVERY 6 HOURS PRN     albuterol sulfate HFA (VENTOLIN HFA) 108 (90 Base) MCG/ACT inhaler 2 puffs, Inhalation, 4 TIMES DAILY PRN    atorvastatin (LIPITOR) 40 mg, Oral, Nightly    carbidopa-levodopa (SINEMET)  MG per tablet 2 tablets, Oral, 3 TIMES DAILY    fluticasone (FLONASE) 50 MCG/ACT nasal spray 2 sprays, Each Nostril, DAILY    losartan-hydroCHLOROthiazide (HYZAAR) 100-25 MG per tablet TAKE 1 TABLET BY MOUTH ONCE DAILY    LYSINE PO 50 mg, Oral, PRN    Multiple Vitamins-Minerals (MULTI COMPLETE PO) Oral    mupirocin (BACTROBAN NASAL) 2 % nasal ointment Take by Nasal route 2 times daily.     omeprazole (PRILOSEC) 40 mg, Oral, DAILY BEFORE BREAKFAST    sildenafil (VIAGRA) 100 mg, Oral, PRN    tiotropium (SPIRIVA RESPIMAT) 2.5 MCG/ACT AERS inhaler 2 puffs, Inhalation, DAILY    verapamil (CALAN SR) 240 mg, Oral, DAILY

## 2023-03-08 ENCOUNTER — APPOINTMENT (OUTPATIENT)
Dept: CT IMAGING | Age: 63
End: 2023-03-08
Payer: COMMERCIAL

## 2023-03-08 ENCOUNTER — HOSPITAL ENCOUNTER (INPATIENT)
Age: 63
LOS: 1 days | Discharge: HOME | DRG: 683 | End: 2023-03-09
Attending: FAMILY MEDICINE | Admitting: INTERNAL MEDICINE
Payer: COMMERCIAL

## 2023-03-08 ENCOUNTER — HOSPITAL ENCOUNTER (EMERGENCY)
Age: 63
Discharge: HOME | End: 2023-03-08
Attending: EMERGENCY MEDICINE
Payer: COMMERCIAL

## 2023-03-08 ENCOUNTER — PATIENT MESSAGE (OUTPATIENT)
Dept: NEUROLOGY | Age: 63
End: 2023-03-08

## 2023-03-08 ENCOUNTER — APPOINTMENT (OUTPATIENT)
Dept: GENERAL RADIOLOGY | Age: 63
End: 2023-03-08
Payer: COMMERCIAL

## 2023-03-08 VITALS
BODY MASS INDEX: 29.4 KG/M2 | DIASTOLIC BLOOD PRESSURE: 75 MMHG | HEIGHT: 71 IN | SYSTOLIC BLOOD PRESSURE: 136 MMHG | RESPIRATION RATE: 10 BRPM | HEART RATE: 77 BPM | TEMPERATURE: 97.7 F | OXYGEN SATURATION: 96 % | WEIGHT: 210 LBS

## 2023-03-08 DIAGNOSIS — N39.0 URINARY TRACT INFECTION WITHOUT HEMATURIA, SITE UNSPECIFIED: Primary | ICD-10-CM

## 2023-03-08 DIAGNOSIS — N17.9 ACUTE KIDNEY INJURY (HCC): ICD-10-CM

## 2023-03-08 PROBLEM — G20.A1 PARKINSON DISEASE: Chronic | Status: ACTIVE | Noted: 2022-02-25

## 2023-03-08 PROBLEM — K52.9 ACUTE COLITIS: Status: ACTIVE | Noted: 2023-03-08

## 2023-03-08 PROBLEM — G20.A1 PARKINSON'S DISEASE: Chronic | Status: ACTIVE | Noted: 2022-03-02

## 2023-03-08 PROBLEM — G20 PARKINSON DISEASE (HCC): Chronic | Status: ACTIVE | Noted: 2022-02-25

## 2023-03-08 PROBLEM — R06.5 CHRONIC MOUTH BREATHING: Chronic | Status: ACTIVE | Noted: 2022-12-06

## 2023-03-08 PROBLEM — J44.9 CHRONIC OBSTRUCTIVE PULMONARY DISEASE (HCC): Chronic | Status: ACTIVE | Noted: 2022-12-06

## 2023-03-08 PROBLEM — N18.30 CHRONIC RENAL DISEASE, STAGE III (HCC): Chronic | Status: ACTIVE | Noted: 2022-05-09

## 2023-03-08 PROBLEM — G20 PARKINSON'S DISEASE (HCC): Chronic | Status: ACTIVE | Noted: 2022-03-02

## 2023-03-08 PROBLEM — G47.33 OSA (OBSTRUCTIVE SLEEP APNEA): Chronic | Status: ACTIVE | Noted: 2021-02-01

## 2023-03-08 PROBLEM — N18.9 ACUTE ON CHRONIC KIDNEY FAILURE (HCC): Status: ACTIVE | Noted: 2023-03-08

## 2023-03-08 LAB
ALBUMIN SERPL-MCNC: 4.4 G/DL (ref 3.2–4.6)
ALBUMIN/GLOB SERPL: 1 (ref 0.4–1.6)
ALP SERPL-CCNC: 73 U/L (ref 50–136)
ALT SERPL-CCNC: 36 U/L (ref 12–65)
ANION GAP SERPL CALC-SCNC: 16 MMOL/L (ref 2–11)
ANION GAP SERPL CALC-SCNC: 7 MMOL/L (ref 2–11)
APPEARANCE UR: ABNORMAL
AST SERPL-CCNC: 24 U/L (ref 15–37)
BACTERIA URNS QL MICRO: ABNORMAL /HPF
BASOPHILS # BLD: 0.1 K/UL (ref 0–0.2)
BASOPHILS NFR BLD: 0 % (ref 0–2)
BILIRUB SERPL-MCNC: 1.8 MG/DL (ref 0.2–1.1)
BILIRUB UR QL: NEGATIVE
BUN SERPL-MCNC: 20 MG/DL (ref 8–23)
BUN SERPL-MCNC: 21 MG/DL (ref 8–23)
CALCIUM SERPL-MCNC: 10.7 MG/DL (ref 8.3–10.4)
CALCIUM SERPL-MCNC: 8.9 MG/DL (ref 8.3–10.4)
CASTS URNS QL MICRO: ABNORMAL /LPF
CHLORIDE SERPL-SCNC: 105 MMOL/L (ref 101–110)
CHLORIDE SERPL-SCNC: 111 MMOL/L (ref 101–110)
CO2 SERPL-SCNC: 15 MMOL/L (ref 21–32)
CO2 SERPL-SCNC: 25 MMOL/L (ref 21–32)
COLOR UR: ABNORMAL
CREAT SERPL-MCNC: 2 MG/DL (ref 0.8–1.5)
CREAT SERPL-MCNC: 2.2 MG/DL (ref 0.8–1.5)
CRYSTALS URNS QL MICRO: 0 /LPF
DIFFERENTIAL METHOD BLD: ABNORMAL
EKG ATRIAL RATE: 107 BPM
EKG DIAGNOSIS: NORMAL
EKG P AXIS: 39 DEGREES
EKG P-R INTERVAL: 146 MS
EKG Q-T INTERVAL: 336 MS
EKG QRS DURATION: 86 MS
EKG QTC CALCULATION (BAZETT): 449 MS
EKG R AXIS: 94 DEGREES
EKG T AXIS: 13 DEGREES
EKG VENTRICULAR RATE: 107 BPM
EOSINOPHIL # BLD: 0.1 K/UL (ref 0–0.8)
EOSINOPHIL NFR BLD: 0 % (ref 0.5–7.8)
EPI CELLS #/AREA URNS HPF: ABNORMAL /HPF
ERYTHROCYTE [DISTWIDTH] IN BLOOD BY AUTOMATED COUNT: 13.8 % (ref 11.9–14.6)
GLOBULIN SER CALC-MCNC: 4.2 G/DL (ref 2.8–4.5)
GLUCOSE SERPL-MCNC: 100 MG/DL (ref 65–100)
GLUCOSE SERPL-MCNC: 90 MG/DL (ref 65–100)
GLUCOSE UR STRIP.AUTO-MCNC: NEGATIVE MG/DL
HCT VFR BLD AUTO: 45.6 % (ref 41.1–50.3)
HGB BLD-MCNC: 15.1 G/DL (ref 13.6–17.2)
HGB UR QL STRIP: NEGATIVE
IMM GRANULOCYTES # BLD AUTO: 0.1 K/UL (ref 0–0.5)
IMM GRANULOCYTES NFR BLD AUTO: 1 % (ref 0–5)
KETONES UR QL STRIP.AUTO: 15 MG/DL
LEUKOCYTE ESTERASE UR QL STRIP.AUTO: ABNORMAL
LYMPHOCYTES # BLD: 1.1 K/UL (ref 0.5–4.6)
LYMPHOCYTES NFR BLD: 9 % (ref 13–44)
MAGNESIUM SERPL-MCNC: 2 MG/DL (ref 1.8–2.4)
MCH RBC QN AUTO: 28.2 PG (ref 26.1–32.9)
MCHC RBC AUTO-ENTMCNC: 33.1 G/DL (ref 31.4–35)
MCV RBC AUTO: 85.2 FL (ref 82–102)
MONOCYTES # BLD: 1.2 K/UL (ref 0.1–1.3)
MONOCYTES NFR BLD: 10 % (ref 4–12)
MUCOUS THREADS URNS QL MICRO: 0 /LPF
NEUTS SEG # BLD: 9.7 K/UL (ref 1.7–8.2)
NEUTS SEG NFR BLD: 80 % (ref 43–78)
NITRITE UR QL STRIP.AUTO: NEGATIVE
NRBC # BLD: 0 K/UL (ref 0–0.2)
NT PRO BNP: 223 PG/ML (ref 5–125)
OTHER OBSERVATIONS: ABNORMAL
PH UR STRIP: 6.5 (ref 5–9)
PLATELET # BLD AUTO: 239 K/UL (ref 150–450)
PMV BLD AUTO: 10.3 FL (ref 9.4–12.3)
POTASSIUM SERPL-SCNC: 3.3 MMOL/L (ref 3.5–5.1)
POTASSIUM SERPL-SCNC: 3.5 MMOL/L (ref 3.5–5.1)
PROT SERPL-MCNC: 8.6 G/DL (ref 6.3–8.2)
PROT UR STRIP-MCNC: 30 MG/DL
RBC # BLD AUTO: 5.35 M/UL (ref 4.23–5.6)
RBC #/AREA URNS HPF: ABNORMAL /HPF
SODIUM SERPL-SCNC: 137 MMOL/L (ref 133–143)
SODIUM SERPL-SCNC: 142 MMOL/L (ref 133–143)
SP GR UR REFRACTOMETRY: 1.02 (ref 1–1.02)
TROPONIN I SERPL HS-MCNC: 23.9 PG/ML (ref 0–14)
TROPONIN I SERPL HS-MCNC: 26.6 PG/ML (ref 0–14)
TROPONIN I SERPL HS-MCNC: 45.2 PG/ML (ref 0–14)
UROBILINOGEN UR QL STRIP.AUTO: 1 EU/DL (ref 0.2–1)
WBC # BLD AUTO: 12.2 K/UL (ref 4.3–11.1)
WBC URNS QL MICRO: ABNORMAL /HPF

## 2023-03-08 PROCEDURE — 84484 ASSAY OF TROPONIN QUANT: CPT

## 2023-03-08 PROCEDURE — 6360000004 HC RX CONTRAST MEDICATION: Performed by: EMERGENCY MEDICINE

## 2023-03-08 PROCEDURE — 1100000000 HC RM PRIVATE

## 2023-03-08 PROCEDURE — 96375 TX/PRO/DX INJ NEW DRUG ADDON: CPT

## 2023-03-08 PROCEDURE — 74176 CT ABD & PELVIS W/O CONTRAST: CPT

## 2023-03-08 PROCEDURE — 71045 X-RAY EXAM CHEST 1 VIEW: CPT

## 2023-03-08 PROCEDURE — 85025 COMPLETE CBC W/AUTO DIFF WBC: CPT

## 2023-03-08 PROCEDURE — 99285 EMERGENCY DEPT VISIT HI MDM: CPT

## 2023-03-08 PROCEDURE — 81015 MICROSCOPIC EXAM OF URINE: CPT

## 2023-03-08 PROCEDURE — 81001 URINALYSIS AUTO W/SCOPE: CPT

## 2023-03-08 PROCEDURE — 83735 ASSAY OF MAGNESIUM: CPT

## 2023-03-08 PROCEDURE — 87040 BLOOD CULTURE FOR BACTERIA: CPT

## 2023-03-08 PROCEDURE — 80053 COMPREHEN METABOLIC PANEL: CPT

## 2023-03-08 PROCEDURE — 94762 N-INVAS EAR/PLS OXIMTRY CONT: CPT

## 2023-03-08 PROCEDURE — 2580000003 HC RX 258: Performed by: INTERNAL MEDICINE

## 2023-03-08 PROCEDURE — 6360000002 HC RX W HCPCS: Performed by: EMERGENCY MEDICINE

## 2023-03-08 PROCEDURE — 2580000003 HC RX 258: Performed by: STUDENT IN AN ORGANIZED HEALTH CARE EDUCATION/TRAINING PROGRAM

## 2023-03-08 PROCEDURE — 96365 THER/PROPH/DIAG IV INF INIT: CPT

## 2023-03-08 PROCEDURE — 2580000003 HC RX 258: Performed by: EMERGENCY MEDICINE

## 2023-03-08 PROCEDURE — 83880 ASSAY OF NATRIURETIC PEPTIDE: CPT

## 2023-03-08 PROCEDURE — 6360000002 HC RX W HCPCS: Performed by: STUDENT IN AN ORGANIZED HEALTH CARE EDUCATION/TRAINING PROGRAM

## 2023-03-08 RX ORDER — ATORVASTATIN CALCIUM 40 MG/1
40 TABLET, FILM COATED ORAL NIGHTLY
Status: CANCELLED | OUTPATIENT
Start: 2023-03-08

## 2023-03-08 RX ORDER — MAGNESIUM SULFATE IN WATER 40 MG/ML
2000 INJECTION, SOLUTION INTRAVENOUS PRN
Status: DISCONTINUED | OUTPATIENT
Start: 2023-03-08 | End: 2023-03-09 | Stop reason: HOSPADM

## 2023-03-08 RX ORDER — ONDANSETRON 4 MG/1
4 TABLET, ORALLY DISINTEGRATING ORAL EVERY 8 HOURS PRN
Status: DISCONTINUED | OUTPATIENT
Start: 2023-03-08 | End: 2023-03-09 | Stop reason: HOSPADM

## 2023-03-08 RX ORDER — SODIUM CHLORIDE 9 MG/ML
INJECTION, SOLUTION INTRAVENOUS CONTINUOUS
Status: DISCONTINUED | OUTPATIENT
Start: 2023-03-08 | End: 2023-03-09

## 2023-03-08 RX ORDER — SODIUM CHLORIDE 0.9 % (FLUSH) 0.9 %
5-40 SYRINGE (ML) INJECTION PRN
OUTPATIENT
Start: 2023-03-08

## 2023-03-08 RX ORDER — SODIUM CHLORIDE 9 MG/ML
INJECTION, SOLUTION INTRAVENOUS CONTINUOUS
Status: DISCONTINUED | OUTPATIENT
Start: 2023-03-08 | End: 2023-03-08 | Stop reason: HOSPADM

## 2023-03-08 RX ORDER — ACETAMINOPHEN 325 MG/1
650 TABLET ORAL EVERY 6 HOURS PRN
Status: DISCONTINUED | OUTPATIENT
Start: 2023-03-08 | End: 2023-03-09 | Stop reason: HOSPADM

## 2023-03-08 RX ORDER — ONDANSETRON 2 MG/ML
4 INJECTION INTRAMUSCULAR; INTRAVENOUS EVERY 6 HOURS PRN
OUTPATIENT
Start: 2023-03-08

## 2023-03-08 RX ORDER — HYDRALAZINE HYDROCHLORIDE 20 MG/ML
20 INJECTION INTRAMUSCULAR; INTRAVENOUS EVERY 4 HOURS PRN
Status: DISCONTINUED | OUTPATIENT
Start: 2023-03-08 | End: 2023-03-09 | Stop reason: HOSPADM

## 2023-03-08 RX ORDER — SODIUM CHLORIDE 9 MG/ML
INJECTION, SOLUTION INTRAVENOUS CONTINUOUS
Status: CANCELLED | OUTPATIENT
Start: 2023-03-08

## 2023-03-08 RX ORDER — SODIUM CHLORIDE 0.9 % (FLUSH) 0.9 %
5-40 SYRINGE (ML) INJECTION EVERY 12 HOURS SCHEDULED
Status: DISCONTINUED | OUTPATIENT
Start: 2023-03-08 | End: 2023-03-09 | Stop reason: HOSPADM

## 2023-03-08 RX ORDER — HEPARIN SODIUM 5000 [USP'U]/ML
5000 INJECTION, SOLUTION INTRAVENOUS; SUBCUTANEOUS EVERY 8 HOURS SCHEDULED
Status: DISCONTINUED | OUTPATIENT
Start: 2023-03-09 | End: 2023-03-09 | Stop reason: HOSPADM

## 2023-03-08 RX ORDER — POTASSIUM CHLORIDE 7.45 MG/ML
10 INJECTION INTRAVENOUS PRN
OUTPATIENT
Start: 2023-03-08

## 2023-03-08 RX ORDER — POTASSIUM CHLORIDE 7.45 MG/ML
10 INJECTION INTRAVENOUS PRN
Status: DISCONTINUED | OUTPATIENT
Start: 2023-03-08 | End: 2023-03-09 | Stop reason: HOSPADM

## 2023-03-08 RX ORDER — FAMOTIDINE 20 MG/1
10 TABLET, FILM COATED ORAL DAILY PRN
Status: DISCONTINUED | OUTPATIENT
Start: 2023-03-08 | End: 2023-03-09 | Stop reason: HOSPADM

## 2023-03-08 RX ORDER — ACETAMINOPHEN 650 MG/1
650 SUPPOSITORY RECTAL EVERY 6 HOURS PRN
OUTPATIENT
Start: 2023-03-08

## 2023-03-08 RX ORDER — 0.9 % SODIUM CHLORIDE 0.9 %
1000 INTRAVENOUS SOLUTION INTRAVENOUS
Status: COMPLETED | OUTPATIENT
Start: 2023-03-08 | End: 2023-03-08

## 2023-03-08 RX ORDER — ACETAMINOPHEN 650 MG/1
650 SUPPOSITORY RECTAL EVERY 6 HOURS PRN
Status: DISCONTINUED | OUTPATIENT
Start: 2023-03-08 | End: 2023-03-09 | Stop reason: HOSPADM

## 2023-03-08 RX ORDER — METRONIDAZOLE 500 MG/1
500 TABLET ORAL EVERY 12 HOURS SCHEDULED
Status: CANCELLED | OUTPATIENT
Start: 2023-03-08

## 2023-03-08 RX ORDER — CIPROFLOXACIN 500 MG/1
500 TABLET, FILM COATED ORAL EVERY 12 HOURS SCHEDULED
Status: CANCELLED | OUTPATIENT
Start: 2023-03-08

## 2023-03-08 RX ORDER — MAGNESIUM HYDROXIDE/ALUMINUM HYDROXICE/SIMETHICONE 120; 1200; 1200 MG/30ML; MG/30ML; MG/30ML
30 SUSPENSION ORAL EVERY 6 HOURS PRN
Status: DISCONTINUED | OUTPATIENT
Start: 2023-03-08 | End: 2023-03-09 | Stop reason: HOSPADM

## 2023-03-08 RX ORDER — ACETAMINOPHEN 325 MG/1
650 TABLET ORAL EVERY 6 HOURS PRN
OUTPATIENT
Start: 2023-03-08

## 2023-03-08 RX ORDER — GUAIFENESIN/DEXTROMETHORPHAN 100-10MG/5
10 SYRUP ORAL EVERY 4 HOURS PRN
Status: DISCONTINUED | OUTPATIENT
Start: 2023-03-08 | End: 2023-03-09 | Stop reason: HOSPADM

## 2023-03-08 RX ORDER — ENOXAPARIN SODIUM 100 MG/ML
40 INJECTION SUBCUTANEOUS DAILY
Status: CANCELLED | OUTPATIENT
Start: 2023-03-09 | Stop reason: CLARIF

## 2023-03-08 RX ORDER — SODIUM CHLORIDE 9 MG/ML
INJECTION, SOLUTION INTRAVENOUS PRN
Status: DISCONTINUED | OUTPATIENT
Start: 2023-03-08 | End: 2023-03-09 | Stop reason: HOSPADM

## 2023-03-08 RX ORDER — POLYETHYLENE GLYCOL 3350 17 G/17G
17 POWDER, FOR SOLUTION ORAL DAILY PRN
Status: DISCONTINUED | OUTPATIENT
Start: 2023-03-08 | End: 2023-03-09 | Stop reason: HOSPADM

## 2023-03-08 RX ORDER — BISACODYL 10 MG
10 SUPPOSITORY, RECTAL RECTAL DAILY PRN
Status: DISCONTINUED | OUTPATIENT
Start: 2023-03-08 | End: 2023-03-09 | Stop reason: HOSPADM

## 2023-03-08 RX ORDER — MAGNESIUM SULFATE IN WATER 40 MG/ML
2000 INJECTION, SOLUTION INTRAVENOUS PRN
OUTPATIENT
Start: 2023-03-08

## 2023-03-08 RX ORDER — LOSARTAN POTASSIUM AND HYDROCHLOROTHIAZIDE 25; 100 MG/1; MG/1
1 TABLET ORAL DAILY
Status: CANCELLED | OUTPATIENT
Start: 2023-03-09

## 2023-03-08 RX ORDER — SODIUM CHLORIDE 9 MG/ML
INJECTION, SOLUTION INTRAVENOUS PRN
OUTPATIENT
Start: 2023-03-08

## 2023-03-08 RX ORDER — CLONIDINE HYDROCHLORIDE 0.1 MG/1
0.1 TABLET ORAL EVERY 4 HOURS PRN
OUTPATIENT
Start: 2023-03-08

## 2023-03-08 RX ORDER — MORPHINE SULFATE 4 MG/ML
4 INJECTION INTRAVENOUS ONCE
Status: COMPLETED | OUTPATIENT
Start: 2023-03-08 | End: 2023-03-08

## 2023-03-08 RX ORDER — BISACODYL 10 MG
10 SUPPOSITORY, RECTAL RECTAL DAILY PRN
OUTPATIENT
Start: 2023-03-08

## 2023-03-08 RX ORDER — CIPROFLOXACIN 500 MG/1
500 TABLET, FILM COATED ORAL EVERY 12 HOURS SCHEDULED
Status: DISCONTINUED | OUTPATIENT
Start: 2023-03-08 | End: 2023-03-09

## 2023-03-08 RX ORDER — SODIUM CHLORIDE 0.9 % (FLUSH) 0.9 %
5-40 SYRINGE (ML) INJECTION EVERY 12 HOURS SCHEDULED
OUTPATIENT
Start: 2023-03-08

## 2023-03-08 RX ORDER — HYDRALAZINE HYDROCHLORIDE 20 MG/ML
20 INJECTION INTRAMUSCULAR; INTRAVENOUS EVERY 4 HOURS PRN
OUTPATIENT
Start: 2023-03-08

## 2023-03-08 RX ORDER — LANOLIN ALCOHOL/MO/W.PET/CERES
3 CREAM (GRAM) TOPICAL NIGHTLY PRN
OUTPATIENT
Start: 2023-03-09

## 2023-03-08 RX ORDER — FAMOTIDINE 20 MG/1
10 TABLET, FILM COATED ORAL DAILY PRN
OUTPATIENT
Start: 2023-03-08

## 2023-03-08 RX ORDER — GUAIFENESIN/DEXTROMETHORPHAN 100-10MG/5
10 SYRUP ORAL EVERY 4 HOURS PRN
OUTPATIENT
Start: 2023-03-08

## 2023-03-08 RX ORDER — CLONIDINE HYDROCHLORIDE 0.1 MG/1
0.1 TABLET ORAL EVERY 4 HOURS PRN
Status: DISCONTINUED | OUTPATIENT
Start: 2023-03-08 | End: 2023-03-09 | Stop reason: HOSPADM

## 2023-03-08 RX ORDER — METRONIDAZOLE 500 MG/1
500 TABLET ORAL EVERY 12 HOURS SCHEDULED
Status: DISCONTINUED | OUTPATIENT
Start: 2023-03-08 | End: 2023-03-09

## 2023-03-08 RX ORDER — SODIUM CHLORIDE 0.9 % (FLUSH) 0.9 %
5-40 SYRINGE (ML) INJECTION PRN
Status: DISCONTINUED | OUTPATIENT
Start: 2023-03-08 | End: 2023-03-09 | Stop reason: HOSPADM

## 2023-03-08 RX ORDER — ONDANSETRON 4 MG/1
4 TABLET, ORALLY DISINTEGRATING ORAL EVERY 8 HOURS PRN
OUTPATIENT
Start: 2023-03-08

## 2023-03-08 RX ORDER — OXYCODONE HYDROCHLORIDE 5 MG/1
5 TABLET ORAL EVERY 4 HOURS PRN
OUTPATIENT
Start: 2023-03-08

## 2023-03-08 RX ORDER — ONDANSETRON 2 MG/ML
4 INJECTION INTRAMUSCULAR; INTRAVENOUS EVERY 6 HOURS PRN
Status: DISCONTINUED | OUTPATIENT
Start: 2023-03-08 | End: 2023-03-09 | Stop reason: HOSPADM

## 2023-03-08 RX ORDER — MAGNESIUM HYDROXIDE/ALUMINUM HYDROXICE/SIMETHICONE 120; 1200; 1200 MG/30ML; MG/30ML; MG/30ML
30 SUSPENSION ORAL EVERY 6 HOURS PRN
OUTPATIENT
Start: 2023-03-08

## 2023-03-08 RX ORDER — ENOXAPARIN SODIUM 100 MG/ML
40 INJECTION SUBCUTANEOUS DAILY
Status: DISCONTINUED | OUTPATIENT
Start: 2023-03-09 | End: 2023-03-08 | Stop reason: CLARIF

## 2023-03-08 RX ORDER — POTASSIUM CHLORIDE 20 MEQ/1
40 TABLET, EXTENDED RELEASE ORAL PRN
OUTPATIENT
Start: 2023-03-08

## 2023-03-08 RX ORDER — SODIUM PHOSPHATE, DIBASIC AND SODIUM PHOSPHATE, MONOBASIC 7; 19 G/133ML; G/133ML
1 ENEMA RECTAL AS NEEDED
OUTPATIENT
Start: 2023-03-08

## 2023-03-08 RX ORDER — POTASSIUM CHLORIDE 20 MEQ/1
40 TABLET, EXTENDED RELEASE ORAL PRN
Status: DISCONTINUED | OUTPATIENT
Start: 2023-03-08 | End: 2023-03-09 | Stop reason: HOSPADM

## 2023-03-08 RX ORDER — LANOLIN ALCOHOL/MO/W.PET/CERES
3 CREAM (GRAM) TOPICAL NIGHTLY PRN
Status: DISCONTINUED | OUTPATIENT
Start: 2023-03-09 | End: 2023-03-09 | Stop reason: HOSPADM

## 2023-03-08 RX ORDER — OXYCODONE HYDROCHLORIDE 5 MG/1
5 TABLET ORAL EVERY 4 HOURS PRN
Status: DISCONTINUED | OUTPATIENT
Start: 2023-03-08 | End: 2023-03-09 | Stop reason: HOSPADM

## 2023-03-08 RX ORDER — POLYETHYLENE GLYCOL 3350 17 G/17G
17 POWDER, FOR SOLUTION ORAL DAILY PRN
OUTPATIENT
Start: 2023-03-08

## 2023-03-08 RX ORDER — SODIUM PHOSPHATE, DIBASIC AND SODIUM PHOSPHATE, MONOBASIC 7; 19 G/133ML; G/133ML
1 ENEMA RECTAL AS NEEDED
Status: DISCONTINUED | OUTPATIENT
Start: 2023-03-08 | End: 2023-03-09 | Stop reason: HOSPADM

## 2023-03-08 RX ORDER — ONDANSETRON 2 MG/ML
4 INJECTION INTRAMUSCULAR; INTRAVENOUS
Status: COMPLETED | OUTPATIENT
Start: 2023-03-08 | End: 2023-03-08

## 2023-03-08 RX ADMIN — DIATRIZOATE MEGLUMINE AND DIATRIZOATE SODIUM 15 ML: 660; 100 LIQUID ORAL; RECTAL at 16:43

## 2023-03-08 RX ADMIN — MORPHINE SULFATE 4 MG: 4 INJECTION INTRAVENOUS at 16:03

## 2023-03-08 RX ADMIN — SODIUM CHLORIDE 1000 ML: 9 INJECTION, SOLUTION INTRAVENOUS at 18:45

## 2023-03-08 RX ADMIN — ONDANSETRON 4 MG: 2 INJECTION INTRAMUSCULAR; INTRAVENOUS at 16:02

## 2023-03-08 RX ADMIN — SODIUM CHLORIDE: 9 INJECTION, SOLUTION INTRAVENOUS at 21:49

## 2023-03-08 RX ADMIN — SODIUM CHLORIDE 1000 ML: 9 INJECTION, SOLUTION INTRAVENOUS at 14:47

## 2023-03-08 RX ADMIN — CEFTRIAXONE 1000 MG: 1 INJECTION, POWDER, FOR SOLUTION INTRAMUSCULAR; INTRAVENOUS at 20:25

## 2023-03-08 ASSESSMENT — ENCOUNTER SYMPTOMS
VOMITING: 0
PHOTOPHOBIA: 0
WHEEZING: 0
EYE REDNESS: 0
CHEST TIGHTNESS: 1
SHORTNESS OF BREATH: 1
ABDOMINAL PAIN: 0
BACK PAIN: 0
TROUBLE SWALLOWING: 0
COLOR CHANGE: 0
VOICE CHANGE: 0
COUGH: 1
NAUSEA: 0
SWOLLEN GLANDS: 0

## 2023-03-08 ASSESSMENT — PAIN SCALES - GENERAL
PAINLEVEL_OUTOF10: 0
PAINLEVEL_OUTOF10: 3
PAINLEVEL_OUTOF10: 8

## 2023-03-08 ASSESSMENT — LIFESTYLE VARIABLES
HOW MANY STANDARD DRINKS CONTAINING ALCOHOL DO YOU HAVE ON A TYPICAL DAY: PATIENT DOES NOT DRINK
HOW OFTEN DO YOU HAVE A DRINK CONTAINING ALCOHOL: NEVER

## 2023-03-08 ASSESSMENT — PAIN - FUNCTIONAL ASSESSMENT: PAIN_FUNCTIONAL_ASSESSMENT: 0-10

## 2023-03-08 NOTE — ED PROVIDER NOTES
Emergency Department Provider Note                   PCP:                Nicholas Elias MD               Age: 60 y.o. Sex: male     DISPOSITION       No diagnosis found. MEDICAL DECISION MAKING  Complexity of Problems Addressed:  {Complexity:75429}    Patient's oxygen saturations are 98 to 100% on room air currently. We will continue to monitor. 3:59 PM  Labs are significant for white count of 12,000. Creatinine elevated to 2.2, most recent was 1.6. Patient now endorses pain in his back as well as around his belly. Awaiting urinalysis. Will obtain CT scan with oral contrast here. IV fluids initiated. 2:30 PM  His chest x-ray is clear. Data Reviewed and Analyzed:  Category 1:   {external source:42368}  I ordered each unique test.  I reviewed the results of each unique test.    {Historian (state who, why needed, what they said):03723}    Category 2:   {test reviewed:76815}    EKG interpretation: Sinus tachycardia, rate of 107, some right axis deviation, no ST segment elevation or depression noted    Category 3: Discussion of management or test interpretation. ***       Risk of Complications and/or Morbidity of Patient Management:  Parental controlled substances given in the ED     Darvni Raines is a 60 y.o. male who presents to the Emergency Department with chief complaint of    Chief Complaint   Patient presents with    Shortness of Breath      Patient presents to the ER from GI office for concerns about shortness of breath. Patient reports he has had some degree of exertional dyspnea for years. States he has some shortness of breath last evening. Went to see GI office today secondary to recurrence of abdominal pain. Was diffuse generalized abdominal pain. Upon arrival to G office was reportedly hypoxic. Patient denies any fevers or chills. Denies any cough. The history is provided by the patient.    Shortness of Breath  Severity:  Moderate  Onset quality: Gradual  Duration:  2 days  Timing:  Constant  Chronicity:  New  Context: not activity, not animal exposure, not known allergens and not occupational exposure    Relieved by:  Nothing  Worsened by:  Nothing  Associated symptoms: cough    Associated symptoms: no abdominal pain, no chest pain, no fever, no headaches, no swollen glands, no vomiting and no wheezing       Review of Systems   Constitutional:  Negative for fever. HENT:  Negative for congestion, dental problem, trouble swallowing and voice change. Eyes:  Negative for photophobia, redness and visual disturbance. Respiratory:  Positive for cough, chest tightness and shortness of breath. Negative for wheezing. Cardiovascular:  Negative for chest pain. Gastrointestinal:  Negative for abdominal pain, nausea and vomiting. Endocrine: Negative for polydipsia, polyphagia and polyuria. Genitourinary:  Negative for flank pain, hematuria, penile discharge and penile swelling. Musculoskeletal:  Negative for back pain, myalgias and neck stiffness. Skin:  Negative for color change and pallor. Allergic/Immunologic: Negative for food allergies. Neurological:  Negative for tremors, weakness, light-headedness, numbness and headaches. Hematological:  Negative for adenopathy. Does not bruise/bleed easily. Psychiatric/Behavioral:  Negative for behavioral problems and confusion. All other systems reviewed and are negative. Vitals signs and nursing note reviewed. Patient Vitals for the past 4 hrs:   Temp Pulse Resp BP SpO2   03/08/23 1419 97.7 °F (36.5 °C) -- -- 137/81 --   03/08/23 1414 -- (!) 107 20 -- 100 %          Physical Exam  Vitals and nursing note reviewed. Constitutional:       General: He is not in acute distress. Appearance: Normal appearance. He is not ill-appearing. HENT:      Head: Normocephalic and atraumatic.       Right Ear: External ear normal.      Left Ear: External ear normal.      Nose: Nose normal. No congestion or rhinorrhea. Cardiovascular:      Rate and Rhythm: Regular rhythm. Tachycardia present. Pulses: Normal pulses. Heart sounds: Normal heart sounds. Pulmonary:      Effort: Pulmonary effort is normal.      Breath sounds: Normal breath sounds. Abdominal:      General: Abdomen is flat. There is no distension. Palpations: Abdomen is soft. There is no mass. Tenderness: There is no abdominal tenderness. Musculoskeletal:         General: No swelling or tenderness. Normal range of motion. Cervical back: Normal range of motion and neck supple. Skin:     General: Skin is warm. Coloration: Skin is not jaundiced or pale. Neurological:      General: No focal deficit present. Mental Status: He is alert and oriented to person, place, and time. Cranial Nerves: No cranial nerve deficit. Sensory: No sensory deficit. Motor: No weakness.    Psychiatric:         Mood and Affect: Mood normal.         Behavior: Behavior normal.        Procedures     Orders Placed This Encounter   Procedures    XR CHEST PORTABLE    CBC with Auto Differential    Comprehensive Metabolic Panel    Magnesium    Brain Natriuretic Peptide    Troponin    Cardiac Monitor - ED Only    Continuous Pulse Oximetry    EKG 12 Lead        Medications   0.9 % sodium chloride bolus (has no administration in time range)       New Prescriptions    No medications on file        Past Medical History:   Diagnosis Date    Arrhythmia     NSVT taking verapamil for this    Arthritis     hips    CAD (coronary artery disease) 2017, 10/02/2020    NO MI, NO STENTS NO CABG per patient; Mild CAD, normal LV function per cath dated 10/02/2020; followed by [facility]    Chronic obstructive pulmonary disease ([address]     per pt mild and treated with inhaler    CKD (chronic kidney disease)     stage 3 per patient    Dyspnea on exertion     only with exertion not at rest    GERD (gastroesophageal reflux disease) taking omeprazole daily; does not sleep elevated    H/O cold sores     Hx of echocardiogram 03/2021    LVEF 55-60%    Hyperlipidemia     statin taken    Hypertension     controlled w/ med    Latex allergy     Sleep apnea     APAP cannot tolerate apap right now; oral device is being made as of 1/27/2023    Wide-complex tachycardia 2017    NSVT; per pt no problems since put on verapamil        Past Surgical History:   Procedure Laterality Date    BACK SURGERY  05/2021    CARDIAC CATHETERIZATION  2017    no stents     CARDIAC CATHETERIZATION  10/02/2020    Mild CAD, normal LV function    COLONOSCOPY      NOSE SURGERY Bilateral 2/7/2023    INFERIOR TURBINATE REDUCTION performed by Raúl Cano MD at [facility]    SEPTOPLASTY N/A 2/7/2023    SEPTOPLASTY performed by Raúl Cano MD at [address] Bilateral 10/07/2020        Family History   Problem Relation Age of Onset    Depression Mother     Heart Disease Father         Social History     Socioeconomic History    Marital status:    Tobacco Use    Smoking status: Never    Smokeless tobacco: Never   Vaping Use    Vaping Use: Never used   Substance and Sexual Activity    Alcohol use: Never    Drug use: Never        Allergies: Latex and Iv dye [iodides]    Previous Medications    ACETAMINOPHEN (TYLENOL) 500 MG TABLET    Take 1,000 mg by mouth every 6 hours as needed    ALBUTEROL SULFATE HFA (VENTOLIN HFA) 108 (90 BASE) MCG/ACT INHALER    Inhale 2 puffs into the lungs 4 times daily as needed for Wheezing    ATORVASTATIN (LIPITOR) 40 MG TABLET    Take 40 mg by mouth at bedtime    CARBIDOPA-LEVODOPA (SINEMET)  MG PER TABLET    Take 2 tablets by mouth 3 times daily    FLUTICASONE (FLONASE) 50 MCG/ACT NASAL SPRAY    2 sprays by Each Nostril route daily    LOSARTAN-HYDROCHLOROTHIAZIDE (HYZAAR) 100-25 MG PER TABLET    TAKE 1 TABLET BY MOUTH ONCE DAILY    LYSINE PO    Take 50 mg by mouth as needed    MULTIPLE VITAMINS-MINERALS (MULTI COMPLETE PO)    Take by mouth    MUPIROCIN (BACTROBAN NASAL) 2 % NASAL OINTMENT    Take by Nasal route 2 times daily. OMEPRAZOLE (PRILOSEC) 40 MG DELAYED RELEASE CAPSULE    Take 1 capsule by mouth every morning (before breakfast)    POLYETHYLENE GLYCOL-ELECTROLYTES (COLYTE) 240 G SOLR SOLUTION    Take 4,000 mLs by mouth once for 1 dose    SILDENAFIL (VIAGRA) 100 MG TABLET    Take 1 tablet by mouth as needed for Erectile Dysfunction    TIOTROPIUM (SPIRIVA RESPIMAT) 2.5 MCG/ACT AERS INHALER    Inhale 2 puffs into the lungs daily    VERAPAMIL (CALAN SR) 240 MG EXTENDED RELEASE TABLET    Take 240 mg by mouth daily        No results found for any visits on 03/08/23. XR CHEST PORTABLE    (Results Pending)                     Voice dictation software was used during the making of this note. This software is not perfect and grammatical and other typographical errors may be present. This note has not been completely proofread for errors. Iv dye [iodides]    Discharge Medication List as of 3/8/2023 11:22 PM        CONTINUE these medications which have NOT CHANGED    Details   polyethylene glycol-electrolytes (COLYTE) 240 g SOLR solution Take 4,000 mLs by mouth once for 1 dose, Disp-4000 mL, R-0Normal      tiotropium (SPIRIVA RESPIMAT) 2.5 MCG/ACT AERS inhaler Inhale 2 puffs into the lungs daily, Disp-1 each, R-11Normal      mupirocin (BACTROBAN NASAL) 2 % nasal ointment Take by Nasal route 2 times daily. , Disp-1 each, R-3, Normal      carbidopa-levodopa (SINEMET)  MG per tablet Take 2 tablets by mouth 3 times daily, Disp-180 tablet, R-5Normal      fluticasone (FLONASE) 50 MCG/ACT nasal spray 2 sprays by Each Nostril route daily, Disp-48 g, R-1Normal      omeprazole (PRILOSEC) 40 MG delayed release capsule Take 1 capsule by mouth every morning (before breakfast), Disp-30 capsule, R-5Normal      sildenafil (VIAGRA) 100 MG tablet Take 1 tablet by mouth as needed for Erectile Dysfunction, Disp-30 tablet, R-3Normal      losartan-hydroCHLOROthiazide (HYZAAR) 100-25 MG per tablet TAKE 1 TABLET BY MOUTH ONCE DAILYHistorical Med      Multiple Vitamins-Minerals (MULTI COMPLETE PO) Take by mouthHistorical Med      albuterol sulfate HFA (VENTOLIN HFA) 108 (90 Base) MCG/ACT inhaler Inhale 2 puffs into the lungs 4 times daily as needed for Wheezing, Disp-54 g, R-1Normal      LYSINE PO Take 50 mg by mouth as neededHistorical Med      acetaminophen (TYLENOL) 500 MG tablet Take 1,000 mg by mouth every 6 hours as neededHistorical Med      atorvastatin (LIPITOR) 40 MG tablet Take 40 mg by mouth at bedtimeHistorical Med      verapamil (CALAN SR) 240 MG extended release tablet Take 240 mg by mouth dailyHistorical Med              Results for orders placed or performed during the hospital encounter of 03/08/23   CBC with Auto Differential   Result Value Ref Range    WBC 5.7 4.3 - 11.1 K/uL    RBC 4.03 (L) 4.23 - 5.6 M/uL    Hemoglobin 11.2 (L) 13.6 - 17.2 g/dL Hematocrit 34.9 (L) 41.1 - 50.3 %    MCV 86.6 82.0 - 102.0 FL    MCH 27.8 26.1 - 32.9 PG    MCHC 32.1 31.4 - 35.0 g/dL    RDW 14.1 11.9 - 14.6 %    Platelets 893 (L) 272 - 450 K/uL    MPV 10.0 9.4 - 12.3 FL    nRBC 0.00 0.0 - 0.2 K/uL    Differential Type AUTOMATED      Seg Neutrophils 62 43 - 78 %    Lymphocytes 20 13 - 44 %    Monocytes 11 4.0 - 12.0 %    Eosinophils % 4 0.5 - 7.8 %    Basophils 1 0.0 - 2.0 %    Immature Granulocytes 1 0.0 - 5.0 %    Segs Absolute 3.6 1.7 - 8.2 K/UL    Absolute Lymph # 1.2 0.5 - 4.6 K/UL    Absolute Mono # 0.6 0.1 - 1.3 K/UL    Absolute Eos # 0.2 0.0 - 0.8 K/UL    Basophils Absolute 0.1 0.0 - 0.2 K/UL    Absolute Immature Granulocyte 0.1 0.0 - 0.5 K/UL   Basic Metabolic Panel w/ Reflex to MG   Result Value Ref Range    Sodium 143 133 - 143 mmol/L    Potassium 3.5 3.5 - 5.1 mmol/L    Chloride 111 (H) 101 - 110 mmol/L    CO2 27 21 - 32 mmol/L    Anion Gap 5 2 - 11 mmol/L    Glucose 84 65 - 100 mg/dL    BUN 24 (H) 8 - 23 MG/DL    Creatinine 1.79 (H) 0.8 - 1.5 MG/DL    Est, Glom Filt Rate 42 (L) >60 ml/min/1.73m2    Calcium 8.1 (L) 8.3 - 10.4 MG/DL   Magnesium   Result Value Ref Range    Magnesium 1.8 1.8 - 2.4 mg/dL   Comprehensive Metabolic Panel w/ Reflex to MG   Result Value Ref Range    Sodium 139 133 - 143 mmol/L    Potassium 3.9 3.5 - 5.1 mmol/L    Chloride 108 101 - 110 mmol/L    CO2 27 21 - 32 mmol/L    Anion Gap 4 2 - 11 mmol/L    Glucose 108 (H) 65 - 100 mg/dL    BUN 22 8 - 23 MG/DL    Creatinine 1.71 (H) 0.8 - 1.5 MG/DL    Est, Glom Filt Rate 44 (L) >60 ml/min/1.73m2    Calcium 8.4 8.3 - 10.4 MG/DL    Total Bilirubin 0.7 0.2 - 1.1 MG/DL    ALT 28 12 - 65 U/L    AST 18 15 - 37 U/L    Alk Phosphatase 56 50 - 136 U/L    Total Protein 6.0 (L) 6.3 - 8.2 g/dL    Albumin 3.0 (L) 3.2 - 4.6 g/dL    Globulin 3.0 2.8 - 4.5 g/dL    Albumin/Globulin Ratio 1.0 0.4 - 1.6     Results for orders placed or performed during the hospital encounter of 03/08/23   Culture, Blood 1    Specimen: Blood   Result Value Ref Range    Special Requests LEFT  FOREARM        Culture NO GROWTH 4 DAYS     XR CHEST PORTABLE    Narrative    EXAM: XR CHEST PORTABLE  INDICATION: Shortness of Breath  COMPARISON: Chest radiograph, 2/2/2022    FINDINGS:  The cardiomediastinal silhouette is within normal limits. No focal parenchymal  process. No pleural effusion. No pneumothorax. No acute osseous abnormality. Impression    No acute cardiopulmonary abnormality. CT ABDOMEN PELVIS WO CONTRAST Additional Contrast? Oral    Narrative    EXAMINATION: CT ABDOMEN AND PELVIS WITHOUT IV CONTRAST       DATE OF EXAMINATION: March 08, 2023    INDICATION: Leukocytosis and generalized abdominal pain and nausea    COMPARISON: None available. PROCEDURE:   Axial CT of the abdomen and pelvis was performed with sagittal and   coronal reformatted images without contrast enhancement. The exam is limited   because some types of pathology may not be adequately demonstrated due to lack   of contrast enhancement. CT dose lowering techniques were used, to include:   automated exposure control, adjustment for patient size, and or use of iterative   reconstruction. FINDINGS:    LOWER CHEST :  Normal.    ABDOMEN:    Liver and Biliary system: There is a 1.5 cm upper right lobe cyst.    Gallbladder:  Normal.    Spleen:  Normal.    Pancreas:  Normal.    Adrenal glands:  Normal.    Kidneys and ureters:  6 cm right renal cyst. No masses or inflammatory process. There is a small nonobstructing stone in each kidney. .    Aorta/IVC:   Aorta normal. No aortic aneurysm or dissection. IVC normal.    Lymph nodes:  No significant lymphadenopathy. Stomach: Small hiatal hernia. Bowel: No obstruction, free air, or ascites. No mucosal thickening. Appendix:. Not visualized.     Peritoneum/Mesentery:  Normal.    Abdominal wall:  Normal.    PELVIS:  Urinary bladder:  Normal.    Reproductive organs:  Normal.    Lymph Nodes:  Normal.    BONES: Bilateral hip replacements. Degenerative disc disease from L4 to S1.    ADDITIONAL  SIGNIFICANT FINDINGS:  None. Impression    1. No acute findings. 2.  Small sliding hiatal hernia. 3.  Small stone in each kidney.   4.  6 cm right renal cyst.     Jatinder Hoffman M.D.   3/8/2023 8:18:00 PM   CBC with Auto Differential   Result Value Ref Range    WBC 12.2 (H) 4.3 - 11.1 K/uL    RBC 5.35 4.23 - 5.6 M/uL    Hemoglobin 15.1 13.6 - 17.2 g/dL    Hematocrit 45.6 41.1 - 50.3 %    MCV 85.2 82 - 102 FL    MCH 28.2 26.1 - 32.9 PG    MCHC 33.1 31.4 - 35.0 g/dL    RDW 13.8 11.9 - 14.6 %    Platelets 697 577 - 364 K/uL    MPV 10.3 9.4 - 12.3 FL    nRBC 0.00 0.0 - 0.2 K/uL    Differential Type AUTOMATED      Seg Neutrophils 80 (H) 43 - 78 %    Lymphocytes 9 (L) 13 - 44 %    Monocytes 10 4.0 - 12.0 %    Eosinophils % 0 (L) 0.5 - 7.8 %    Basophils 0 0.0 - 2.0 %    Immature Granulocytes 1 0.0 - 5.0 %    Segs Absolute 9.7 (H) 1.7 - 8.2 K/UL    Absolute Lymph # 1.1 0.5 - 4.6 K/UL    Absolute Mono # 1.2 0.1 - 1.3 K/UL    Absolute Eos # 0.1 0.0 - 0.8 K/UL    Basophils Absolute 0.1 0.0 - 0.2 K/UL    Absolute Immature Granulocyte 0.1 0.0 - 0.5 K/UL   Comprehensive Metabolic Panel   Result Value Ref Range    Sodium 137 133 - 143 mmol/L    Potassium 3.5 3.5 - 5.1 mmol/L    Chloride 105 101 - 110 mmol/L    CO2 25 21 - 32 mmol/L    Anion Gap 7 2 - 11 mmol/L    Glucose 100 65 - 100 mg/dL    BUN 21 8 - 23 MG/DL    Creatinine 2.20 (H) 0.8 - 1.5 MG/DL    Est, Glom Filt Rate 33 (L) >60 ml/min/1.73m2    Calcium 10.7 (H) 8.3 - 10.4 MG/DL    Total Bilirubin 1.8 (H) 0.2 - 1.1 MG/DL    ALT 36 12 - 65 U/L    AST 24 15 - 37 U/L    Alk Phosphatase 73 50 - 136 U/L    Total Protein 8.6 (H) 6.3 - 8.2 g/dL    Albumin 4.4 3.2 - 4.6 g/dL    Globulin 4.2 2.8 - 4.5 g/dL    Albumin/Globulin Ratio 1.0 0.4 - 1.6     Magnesium   Result Value Ref Range    Magnesium 2.0 1.8 - 2.4 mg/dL   Brain Natriuretic Peptide   Result Value Ref Range    NT Pro- (H) 5 - 125 PG/ML   Troponin   Result Value Ref Range    Troponin, High Sensitivity 23.9 (H) 0 - 14 pg/mL   Urinalysis w rflx microscopic   Result Value Ref Range    Color, UA DARK YELLOW      Appearance CLOUDY      Specific Gravity, UA 1.024 (H) 1.001 - 1.023      pH, Urine 6.5 5.0 - 9.0      Protein, UA 30 (A) NEG mg/dL    Glucose, UA Negative mg/dL    Ketones, Urine 15 (A) NEG mg/dL    Bilirubin Urine Negative NEG      Blood, Urine Negative NEG      Urobilinogen, Urine 1.0 0.2 - 1.0 EU/dL    Nitrite, Urine Negative NEG      Leukocyte Esterase, Urine SMALL (A) NEG     Troponin   Result Value Ref Range    Troponin, High Sensitivity 45.2 (H) 0 - 14 pg/mL   Urinalysis, Micro   Result Value Ref Range    WBC, UA 0-3 0 /hpf    RBC, UA 0-3 0 /hpf    Epithelial Cells UA 0-3 0 /hpf    BACTERIA, URINE 1+ (H) 0 /hpf    Casts 5-10 0 /lpf    Crystals 0 0 /LPF    Mucus, UA 0 0 /lpf    Other observations RESULTS VERIFIED MANUALLY     Basic Metabolic Panel   Result Value Ref Range    Sodium 142 133 - 143 mmol/L    Potassium 3.3 (L) 3.5 - 5.1 mmol/L    Chloride 111 (H) 101 - 110 mmol/L    CO2 15 (L) 21 - 32 mmol/L    Anion Gap 16 (H) 2 - 11 mmol/L    Glucose 90 65 - 100 mg/dL    BUN 20 8 - 23 MG/DL    Creatinine 2.00 (H) 0.8 - 1.5 MG/DL    Est, Glom Filt Rate 37 (L) >60 ml/min/1.73m2    Calcium 8.9 8.3 - 10.4 MG/DL   Troponin   Result Value Ref Range    Troponin, High Sensitivity 26.6 (H) 0 - 14 pg/mL   EKG 12 Lead   Result Value Ref Range    Ventricular Rate 107 BPM    Atrial Rate 107 BPM    P-R Interval 146 ms    QRS Duration 86 ms    Q-T Interval 336 ms    QTc Calculation (Bazett) 449 ms    P Axis 39 degrees    R Axis 94 degrees    T Axis 13 degrees    Diagnosis Sinus tachycardia         CT ABDOMEN PELVIS WO CONTRAST Additional Contrast? Oral   Final Result   1. No acute findings. 2.  Small sliding hiatal hernia. 3.  Small stone in each kidney.    4.  6 cm right renal cyst.       Dfane Rossi M.D.    3/8/2023 8:18:00 PM      XR CHEST PORTABLE   Final Result   No acute cardiopulmonary abnormality. Voice dictation software was used during the making of this note. This software is not perfect and grammatical and other typographical errors may be present. This note has not been completely proofread for errors.      Kena Seaman MD  03/13/23 [id number]

## 2023-03-08 NOTE — ED TRIAGE NOTES
Patient arrives to ED from outpatient GI. Patient was 83% on RA. Patient reports his SOB started this morning. Patient reports he had n/v last night. Denies chest pain. Denies fever or chills.

## 2023-03-09 VITALS
BODY MASS INDEX: 29.4 KG/M2 | SYSTOLIC BLOOD PRESSURE: 135 MMHG | RESPIRATION RATE: 18 BRPM | HEART RATE: 82 BPM | OXYGEN SATURATION: 95 % | HEIGHT: 71 IN | DIASTOLIC BLOOD PRESSURE: 80 MMHG | WEIGHT: 210 LBS | TEMPERATURE: 99 F

## 2023-03-09 PROBLEM — N17.9 AKI (ACUTE KIDNEY INJURY) (HCC): Status: RESOLVED | Noted: 2023-03-08 | Resolved: 2023-03-09

## 2023-03-09 LAB
ALBUMIN SERPL-MCNC: 3 G/DL (ref 3.2–4.6)
ALBUMIN/GLOB SERPL: 1 (ref 0.4–1.6)
ALP SERPL-CCNC: 56 U/L (ref 50–136)
ALT SERPL-CCNC: 28 U/L (ref 12–65)
ANION GAP SERPL CALC-SCNC: 4 MMOL/L (ref 2–11)
ANION GAP SERPL CALC-SCNC: 5 MMOL/L (ref 2–11)
AST SERPL-CCNC: 18 U/L (ref 15–37)
BASOPHILS # BLD: 0.1 K/UL (ref 0–0.2)
BASOPHILS NFR BLD: 1 % (ref 0–2)
BILIRUB SERPL-MCNC: 0.7 MG/DL (ref 0.2–1.1)
BUN SERPL-MCNC: 22 MG/DL (ref 8–23)
BUN SERPL-MCNC: 24 MG/DL (ref 8–23)
CALCIUM SERPL-MCNC: 8.1 MG/DL (ref 8.3–10.4)
CALCIUM SERPL-MCNC: 8.4 MG/DL (ref 8.3–10.4)
CHLORIDE SERPL-SCNC: 108 MMOL/L (ref 101–110)
CHLORIDE SERPL-SCNC: 111 MMOL/L (ref 101–110)
CO2 SERPL-SCNC: 27 MMOL/L (ref 21–32)
CO2 SERPL-SCNC: 27 MMOL/L (ref 21–32)
CREAT SERPL-MCNC: 1.71 MG/DL (ref 0.8–1.5)
CREAT SERPL-MCNC: 1.79 MG/DL (ref 0.8–1.5)
DIFFERENTIAL METHOD BLD: ABNORMAL
EOSINOPHIL # BLD: 0.2 K/UL (ref 0–0.8)
EOSINOPHIL NFR BLD: 4 % (ref 0.5–7.8)
ERYTHROCYTE [DISTWIDTH] IN BLOOD BY AUTOMATED COUNT: 14.1 % (ref 11.9–14.6)
GLOBULIN SER CALC-MCNC: 3 G/DL (ref 2.8–4.5)
GLUCOSE SERPL-MCNC: 108 MG/DL (ref 65–100)
GLUCOSE SERPL-MCNC: 84 MG/DL (ref 65–100)
HCT VFR BLD AUTO: 34.9 % (ref 41.1–50.3)
HGB BLD-MCNC: 11.2 G/DL (ref 13.6–17.2)
IMM GRANULOCYTES # BLD AUTO: 0.1 K/UL (ref 0–0.5)
IMM GRANULOCYTES NFR BLD AUTO: 1 % (ref 0–5)
LYMPHOCYTES # BLD: 1.2 K/UL (ref 0.5–4.6)
LYMPHOCYTES NFR BLD: 20 % (ref 13–44)
MAGNESIUM SERPL-MCNC: 1.8 MG/DL (ref 1.8–2.4)
MCH RBC QN AUTO: 27.8 PG (ref 26.1–32.9)
MCHC RBC AUTO-ENTMCNC: 32.1 G/DL (ref 31.4–35)
MCV RBC AUTO: 86.6 FL (ref 82–102)
MONOCYTES # BLD: 0.6 K/UL (ref 0.1–1.3)
MONOCYTES NFR BLD: 11 % (ref 4–12)
NEUTS SEG # BLD: 3.6 K/UL (ref 1.7–8.2)
NEUTS SEG NFR BLD: 62 % (ref 43–78)
NRBC # BLD: 0 K/UL (ref 0–0.2)
PLATELET # BLD AUTO: 146 K/UL (ref 150–450)
PMV BLD AUTO: 10 FL (ref 9.4–12.3)
POTASSIUM SERPL-SCNC: 3.5 MMOL/L (ref 3.5–5.1)
POTASSIUM SERPL-SCNC: 3.9 MMOL/L (ref 3.5–5.1)
PROT SERPL-MCNC: 6 G/DL (ref 6.3–8.2)
RBC # BLD AUTO: 4.03 M/UL (ref 4.23–5.6)
SODIUM SERPL-SCNC: 139 MMOL/L (ref 133–143)
SODIUM SERPL-SCNC: 143 MMOL/L (ref 133–143)
WBC # BLD AUTO: 5.7 K/UL (ref 4.3–11.1)

## 2023-03-09 PROCEDURE — 80053 COMPREHEN METABOLIC PANEL: CPT

## 2023-03-09 PROCEDURE — 2580000003 HC RX 258: Performed by: INTERNAL MEDICINE

## 2023-03-09 PROCEDURE — 85025 COMPLETE CBC W/AUTO DIFF WBC: CPT

## 2023-03-09 PROCEDURE — 6360000002 HC RX W HCPCS: Performed by: INTERNAL MEDICINE

## 2023-03-09 PROCEDURE — 36415 COLL VENOUS BLD VENIPUNCTURE: CPT

## 2023-03-09 PROCEDURE — 6370000000 HC RX 637 (ALT 250 FOR IP): Performed by: INTERNAL MEDICINE

## 2023-03-09 PROCEDURE — 83735 ASSAY OF MAGNESIUM: CPT

## 2023-03-09 RX ADMIN — SODIUM CHLORIDE, PRESERVATIVE FREE 10 ML: 5 INJECTION INTRAVENOUS at 09:06

## 2023-03-09 RX ADMIN — METRONIDAZOLE 500 MG: 500 TABLET ORAL at 00:07

## 2023-03-09 RX ADMIN — SODIUM CHLORIDE: 9 INJECTION, SOLUTION INTRAVENOUS at 05:40

## 2023-03-09 RX ADMIN — CIPROFLOXACIN 500 MG: 500 TABLET, FILM COATED ORAL at 08:15

## 2023-03-09 RX ADMIN — POTASSIUM CHLORIDE 40 MEQ: 1500 TABLET, EXTENDED RELEASE ORAL at 08:13

## 2023-03-09 RX ADMIN — SODIUM CHLORIDE, PRESERVATIVE FREE 10 ML: 5 INJECTION INTRAVENOUS at 00:08

## 2023-03-09 RX ADMIN — SODIUM CHLORIDE: 9 INJECTION, SOLUTION INTRAVENOUS at 00:03

## 2023-03-09 RX ADMIN — METRONIDAZOLE 500 MG: 500 TABLET ORAL at 08:14

## 2023-03-09 RX ADMIN — CIPROFLOXACIN 500 MG: 500 TABLET, FILM COATED ORAL at 00:07

## 2023-03-09 RX ADMIN — HEPARIN SODIUM 5000 UNITS: 5000 INJECTION INTRAVENOUS; SUBCUTANEOUS at 05:39

## 2023-03-09 ASSESSMENT — PAIN SCALES - GENERAL: PAINLEVEL_OUTOF10: 0

## 2023-03-09 NOTE — PROGRESS NOTES
TRANSFER - IN REPORT:    Verbal report received from [facility] on Zane Jimenez  being received from ED for routine progression of patient care      Report consisted of patient's Situation, Background, Assessment and   Recommendations(SBAR). Information from the following report(s) Nurse Handoff Report, ED Encounter Summary, and ED SBAR was reviewed with the receiving nurse. Opportunity for questions and clarification was provided. Assessment completed upon patient's arrival to unit and care assumed.

## 2023-03-09 NOTE — DISCHARGE SUMMARY
Hospitalist Discharge Summary   Admit Date:  3/8/2023 11:22 PM   DC Note date: 3/9/2023  Name:  [facility]   Age:  60 y.o. Sex:  male  :  1960   MRN:  [id number]   Room:  [facility]/  PCP:  Danielle Macias MD    Presenting Complaint: No chief complaint on file. Initial Admission Diagnosis: ANTONELLA (acute kidney injury) ([facility] [address] [N17.9]     Problem List for this Hospitalization (present on admission):    Principal Problem (Resolved):    ANTONELLA (acute kidney injury) ([facility] [address]  Active Problems:    * No active hospital problems. *      Hospital Course:  60 y.o. male with medical history of COPD, parkinsons, CKD, who presented with nausea and vomiting. He felt fine yesterday. He said he ate at [employer] last night and became ill afterwards and had episodes of vomiting. No other family members sick but they had different foods. He also c/o LLQ abd pain moderate, dull, still present currently while n/v has resolved. He presented to the GI office for routine EGD/[place] and complained of SOB so was sent to the ER. He was found to have ANTONELLA and metabolic acidosis. CXR clear. He was started on IVFs. His ANTONELLA and metabolic acidosis resolved. His shortness of breath resolved. He remained stable and was discharged home. Disposition: Home  Diet: ADULT DIET; Regular  Code Status: Full Code    Follow Ups:  PCP in one week  Dr. Mirna Rosas at next appt    Time spent in patient discharge and coordination 39 minutes. Follow up labs/diagnostics (ultimately defer to outpatient provider):  None    Plan was discussed with patient, wife, nursing, and case management. All questions answered. Patient was stable at time of discharge. Instructions given to call a physician or return if any concerns.     Current Discharge Medication List        CONTINUE these medications which have NOT CHANGED    Details   tiotropium (SPIRIVA RESPIMAT) 2.5 MCG/ACT AERS inhaler Inhale 2 puffs into the lungs daily  Qty: 1 each, Refills: 11    Associated Diagnoses: Chronic obstructive pulmonary disease, unspecified COPD type ([facility])      mupirocin (BACTROBAN NASAL) 2 % nasal ointment Take by Nasal route 2 times daily.   Qty: 1 each, Refills: 3      carbidopa-levodopa (SINEMET)  MG per tablet Take 2 tablets by mouth 3 times daily  Qty: 180 tablet, Refills: 5    Associated Diagnoses: Primary parkinsonism (HCC)      omeprazole (PRILOSEC) 40 MG delayed release capsule Take 1 capsule by mouth every morning (before breakfast)  Qty: 30 capsule, Refills: 5    Associated Diagnoses: Gastroesophageal reflux disease with esophagitis without hemorrhage      sildenafil (VIAGRA) 100 MG tablet Take 1 tablet by mouth as needed for Erectile Dysfunction  Qty: 30 tablet, Refills: 3    Associated Diagnoses: Erectile dysfunction, unspecified erectile dysfunction type      losartan-hydroCHLOROthiazide (HYZAAR) 100-25 MG per tablet TAKE 1 TABLET BY MOUTH ONCE DAILY      albuterol sulfate HFA (VENTOLIN HFA) 108 (90 Base) MCG/ACT inhaler Inhale 2 puffs into the lungs 4 times daily as needed for Wheezing  Qty: 54 g, Refills: 1    Associated Diagnoses: Chronic obstructive pulmonary disease, unspecified COPD type ([facility])      LYSINE PO Take 50 mg by mouth as needed      acetaminophen (TYLENOL) 500 MG tablet Take 1,000 mg by mouth every 6 hours as needed      atorvastatin (LIPITOR) 40 MG tablet Take 40 mg by mouth at bedtime      verapamil (CALAN SR) 240 MG extended release tablet Take 240 mg by mouth daily           STOP taking these medications       polyethylene glycol-electrolytes (COLYTE) 240 g SOLR solution Comments:   Reason for Stopping:         fluticasone (FLONASE) 50 MCG/ACT nasal spray Comments:   Reason for Stopping:         Multiple Vitamins-Minerals (MULTI COMPLETE PO) Comments:   Reason for Stopping:               Some medications may have been reported old/obsolete and marked \"stop taking\" by the system; in reality pt was already off these meds; defer to outpatient or prescribing providers. Procedures done this admission:  * No surgery found *    Consults this admission:  None    Echocardiogram results:  No results found for this or any previous visit. Diagnostic Imaging/Tests:   CT ABDOMEN PELVIS WO CONTRAST Additional Contrast? Oral    Result Date: 3/8/2023  1. No acute findings. 2.  Small sliding hiatal hernia. 3.  Small stone in each kidney. 4.  6 cm right renal cyst.  Gordy Herrera M.D. 3/8/2023 8:18:00 PM    XR CHEST PORTABLE    Result Date: 3/8/2023  No acute cardiopulmonary abnormality.         Labs: Results:       BMP, Mg, Phos Recent Labs     03/08/23  1420 03/08/23 2025 03/09/23  0609 03/09/23  1547    142 143 139   K 3.5 3.3* 3.5 3.9    111* 111* 108   CO2 25 15* 27 27   ANIONGAP 7 16* 5 4   BUN 21 20 24* 22   CREATININE 2.20* 2.00* 1.79* 1.71*   LABGLOM 33* 37* 42* 44*   CALCIUM 10.7* 8.9 8.1* 8.4   GLUCOSE 100 90 84 108*   MG 2.0  --  1.8  --       CBC Recent Labs     03/08/23 1420 03/09/23  0609   WBC 12.2* 5.7   RBC 5.35 4.03*   HGB 15.1 11.2*   HCT 45.6 34.9*   MCV 85.2 86.6   MCH 28.2 27.8   MCHC 33.1 32.1   RDW 13.8 14.1    146*   MPV 10.3 10.0   NRBC 0.00 0.00   SEGS 80* 62   LYMPHOPCT 9* 20   EOSRELPCT 0* 4   MONOPCT 10 11   BASOPCT 0 1   IMMGRAN 1 1   SEGSABS 9.7* 3.6   LYMPHSABS 1.1 1.2   EOSABS 0.1 0.2   MONOSABS 1.2 0.6   BASOSABS 0.1 0.1   ABSIMMGRAN 0.1 0.1      LFT Recent Labs     03/08/23 1420 03/09/23  1547   BILITOT 1.8* 0.7   ALKPHOS 73 56   AST 24 18   ALT 36 28   PROT 8.6* 6.0*   LABALBU 4.4 3.0*   GLOB 4.2 3.0      Cardiac  Lab Results   Component Value Date/Time    NTPROBNP 223 03/08/2023 02:20 PM    TROPHS 26.6 03/08/2023 09:57 PM    TROPHS 45.2 03/08/2023 06:47 PM    TROPHS 23.9 03/08/2023 02:20 PM      Coags Lab Results   Component Value Date/Time    PROTIME 13.4 09/21/2020 08:37 AM    INR 1.0 09/21/2020 08:37 AM    APTT 28.0 09/21/2020 08:37 AM      A1c Lab Results   Component Value Date/Time LABA1C 5.4 11/28/2022 08:40 AM    LABA1C 5.4 04/08/2021 11:10 AM    LABA1C 5.8 09/21/2020 08:38 AM     11/28/2022 08:40 AM     04/08/2021 11:10 AM     09/21/2020 08:38 AM      Lipids Lab Results   Component Value Date/Time    CHOL 121 11/28/2022 08:40 AM    LDLCALC 63.6 11/28/2022 08:40 AM    LABVLDL 12.4 11/28/2022 08:40 AM    HDL 45 11/28/2022 08:40 AM    CHOLHDLRATIO 2.7 11/28/2022 08:40 AM    TRIG 62 11/28/2022 08:40 AM      Thyroid  Lab Results   Component Value Date/Time    QWS3BPG 1.570 11/28/2022 08:40 AM        Most Recent UA Lab Results   Component Value Date/Time    COLORU DARK YELLOW 03/08/2023 06:24 PM    APPEARANCE CLOUDY 03/08/2023 06:24 PM    SPECGRAV 1.024 03/08/2023 06:24 PM    LABPH 6.5 03/08/2023 06:24 PM    PROTEINU 30 03/08/2023 06:24 PM    GLUCOSEU Negative 03/08/2023 06:24 PM    KETUA 15 03/08/2023 06:24 PM    BILIRUBINUR Negative 03/08/2023 06:24 PM    BLOODU Negative 03/08/2023 06:24 PM    UROBILINOGEN 1.0 03/08/2023 06:24 PM    NITRU Negative 03/08/2023 06:24 PM    LEUKOCYTESUR SMALL 03/08/2023 06:24 PM    WBCUA 0-3 03/08/2023 06:24 PM    RBCUA 0-3 03/08/2023 06:24 PM    EPITHUA 0-3 03/08/2023 06:24 PM    BACTERIA 1+ 03/08/2023 06:24 PM    LABCAST 5-10 03/08/2023 06:24 PM    MUCUS 0 03/08/2023 06:24 PM        Recent Labs     03/08/23 2025   CULTURE NO GROWTH AFTER 9 HOURS       All Labs from Last 24 Hrs:  Recent Results (from the past 24 hour(s))   Urinalysis w rflx microscopic    Collection Time: 03/08/23  6:24 PM   Result Value Ref Range    Color, UA DARK YELLOW      Appearance CLOUDY      Specific Gravity, UA 1.024 (H) 1.001 - 1.023      pH, Urine 6.5 5.0 - 9.0      Protein, UA 30 (A) NEG mg/dL    Glucose, UA Negative mg/dL    Ketones, Urine 15 (A) NEG mg/dL    Bilirubin Urine Negative NEG      Blood, Urine Negative NEG      Urobilinogen, Urine 1.0 0.2 - 1.0 EU/dL    Nitrite, Urine Negative NEG      Leukocyte Esterase, Urine SMALL (A) NEG     Urinalysis, Micro Collection Time: 03/08/23  6:24 PM   Result Value Ref Range    WBC, UA 0-3 0 /hpf    RBC, UA 0-3 0 /hpf    Epithelial Cells UA 0-3 0 /hpf    BACTERIA, URINE 1+ (H) 0 /hpf    Casts 5-10 0 /lpf    Crystals 0 0 /LPF    Mucus, UA 0 0 /lpf    Other observations RESULTS VERIFIED MANUALLY     Troponin    Collection Time: 03/08/23  6:47 PM   Result Value Ref Range    Troponin, High Sensitivity 45.2 (H) 0 - 14 pg/mL   Culture, Blood 1    Collection Time: 03/08/23  8:25 PM    Specimen: Blood   Result Value Ref Range    Special Requests LEFT  FOREARM        Culture NO GROWTH AFTER 9 HOURS     Basic Metabolic Panel    Collection Time: 03/08/23  8:25 PM   Result Value Ref Range    Sodium 142 133 - 143 mmol/L    Potassium 3.3 (L) 3.5 - 5.1 mmol/L    Chloride 111 (H) 101 - 110 mmol/L    CO2 15 (L) 21 - 32 mmol/L    Anion Gap 16 (H) 2 - 11 mmol/L    Glucose 90 65 - 100 mg/dL    BUN 20 8 - 23 MG/DL    Creatinine 2.00 (H) 0.8 - 1.5 MG/DL    Est, Glom Filt Rate 37 (L) >60 ml/min/1.73m2    Calcium 8.9 8.3 - 10.4 MG/DL   Troponin    Collection Time: 03/08/23  9:57 PM   Result Value Ref Range    Troponin, High Sensitivity 26.6 (H) 0 - 14 pg/mL   CBC with Auto Differential    Collection Time: 03/09/23  6:09 AM   Result Value Ref Range    WBC 5.7 4.3 - 11.1 K/uL    RBC 4.03 (L) 4.23 - 5.6 M/uL    Hemoglobin 11.2 (L) 13.6 - 17.2 g/dL    Hematocrit 34.9 (L) 41.1 - 50.3 %    MCV 86.6 82.0 - 102.0 FL    MCH 27.8 26.1 - 32.9 PG    MCHC 32.1 31.4 - 35.0 g/dL    RDW 14.1 11.9 - 14.6 %    Platelets 790 (L) 542 - 450 K/uL    MPV 10.0 9.4 - 12.3 FL    nRBC 0.00 0.0 - 0.2 K/uL    Differential Type AUTOMATED      Seg Neutrophils 62 43 - 78 %    Lymphocytes 20 13 - 44 %    Monocytes 11 4.0 - 12.0 %    Eosinophils % 4 0.5 - 7.8 %    Basophils 1 0.0 - 2.0 %    Immature Granulocytes 1 0.0 - 5.0 %    Segs Absolute 3.6 1.7 - 8.2 K/UL    Absolute Lymph # 1.2 0.5 - 4.6 K/UL    Absolute Mono # 0.6 0.1 - 1.3 K/UL    Absolute Eos # 0.2 0.0 - 0.8 K/UL Basophils Absolute 0.1 0.0 - 0.2 K/UL    Absolute Immature Granulocyte 0.1 0.0 - 0.5 K/UL   Basic Metabolic Panel w/ Reflex to MG    Collection Time: 03/09/23  6:09 AM   Result Value Ref Range    Sodium 143 133 - 143 mmol/L    Potassium 3.5 3.5 - 5.1 mmol/L    Chloride 111 (H) 101 - 110 mmol/L    CO2 27 21 - 32 mmol/L    Anion Gap 5 2 - 11 mmol/L    Glucose 84 65 - 100 mg/dL    BUN 24 (H) 8 - 23 MG/DL    Creatinine 1.79 (H) 0.8 - 1.5 MG/DL    Est, Glom Filt Rate 42 (L) >60 ml/min/1.73m2    Calcium 8.1 (L) 8.3 - 10.4 MG/DL   Magnesium    Collection Time: 03/09/23  6:09 AM   Result Value Ref Range    Magnesium 1.8 1.8 - 2.4 mg/dL   Comprehensive Metabolic Panel w/ Reflex to MG    Collection Time: 03/09/23  3:47 PM   Result Value Ref Range    Sodium 139 133 - 143 mmol/L    Potassium 3.9 3.5 - 5.1 mmol/L    Chloride 108 101 - 110 mmol/L    CO2 27 21 - 32 mmol/L    Anion Gap 4 2 - 11 mmol/L    Glucose 108 (H) 65 - 100 mg/dL    BUN 22 8 - 23 MG/DL    Creatinine 1.71 (H) 0.8 - 1.5 MG/DL    Est, Glom Filt Rate 44 (L) >60 ml/min/1.73m2    Calcium 8.4 8.3 - 10.4 MG/DL    Total Bilirubin 0.7 0.2 - 1.1 MG/DL    ALT 28 12 - 65 U/L    AST 18 15 - 37 U/L    Alk Phosphatase 56 50 - 136 U/L    Total Protein 6.0 (L) 6.3 - 8.2 g/dL    Albumin 3.0 (L) 3.2 - 4.6 g/dL    Globulin 3.0 2.8 - 4.5 g/dL    Albumin/Globulin Ratio 1.0 0.4 - 1.6         Allergies   Allergen Reactions    Latex Rash    Iv Dye [Iodides] Hives and Itching       There is no immunization history on file for this patient.     Recent Vital Data:  Patient Vitals for the past 24 hrs:   Temp Pulse Resp BP SpO2   03/09/23 1515 99 °F (37.2 °C) 82 18 135/80 95 %   03/09/23 1135 98.2 °F (36.8 °C) 82 -- 132/78 96 %   03/09/23 0723 98.4 °F (36.9 °C) 70 17 126/74 95 %   03/09/23 0401 98.1 °F (36.7 °C) 85 16 (!) 142/72 96 %   03/09/23 0023 -- 77 -- 136/75 --   03/08/23 2327 -- 77 -- 136/75 --   03/08/23 2324 98.3 °F (36.8 °C) 71 12 132/77 93 %       Oxygen Therapy  SpO2: 95 %  O2 Device: None (Room air)    Estimated body mass index is 29.29 kg/m² as calculated from the following:    Height as of this encounter: 5' 11\" (1.803 m). Weight as of this encounter: 210 lb (95.3 kg). Intake/Output Summary (Last 24 hours) at 3/9/2023 1626  Last data filed at 3/9/2023 0906  Gross per 24 hour   Intake 10 ml   Output 220 ml   Net -210 ml         Physical Exam:  General:    Well nourished. No overt distress  Head:  Normocephalic, atraumatic  Eyes:  Sclerae appear normal.  Pupils equally round. HENT:  Nares appear normal, no drainage. Moist mucous membranes  Neck:  No restricted ROM. Trachea midline  CV:   RRR. No m/r/g. No JVD  Lungs:   CTAB. No wheezing, rhonchi, or rales. Respirations even, unlabored  Abdomen:   Soft, nontender, nondistended. Extremities: Warm and dry. No cyanosis or clubbing. No edema. Skin:     No rashes. Normal coloration  Neuro:  CN II-XII grossly intact. Psych:  Normal mood and affect. Signed:  Rosas Giles DO    Part of this note may have been written by using a voice dictation software. The note has been proof read but may still contain some grammatical/other typographical errors.

## 2023-03-09 NOTE — PROGRESS NOTES
After visit summary reviewed with patient and spouse. Patient made aware of whom to notify in case of emergency. Verbalized understanding. Opportunity given to review information and ask questions. Peripheral IV removed with catheter tip intact.

## 2023-03-09 NOTE — ED NOTES
TRANSFER - OUT REPORT:    Verbal report given to [address] on Eladia Jimenez  being transferred to [facility] 3rd floor for routine progression of patient care       Report consisted of patient's Situation, Background, Assessment and   Recommendations(SBAR). Information from the following report(s) ED SBAR was reviewed with the receiving nurse. [place] Assessment: Presents to emergency department  because of falls (Syncope, seizure, or loss of consciousness): No, Age > 79: No, Altered Mental Status, Intoxication with alcohol or substance confusion (Disorientation, impaired judgment, poor safety awaremess, or inability to follow instructions): No, Impaired Mobility: Ambulates or transfers with assistive devices or assistance; Unable to ambulate or transer.: No  Lines:   Peripheral IV 03/08/23 Right Antecubital (Active)        Opportunity for questions and clarification was provided.       Patient transported with:  EMS          Mono Rhodes RN  03/08/23 1999

## 2023-03-09 NOTE — CARE COORDINATION
Pt chart reviewed for discharge planning. LMSW met with pt. Pt resides with his spouse and normally manages his ADL's. He is retired but still insured by [employer]. He has a PCP identified for follow up care. Pt anticipates no supportive care needs at this time. CM will follow pt plan of care and assist with supportive care referrals pending pt clinical progress. Please consult case management if specific needs arise. 03/09/23 2097   Service Assessment   Patient Orientation Alert and Oriented   Cognition Alert   History Provided By Patient;Medical Record   Primary Caregiver Self   Support Systems Spouse/Significant Other   Patient's Healthcare Decision Maker is: Legal Next of Kin   PCP Verified by CM Yes   Last Visit to PCP Within last 6 months   Prior Functional Level Independent in ADLs/IADLs   Current Functional Level Independent in ADLs/IADLs   Can patient return to prior living arrangement Yes   Ability to make needs known: Good   Family able to assist with home care needs: Yes   Would you like for me to discuss the discharge plan with any other family members/significant others, and if so, who? No   Financial Resources Other (Comment)  ([employer])   Social/Functional History   Lives With Spouse   Type of Home House   ADL Assistance Independent   Homemaking Assistance Independent   Ambulation Assistance Independent   Transfer Assistance Independent   Active  Yes   Occupation Retired   Discharge Planning   Type of [address] Prior To Admission None   Potential Assistance Needed N/A   DME Ordered? No   Potential Assistance Purchasing Medications No   Type of Home Care Services None   Patient expects to be discharged to: [address] Discharge   Transition of Care Consult (CM Consult) Discharge [address] Discharge None   [facility] Information Provided?  No   Mode of Transport at Discharge Other (see comment)  (family)   Confirm Follow Up Transport Self   Condition of Participation: Discharge Planning   The Plan for Transition of Care is related to the following treatment goals: Pt will return home with family at this functional baseline. The Patient and/Or Patient Representative agree with the Discharge Plan?  Yes

## 2023-03-09 NOTE — H&P
Hospitalist History and Physical   Admit Date:  3/8/2023  2:11 PM   Name:  Zane Neal [facility]   Age:  60 y.o. Sex:  male  :  1960   MRN:  [id number]   Room:  [place] [address]    Presenting Complaint: nausea and vomiting     Reason(s) for Admission: No admission diagnoses are documented for this encounter. History of Present Illness:   Kelly Yuen is a 60 y.o. male with medical history of COPD, parkinsons, CKD, who presented with nausea and vomiting. He felt fine yesterday. He said he ate at [employer] last night and became ill afterwards and had episodes of vomiting. No other family members sick but they had different foods. He also c/o LLQ abd pain moderate, dull, still present currently while n/v has resolved. No CP, SOB, cough, fevers, or other complaints      Assessment & Plan:       Gastroenteritis vs colitis  -start cipro/flagyl for persistent LLQ pain  -monitor response. -recheck WBC in AM      Acute on chronic Chronic renal disease, stage III ([facility])  -IVF overnight  -recheck BMP in AM      Chronic obstructive pulmonary disease ([facility] [address]  -Controlled. Continue home meds      Parkinson's disease ([facility] [address]  -cont home sinemet      PT/OT evals and PPD needed/ordered? No  Diet: No diet orders on file  VTE prophylaxis: Lovenox  Code status: Full Code    Hospital Problems:  Principal Problem:    Acute on chronic kidney failure (HCC)  Active Problems:    Chronic obstructive pulmonary disease (HCC)    Chronic mouth breathing    Acute colitis    Parkinson's disease (HCC)    AJAY (obstructive sleep apnea)    Parkinson disease (HCC)    Chronic renal disease, stage III ([facility])  Resolved Problems:    * No resolved hospital problems.  *       Past History:     Past Medical History:   Diagnosis Date    Arrhythmia     NSVT taking verapamil for this    Arthritis     hips    CAD (coronary artery disease) , 10/02/2020    NO MI, NO STENTS NO CABG per patient; Mild CAD, normal LV function per cath dated 10/02/2020; followed by [facility]    Chronic obstructive pulmonary disease ([address]     per pt mild and treated with inhaler    CKD (chronic kidney disease)     stage 3 per patient    Dyspnea on exertion     only with exertion not at rest    GERD (gastroesophageal reflux disease)     taking omeprazole daily; does not sleep elevated    H/O cold sores     Hx of echocardiogram 03/2021    LVEF 55-60%    Hyperlipidemia     statin taken    Hypertension     controlled w/ med    Latex allergy     Sleep apnea     APAP cannot tolerate apap right now; oral device is being made as of 1/27/2023    Wide-complex tachycardia 2017    NSVT; per pt no problems since put on verapamil       Past Surgical History:   Procedure Laterality Date    BACK SURGERY  05/2021    CARDIAC CATHETERIZATION  2017    no stents     CARDIAC CATHETERIZATION  10/02/2020    Mild CAD, normal LV function    COLONOSCOPY      NOSE SURGERY Bilateral 2/7/2023    INFERIOR TURBINATE REDUCTION performed by Jeffrey Emerson MD at [facility]    SEPTOPLASTY N/A 2/7/2023    SEPTOPLASTY performed by Jeffrey Emerson MD at [address] Bilateral 10/07/2020        Social History     Tobacco Use    Smoking status: Never    Smokeless tobacco: Never   Substance Use Topics    Alcohol use: Never      Social History     Substance and Sexual Activity   Drug Use Never       Family History   Problem Relation Age of Onset    Depression Mother     Heart Disease Father           There is no immunization history on file for this patient.   Allergies   Allergen Reactions    Latex Rash    Iv Dye [Iodides] Hives and Itching     Prior to Admit Medications:  Current Outpatient Medications   Medication Instructions    acetaminophen (TYLENOL) 1,000 mg, Oral, EVERY 6 HOURS PRN    albuterol sulfate HFA (VENTOLIN HFA) 108 (90 Base) MCG/ACT inhaler 2 puffs, Inhalation, 4 TIMES DAILY PRN    atorvastatin (LIPITOR) 40 mg, Oral, Nightly    carbidopa-levodopa (SINEMET)  MG per tablet 2 tablets, Oral, 3 TIMES DAILY    fluticasone (FLONASE) 50 MCG/ACT nasal spray 2 sprays, Each Nostril, DAILY    losartan-hydroCHLOROthiazide (HYZAAR) 100-25 MG per tablet TAKE 1 TABLET BY MOUTH ONCE DAILY    LYSINE PO 50 mg, Oral, PRN    Multiple Vitamins-Minerals (MULTI COMPLETE PO) Oral    mupirocin (BACTROBAN NASAL) 2 % nasal ointment Take by Nasal route 2 times daily. omeprazole (PRILOSEC) 40 mg, Oral, DAILY BEFORE BREAKFAST    polyethylene glycol-electrolytes (COLYTE) 240 g SOLR solution 4,000 mLs, Oral, ONCE    sildenafil (VIAGRA) 100 mg, Oral, PRN    tiotropium (SPIRIVA RESPIMAT) 2.5 MCG/ACT AERS inhaler 2 puffs, Inhalation, DAILY    verapamil (CALAN SR) 240 mg, Oral, DAILY         Objective:   Patient Vitals for the past 24 hrs:   Temp Pulse Resp BP SpO2   03/08/23 2230 -- 77 10 -- 96 %   03/08/23 2201 -- 86 12 136/75 98 %   03/08/23 2136 -- 88 16 -- 95 %   03/08/23 2126 -- 79 10 129/77 --   03/08/23 2027 -- 80 18 -- 95 %   03/08/23 2017 -- 83 14 134/80 --   03/08/23 1930 -- 85 12 116/87 94 %   03/08/23 1928 -- 82 14 -- 93 %   03/08/23 1918 -- 83 15 121/75 --   03/08/23 1858 -- 85 12 129/77 94 %   03/08/23 1745 -- -- -- 134/77 --   03/08/23 1730 -- 85 12 -- 98 %   03/08/23 1530 -- 97 22 114/73 97 %   03/08/23 1515 -- (!) 102 20 91/66 99 %   03/08/23 1500 -- (!) 105 25 115/72 97 %   03/08/23 1451 -- (!) 104 26 -- 98 %   03/08/23 1445 -- (!) 103 18 116/79 100 %   03/08/23 1430 -- (!) 102 25 126/81 100 %   03/08/23 1419 97.7 °F (36.5 °C) -- -- 137/81 --   03/08/23 1415 -- (!) 107 22 137/81 99 %   03/08/23 1414 -- (!) 107 20 -- 100 %       Oxygen Therapy  SpO2: 96 %  Pulse via Oximetry: 87 beats per minute  O2 Device: None (Room air)    Estimated body mass index is 29.29 kg/m² as calculated from the following:    Height as of this encounter: 5' 11\" (1.803 m). Weight as of this encounter: 210 lb (95.3 kg).     Intake/Output Summary (Last 24 hours) at 3/8/2023 8289  Last data filed at 3/8/2023 2227  Gross per 24 hour   Intake 1050 ml   Output --   Net 1050 ml         Physical Exam:    Blood pressure 136/75, pulse 77, temperature 97.7 °F (36.5 °C), temperature source Oral, resp. rate 10, height 5' 11\" (1.803 m), weight 210 lb (95.3 kg), SpO2 96 %. General:    Well nourished. Head:  Normocephalic, atraumatic  Eyes:  Sclerae appear normal.  Pupils equally round. ENT:  Nares appear normal.  Moist oral mucosa  Neck:  No restricted ROM. Trachea midline   CV:   RRR. No jugular venous distension. Lungs:   CTAB. Symmetric expansion. Abdomen:   Soft, TTP LLQ, nondistended. Extremities: No cyanosis or clubbing. No edema  Skin:     No rashes and normal coloration. Warm and dry. Neuro:  CN II-XII grossly intact. Sensation intact. Psych:  Normal mood and affect.       I have personally reviewed labs and tests:  Recent Labs:  Recent Results (from the past 24 hour(s))   EKG 12 Lead    Collection Time: 03/08/23  2:16 PM   Result Value Ref Range    Ventricular Rate 107 BPM    Atrial Rate 107 BPM    P-R Interval 146 ms    QRS Duration 86 ms    Q-T Interval 336 ms    QTc Calculation (Bazett) 449 ms    P Axis 39 degrees    R Axis 94 degrees    T Axis 13 degrees    Diagnosis Sinus tachycardia    CBC with Auto Differential    Collection Time: 03/08/23  2:20 PM   Result Value Ref Range    WBC 12.2 (H) 4.3 - 11.1 K/uL    RBC 5.35 4.23 - 5.6 M/uL    Hemoglobin 15.1 13.6 - 17.2 g/dL    Hematocrit 45.6 41.1 - 50.3 %    MCV 85.2 82 - 102 FL    MCH 28.2 26.1 - 32.9 PG    MCHC 33.1 31.4 - 35.0 g/dL    RDW 13.8 11.9 - 14.6 %    Platelets 900 791 - 084 K/uL    MPV 10.3 9.4 - 12.3 FL    nRBC 0.00 0.0 - 0.2 K/uL    Differential Type AUTOMATED      Seg Neutrophils 80 (H) 43 - 78 %    Lymphocytes 9 (L) 13 - 44 %    Monocytes 10 4.0 - 12.0 %    Eosinophils % 0 (L) 0.5 - 7.8 %    Basophils 0 0.0 - 2.0 %    Immature Granulocytes 1 0.0 - 5.0 %    Segs Absolute 9.7 (H) 1.7 - 8.2 K/UL    Absolute Lymph # 1.1 0.5 - 4.6 K/UL    Absolute Mono # 1.2 0.1 - 1.3 K/UL    Absolute Eos # 0.1 0.0 - 0.8 K/UL    Basophils Absolute 0.1 0.0 - 0.2 K/UL    Absolute Immature Granulocyte 0.1 0.0 - 0.5 K/UL   Comprehensive Metabolic Panel    Collection Time: 03/08/23  2:20 PM   Result Value Ref Range    Sodium 137 133 - 143 mmol/L    Potassium 3.5 3.5 - 5.1 mmol/L    Chloride 105 101 - 110 mmol/L    CO2 25 21 - 32 mmol/L    Anion Gap 7 2 - 11 mmol/L    Glucose 100 65 - 100 mg/dL    BUN 21 8 - 23 MG/DL    Creatinine 2.20 (H) 0.8 - 1.5 MG/DL    Est, Glom Filt Rate 33 (L) >60 ml/min/1.73m2    Calcium 10.7 (H) 8.3 - 10.4 MG/DL    Total Bilirubin 1.8 (H) 0.2 - 1.1 MG/DL    ALT 36 12 - 65 U/L    AST 24 15 - 37 U/L    Alk Phosphatase 73 50 - 136 U/L    Total Protein 8.6 (H) 6.3 - 8.2 g/dL    Albumin 4.4 3.2 - 4.6 g/dL    Globulin 4.2 2.8 - 4.5 g/dL    Albumin/Globulin Ratio 1.0 0.4 - 1.6     Magnesium    Collection Time: 03/08/23  2:20 PM   Result Value Ref Range    Magnesium 2.0 1.8 - 2.4 mg/dL   Brain Natriuretic Peptide    Collection Time: 03/08/23  2:20 PM   Result Value Ref Range    NT Pro- (H) 5 - 125 PG/ML   Troponin    Collection Time: 03/08/23  2:20 PM   Result Value Ref Range    Troponin, High Sensitivity 23.9 (H) 0 - 14 pg/mL   Urinalysis w rflx microscopic    Collection Time: 03/08/23  6:24 PM   Result Value Ref Range    Color, UA DARK YELLOW      Appearance CLOUDY      Specific Gravity, UA 1.024 (H) 1.001 - 1.023      pH, Urine 6.5 5.0 - 9.0      Protein, UA 30 (A) NEG mg/dL    Glucose, UA Negative mg/dL    Ketones, Urine 15 (A) NEG mg/dL    Bilirubin Urine Negative NEG      Blood, Urine Negative NEG      Urobilinogen, Urine 1.0 0.2 - 1.0 EU/dL    Nitrite, Urine Negative NEG      Leukocyte Esterase, Urine SMALL (A) NEG     Urinalysis, Micro    Collection Time: 03/08/23  6:24 PM   Result Value Ref Range    WBC, UA 0-3 0 /hpf    RBC, UA 0-3 0 /hpf    Epithelial Cells UA 0-3 0 /hpf    BACTERIA, URINE 1+ (H) 0 /hpf    Casts 5-10 0 /lpf Crystals 0 0 /LPF    Mucus, UA 0 0 /lpf    Other observations RESULTS VERIFIED MANUALLY     Troponin    Collection Time: 03/08/23  6:47 PM   Result Value Ref Range    Troponin, High Sensitivity 45.2 (H) 0 - 14 pg/mL   Basic Metabolic Panel    Collection Time: 03/08/23  8:25 PM   Result Value Ref Range    Sodium 142 133 - 143 mmol/L    Potassium 3.3 (L) 3.5 - 5.1 mmol/L    Chloride 111 (H) 101 - 110 mmol/L    CO2 15 (L) 21 - 32 mmol/L    Anion Gap 16 (H) 2 - 11 mmol/L    Glucose 90 65 - 100 mg/dL    BUN 20 8 - 23 MG/DL    Creatinine 2.00 (H) 0.8 - 1.5 MG/DL    Est, Glom Filt Rate 37 (L) >60 ml/min/1.73m2    Calcium 8.9 8.3 - 10.4 MG/DL       I have personally reviewed imaging studies:  CT ABDOMEN PELVIS WO CONTRAST Additional Contrast? Oral    Result Date: 3/8/2023  EXAMINATION: CT ABDOMEN AND PELVIS WITHOUT IV CONTRAST   DATE OF EXAMINATION: March 08, 2023 INDICATION: Leukocytosis and generalized abdominal pain and nausea COMPARISON: None available. PROCEDURE:   Axial CT of the abdomen and pelvis was performed with sagittal and coronal reformatted images without contrast enhancement. The exam is limited because some types of pathology may not be adequately demonstrated due to lack of contrast enhancement. CT dose lowering techniques were used, to include: automated exposure control, adjustment for patient size, and or use of iterative  reconstruction. FINDINGS: LOWER CHEST :  Normal. ABDOMEN: Liver and Biliary system: There is a 1.5 cm upper right lobe cyst. Gallbladder:  Normal. Spleen:  Normal. Pancreas:  Normal. Adrenal glands:  Normal. Kidneys and ureters:  6 cm right renal cyst. No masses or inflammatory process. There is a small nonobstructing stone in each kidney. . Aorta/IVC:   Aorta normal. No aortic aneurysm or dissection. IVC normal. Lymph nodes:  No significant lymphadenopathy. Stomach: Small hiatal hernia. Bowel: No obstruction, free air, or ascites. No mucosal thickening. Appendix:.   Not visualized. Peritoneum/Mesentery:  Normal. Abdominal wall:  Normal. PELVIS: Urinary bladder:  Normal. Reproductive organs:  Normal. Lymph Nodes:  Normal. BONES:  Bilateral hip replacements. Degenerative disc disease from L4 to S1. ADDITIONAL  SIGNIFICANT FINDINGS:  None. 1. No acute findings. 2.  Small sliding hiatal hernia. 3.  Small stone in each kidney. 4.  6 cm right renal cyst.  Kennedy Yao M.D. 3/8/2023 8:18:00 PM    XR CHEST PORTABLE    Result Date: 3/8/2023  EXAM: XR CHEST PORTABLE INDICATION: Shortness of Breath COMPARISON: Chest radiograph, 2/2/2022 FINDINGS: The cardiomediastinal silhouette is within normal limits. No focal parenchymal process. No pleural effusion. No pneumothorax. No acute osseous abnormality. No acute cardiopulmonary abnormality. Echocardiogram:  No results found for this or any previous visit. Orders Placed This Encounter   Medications    0.9 % sodium chloride bolus    diatrizoate meglumine-sodium (GASTROGRAFIN) 66-10 % solution 15 mL    morphine injection 4 mg    ondansetron (ZOFRAN) injection 4 mg    0.9 % sodium chloride bolus    cefTRIAXone (ROCEPHIN) 1,000 mg in sodium chloride 0.9 % 50 mL IVPB (mini-bag)     Order Specific Question:   Antimicrobial Indications     Answer:   Urinary Tract Infection    0.9 % sodium chloride infusion    carbidopa-levodopa (SINEMET)  MG per tablet 2 tablet         Signed:  Rolando Zuñiga MD    Part of this note may have been written by using a voice dictation software. The note has been proof read but may still contain some grammatical/other typographical errors.

## 2023-03-10 ENCOUNTER — CARE COORDINATION (OUTPATIENT)
Dept: CARE COORDINATION | Facility: CLINIC | Age: 63
End: 2023-03-10

## 2023-03-10 NOTE — CARE COORDINATION
Care Transitions Outreach Attempt    Call within 2 business days of discharge: Yes   Attempted to reach patient for transitions of care follow up. Unable to reach patient. Patient: Vandana Moore [facility] Patient : 1960 MRN: [id number]    Last Discharge [address]       Date Complaint Diagnosis Description Type Department Provider    3/8/23   Admission (Discharged) [address] Taylor DO    3/8/23 Shortness of Breath Urinary tract infection without hematuria, site unspecified . .. ED (TRANSFER) [facility] Solid, DO; Edda Pleva ... Was this an external facility discharge?  No Discharge Facility: sfe    Noted following upcoming appointments from discharge chart review:   [facility] follow up appointment(s):   Future Appointments   Date Time Provider Vanessa Machado   3/13/2023  2:00 PM Lauren Ayala MD ENTG [facility] AMB   2023  9:00 AM POW LAB POW [facility] AMB   2023  8:00 AM Antonella Cook APRN - CNP PSCD [facility] AMB   2023  8:40 AM Dayami Cruz MD POW [facility] AMB   2023  8:30 AM DEB Monae - CNP PPS [facility] AMB     Non-[facility] follow up appointment(s): Dr Girish Lloyd GI

## 2023-03-10 NOTE — CARE COORDINATION
Care Transitions Outreach Attempt    Call within 2 business days of discharge: Yes   Attempted to reach patient for transitions of care follow up. Unable to reach patient. Patient: Rama Kennedy [facility] Patient : 1960 MRN: [id number]    Last Discharge [address]       Date Complaint Diagnosis Description Type Department Provider    3/8/23   Admission (Discharged) [address] Taylor DO    3/8/23 Shortness of Breath Urinary tract infection without hematuria, site unspecified . .. ED (TRANSFER) [facility] Reny Mathur DO; Erica Theodore ... Was this an external facility discharge?  No Discharge Facility: sfe    Noted following upcoming appointments from discharge chart review:   [address] follow up appointment(s):   Future Appointments   Date Time Provider Vanessa Machado   3/13/2023  2:00 PM Renzo Gomez MD ENTG [facility] AMB   2023  9:00 AM POW LAB POW [facility] AMB   2023  8:00 AM Rusty Qureshi APRN - CNP PSCD [facility] AMB   2023  8:40 AM Jose Antonio Carballo MD POW [facility] AMB   2023  8:30 AM Anabela Jordan APRN - CNP PPS [facility] AMB     Non-[facility] follow up appointment(s): GI

## 2023-03-12 LAB
BACTERIA SPEC CULT: NORMAL
SERVICE CMNT-IMP: NORMAL

## 2023-03-13 ENCOUNTER — OFFICE VISIT (OUTPATIENT)
Dept: ENT CLINIC | Age: 63
End: 2023-03-13

## 2023-03-13 ENCOUNTER — CARE COORDINATION (OUTPATIENT)
Dept: CARE COORDINATION | Facility: CLINIC | Age: 63
End: 2023-03-13

## 2023-03-13 ENCOUNTER — CLINICAL DOCUMENTATION (OUTPATIENT)
Dept: NEUROLOGY | Age: 63
End: 2023-03-13

## 2023-03-13 VITALS — HEIGHT: 71 IN | BODY MASS INDEX: 29.4 KG/M2 | RESPIRATION RATE: 16 BRPM | WEIGHT: 210 LBS

## 2023-03-13 DIAGNOSIS — Z09 SURGERY FOLLOW-UP: Primary | ICD-10-CM

## 2023-03-13 LAB
BACTERIA SPEC CULT: NORMAL
SERVICE CMNT-IMP: NORMAL

## 2023-03-13 PROCEDURE — 99024 POSTOP FOLLOW-UP VISIT: CPT | Performed by: OTOLARYNGOLOGY

## 2023-03-13 NOTE — CARE COORDINATION
Care Transitions Outreach Attempt    Call within 2 business days of discharge: Yes   Attempted to reach patient for transitions of care follow up. Unable to reach patient. No further outreach is indicated. Patient: Jake Hannah [facility] Patient : 1960 MRN: [id number]    Last Discharge [address]       Date Complaint Diagnosis Description Type Department Provider    3/8/23   Admission (Discharged) [address] DO    3/8/23 Shortness of Breath Urinary tract infection without hematuria, site unspecified . .. ED (TRANSFER) [facility] Tre Dawson DO; Jamil Monte ... Was this an external facility discharge?  No Discharge Facility: sfe    Noted following upcoming appointments from discharge chart review:   [facility] follow up appointment(s):   Future Appointments   Date Time Provider Vanessa Machado   3/13/2023  2:00 PM Radha Dubon MD ENTG [facility] AMB   2023  9:00 AM POW LAB POW [facility] AMB   2023  8:00 AM DEB Diaz - CNP PSCD [facility] AMB   2023  8:40 AM Ana Wolf MD POW [facility] AMB   2023  8:30 AM DEB Simpson CNP PPS [facility] AMB     Non-[facility] follow up appointment(s): na

## 2023-03-13 NOTE — PROGRESS NOTES
Bryce Sue  [address]  P: [phone]          3/13/2023    Chief Complaint   Patient presents with    Follow-up     2nd pov Septo Turb. HPI:  79-year-old male presents for follow-up after nasal airway surgery. Current Outpatient Medications   Medication Sig Dispense Refill    tiotropium (SPIRIVA RESPIMAT) 2.5 MCG/ACT AERS inhaler Inhale 2 puffs into the lungs daily 1 each 11    mupirocin (BACTROBAN NASAL) 2 % nasal ointment Take by Nasal route 2 times daily. 1 each 3    carbidopa-levodopa (SINEMET)  MG per tablet Take 2 tablets by mouth 3 times daily (Patient taking differently: Take 1 tablet by mouth 3 times daily) 180 tablet 5    omeprazole (PRILOSEC) 40 MG delayed release capsule Take 1 capsule by mouth every morning (before breakfast) 30 capsule 5    sildenafil (VIAGRA) 100 MG tablet Take 1 tablet by mouth as needed for Erectile Dysfunction 30 tablet 3    losartan-hydroCHLOROthiazide (HYZAAR) 100-25 MG per tablet TAKE 1 TABLET BY MOUTH ONCE DAILY      albuterol sulfate HFA (VENTOLIN HFA) 108 (90 Base) MCG/ACT inhaler Inhale 2 puffs into the lungs 4 times daily as needed for Wheezing 54 g 1    LYSINE PO Take 50 mg by mouth as needed      acetaminophen (TYLENOL) 500 MG tablet Take 1,000 mg by mouth every 6 hours as needed      atorvastatin (LIPITOR) 40 MG tablet Take 40 mg by mouth at bedtime      verapamil (CALAN SR) 240 MG extended release tablet Take 240 mg by mouth daily       No current facility-administered medications for this visit.         Past Medical History:   Diagnosis Date    Arrhythmia     NSVT taking verapamil for this    Arthritis     hips    CAD (coronary artery disease) 2017, 10/02/2020    NO MI, NO STENTS NO CABG per patient; Mild CAD, normal LV function per cath dated 10/02/2020; followed by [address] [facility]    Chronic obstructive pulmonary disease ([address]     per pt mild and treated with inhaler    CKD (chronic kidney disease)     stage 3 per patient    Dyspnea on exertion     only with exertion not at rest    GERD (gastroesophageal reflux disease)     taking omeprazole daily; does not sleep elevated    H/O cold sores     Hx of echocardiogram 03/2021    LVEF 55-60%    Hyperlipidemia     statin taken    Hypertension     controlled w/ med    Latex allergy     Sleep apnea     APAP cannot tolerate apap right now; oral device is being made as of 1/27/2023    Wide-complex tachycardia 2017    NSVT; per pt no problems since put on verapamil        Past Surgical History:   Procedure Laterality Date    BACK SURGERY  05/2021    CARDIAC CATHETERIZATION  2017    no stents     CARDIAC CATHETERIZATION  10/02/2020    Mild CAD, normal LV function    COLONOSCOPY      NOSE SURGERY Bilateral 2/7/2023    INFERIOR TURBINATE REDUCTION performed by Letty Ramirez MD at [facility]    SEPTOPLASTY N/A 2/7/2023    SEPTOPLASTY performed by Letty Ramirez MD at [address] Bilateral 10/07/2020        Family History   Problem Relation Age of Onset    Depression Mother     Heart Disease Father         Past Surgical History:   Procedure Laterality Date    BACK SURGERY  05/2021    CARDIAC CATHETERIZATION  2017    no stents     CARDIAC CATHETERIZATION  10/02/2020    Mild CAD, normal LV function    COLONOSCOPY      NOSE SURGERY Bilateral 2/7/2023    INFERIOR TURBINATE REDUCTION performed by Letty Ramirez MD at [facility]    SEPTOPLASTY N/A 2/7/2023    SEPTOPLASTY performed by Letty Ramirez MD at [address] Bilateral 10/07/2020        Allergies   Allergen Reactions    Latex Rash    Iv Dye [Iodides] Hives and Itching          ROS:  As noted per HPI. PHYSICAL EXAM:    Vitals:   Vitals:    03/13/23 1409   Resp: 16          GENERAL:   PHYSICAL EXAM: An expanded physical exam was performed in the following manner.  Unless otherwise indicated in pertinent findings section below, findings were within normal limits. APPEARANCE:   General assessment for development status, nutritional status, and for pain or distress was performed. COMMUNICATION:   Ability to communicate effectively including vocal quality was assessed. EARS:   External inspection and palpation of the auricular skin and cartilage was performed for lesion or abnormality. Otoscopy of the external auditory and tympanic membranes was performed to assess for patency, induration, erythema, tympanic membrane health and mobility and the presence of any middle ear fluid or abnormality. Speech reception thresholds were grossly assessed through communication at normal conversational levels. NOSE:   External exam for gross deformity of the nasal bones and upper and lower lateral cartilages was performed. Anterior rhinoscopy was performed to assess the patency of the nasal airway, the anatomy of the nasal septum and turbinates as well as the nasal valve region, and the general mucosal health. The presence of any rhinorrhea and its consistency was noted. Any abnormalities requiring further evaluation by nasal endoscopy will be described below. MOUTH/PHARYNX/LARYNX:   Assessment of the lips, gums, hard/soft palate, tongue, tonsillar fossae and oropharynx for mass, lesions or mucosal abnormalities was performed. The base of tongue and floor of mouth were inspected for lesions and palpated for mass or nodularity. Mirror exam of the larynx to assess for vocal fold mobility and any gross mass or lesion was performed. Mirror exam of the nasopharynx was attempted, to assess for gross mass or lesion of the nasopharynx or any of adenoidal hyperplasia or inflammation. Any abnormalities requiring further examination by flexible endoscopy will be described below. RESPIRATION:   Respiratory effort was assessed for increased work of breathing and inspiratory or expiratory wheezing. Chest expansion was noted for symmetry. CARDIOVASCULAR:   Gross examination for peripheral vascular edema and jugular venous distension was performed. PERTINENT PHYSICAL EXAM FINDINGS   Bilateral nasal airway are widely patent. Minimal crusting noted inferiorly at site of spur removal.      STUDIES REVIEWED:   Chart Review      ASSESSMENT AND PLAN:      Diagnosis Orders   1. Surgery follow-up              Recommend continued nasal hygiene including nightly use of nasal saline mist and mupirocin salve. Return to clinic in 6 months for serial exam.      The patient diagnoses and management plan were discussed at length. They  demonstrated and understanding of the plan and stated that all questions were answered to their satisfaction.      PATIENT EDUCATION / INSTRUCTIONS GIVEN FOR:   Nasal hygiene*

## 2023-03-13 NOTE — PROGRESS NOTES
Reviewed results of skin biopsy (Syn-One Test) pathology report. No pathologic evidence of phospholipid alpha-synuclein deposition. There was reduced intraepidermal nerve fiber density in a length-dependent fashion, which can often be seen in small fiber neuropathies or other polyneuropathies. No pathologic evidence of amyloid deposition.

## 2023-03-17 ENCOUNTER — TELEPHONE (OUTPATIENT)
Dept: PRIMARY CARE CLINIC | Facility: CLINIC | Age: 63
End: 2023-03-17

## 2023-03-17 DIAGNOSIS — R39.15 URINARY URGENCY: Primary | ICD-10-CM

## 2023-03-22 ENCOUNTER — PREP FOR PROCEDURE (OUTPATIENT)
Dept: GASTROENTEROLOGY | Age: 63
End: 2023-03-22

## 2023-03-22 ENCOUNTER — TELEPHONE (OUTPATIENT)
Dept: GASTROENTEROLOGY | Age: 63
End: 2023-03-22

## 2023-03-22 DIAGNOSIS — Z12.11 SCREENING FOR MALIGNANT NEOPLASM OF COLON: Primary | ICD-10-CM

## 2023-03-22 RX ORDER — SODIUM CHLORIDE 9 MG/ML
25 INJECTION, SOLUTION INTRAVENOUS PRN
Status: CANCELLED | OUTPATIENT
Start: 2023-03-22

## 2023-03-22 RX ORDER — POLYETHYLENE GLYCOL 3350, SODIUM SULFATE ANHYDROUS, SODIUM BICARBONATE, SODIUM CHLORIDE, POTASSIUM CHLORIDE 236; 22.74; 6.74; 5.86; 2.97 G/4L; G/4L; G/4L; G/4L; G/4L
4 POWDER, FOR SOLUTION ORAL ONCE
Qty: 4000 ML | Refills: 0 | Status: SHIPPED | OUTPATIENT
Start: 2023-03-22 | End: 2023-03-22

## 2023-03-22 RX ORDER — SODIUM CHLORIDE 0.9 % (FLUSH) 0.9 %
5-40 SYRINGE (ML) INJECTION PRN
Status: CANCELLED | OUTPATIENT
Start: 2023-03-22

## 2023-03-22 RX ORDER — SODIUM CHLORIDE 0.9 % (FLUSH) 0.9 %
5-40 SYRINGE (ML) INJECTION EVERY 12 HOURS SCHEDULED
Status: CANCELLED | OUTPATIENT
Start: 2023-03-22

## 2023-03-29 RX ORDER — ASPIRIN 81 MG/1
81 TABLET ORAL DAILY
COMMUNITY

## 2023-04-04 ENCOUNTER — OFFICE VISIT (OUTPATIENT)
Dept: ORTHOPEDIC SURGERY | Age: 63
End: 2023-04-04

## 2023-04-04 DIAGNOSIS — M54.2 NECK PAIN: ICD-10-CM

## 2023-04-04 DIAGNOSIS — G95.9 MYELOPATHY (HCC): ICD-10-CM

## 2023-04-04 DIAGNOSIS — M48.02 CERVICAL SPINAL STENOSIS: ICD-10-CM

## 2023-04-04 DIAGNOSIS — M48.062 LUMBAR STENOSIS WITH NEUROGENIC CLAUDICATION: ICD-10-CM

## 2023-04-04 DIAGNOSIS — M50.30 DDD (DEGENERATIVE DISC DISEASE), CERVICAL: ICD-10-CM

## 2023-04-04 DIAGNOSIS — M54.50 LOW BACK PAIN, UNSPECIFIED BACK PAIN LATERALITY, UNSPECIFIED CHRONICITY, UNSPECIFIED WHETHER SCIATICA PRESENT: Primary | ICD-10-CM

## 2023-04-04 NOTE — PROGRESS NOTES
An MRI of the Cervical spine has been ordered.
bilateral hand. Difficulty opening objects. Dr. Sony Aviles injected bilateral carpal tunnels. This provided no change in symptoms. AMB PAIN ASSESSMENT 4/4/2023   Location of Pain Back   Location Modifiers Right;Left   Severity of Pain 7   Quality of Pain Aching; Sharp;Dull   Duration of Pain Persistent   Frequency of Pain Constant   Date Pain First Started 6/5/2019   Aggravating Factors Walking;Standing   Limiting Behavior Some   Relieving Factors Other (Comment)   Result of Injury No   Work-Related Injury No   Are there other pain locations you wish to document? No              ROS/Meds/PSH/PMH/FH/SH: I personally reviewed the patient's collected intake data. Below are the pertinents:    Allergies   Allergen Reactions    Latex Rash    Iv Dye [Iodides] Hives and Itching         Current Outpatient Medications:     aspirin 81 MG EC tablet, Take 81 mg by mouth daily, Disp: , Rfl:     tiotropium (SPIRIVA RESPIMAT) 2.5 MCG/ACT AERS inhaler, Inhale 2 puffs into the lungs daily, Disp: 1 each, Rfl: 11    mupirocin (BACTROBAN NASAL) 2 % nasal ointment, Take by Nasal route 2 times daily. , Disp: 1 each, Rfl: 3    carbidopa-levodopa (SINEMET)  MG per tablet, Take 2 tablets by mouth 3 times daily (Patient not taking: Reported on 3/29/2023), Disp: 180 tablet, Rfl: 5    omeprazole (PRILOSEC) 40 MG delayed release capsule, Take 1 capsule by mouth every morning (before breakfast), Disp: 30 capsule, Rfl: 5    sildenafil (VIAGRA) 100 MG tablet, Take 1 tablet by mouth as needed for Erectile Dysfunction, Disp: 30 tablet, Rfl: 3    losartan-hydroCHLOROthiazide (HYZAAR) 100-25 MG per tablet, TAKE 1 TABLET BY MOUTH ONCE DAILY, Disp: , Rfl:     albuterol sulfate HFA (VENTOLIN HFA) 108 (90 Base) MCG/ACT inhaler, Inhale 2 puffs into the lungs 4 times daily as needed for Wheezing, Disp: 54 g, Rfl: 1    LYSINE PO, Take 50 mg by mouth as needed, Disp: , Rfl:     acetaminophen (TYLENOL) 500 MG tablet, Take 1,000 mg by mouth every 6

## 2023-04-16 ENCOUNTER — ANESTHESIA EVENT (OUTPATIENT)
Dept: ENDOSCOPY | Age: 63
End: 2023-04-16
Payer: COMMERCIAL

## 2023-04-17 ENCOUNTER — HOSPITAL ENCOUNTER (OUTPATIENT)
Age: 63
Setting detail: OUTPATIENT SURGERY
Discharge: HOME | End: 2023-04-17
Attending: INTERNAL MEDICINE | Admitting: INTERNAL MEDICINE
Payer: COMMERCIAL

## 2023-04-17 ENCOUNTER — ANESTHESIA (OUTPATIENT)
Dept: ENDOSCOPY | Age: 63
End: 2023-04-17
Payer: COMMERCIAL

## 2023-04-17 VITALS
DIASTOLIC BLOOD PRESSURE: 66 MMHG | WEIGHT: 210 LBS | HEART RATE: 75 BPM | RESPIRATION RATE: 18 BRPM | BODY MASS INDEX: 29.4 KG/M2 | TEMPERATURE: 98.6 F | OXYGEN SATURATION: 94 % | HEIGHT: 71 IN | SYSTOLIC BLOOD PRESSURE: 116 MMHG

## 2023-04-17 DIAGNOSIS — Z12.11 ENCOUNTER FOR SCREENING COLONOSCOPY: ICD-10-CM

## 2023-04-17 PROCEDURE — 88305 TISSUE EXAM BY PATHOLOGIST: CPT

## 2023-04-17 PROCEDURE — 7100000010 HC PHASE II RECOVERY - FIRST 15 MIN: Performed by: INTERNAL MEDICINE

## 2023-04-17 PROCEDURE — 2500000003 HC RX 250 WO HCPCS: Performed by: NURSE ANESTHETIST, CERTIFIED REGISTERED

## 2023-04-17 PROCEDURE — 3700000000 HC ANESTHESIA ATTENDED CARE: Performed by: INTERNAL MEDICINE

## 2023-04-17 PROCEDURE — 3700000001 HC ADD 15 MINUTES (ANESTHESIA): Performed by: INTERNAL MEDICINE

## 2023-04-17 PROCEDURE — 7100000011 HC PHASE II RECOVERY - ADDTL 15 MIN: Performed by: INTERNAL MEDICINE

## 2023-04-17 PROCEDURE — 88312 SPECIAL STAINS GROUP 1: CPT

## 2023-04-17 PROCEDURE — 6360000002 HC RX W HCPCS: Performed by: NURSE ANESTHETIST, CERTIFIED REGISTERED

## 2023-04-17 PROCEDURE — 2580000003 HC RX 258: Performed by: ANESTHESIOLOGY

## 2023-04-17 PROCEDURE — C1726 CATH, BAL DIL, NON-VASCULAR: HCPCS | Performed by: INTERNAL MEDICINE

## 2023-04-17 PROCEDURE — 3609027000 HC COLONOSCOPY: Performed by: INTERNAL MEDICINE

## 2023-04-17 PROCEDURE — 2709999900 HC NON-CHARGEABLE SUPPLY: Performed by: INTERNAL MEDICINE

## 2023-04-17 PROCEDURE — 3609012400 HC EGD TRANSORAL BIOPSY SINGLE/MULTIPLE: Performed by: INTERNAL MEDICINE

## 2023-04-17 RX ORDER — SODIUM CHLORIDE 9 MG/ML
25 INJECTION, SOLUTION INTRAVENOUS PRN
Status: DISCONTINUED | OUTPATIENT
Start: 2023-04-17 | End: 2023-04-17 | Stop reason: HOSPADM

## 2023-04-17 RX ORDER — PROPOFOL 10 MG/ML
INJECTION, EMULSION INTRAVENOUS PRN
Status: DISCONTINUED | OUTPATIENT
Start: 2023-04-17 | End: 2023-04-17 | Stop reason: SDUPTHER

## 2023-04-17 RX ORDER — LIDOCAINE HYDROCHLORIDE 20 MG/ML
INJECTION, SOLUTION EPIDURAL; INFILTRATION; INTRACAUDAL; PERINEURAL PRN
Status: DISCONTINUED | OUTPATIENT
Start: 2023-04-17 | End: 2023-04-17 | Stop reason: SDUPTHER

## 2023-04-17 RX ORDER — SODIUM CHLORIDE 9 MG/ML
INJECTION, SOLUTION INTRAVENOUS PRN
Status: DISCONTINUED | OUTPATIENT
Start: 2023-04-17 | End: 2023-04-17 | Stop reason: HOSPADM

## 2023-04-17 RX ORDER — SODIUM CHLORIDE 0.9 % (FLUSH) 0.9 %
5-40 SYRINGE (ML) INJECTION EVERY 12 HOURS SCHEDULED
Status: DISCONTINUED | OUTPATIENT
Start: 2023-04-17 | End: 2023-04-17 | Stop reason: HOSPADM

## 2023-04-17 RX ORDER — SODIUM CHLORIDE 0.9 % (FLUSH) 0.9 %
5-40 SYRINGE (ML) INJECTION PRN
Status: DISCONTINUED | OUTPATIENT
Start: 2023-04-17 | End: 2023-04-17 | Stop reason: HOSPADM

## 2023-04-17 RX ORDER — PROPOFOL 10 MG/ML
INJECTION, EMULSION INTRAVENOUS CONTINUOUS PRN
Status: DISCONTINUED | OUTPATIENT
Start: 2023-04-17 | End: 2023-04-17 | Stop reason: SDUPTHER

## 2023-04-17 RX ORDER — ONDANSETRON 2 MG/ML
4 INJECTION INTRAMUSCULAR; INTRAVENOUS
Status: DISCONTINUED | OUTPATIENT
Start: 2023-04-17 | End: 2023-04-17 | Stop reason: HOSPADM

## 2023-04-17 RX ORDER — LIDOCAINE HYDROCHLORIDE 10 MG/ML
1 INJECTION, SOLUTION INFILTRATION; PERINEURAL
Status: DISCONTINUED | OUTPATIENT
Start: 2023-04-17 | End: 2023-04-17 | Stop reason: HOSPADM

## 2023-04-17 RX ORDER — SODIUM CHLORIDE, SODIUM LACTATE, POTASSIUM CHLORIDE, CALCIUM CHLORIDE 600; 310; 30; 20 MG/100ML; MG/100ML; MG/100ML; MG/100ML
INJECTION, SOLUTION INTRAVENOUS CONTINUOUS
Status: DISCONTINUED | OUTPATIENT
Start: 2023-04-17 | End: 2023-04-17 | Stop reason: HOSPADM

## 2023-04-17 RX ADMIN — PROPOFOL 20 MG: 10 INJECTION, EMULSION INTRAVENOUS at 07:16

## 2023-04-17 RX ADMIN — SODIUM CHLORIDE, POTASSIUM CHLORIDE, SODIUM LACTATE AND CALCIUM CHLORIDE: 600; 310; 30; 20 INJECTION, SOLUTION INTRAVENOUS at 07:08

## 2023-04-17 RX ADMIN — PROPOFOL 140 MCG/KG/MIN: 10 INJECTION, EMULSION INTRAVENOUS at 07:14

## 2023-04-17 RX ADMIN — PROPOFOL 30 MG: 10 INJECTION, EMULSION INTRAVENOUS at 07:19

## 2023-04-17 RX ADMIN — LIDOCAINE HYDROCHLORIDE 60 MG: 20 INJECTION, SOLUTION EPIDURAL; INFILTRATION; INTRACAUDAL; PERINEURAL at 07:14

## 2023-04-17 RX ADMIN — PROPOFOL 80 MG: 10 INJECTION, EMULSION INTRAVENOUS at 07:14

## 2023-04-17 RX ADMIN — PROPOFOL 20 MG: 10 INJECTION, EMULSION INTRAVENOUS at 07:25

## 2023-04-17 ASSESSMENT — PAIN SCALES - GENERAL
PAINLEVEL_OUTOF10: 0

## 2023-04-17 ASSESSMENT — ENCOUNTER SYMPTOMS
TROUBLE SWALLOWING: 0
SHORTNESS OF BREATH: 0
VOMITING: 0
COLOR CHANGE: 0
BLOOD IN STOOL: 0
ABDOMINAL PAIN: 0

## 2023-04-17 ASSESSMENT — COPD QUESTIONNAIRES: CAT_SEVERITY: MODERATE

## 2023-04-17 ASSESSMENT — PAIN - FUNCTIONAL ASSESSMENT: PAIN_FUNCTIONAL_ASSESSMENT: NONE - DENIES PAIN

## 2023-04-17 NOTE — ANESTHESIA POSTPROCEDURE EVALUATION
Department of Anesthesiology  Postprocedure Note    Patient: Dean Chavez  MRN: [id number]  YOB: 1960  Date of evaluation: 4/17/2023      Procedure Summary     Date: 04/17/23 Room / Location: [facility] ENDO 03 / [facility] ENDOSCOPY    Anesthesia Start: 0710 Anesthesia Stop: 5616    Procedures:       COLORECTAL CANCER SCREENING, NOT HIGH RISK      EGD BIOPSY AND DILATION (Upper GI Region) Diagnosis:       Encounter for screening colonoscopy      (Encounter for screening colonoscopy [Z12.11])    Surgeons: Beth Lee MD Responsible Provider: Giselle Duffy MD    Anesthesia Type: TIVA ASA Status: 3          Anesthesia Type: No value filed. Jack Phase I: Jack Score: 10    Jack Phase II: Jack Score: 8      Anesthesia Post Evaluation    Patient location during evaluation: PACU  Patient participation: complete - patient participated  Level of consciousness: awake and alert  Airway patency: patent  Nausea & Vomiting: no nausea and no vomiting  Complications: no  Cardiovascular status: hemodynamically stable  Respiratory status: acceptable  Hydration status: euvolemic  Comments: Blood pressure 119/69, pulse 82, temperature 98.6 °F (37 °C), temperature source Temporal, resp. rate 18, height 5' 11\" (1.803 m), weight 210 lb (95.3 kg), SpO2 97 %.       Pt stable for discharge from PACU  Multimodal analgesia pain management approach

## 2023-04-17 NOTE — PROGRESS NOTES
0747-Discharge instructions were reviewed with patient and pts spouse, Xochilt Robert. Xochilt Robert verbalized understanding, and has no questions at this time. [id number] lobby via wheelchair accompanied by staff. Discharged to home in good condition via private vehicle.

## 2023-04-17 NOTE — ANESTHESIA PRE PROCEDURE
ROM: full  Mouth opening: > = 3 FB   Dental: normal exam         Pulmonary: breath sounds clear to auscultation  (+) COPD (Daily Spiriva and prn Albuterol - pt never smoked, due to occupational exposure): moderate,  sleep apnea:                             Cardiovascular:  Exercise tolerance: good (>4 METS),   (+) hypertension:, dysrhythmias (NSVT - controlled with verapamil):,         Rhythm: regular  Rate: normal                 ROS comment: Echo 3/22 - normal EF, mild MR/TR     Neuro/Psych:   Negative Neuro/Psych ROS              GI/Hepatic/Renal:   (+) GERD: well controlled, renal disease (Cr 1.7): CRI,           Endo/Other: Negative Endo/Other ROS                    Abdominal:             Vascular: negative vascular ROS. Other Findings:           Anesthesia Plan      TIVA     ASA 3       Induction: intravenous. Anesthetic plan and risks discussed with patient.                         Mahendra Stokes MD   4/17/2023

## 2023-04-17 NOTE — PROCEDURES
Endoscopy Note          Operative Report    Patient: Edward Loya MRN: [id number]      YOB: 1960  Age: 60 y.o. Sex: male            Indications:   Chronic reflux disease    Preoperative Evaluation: The patient was evaluated prior to the procedure in the GI lab admission area, the patient ASA was recorded . Consent was obtained from the patient with the risk of perforation bleeding and aspiration. Anesthesia: CAMPBELL-per anesthesia    Findings: Normal upper and mid esophageal mucosa nonobstructive ring on top of a 2 cm hiatal hernia dilated using 20 mm through-the-scope balloon. Mild generalized gastritis. Biopsy from the antrum and gastric body was taken. Open pylorus. Normal descending duodenal mucosa    Postoperative Diagnosis: 1-benign esophageal stricture 2-hiatal hernia.   3-chronic gastritis    12411 Esophagogastroduodenoscopy, Flexible, transoral; biopsy; single or multiple and 13388 Esophagogastroduodenoscopy, Flexible, transoral; transendoscopic balloon dilation of esophagus      Recommendations: Await Pathology      Signed By:  Jewel Becker MD     April 17, 2023

## 2023-04-17 NOTE — CONSULTS
Linda Jimenez (:  1960) is a 60 y.o. male, new patient here for evaluation of the following chief complaint(s):gerd/screening  No chief complaint on file. ASSESSMENT/PLAN: GERD/ Screening  [unfilled]         Subjective   SUBJECTIVE/OBJECTIVE  Presenting with chronic reflux disease and prior esophagitis also need for a screening colonoscopy. He had had positive nausea vomiting and occasional dysphagia. Recent COPD exacerbation, controled.   Past Medical History:   Diagnosis Date    Arrhythmia     NSVT taking verapamil for this    Arthritis     generalized    CAD (coronary artery disease) 2017, 10/02/2020    NO MI, NO STENTS NO CABG- heart cath  LAD proximal to mid 20% stenosis, normal LV function per cath dated 10/02/2020; followed by [facility]    Chronic obstructive pulmonary disease ([address]     per pt mild and treated with inhaler    CKD (chronic kidney disease)     stage 3 per patient    Dyspnea on exertion     only with exertion not at rest    GERD (gastroesophageal reflux disease)     taking omeprazole daily; does not sleep elevated    H/O cold sores     Hx of echocardiogram 2021    LVEF 55-60%    Hyperlipidemia     statin taken    Hypertension     controlled w/ med    Latex allergy     Sleep apnea     CPAP cannot tolerate cpap right now; oral device is being made as of 2023    Wide-complex tachycardia 2017    NSVT; per pt no problems since put on verapamil       Past Surgical History:   Procedure Laterality Date    BACK SURGERY  2021    CARDIAC CATHETERIZATION      no stents     CARDIAC CATHETERIZATION  10/02/2020    Mild CAD, normal LV function    CARPAL TUNNEL RELEASE Right     COLONOSCOPY      FINGER TRIGGER RELEASE Right     NOSE SURGERY Bilateral 2023    INFERIOR TURBINATE REDUCTION performed by José Miguel Comer MD at [facility]    SEPTOPLASTY N/A 2023    SEPTOPLASTY performed by José Miguel Comer MD at [place] [address]

## 2023-04-17 NOTE — PROCEDURES
Operative Report    Patient: Andrew Rivera MRN: [id number]      YOB: 1960  Age: 60 y.o. Sex: male            Indications:  screening for colon cancer [unfilled]     Preoperative Evaluation: The patient was evaluated prior to the procedure in the GI lab admission area, the patient ASA was recorded . Consent was obtained from the patient with the risk of perforation bleeding and aspiration. Anesthesia: CAMPBELL-per anesthesia    Complications: None; patient tolerated the procedure well. EBL -insignificant      Procedure: The patient was sedated in the left lateral decubitus position. Scope was advanced from the rectum to the cecum. Evaluation was performed to the cecum twice. The scope was withdrawn to the rectum, retroflexed view was performed. The rectal exam was normal.  Preparation was good Gillett score of 3/3/3:9 . Findings:   Exam to the cecum. 2 passes performed in the ascending colon. No sign of colon polyp noted throughout the exam.  Normal cecum ascending transverse descending  sigmoid and rectal mucosa.       Postoperative Diagnosis: Normal screening colonoscopy    82292 Colonoscopy, Flexible; diagnostic       Recommendations:    -   Repeat exam in 10 years      Signed By:  Deric Rock MD     April 17, 2023

## 2023-04-18 ENCOUNTER — OFFICE VISIT (OUTPATIENT)
Dept: ORTHOPEDIC SURGERY | Age: 63
End: 2023-04-18
Payer: COMMERCIAL

## 2023-04-18 DIAGNOSIS — M48.02 FORAMINAL STENOSIS OF CERVICAL REGION: ICD-10-CM

## 2023-04-18 DIAGNOSIS — M50.30 DDD (DEGENERATIVE DISC DISEASE), CERVICAL: Primary | ICD-10-CM

## 2023-04-18 DIAGNOSIS — M48.062 LUMBAR STENOSIS WITH NEUROGENIC CLAUDICATION: ICD-10-CM

## 2023-04-18 PROCEDURE — 99214 OFFICE O/P EST MOD 30 MIN: CPT | Performed by: PHYSICIAN ASSISTANT

## 2023-04-18 NOTE — PATIENT INSTRUCTIONS
discontinued 5-7 days prior to the injection. Check with your doctor regarding specific drugs you are taking. Med    [facility]    MD Mattie Fried MD Amy [place], PA-C Rufina Gata, NP    Main Office  [address] [facility], [facility], [facility] [address] [place] [address]       For Appointments at either location, please call [phone] that result in thinning of blood such as Coumadin, Aspirin, Plavix, and many anti-inflammatories, need to Pre procedure teaching sheet: Epidural spinal injection, selective nerve root block injection, sacroiliac injection and facet block injections. You have been scheduled for spinal injection. This injection is to help decrease her back and or leg pain. A local anesthetic will be used to numb the area. Then, a spinal needle will be placed in the appropriate place according to the type of injection ordered by your physician. A steroid with or without local anesthetic will be injected. A small amount of contrast dye will be used to better visualize the injection site. You will be lying on your stomach. A series of 3 injections may be performed as these are ordered 2 to 3 weeks apart. Be aware, steroids can take 2 to 3 days to begin working, but can take 7 to 10 days for full effectiveness. Therefore, you may not have relief immediately. Please be patient and give the injection time to work. You should not resume any type of strenuous workout routine for at least 3 days following the procedure. Walking is fine. Prior to your procedure    - Please call your primary care physician if you are on blood thinners such as Coumadin, warfarin, heparin, Plavix, Lovenox, Effient, Eliquis, Xarelto, Brilinta, or aspirin or other blood thinner as these will need to be stopped for 3 to 7 days before the injections.   We will need verbal approval to stop the thinners and proceed with

## 2023-04-18 NOTE — PROGRESS NOTES
A referral for spine injection has been ordered. A referral to PT has been ordered of the Cervical spine.
(TYLENOL) 500 MG tablet, Take 2 tablets by mouth every 6 hours as needed, Disp: , Rfl:     atorvastatin (LIPITOR) 40 MG tablet, Take 1 tablet by mouth at bedtime, Disp: , Rfl:     verapamil (CALAN SR) 240 MG extended release tablet, Take 1 tablet by mouth daily, Disp: , Rfl:     Past Surgical History:   Procedure Laterality Date    BACK SURGERY  05/2021    CARDIAC CATHETERIZATION  2017    no stents     CARDIAC CATHETERIZATION  10/02/2020    Mild CAD, normal LV function    CARPAL TUNNEL RELEASE Right     COLONOSCOPY      COLONOSCOPY N/A 4/17/2023    COLORECTAL CANCER SCREENING, NOT HIGH RISK performed by Deric Rock MD at [place] [address] Right     NOSE SURGERY Bilateral 02/07/2023    INFERIOR TURBINATE REDUCTION performed by Sudarshan Chavez MD at [facility]    SEPTOPLASTY N/A 02/07/2023    SEPTOPLASTY performed by Sudarshan Chavez MD at [address] [place] [address] Bilateral 10/07/2020    UPPER GASTROINTESTINAL ENDOSCOPY N/A 4/17/2023    EGD BIOPSY AND DILATION performed by Deric Rock MD at [facility] ENDOSCOPY       Patient Active Problem List   Diagnosis    Tremors of nervous system    Acute left-sided low back pain with left-sided sciatica    Follow-up examination, following other surgery    Left carpal tunnel syndrome    Parkinson's disease ([address]    Lumbar herniated disc    Nocturnal hypoxemia    AJAY (obstructive sleep apnea)    Parkinson disease ([address]    Cogwheel rigidity    Cervical radiculopathy    Trigger finger, right middle finger    Right carpal tunnel syndrome    Snoring    Osteoarthritis of right hip    OA (osteoarthritis) of hip    Generalized hyperreflexia    Cervical spinal stenosis    Shortness of breath on exertion    Rash and nonspecific skin eruption    Chronic renal disease, stage III (HCC)    Bilateral carpal tunnel syndrome    Ulnar nerve entrapment at elbow, right    Chronic obstructive pulmonary disease (HCC)    Chronic mouth breathing    Gastroesophageal reflux disease

## 2023-04-20 ENCOUNTER — HOSPITAL ENCOUNTER (OUTPATIENT)
Dept: PHYSICAL THERAPY | Age: 63
Setting detail: RECURRING SERIES
Discharge: HOME | End: 2023-04-23
Payer: COMMERCIAL

## 2023-04-20 PROCEDURE — 97162 PT EVAL MOD COMPLEX 30 MIN: CPT

## 2023-04-20 PROCEDURE — 97110 THERAPEUTIC EXERCISES: CPT

## 2023-04-20 ASSESSMENT — PAIN SCALES - GENERAL: PAINLEVEL_OUTOF10: 8

## 2023-04-21 ENCOUNTER — CLINICAL DOCUMENTATION (OUTPATIENT)
Dept: ORTHOPEDIC SURGERY | Age: 63
End: 2023-04-21

## 2023-04-21 PROBLEM — Z12.11 ENCOUNTER FOR SCREENING COLONOSCOPY: Status: RESOLVED | Noted: 2023-03-22 | Resolved: 2023-04-21

## 2023-04-24 ENCOUNTER — CLINICAL DOCUMENTATION (OUTPATIENT)
Dept: ORTHOPEDIC SURGERY | Age: 63
End: 2023-04-24

## 2023-04-24 ENCOUNTER — TELEPHONE (OUTPATIENT)
Dept: ORTHOPEDIC SURGERY | Age: 63
End: 2023-04-24

## 2023-04-24 NOTE — PROGRESS NOTES
MONICA NEEDS INFORMATION ON CONSEVATIVE TREATMENT, DATES AND IF STILL DOING THEM AND % OF PAIN RELIEF.  NOTIFIED [facility]

## 2023-04-25 ENCOUNTER — HOSPITAL ENCOUNTER (OUTPATIENT)
Dept: PHYSICAL THERAPY | Age: 63
Setting detail: RECURRING SERIES
Discharge: HOME | End: 2023-04-28
Payer: COMMERCIAL

## 2023-04-25 ENCOUNTER — CLINICAL DOCUMENTATION (OUTPATIENT)
Dept: ORTHOPEDIC SURGERY | Age: 63
End: 2023-04-25

## 2023-04-25 PROCEDURE — 97140 MANUAL THERAPY 1/> REGIONS: CPT

## 2023-04-25 PROCEDURE — 97110 THERAPEUTIC EXERCISES: CPT

## 2023-04-25 NOTE — PROGRESS NOTES
Chanelle Jimenez  : 1960  Primary: Nick Mata ([facility])  Secondary:  Kellee Graves [facility]  [address] [address]  Phone: [phone]  Fax: [phone] Plan Frequency: 2x/wk x 8 wks  Plan of Care/Certification Expiration Date: 23      >PT Visit Info:  Plan Frequency: 2x/wk x 8 wks  Plan of Care/Certification Expiration Date: 23  Total # of Visits Approved: 30 (hard max)  Total # of Visits to Date: 2  Progress Note Counter: 2      Visit Count:  2    OUTPATIENT PHYSICAL THERAPY:OP NOTE TYPE: Treatment Note 2023       Episode  }Appt Desk             Treatment Diagnosis: Other lack of cordination (R27.8)  Cervicalgia (M54.2)  Abnormal posture (R29.3)  Medical/Referring Diagnosis:  DDD (degenerative disc disease), cervical [M50.30]  Foraminal stenosis of cervical region [M48.02]  Lumbar stenosis with neurogenic claudication [M48.062]  Referring Provider:  Katie Hernandez PA-C  Orders:  PT Eval and Treat   Date of Onset:  No data recorded   Allergies:   Latex and Iv dye [iodides]  Restrictions/Precautions: Fall risk    Interventions Planned (Treatment may consist of any combination of the following):    Current Treatment Recommendations: Strengthening; ROM; Balance training; Functional mobility training; Transfer training; Therapeutic activities; Modalities; Safety education & training; Home exercise program; Pain management; Equipment evaluation, education, & procurement; Patient/Caregiver education & training; Manual; Neuromuscular re-education; Gait training; Stair training; Endurance training     REASON FOR TREATMENT: pain in cervical spine with a flexion preference. His MRI showed severe left foraminal stenosis, and mild central stenosis. He also has chronic low back pain and impaired gait quality and safety. He would like to have his lumbar spine evaluated after his cervical plan of care.  Recommend he follow up with a spine

## 2023-04-26 ASSESSMENT — PAIN SCALES - GENERAL: PAINLEVEL_OUTOF10: 8

## 2023-04-27 ENCOUNTER — HOSPITAL ENCOUNTER (OUTPATIENT)
Dept: PHYSICAL THERAPY | Age: 63
Setting detail: RECURRING SERIES
Discharge: HOME | End: 2023-04-30
Payer: COMMERCIAL

## 2023-04-27 PROCEDURE — 97110 THERAPEUTIC EXERCISES: CPT

## 2023-04-27 PROCEDURE — 97530 THERAPEUTIC ACTIVITIES: CPT

## 2023-04-27 PROCEDURE — 97140 MANUAL THERAPY 1/> REGIONS: CPT

## 2023-04-28 ASSESSMENT — PAIN SCALES - GENERAL: PAINLEVEL_OUTOF10: 8

## 2023-05-02 ENCOUNTER — HOSPITAL ENCOUNTER (OUTPATIENT)
Dept: PHYSICAL THERAPY | Age: 63
Setting detail: RECURRING SERIES
Discharge: HOME | End: 2023-05-05
Payer: COMMERCIAL

## 2023-05-02 ENCOUNTER — PATIENT MESSAGE (OUTPATIENT)
Dept: NEUROLOGY | Age: 63
End: 2023-05-02

## 2023-05-02 PROCEDURE — 97112 NEUROMUSCULAR REEDUCATION: CPT

## 2023-05-02 PROCEDURE — 97110 THERAPEUTIC EXERCISES: CPT

## 2023-05-02 PROCEDURE — 97140 MANUAL THERAPY 1/> REGIONS: CPT

## 2023-05-02 ASSESSMENT — PAIN SCALES - GENERAL: PAINLEVEL_OUTOF10: 8

## 2023-05-02 NOTE — PROGRESS NOTES
Joanie Jimenez  : 1960  Primary: Kelly Rhodes Sc ([facility])  Secondary:  [facility]  [address] [address] [address]  Phone: [phone]  Fax: [phone] Plan Frequency: 2x/wk x 8 wks  Plan of Care/Certification Expiration Date: 23      >PT Visit Info:  Plan Frequency: 2x/wk x 8 wks  Plan of Care/Certification Expiration Date: 23  Total # of Visits Approved: 30 (hard max)  Total # of Visits to Date: 4  Progress Note Counter: 4      Visit Count:  4    OUTPATIENT PHYSICAL THERAPY:OP NOTE TYPE: Treatment Note 2023       Episode  }Appt Desk             Treatment Diagnosis: Other lack of cordination (R27.8)  Cervicalgia (M54.2)  Abnormal posture (R29.3)  Medical/Referring Diagnosis:  DDD (degenerative disc disease), cervical [M50.30]  Foraminal stenosis of cervical region [M48.02]  Lumbar stenosis with neurogenic claudication [M48.062]  Referring Provider:  Cortney Lockhart PA-C  Orders:  PT Eval and Treat   Date of Onset:  No data recorded   Allergies:   Latex and Iv dye [iodides]  Restrictions/Precautions: Fall risk    Interventions Planned (Treatment may consist of any combination of the following):    Current Treatment Recommendations: Strengthening; ROM; Balance training; Functional mobility training; Transfer training; Therapeutic activities; Modalities; Safety education & training; Home exercise program; Pain management; Equipment evaluation, education, & procurement; Patient/Caregiver education & training; Manual; Neuromuscular re-education; Gait training; Stair training; Endurance training     REASON FOR TREATMENT: pain in cervical spine with a flexion preference. His MRI showed severe left foraminal stenosis, and mild central stenosis. He also has chronic low back pain and impaired gait quality and safety. He would like to have his lumbar spine evaluated after his cervical plan of care.  Recommend he follow up with a spine

## 2023-05-03 ENCOUNTER — OFFICE VISIT (OUTPATIENT)
Dept: ORTHOPEDIC SURGERY | Age: 63
End: 2023-05-03

## 2023-05-03 DIAGNOSIS — M54.17 LUMBOSACRAL RADICULOPATHY: Primary | ICD-10-CM

## 2023-05-03 RX ORDER — TRIAMCINOLONE ACETONIDE 40 MG/ML
100 INJECTION, SUSPENSION INTRA-ARTICULAR; INTRAMUSCULAR ONCE
Status: COMPLETED | OUTPATIENT
Start: 2023-05-03 | End: 2023-05-03

## 2023-05-03 RX ADMIN — TRIAMCINOLONE ACETONIDE 100 MG: 40 INJECTION, SUSPENSION INTRA-ARTICULAR; INTRAMUSCULAR at 16:24

## 2023-05-03 NOTE — PROGRESS NOTES
Date: 05/03/23   Name: Priscila Warren [facility]    Pre-Procedural Diagnosis:    Diagnosis Orders   1. Lumbosacral radiculopathy  XR INJ SPINE THER SUBST LUM/SAC W IMG    triamcinolone acetonide (KENALOG-40) injection 100 mg          Procedure: Lumbar Epidural Steroid Injection (RANJIT)    Precautions: [facility] Precautions spine injections: Patient allergic to contrast dye. No contrast administered during this procedure. Hibiclens used for skin prep. The procedure was discussed at length with the patient and informed consent was signed. The patient was placed in a prone position on the fluoroscopy table and the skin was prepped and draped in a routine sterile fashion. The area to be injected was anesthetized with approximately 5 cc of 1% Lidocaine. Initially a 22-gauge 3.5 inch spinal needle was carefully advanced under fluoroscopic guidance to the left L4-L5 interlaminar epidural space. As above, no contrast administered. . Once proper placement was confirmed, 1.5 cc of sterile water and 100 mg of Kenalog were injected through the spinal needle. Fluoroscopic guidance was used intermittently over a 10-minute period to insure proper needle placement and patient safety. A hard copy of the fluoroscopic  images has been placed in the patient's chart. The patient was monitored after the procedure and discharged home without complication. A total of 2.5 cc of Kenalog were administered during this procedure.     Resume Meds:  Pt remains on asa 81 mg.    Dino Draper MD  05/03/23

## 2023-05-04 ENCOUNTER — HOSPITAL ENCOUNTER (OUTPATIENT)
Dept: PHYSICAL THERAPY | Age: 63
Setting detail: RECURRING SERIES
Discharge: HOME | End: 2023-05-07
Payer: COMMERCIAL

## 2023-05-04 PROCEDURE — 97110 THERAPEUTIC EXERCISES: CPT

## 2023-05-04 PROCEDURE — 97530 THERAPEUTIC ACTIVITIES: CPT

## 2023-05-04 PROCEDURE — 97140 MANUAL THERAPY 1/> REGIONS: CPT

## 2023-05-04 ASSESSMENT — PAIN SCALES - GENERAL: PAINLEVEL_OUTOF10: 6

## 2023-05-05 ENCOUNTER — OFFICE VISIT (OUTPATIENT)
Dept: UROLOGY | Age: 63
End: 2023-05-05

## 2023-05-05 DIAGNOSIS — N40.1 BPH WITH OBSTRUCTION/LOWER URINARY TRACT SYMPTOMS: ICD-10-CM

## 2023-05-05 DIAGNOSIS — N52.9 ERECTILE DYSFUNCTION, UNSPECIFIED ERECTILE DYSFUNCTION TYPE: ICD-10-CM

## 2023-05-05 DIAGNOSIS — N20.0 RENAL STONE: ICD-10-CM

## 2023-05-05 DIAGNOSIS — N28.1 RENAL CYST: ICD-10-CM

## 2023-05-05 DIAGNOSIS — N13.8 BPH WITH OBSTRUCTION/LOWER URINARY TRACT SYMPTOMS: ICD-10-CM

## 2023-05-05 DIAGNOSIS — R33.9 INCOMPLETE EMPTYING OF BLADDER: Primary | ICD-10-CM

## 2023-05-05 LAB
BILIRUBIN, URINE, POC: NEGATIVE
BLOOD URINE, POC: ABNORMAL
GLUCOSE URINE, POC: NEGATIVE
KETONES, URINE, POC: NEGATIVE
LEUKOCYTE ESTERASE, URINE, POC: NEGATIVE
NITRITE, URINE, POC: NEGATIVE
PH, URINE, POC: 5.5 (ref 4.6–8)
PROTEIN,URINE, POC: 30
SPECIFIC GRAVITY, URINE, POC: 1.03 (ref 1–1.03)
URINALYSIS CLARITY, POC: ABNORMAL
URINALYSIS COLOR, POC: ABNORMAL
UROBILINOGEN, POC: NORMAL

## 2023-05-05 RX ORDER — TADALAFIL 5 MG/1
5 TABLET ORAL DAILY
Qty: 30 TABLET | Refills: 11 | Status: SHIPPED | OUTPATIENT
Start: 2023-05-05

## 2023-05-05 ASSESSMENT — ENCOUNTER SYMPTOMS
INDIGESTION: 1
SKIN LESIONS: 1

## 2023-05-05 NOTE — PROGRESS NOTES
function    CARPAL TUNNEL RELEASE Right     COLONOSCOPY      COLONOSCOPY N/A 4/17/2023    COLORECTAL CANCER SCREENING, NOT HIGH RISK performed by Americo Locke MD at [place] [address] Right     NOSE SURGERY Bilateral 02/07/2023    INFERIOR TURBINATE REDUCTION performed by Zaria Mixon MD at [facility]    SEPTOPLASTY N/A 02/07/2023    SEPTOPLASTY performed by Zaria Mixon MD at [address] Bilateral 10/07/2020    UPPER GASTROINTESTINAL ENDOSCOPY N/A 4/17/2023    EGD BIOPSY AND DILATION performed by Americo Locke MD at [facility] ENDOSCOPY     Current Outpatient Medications   Medication Sig Dispense Refill    tadalafil (CIALIS) 5 MG tablet Take 1 tablet by mouth daily 30 tablet 11    aspirin 81 MG EC tablet Take 1 tablet by mouth daily      tiotropium (SPIRIVA RESPIMAT) 2.5 MCG/ACT AERS inhaler Inhale 2 puffs into the lungs daily 1 each 11    mupirocin (BACTROBAN NASAL) 2 % nasal ointment Take by Nasal route 2 times daily.  1 each 3    carbidopa-levodopa (SINEMET)  MG per tablet Take 2 tablets by mouth 3 times daily (Patient not taking: Reported on 3/29/2023) 180 tablet 5    omeprazole (PRILOSEC) 40 MG delayed release capsule Take 1 capsule by mouth every morning (before breakfast) 30 capsule 5    sildenafil (VIAGRA) 100 MG tablet Take 1 tablet by mouth as needed for Erectile Dysfunction 30 tablet 3    losartan-hydroCHLOROthiazide (HYZAAR) 100-25 MG per tablet TAKE 1 TABLET BY MOUTH ONCE DAILY      albuterol sulfate HFA (VENTOLIN HFA) 108 (90 Base) MCG/ACT inhaler Inhale 2 puffs into the lungs 4 times daily as needed for Wheezing 54 g 1    LYSINE PO Take 50 mg by mouth as needed      acetaminophen (TYLENOL) 500 MG tablet Take 2 tablets by mouth every 6 hours as needed      atorvastatin (LIPITOR) 40 MG tablet Take 1 tablet by mouth at bedtime      verapamil (CALAN SR) 240 MG extended release tablet Take 1 tablet by mouth daily       No current facility-administered medications

## 2023-05-08 NOTE — TELEPHONE ENCOUNTER
Riki Hoff MA 5/2/2023 3:33 PM EDT      ----- Message -----  From: Perlita Soriano \"Leroy\"  Sent: 5/2/2023 11:49 AM EDT  To: , *  Subject: Tremors     I have seen my back doctor, after X-ray and MRI, I was referred for another injection and PT. Doctor says there is nothing going on that would cause my tremors. Doctor said I should contact you to see if you had any other options. Awaiting any help or suggestions.  Thanks

## 2023-05-09 NOTE — PROGRESS NOTES
Roberto Jimenez  : 1960  Primary: Lynn Rhodes Sc ([facility])  Secondary:  Dick Hinson [facility]  [address] [address]  [address]  Phone: [phone]  Fax: [phone] Plan Frequency: 2x/wk x 8 wks  Plan of Care/Certification Expiration Date: 23      >PT Visit Info:  Plan Frequency: 2x/wk x 8 wks  Plan of Care/Certification Expiration Date: 23  Total # of Visits Approved: 30 (hard max)  Total # of Visits to Date: 6  Progress Note Counter: 6      Visit Count:  6    OUTPATIENT PHYSICAL THERAPY:OP NOTE TYPE: Treatment Note 5/10/2023       Episode  }Appt Desk             Treatment Diagnosis: Other lack of cordination (R27.8)  Cervicalgia (M54.2)  Abnormal posture (R29.3)  Medical/Referring Diagnosis:  DDD (degenerative disc disease), cervical [M50.30]  Foraminal stenosis of cervical region [M48.02]  Lumbar stenosis with neurogenic claudication [M48.062]  Referring Provider:  Katelyn Archibald PA-C  Orders:  PT Eval and Treat   Date of Onset:  No data recorded   Allergies:   Latex and Iv dye [iodides]  Restrictions/Precautions: Fall risk    Interventions Planned (Treatment may consist of any combination of the following):    Current Treatment Recommendations: Strengthening; ROM; Balance training; Functional mobility training; Transfer training; Therapeutic activities; Modalities; Safety education & training; Home exercise program; Pain management; Equipment evaluation, education, & procurement; Patient/Caregiver education & training; Manual; Neuromuscular re-education; Gait training; Stair training; Endurance training     REASON FOR TREATMENT: pain in cervical spine with a flexion preference. His MRI showed severe left foraminal stenosis, and mild central stenosis. He also has chronic low back pain and impaired gait quality and safety. He would like to have his lumbar spine evaluated after his cervical plan of care.  Recommend he follow up with a spine

## 2023-05-10 ENCOUNTER — HOSPITAL ENCOUNTER (OUTPATIENT)
Dept: PHYSICAL THERAPY | Age: 63
Setting detail: RECURRING SERIES
Discharge: HOME | End: 2023-05-13
Payer: COMMERCIAL

## 2023-05-10 PROCEDURE — 97140 MANUAL THERAPY 1/> REGIONS: CPT

## 2023-05-10 PROCEDURE — 97110 THERAPEUTIC EXERCISES: CPT

## 2023-05-10 PROCEDURE — 97530 THERAPEUTIC ACTIVITIES: CPT

## 2023-05-10 ASSESSMENT — PAIN SCALES - GENERAL: PAINLEVEL_OUTOF10: 6

## 2023-05-11 ENCOUNTER — TELEPHONE (OUTPATIENT)
Dept: PRIMARY CARE CLINIC | Facility: CLINIC | Age: 63
End: 2023-05-11

## 2023-05-11 DIAGNOSIS — G20 PARKINSON'S DISEASE (HCC): Chronic | ICD-10-CM

## 2023-05-11 DIAGNOSIS — Z78.9 POOR TOLERANCE FOR AMBULATION: ICD-10-CM

## 2023-05-11 DIAGNOSIS — M54.42 ACUTE LEFT-SIDED LOW BACK PAIN WITH LEFT-SIDED SCIATICA: Primary | ICD-10-CM

## 2023-05-11 DIAGNOSIS — M51.26 LUMBAR HERNIATED DISC: ICD-10-CM

## 2023-05-11 NOTE — TELEPHONE ENCOUNTER
Pt needs referral to spinal specialist, due to weakness of ambulation, poor balance, recommended by Physical Therapy. Pt is referred to Dr. Jeanna Jacinto, neurosurgery for evaluation and treatment.

## 2023-05-12 ENCOUNTER — HOSPITAL ENCOUNTER (OUTPATIENT)
Dept: PHYSICAL THERAPY | Age: 63
Setting detail: RECURRING SERIES
Discharge: HOME | End: 2023-05-15
Payer: COMMERCIAL

## 2023-05-12 PROCEDURE — 97140 MANUAL THERAPY 1/> REGIONS: CPT

## 2023-05-12 PROCEDURE — 97112 NEUROMUSCULAR REEDUCATION: CPT

## 2023-05-12 PROCEDURE — 97110 THERAPEUTIC EXERCISES: CPT

## 2023-05-12 ASSESSMENT — PAIN SCALES - GENERAL: PAINLEVEL_OUTOF10: 7

## 2023-05-15 NOTE — THERAPY EVALUATION
Akilah Jimenez  : 1960  Primary: Nicolleed Justice [employer] Meagan Sc ([employer])  Secondary:  [address] [address]  Phone: [phone]  Fax: [phone] Plan Frequency: 2x/wk x 90 days    Plan of Care/Certification Expiration Date: 23      PT Visit Info:  Plan Frequency: 2x/wk x 90 days  Plan of Care/Certification Expiration Date: 23  Total # of Visits Approved: 30 (hard max)  Total # of Visits to Date: 8  Progress Note Counter: 1      Visit Count:  8                OUTPATIENT PHYSICAL THERAPY:             OP NOTE TYPE: Re-evaluation 2023               Episode (Cervical and Lumbar Spine) Appt Desk         Treatment Diagnosis:  Other abnormalities of gait and mobility (R26.89)  Generalized Muscle Weakness (M62.81)  Other lack of cordination (R27.8)  Other Low Back Pain (M54.59)  Cervicalgia (M54.2)  Abnormal posture (R29.3)  Medical/Referring Diagnosis:  DDD (degenerative disc disease), cervical [M50.30]  Foraminal stenosis of cervical region [M48.02]  Lumbar stenosis with neurogenic claudication [M48.062]  Referring Provider:  Guadalupe Saha PA-C  Orders:  PT Eval and Treat   Return Appt:    Date of Onset:       Allergies:  Latex and Iv dye [iodides]  Restrictions/Precautions:           Medications Last Reviewed:  2023     SUBJECTIVE   History of Injury/Illness (Reason for Referral):    Lumbar Spine 5/15/23: Pt has been being treated for neck pain at this clinic. His lumbar spine is being evaluated this date. He has had back pain for about 5 years. He was told it was likely due to his hips. XR showed bilateral OA, and he had bilateral THAs. He continued to have back pain and returned to the spine specialist. He had surgery in  (\"Left-sided L5-S1 laminotomy, diskectomy, decompression of the lateral recess\"). He has less peripheralized pain now, and the main symptoms are usually in his glutes.  He says epidural

## 2023-05-15 NOTE — PROGRESS NOTES
TA setting  yes          HEP:   Neck: scap squeezes, supine cervical rotation seated cervical extension  Back: 3-way hip, TA sets, SKTC     GAIT: on level surface with 2WW with focus on safety, normalizing gait pattern, safe use of assistive device, upright posture, heel first at initial contact, push off, obstacle avoidance, and increasing functional walking tolerance. MANUAL THERAPY: none     MODALITIES: none     Education: Advised pt to get handrails for the steps into his home. Advised pt not to walk without his walker at home as he does at times. Continued pain neuroscience education. Discussion of plan of care. HEP review. Pt education for HEP safety, exercise frequency and duration, symptom control, mechanics, fall risk, and anatomy and physiology of present condition/healing time. Treatment/Session Summary:    >Treatment Assessment: Pt tolerated session well with no lingering increase in pain. Provide pt with updated HEP for cervical spine next session. Communication/Consultation:  None today  Equipment provided today:  None  Recommendations/Intent for next treatment session: Next visit will focus on advancements to more challenging activities. Progress repetitions, weight, and complexity of functional movement per pt tolerance and as indicated.     >Total Treatment Billable Duration: 55 minutes  Time In: 0845  Time Out: 4222       Manual Therapy: (0 minutes)     Ther-Ex: (25 minutes)     Ther-Act: (16 minutes)     Neuro Re-ed (14 minutes)     Modalities: (0 minutes)    Geoff Lindsey, PT, DPT       Charge Capture  }Post Session Pain  PT Visit Info  [employer] Portal  MD Guidelines  Scanned Media  Benefits  MyChart    Future Appointments   Date Time Provider Vanessa Machado   5/18/2023  9:30 AM Ingris [facility], [facility]   5/22/2023  1:45 PM Ingris [facility]   5/23/2023 12:00 PM Isis Girard MD [facility]   5/24/2023  1:45 PM Ingris [facility],

## 2023-05-16 ENCOUNTER — HOSPITAL ENCOUNTER (OUTPATIENT)
Dept: PHYSICAL THERAPY | Age: 63
Setting detail: RECURRING SERIES
Discharge: HOME | End: 2023-05-19
Payer: COMMERCIAL

## 2023-05-16 PROCEDURE — 97110 THERAPEUTIC EXERCISES: CPT

## 2023-05-16 PROCEDURE — 97530 THERAPEUTIC ACTIVITIES: CPT

## 2023-05-16 PROCEDURE — 97164 PT RE-EVAL EST PLAN CARE: CPT

## 2023-05-16 PROCEDURE — 97112 NEUROMUSCULAR REEDUCATION: CPT

## 2023-05-16 ASSESSMENT — PAIN SCALES - GENERAL: PAINLEVEL_OUTOF10: 1

## 2023-05-18 ENCOUNTER — HOSPITAL ENCOUNTER (OUTPATIENT)
Dept: PHYSICAL THERAPY | Age: 63
Setting detail: RECURRING SERIES
Discharge: HOME | End: 2023-05-21
Payer: COMMERCIAL

## 2023-05-18 PROCEDURE — 97530 THERAPEUTIC ACTIVITIES: CPT

## 2023-05-18 PROCEDURE — 97140 MANUAL THERAPY 1/> REGIONS: CPT

## 2023-05-18 PROCEDURE — 97110 THERAPEUTIC EXERCISES: CPT

## 2023-05-18 ASSESSMENT — PAIN SCALES - GENERAL: PAINLEVEL_OUTOF10: 3

## 2023-05-22 ENCOUNTER — HOSPITAL ENCOUNTER (OUTPATIENT)
Dept: PHYSICAL THERAPY | Age: 63
Setting detail: RECURRING SERIES
Discharge: HOME | End: 2023-05-25
Payer: COMMERCIAL

## 2023-05-22 PROCEDURE — 97112 NEUROMUSCULAR REEDUCATION: CPT

## 2023-05-22 PROCEDURE — 97110 THERAPEUTIC EXERCISES: CPT

## 2023-05-22 PROCEDURE — 97530 THERAPEUTIC ACTIVITIES: CPT

## 2023-05-22 ASSESSMENT — PAIN SCALES - GENERAL: PAINLEVEL_OUTOF10: 3

## 2023-05-22 NOTE — PROGRESS NOTES
George Jimenez  : 1960  Primary: Chestine Poplin [employer] Meagan Sc ([place] [employer])  Secondary:  [address] [address]  Phone: [phone]  Fax: [phone] Plan Frequency: 2x/wk x 90 days  Plan of Care/Certification Expiration Date: 23      >PT Visit Info:  Plan Frequency: 2x/wk x 90 days  Plan of Care/Certification Expiration Date: 23  Total # of Visits Approved: 30 (hard max)  Total # of Visits to Date: 10  Progress Note Counter: 3      Visit Count:  10    OUTPATIENT PHYSICAL THERAPY:OP NOTE TYPE: Treatment Note 2023       Episode  }Appt Desk             Treatment Diagnosis: Other abnormalities of gait and mobility (R26.89)  Generalized Muscle Weakness (M62.81)  Other lack of cordination (R27.8)  Other Low Back Pain (M54.59)  Cervicalgia (M54.2)  Abnormal posture (R29.3)  Medical/Referring Diagnosis:  DDD (degenerative disc disease), cervical [M50.30]  Foraminal stenosis of cervical region [M48.02]  Lumbar stenosis with neurogenic claudication [M48.062]  Referring Provider:  Giovanny Augustin PA-C  Orders:  PT Eval and Treat   Date of Onset:  No data recorded   Allergies:   Latex and Iv dye [iodides]  Restrictions/Precautions: Fall risk    Interventions Planned (Treatment may consist of any combination of the following):    Current Treatment Recommendations: Strengthening; ROM; Balance training; Functional mobility training; Transfer training; Endurance training; Gait training; Stair training; Neuromuscular re-education; Manual; Pain management; Home exercise program; Safety education & training; Patient/Caregiver education & training; Equipment evaluation, education, & procurement; Positioning; Modalities; Therapeutic activities     REASON FOR TREATMENT:   Cervical Spine: pain in cervical spine with a flexion preference. His MRI showed severe left foraminal stenosis, and mild central stenosis.  He also has chronic low back pain

## 2023-05-23 ENCOUNTER — OFFICE VISIT (OUTPATIENT)
Dept: NEUROLOGY | Age: 63
End: 2023-05-23
Payer: COMMERCIAL

## 2023-05-23 VITALS
SYSTOLIC BLOOD PRESSURE: 100 MMHG | HEART RATE: 86 BPM | BODY MASS INDEX: 28.73 KG/M2 | DIASTOLIC BLOOD PRESSURE: 68 MMHG | WEIGHT: 206 LBS | OXYGEN SATURATION: 96 %

## 2023-05-23 DIAGNOSIS — M79.2 NEUROPATHIC PAIN: ICD-10-CM

## 2023-05-23 DIAGNOSIS — G25.0 ESSENTIAL TREMOR: Primary | ICD-10-CM

## 2023-05-23 DIAGNOSIS — R25.1 TREMOR OF BOTH HANDS: ICD-10-CM

## 2023-05-23 DIAGNOSIS — M25.60 GENERALIZED STIFFNESS: ICD-10-CM

## 2023-05-23 PROCEDURE — 99215 OFFICE O/P EST HI 40 MIN: CPT | Performed by: PSYCHIATRY & NEUROLOGY

## 2023-05-23 RX ORDER — TOPIRAMATE 50 MG/1
50 TABLET, FILM COATED ORAL 2 TIMES DAILY
Qty: 60 TABLET | Refills: 5 | Status: SHIPPED | OUTPATIENT
Start: 2023-05-23

## 2023-05-23 ASSESSMENT — ENCOUNTER SYMPTOMS
CONSTIPATION: 1
VOICE CHANGE: 1
COUGH: 0

## 2023-05-23 NOTE — PROGRESS NOTES
[address], [address] [facility], [address] [place] [address]  Phone: [phone] Fax [phone]  Aishwarya Bansal MD    Patient: [facility]  Provider: Aishwarya Bansal MD    CC:   Chief Complaint   Patient presents with    Follow-up     Parkinson's     Referring Provider:    History of Present Illness:     Matheus Mcintosh is a 60 y.o. RH male who presents for follow-up of Parkinson's disease. He is unaccompanied by her visit. He was last seen January 2023. He presents for follow-up and continued management of a suspected primary parkinsonian syndrome highlighted by hand tremors, progressive generalized stiffness and gait disturbance. His father had PD in his late 76s. Current medications include (but not limited to):  N/A     Previous medication trials include:  Sinemet     Patient presents today for follow-up. There was previously concern for features of parkinsonism with history of progressive generalized stiffness and gait changes, particularly in light of his family history. However trials of levodopa were not felt to be effective and have since been discontinued. Follow-up studies including a DaTscan and a Syn-One skin biopsy were negative. The studies have been reviewed. He presents today for follow-up. Continues to have a tremor of his hands which is more noticeable as a postural and action tremor. There is never been much tremor at rest.  He still has stiffness in the upper extremities from the shoulders down to his hands. He continues to have a chronic gait disturbance but most of this appears to be related more to lower back pain although there is somewhat of a tendency for him to drag his left lower extremity. He is walking unassisted and denies any recent falls. Of note he there is some complaints of softening of his voice that he does have some trouble with chronic constipation although this appears to be well managed at the current time.   There

## 2023-05-24 ENCOUNTER — HOSPITAL ENCOUNTER (OUTPATIENT)
Dept: PHYSICAL THERAPY | Age: 63
Setting detail: RECURRING SERIES
Discharge: HOME | End: 2023-05-27
Payer: COMMERCIAL

## 2023-05-24 LAB
ANA SER QL: NEGATIVE
CK SERPL-CCNC: 236 U/L (ref 21–215)

## 2023-05-24 PROCEDURE — 97530 THERAPEUTIC ACTIVITIES: CPT

## 2023-05-24 PROCEDURE — 97112 NEUROMUSCULAR REEDUCATION: CPT

## 2023-05-24 PROCEDURE — 97110 THERAPEUTIC EXERCISES: CPT

## 2023-05-24 ASSESSMENT — PAIN SCALES - GENERAL: PAINLEVEL_OUTOF10: 3

## 2023-05-24 NOTE — PROGRESS NOTES
Rachel Jimenez  : 1960  Primary: Asmita Rhodes Sc ([employer]HELLE)  Secondary:  [address] [facility] [address]  Phone: [phone]  Fax: [phone] Plan Frequency: 2x/wk x 90 days  Plan of Care/Certification Expiration Date: 23      >PT Visit Info:  Plan Frequency: 2x/wk x 90 days  Plan of Care/Certification Expiration Date: 23  Total # of Visits Approved: 30 (hard max)  Total # of Visits to Date: 10  Progress Note Counter: 3      Visit Count:  11    OUTPATIENT PHYSICAL THERAPY:OP NOTE TYPE: Treatment Note 2023       Episode  }Appt Desk             Treatment Diagnosis: Other abnormalities of gait and mobility (R26.89)  Generalized Muscle Weakness (M62.81)  Other lack of cordination (R27.8)  Other Low Back Pain (M54.59)  Cervicalgia (M54.2)  Abnormal posture (R29.3)  Medical/Referring Diagnosis:  DDD (degenerative disc disease), cervical [M50.30]  Foraminal stenosis of cervical region [M48.02]  Lumbar stenosis with neurogenic claudication [M48.062]  Referring Provider:  Heena Urbano PA-C  Orders:  PT Eval and Treat   Date of Onset:  No data recorded   Allergies:   Latex and Iv dye [iodides]  Restrictions/Precautions: Fall risk    Interventions Planned (Treatment may consist of any combination of the following):    Current Treatment Recommendations: Strengthening; ROM; Balance training; Functional mobility training; Transfer training; Endurance training; Gait training; Stair training; Neuromuscular re-education; Manual; Pain management; Home exercise program; Safety education & training; Patient/Caregiver education & training; Equipment evaluation, education, & procurement; Positioning; Modalities; Therapeutic activities     REASON FOR TREATMENT:   Cervical Spine: pain in cervical spine with a flexion preference. His MRI showed severe left foraminal stenosis, and mild central stenosis.  He also has chronic low back pain

## 2023-05-25 LAB
ALBUMIN SERPL ELPH-MCNC: 3.8 G/DL (ref 2.9–4.4)
ALBUMIN/GLOB SERPL: 1.4 (ref 0.7–1.7)
ALPHA1 GLOB SERPL ELPH-MCNC: 0.2 G/DL (ref 0–0.4)
ALPHA2 GLOB SERPL ELPH-MCNC: 0.8 G/DL (ref 0.4–1)
B-GLOBULIN SERPL ELPH-MCNC: 0.8 G/DL (ref 0.7–1.3)
GAMMA GLOB SERPL ELPH-MCNC: 0.9 G/DL (ref 0.4–1.8)
GLOBULIN SER CALC-MCNC: 2.7 G/DL (ref 2.2–3.9)
M PROTEIN SERPL ELPH-MCNC: NORMAL G/DL
PROT SERPL-MCNC: 6.5 G/DL (ref 6–8.5)

## 2023-05-26 LAB — GAD65 AB SER-ACNC: <5 U/ML (ref 0–5)

## 2023-05-30 DIAGNOSIS — Z13.6 SCREENING FOR ISCHEMIC HEART DISEASE: ICD-10-CM

## 2023-05-30 DIAGNOSIS — R53.82 CHRONIC FATIGUE: ICD-10-CM

## 2023-05-30 DIAGNOSIS — R97.20 ELEVATED PROSTATE SPECIFIC ANTIGEN (PSA): ICD-10-CM

## 2023-05-30 DIAGNOSIS — Z13.228 SCREENING FOR PHENYLKETONURIA (PKU): ICD-10-CM

## 2023-05-30 DIAGNOSIS — R79.9 ABNORMAL BLOOD CHEMISTRY: Primary | ICD-10-CM

## 2023-05-30 DIAGNOSIS — R79.9 ABNORMAL BLOOD CHEMISTRY: ICD-10-CM

## 2023-05-30 LAB
ALBUMIN SERPL-MCNC: 3.8 G/DL (ref 3.2–4.6)
ALBUMIN/GLOB SERPL: 1.4 (ref 0.4–1.6)
ALP SERPL-CCNC: 57 U/L (ref 50–136)
ALT SERPL-CCNC: 33 U/L (ref 12–65)
ANION GAP SERPL CALC-SCNC: 5 MMOL/L (ref 2–11)
AST SERPL-CCNC: 19 U/L (ref 15–37)
BASOPHILS # BLD: 0.1 K/UL (ref 0–0.2)
BASOPHILS NFR BLD: 1 % (ref 0–2)
BILIRUB SERPL-MCNC: 0.6 MG/DL (ref 0.2–1.1)
BUN SERPL-MCNC: 31 MG/DL (ref 8–23)
CALCIUM SERPL-MCNC: 9.1 MG/DL (ref 8.3–10.4)
CHLORIDE SERPL-SCNC: 107 MMOL/L (ref 101–110)
CHOLEST SERPL-MCNC: 112 MG/DL
CO2 SERPL-SCNC: 24 MMOL/L (ref 21–32)
CREAT SERPL-MCNC: 1.7 MG/DL (ref 0.8–1.5)
DIFFERENTIAL METHOD BLD: ABNORMAL
EOSINOPHIL # BLD: 0.4 K/UL (ref 0–0.8)
EOSINOPHIL NFR BLD: 6 % (ref 0.5–7.8)
ERYTHROCYTE [DISTWIDTH] IN BLOOD BY AUTOMATED COUNT: 15.4 % (ref 11.9–14.6)
GLOBULIN SER CALC-MCNC: 2.8 G/DL (ref 2.8–4.5)
GLUCOSE SERPL-MCNC: 85 MG/DL (ref 65–100)
HCT VFR BLD AUTO: 39.5 % (ref 41.1–50.3)
HDLC SERPL-MCNC: 47 MG/DL (ref 40–60)
HDLC SERPL: 2.4
HGB BLD-MCNC: 12.4 G/DL (ref 13.6–17.2)
IMM GRANULOCYTES # BLD AUTO: 0.1 K/UL (ref 0–0.5)
IMM GRANULOCYTES NFR BLD AUTO: 2 % (ref 0–5)
LDLC SERPL CALC-MCNC: 54 MG/DL
LYMPHOCYTES # BLD: 1 K/UL (ref 0.5–4.6)
LYMPHOCYTES NFR BLD: 15 % (ref 13–44)
MCH RBC QN AUTO: 28.8 PG (ref 26.1–32.9)
MCHC RBC AUTO-ENTMCNC: 31.4 G/DL (ref 31.4–35)
MCV RBC AUTO: 91.9 FL (ref 82–102)
MONOCYTES # BLD: 0.7 K/UL (ref 0.1–1.3)
MONOCYTES NFR BLD: 11 % (ref 4–12)
NEUTS SEG # BLD: 4.6 K/UL (ref 1.7–8.2)
NEUTS SEG NFR BLD: 65 % (ref 43–78)
NRBC # BLD: 0 K/UL (ref 0–0.2)
PLATELET # BLD AUTO: 154 K/UL (ref 150–450)
PMV BLD AUTO: 10.5 FL (ref 9.4–12.3)
POTASSIUM SERPL-SCNC: 3.7 MMOL/L (ref 3.5–5.1)
PROT SERPL-MCNC: 6.6 G/DL (ref 6.3–8.2)
RBC # BLD AUTO: 4.3 M/UL (ref 4.23–5.6)
SODIUM SERPL-SCNC: 136 MMOL/L (ref 133–143)
T4 FREE SERPL-MCNC: 0.9 NG/DL (ref 0.78–1.46)
TRIGL SERPL-MCNC: 55 MG/DL (ref 35–150)
TSH, 3RD GENERATION: 2.15 UIU/ML (ref 0.36–3.74)
VLDLC SERPL CALC-MCNC: 11 MG/DL (ref 6–23)
WBC # BLD AUTO: 6.9 K/UL (ref 4.3–11.1)

## 2023-05-31 ENCOUNTER — HOSPITAL ENCOUNTER (OUTPATIENT)
Dept: PHYSICAL THERAPY | Age: 63
Setting detail: RECURRING SERIES
Discharge: HOME | End: 2023-06-03
Payer: COMMERCIAL

## 2023-05-31 LAB
PSA FREE MFR SERPL: 16.7 %
PSA FREE SERPL-MCNC: 0.2 NG/ML
PSA SERPL-MCNC: 1.2 NG/ML

## 2023-05-31 PROCEDURE — 97110 THERAPEUTIC EXERCISES: CPT

## 2023-05-31 PROCEDURE — 97530 THERAPEUTIC ACTIVITIES: CPT

## 2023-05-31 ASSESSMENT — PAIN SCALES - GENERAL: PAINLEVEL_OUTOF10: 5

## 2023-05-31 NOTE — PROGRESS NOTES
Supriya Jimenez  : 1960  Primary: Altagracia Parikh Niecy Meagan Sc ([employer]HELLE)  Secondary:  [address] [address]  Phone: [phone]  Fax: [phone] Plan Frequency: 2x/wk x 90 days  Plan of Care/Certification Expiration Date: 23      >PT Visit Info:  Plan Frequency: 2x/wk x 90 days  Plan of Care/Certification Expiration Date: 23  Total # of Visits Approved: 30 (hard max)  Total # of Visits to Date: 11  Progress Note Counter: 4      Visit Count:  12    OUTPATIENT PHYSICAL THERAPY:OP NOTE TYPE: Treatment Note 2023       Episode  }Appt Desk             Treatment Diagnosis: Other abnormalities of gait and mobility (R26.89)  Generalized Muscle Weakness (M62.81)  Other lack of cordination (R27.8)  Other Low Back Pain (M54.59)  Cervicalgia (M54.2)  Abnormal posture (R29.3)  Medical/Referring Diagnosis:  DDD (degenerative disc disease), cervical [M50.30]  Foraminal stenosis of cervical region [M48.02]  Lumbar stenosis with neurogenic claudication [M48.062]  Referring Provider:  Ricardo Petersen PA-C  Orders:  PT Eval and Treat   Date of Onset:  No data recorded   Allergies:   Latex and Iv dye [iodides]  Restrictions/Precautions: Fall risk    Interventions Planned (Treatment may consist of any combination of the following):    Current Treatment Recommendations: Strengthening; ROM; Balance training; Functional mobility training; Transfer training; Endurance training; Gait training; Stair training; Neuromuscular re-education; Manual; Pain management; Home exercise program; Safety education & training; Patient/Caregiver education & training; Equipment evaluation, education, & procurement; Positioning; Modalities; Therapeutic activities     REASON FOR TREATMENT:   Cervical Spine: pain in cervical spine with a flexion preference. His MRI showed severe left foraminal stenosis, and mild central stenosis.  He also has chronic low back pain

## 2023-06-02 ENCOUNTER — HOSPITAL ENCOUNTER (OUTPATIENT)
Dept: PHYSICAL THERAPY | Age: 63
Setting detail: RECURRING SERIES
Discharge: HOME | End: 2023-06-05
Payer: COMMERCIAL

## 2023-06-02 PROCEDURE — 97530 THERAPEUTIC ACTIVITIES: CPT

## 2023-06-02 PROCEDURE — 97110 THERAPEUTIC EXERCISES: CPT

## 2023-06-02 SDOH — ECONOMIC STABILITY: FOOD INSECURITY: WITHIN THE PAST 12 MONTHS, THE FOOD YOU BOUGHT JUST DIDN'T LAST AND YOU DIDN'T HAVE MONEY TO GET MORE.: NEVER TRUE

## 2023-06-02 SDOH — ECONOMIC STABILITY: INCOME INSECURITY: HOW HARD IS IT FOR YOU TO PAY FOR THE VERY BASICS LIKE FOOD, HOUSING, MEDICAL CARE, AND HEATING?: NOT VERY HARD

## 2023-06-02 SDOH — ECONOMIC STABILITY: FOOD INSECURITY: WITHIN THE PAST 12 MONTHS, YOU WORRIED THAT YOUR FOOD WOULD RUN OUT BEFORE YOU GOT MONEY TO BUY MORE.: NEVER TRUE

## 2023-06-02 SDOH — ECONOMIC STABILITY: HOUSING INSECURITY
IN THE LAST 12 MONTHS, WAS THERE A TIME WHEN YOU DID NOT HAVE A STEADY PLACE TO SLEEP OR SLEPT IN A SHELTER (INCLUDING NOW)?: NO

## 2023-06-02 SDOH — ECONOMIC STABILITY: TRANSPORTATION INSECURITY
IN THE PAST 12 MONTHS, HAS LACK OF TRANSPORTATION KEPT YOU FROM MEETINGS, WORK, OR FROM GETTING THINGS NEEDED FOR DAILY LIVING?: NO

## 2023-06-02 ASSESSMENT — PAIN SCALES - GENERAL: PAINLEVEL_OUTOF10: 5

## 2023-06-05 ENCOUNTER — OFFICE VISIT (OUTPATIENT)
Dept: PRIMARY CARE CLINIC | Facility: CLINIC | Age: 63
End: 2023-06-05
Payer: COMMERCIAL

## 2023-06-05 VITALS
TEMPERATURE: 98 F | BODY MASS INDEX: 28.98 KG/M2 | DIASTOLIC BLOOD PRESSURE: 70 MMHG | SYSTOLIC BLOOD PRESSURE: 118 MMHG | HEART RATE: 85 BPM | OXYGEN SATURATION: 98 % | HEIGHT: 71 IN | WEIGHT: 207 LBS

## 2023-06-05 DIAGNOSIS — J44.9 CHRONIC OBSTRUCTIVE PULMONARY DISEASE, UNSPECIFIED COPD TYPE (HCC): ICD-10-CM

## 2023-06-05 DIAGNOSIS — R25.1 TREMORS OF NERVOUS SYSTEM: ICD-10-CM

## 2023-06-05 DIAGNOSIS — N18.30 STAGE 3 CHRONIC KIDNEY DISEASE, UNSPECIFIED WHETHER STAGE 3A OR 3B CKD (HCC): Chronic | ICD-10-CM

## 2023-06-05 DIAGNOSIS — R41.3 MEMORY CHANGES: Primary | ICD-10-CM

## 2023-06-05 DIAGNOSIS — K21.00 GASTROESOPHAGEAL REFLUX DISEASE WITH ESOPHAGITIS WITHOUT HEMORRHAGE: ICD-10-CM

## 2023-06-05 DIAGNOSIS — F70 MILD MENTAL SLOWING: ICD-10-CM

## 2023-06-05 PROCEDURE — 99214 OFFICE O/P EST MOD 30 MIN: CPT | Performed by: FAMILY MEDICINE

## 2023-06-05 RX ORDER — ALBUTEROL SULFATE 90 UG/1
2 AEROSOL, METERED RESPIRATORY (INHALATION) 4 TIMES DAILY PRN
Qty: 54 G | Refills: 1 | Status: SHIPPED | OUTPATIENT
Start: 2023-06-05

## 2023-06-05 RX ORDER — OMEPRAZOLE 40 MG/1
40 CAPSULE, DELAYED RELEASE ORAL
Qty: 30 CAPSULE | Refills: 5 | Status: SHIPPED | OUTPATIENT
Start: 2023-06-05

## 2023-06-05 SDOH — ECONOMIC STABILITY: FOOD INSECURITY: WITHIN THE PAST 12 MONTHS, THE FOOD YOU BOUGHT JUST DIDN'T LAST AND YOU DIDN'T HAVE MONEY TO GET MORE.: NEVER TRUE

## 2023-06-05 SDOH — ECONOMIC STABILITY: FOOD INSECURITY: WITHIN THE PAST 12 MONTHS, YOU WORRIED THAT YOUR FOOD WOULD RUN OUT BEFORE YOU GOT MONEY TO BUY MORE.: NEVER TRUE

## 2023-06-05 SDOH — ECONOMIC STABILITY: INCOME INSECURITY: HOW HARD IS IT FOR YOU TO PAY FOR THE VERY BASICS LIKE FOOD, HOUSING, MEDICAL CARE, AND HEATING?: NOT HARD AT ALL

## 2023-06-05 ASSESSMENT — PATIENT HEALTH QUESTIONNAIRE - PHQ9
SUM OF ALL RESPONSES TO PHQ QUESTIONS 1-9: 2
1. LITTLE INTEREST OR PLEASURE IN DOING THINGS: 1
SUM OF ALL RESPONSES TO PHQ9 QUESTIONS 1 & 2: 2
2. FEELING DOWN, DEPRESSED OR HOPELESS: 1
SUM OF ALL RESPONSES TO PHQ QUESTIONS 1-9: 2

## 2023-06-05 NOTE — PROGRESS NOTES
Linda Helm MD  [address] [facility] [address] Radha Cason, [address]  Ph No:  [phone]  Fax:  [phone]:  Chief Complaint   Patient presents with    6 Month Follow-Up     Pt in for 6 month follow up. Pt says that he is taking anew medicine for tremors that giving him some side affect. Pt say his memory is getting worst and he is getting his words are getting mixed up. Dizziness     Pt says he get dizzy when he stands up. HISTORY OF PRESENT ILLNESS:  Mr. Christel Pascal is a 60 y.o. male. Pt presents for 6 months follow up. Pt reports when stands up gets dizzy, if get up low BP is apparently causing dizziness. Pt advised to pause and to wait for a few seconds prior to proceeding. Pt also reports he has an underlying neurological condition, not fully diagnosed. Pt reports when he is ambulating it feels as if he is moving toward his right side. Pt is using walker to balance self. Pt reports two years ago he had not specific problems, but the onset of whatever this his has been sudden and he is worsening rapidly over 2 years. Memory is worsening and words are getting mixed up. Pt observed having difficulty forming words in speech,    Pt says is having tremors, was ruled out for Parkinsons, and placed on med for essential tremor, topamax. Pt says has stopped med, and is going to inform neurology that he has done so. Pt says he expects the medication may be making things worse as to memory and word formation \"slowing of thinking\", but it is not helping tremors. Pt is advised he may wish to seek referral to [facility] in [place], to have an even more intensive neurological investigation, as his decline is so rapid. Pt is advised to discuss this with neurology to see if he or she can given a referral for him. Pt is reviewed as to labs, as follows:  GFR 45, which is consider with prior GFR 43, and GFR 44.   Pt advised to drink plenty of

## 2023-06-06 ENCOUNTER — APPOINTMENT (OUTPATIENT)
Dept: PHYSICAL THERAPY | Age: 63
End: 2023-06-06
Payer: COMMERCIAL

## 2023-06-07 ENCOUNTER — OFFICE VISIT (OUTPATIENT)
Dept: NEUROSURGERY | Age: 63
End: 2023-06-07
Payer: COMMERCIAL

## 2023-06-07 VITALS
HEART RATE: 90 BPM | BODY MASS INDEX: 28.84 KG/M2 | DIASTOLIC BLOOD PRESSURE: 64 MMHG | TEMPERATURE: 98.4 F | OXYGEN SATURATION: 96 % | SYSTOLIC BLOOD PRESSURE: 113 MMHG | HEIGHT: 71 IN | WEIGHT: 206 LBS

## 2023-06-07 DIAGNOSIS — M54.16 LUMBAR RADICULOPATHY: Primary | ICD-10-CM

## 2023-06-07 DIAGNOSIS — R41.3 MEMORY LOSS: ICD-10-CM

## 2023-06-07 DIAGNOSIS — R25.1 TREMORS OF NERVOUS SYSTEM: ICD-10-CM

## 2023-06-07 PROCEDURE — 99204 OFFICE O/P NEW MOD 45 MIN: CPT | Performed by: NURSE PRACTITIONER

## 2023-06-07 RX ORDER — DIPHENHYDRAMINE HCL 50 MG
CAPSULE ORAL
Qty: 1 CAPSULE | Refills: 0 | Status: SHIPPED | OUTPATIENT
Start: 2023-06-07

## 2023-06-07 RX ORDER — PREDNISONE 50 MG/1
TABLET ORAL
Qty: 3 TABLET | Refills: 0 | Status: SHIPPED | OUTPATIENT
Start: 2023-06-07

## 2023-06-07 NOTE — PROGRESS NOTES
10/02/2020    NO MI, NO STENTS NO CABG- heart cath 2020 LAD proximal to mid 20% stenosis, normal LV function per cath dated 10/02/2020; followed by [facility]    Chronic obstructive pulmonary disease ([facility] [address]     per pt mild and treated with inhaler    CKD (chronic kidney disease)     stage 3 per patient    Dyspnea on exertion     only with exertion not at rest    GERD (gastroesophageal reflux disease)     taking omeprazole daily; does not sleep elevated    H/O cold sores     Hx of echocardiogram 03/2021    LVEF 55-60%    Hyperlipidemia     statin taken    Hypertension     controlled w/ med    Latex allergy     Sleep apnea     CPAP cannot tolerate cpap right now; oral device is being made as of 1/27/2023    Wide-complex tachycardia 2017    NSVT; per pt no problems since put on verapamil      Past Surgical History:   Procedure Laterality Date    BACK SURGERY  05/2021    CARDIAC CATHETERIZATION  2017    no stents     CARDIAC CATHETERIZATION  10/02/2020    Mild CAD, normal LV function    CARPAL TUNNEL RELEASE Right     COLONOSCOPY      COLONOSCOPY N/A 4/17/2023    COLORECTAL CANCER SCREENING, NOT HIGH RISK performed by Serafin Abbott MD at [place] [address] Right     NOSE SURGERY Bilateral 02/07/2023    INFERIOR TURBINATE REDUCTION performed by Gi Millan MD at [facility]    SEPTOPLASTY N/A 02/07/2023    SEPTOPLASTY performed by Gi Millan MD at [address] [place] [address] Bilateral 10/07/2020    UPPER GASTROINTESTINAL ENDOSCOPY N/A 4/17/2023    EGD BIOPSY AND DILATION performed by Serafin Abbott MD at [facility] ENDOSCOPY     Allergies   Allergen Reactions    Latex Rash    Iv Dye [Iodides] Hives and Itching      Family History   Problem Relation Age of Onset    Depression Mother     Heart Disease Father       Social History     Socioeconomic History    Marital status:      Spouse name: Not on file    Number of children: Not on file    Years of education: Not on file

## 2023-06-08 ENCOUNTER — APPOINTMENT (OUTPATIENT)
Dept: PHYSICAL THERAPY | Age: 63
End: 2023-06-08
Payer: COMMERCIAL

## 2023-06-13 ENCOUNTER — APPOINTMENT (OUTPATIENT)
Dept: PHYSICAL THERAPY | Age: 63
End: 2023-06-13
Payer: COMMERCIAL

## 2023-06-15 ENCOUNTER — APPOINTMENT (OUTPATIENT)
Dept: PHYSICAL THERAPY | Age: 63
End: 2023-06-15
Payer: COMMERCIAL

## 2023-06-16 ENCOUNTER — HOSPITAL ENCOUNTER (OUTPATIENT)
Dept: MRI IMAGING | Age: 63
Discharge: HOME | End: 2023-06-19
Payer: COMMERCIAL

## 2023-06-16 DIAGNOSIS — M54.16 LUMBAR RADICULOPATHY: ICD-10-CM

## 2023-06-16 DIAGNOSIS — R41.3 MEMORY LOSS: ICD-10-CM

## 2023-06-16 DIAGNOSIS — R25.1 TREMORS OF NERVOUS SYSTEM: ICD-10-CM

## 2023-06-16 PROCEDURE — 72158 MRI LUMBAR SPINE W/O & W/DYE: CPT

## 2023-06-16 PROCEDURE — 6360000004 HC RX CONTRAST MEDICATION: Performed by: NURSE PRACTITIONER

## 2023-06-16 PROCEDURE — 2580000003 HC RX 258: Performed by: NURSE PRACTITIONER

## 2023-06-16 PROCEDURE — A9579 GAD-BASE MR CONTRAST NOS,1ML: HCPCS | Performed by: NURSE PRACTITIONER

## 2023-06-16 RX ORDER — SODIUM CHLORIDE 0.9 % (FLUSH) 0.9 %
20 SYRINGE (ML) INJECTION AS NEEDED
Status: DISCONTINUED | OUTPATIENT
Start: 2023-06-16 | End: 2023-06-20 | Stop reason: HOSPADM

## 2023-06-16 RX ADMIN — SODIUM CHLORIDE, PRESERVATIVE FREE 20 ML: 5 INJECTION INTRAVENOUS at 07:38

## 2023-06-16 RX ADMIN — GADOTERIDOL 19 ML: 279.3 INJECTION, SOLUTION INTRAVENOUS at 07:40

## 2023-06-16 RX ADMIN — SODIUM CHLORIDE, PRESERVATIVE FREE 20 ML: 5 INJECTION INTRAVENOUS at 07:39

## 2023-07-06 ENCOUNTER — PATIENT MESSAGE (OUTPATIENT)
Dept: NEUROLOGY | Age: 63
End: 2023-07-06

## 2023-07-19 ENCOUNTER — OFFICE VISIT (OUTPATIENT)
Dept: NEUROSURGERY | Age: 63
End: 2023-07-19
Payer: COMMERCIAL

## 2023-07-19 VITALS
BODY MASS INDEX: 29.26 KG/M2 | WEIGHT: 209 LBS | DIASTOLIC BLOOD PRESSURE: 72 MMHG | HEART RATE: 84 BPM | OXYGEN SATURATION: 97 % | TEMPERATURE: 97.7 F | SYSTOLIC BLOOD PRESSURE: 131 MMHG | HEIGHT: 71 IN

## 2023-07-19 DIAGNOSIS — M54.50 CHRONIC BILATERAL LOW BACK PAIN WITHOUT SCIATICA: Primary | ICD-10-CM

## 2023-07-19 DIAGNOSIS — G89.29 CHRONIC BILATERAL LOW BACK PAIN WITHOUT SCIATICA: Primary | ICD-10-CM

## 2023-07-19 DIAGNOSIS — M54.16 LUMBAR RADICULOPATHY: ICD-10-CM

## 2023-07-19 PROCEDURE — 99214 OFFICE O/P EST MOD 30 MIN: CPT | Performed by: NURSE PRACTITIONER

## 2023-07-19 NOTE — PROGRESS NOTES
[facility] NOTE:   History of Present Illness:    CC: L spine MRI review    Dru Bustamante is a 60 y.o. male here to review his lumbar MRI. At last visit patient referral was sent to [address] neurology for the patient to be evaluated for an unknown neurologic condition that causes tremors as well as several other different symptoms. Patient stopped taking his Tegretol and since stopping this medication a large percentage of his symptoms have improved to the point where he is much more high functioning at this time. Patient is able to stay awake and complete coherent sentences and is much more engaged at this visit. Patient states he has not gotten to be seen on the area but he is reaching out to his local neurologist.  Genetic testing. The lumbar MRI reveals mild left L5-S1 neural foraminal narrowing as well as some mild right L4-5 neural foraminal narrowing as well as incidental finding of a large 6 cm cyst of the right kidney.   Patient states that he is aware of the right kidney cyst    Past Medical History:   Diagnosis Date    Arrhythmia     NSVT taking verapamil for this    Arthritis     generalized    CAD (coronary artery disease) 2017, 10/02/2020    NO MI, NO STENTS NO CABG- heart cath 2020 LAD proximal to mid 20% stenosis, normal LV function per cath dated 10/02/2020; followed by [facility]    Chronic obstructive pulmonary disease ([address])     per pt mild and treated with inhaler    CKD (chronic kidney disease)     stage 3 per patient    Dyspnea on exertion     only with exertion not at rest    GERD (gastroesophageal reflux disease)     taking omeprazole daily; does not sleep elevated    H/O cold sores     Hx of echocardiogram 03/2021    LVEF 55-60%    Hyperlipidemia     statin taken    Hypertension     controlled w/ med    Latex allergy     Sleep apnea     CPAP cannot tolerate cpap right now; oral device is being made as of 1/27/2023

## 2023-08-04 ENCOUNTER — OFFICE VISIT (OUTPATIENT)
Dept: NEUROLOGY | Age: 63
End: 2023-08-04
Payer: MEDICARE

## 2023-08-04 VITALS
DIASTOLIC BLOOD PRESSURE: 70 MMHG | HEART RATE: 92 BPM | BODY MASS INDEX: 29.01 KG/M2 | SYSTOLIC BLOOD PRESSURE: 108 MMHG | OXYGEN SATURATION: 94 % | WEIGHT: 208 LBS

## 2023-08-04 DIAGNOSIS — R26.0 ATAXIC GAIT: Primary | ICD-10-CM

## 2023-08-04 DIAGNOSIS — M79.2 NEUROPATHIC PAIN: ICD-10-CM

## 2023-08-04 DIAGNOSIS — G31.9 NEURODEGENERATIVE COGNITIVE IMPAIRMENT (HCC): ICD-10-CM

## 2023-08-04 DIAGNOSIS — G25.0 ESSENTIAL TREMOR: ICD-10-CM

## 2023-08-04 DIAGNOSIS — G56.03 BILATERAL CARPAL TUNNEL SYNDROME: ICD-10-CM

## 2023-08-04 PROCEDURE — 99215 OFFICE O/P EST HI 40 MIN: CPT | Performed by: PSYCHIATRY & NEUROLOGY

## 2023-08-04 PROCEDURE — 3017F COLORECTAL CA SCREEN DOC REV: CPT | Performed by: PSYCHIATRY & NEUROLOGY

## 2023-08-04 PROCEDURE — 96138 PSYCL/NRPSYC TECH 1ST: CPT | Performed by: PSYCHIATRY & NEUROLOGY

## 2023-08-04 PROCEDURE — G8417 CALC BMI ABV UP PARAM F/U: HCPCS | Performed by: PSYCHIATRY & NEUROLOGY

## 2023-08-04 PROCEDURE — 96132 NRPSYC TST EVAL PHYS/QHP 1ST: CPT | Performed by: PSYCHIATRY & NEUROLOGY

## 2023-08-04 PROCEDURE — 1036F TOBACCO NON-USER: CPT | Performed by: PSYCHIATRY & NEUROLOGY

## 2023-08-04 PROCEDURE — G8427 DOCREV CUR MEDS BY ELIG CLIN: HCPCS | Performed by: PSYCHIATRY & NEUROLOGY

## 2023-08-04 ASSESSMENT — ENCOUNTER SYMPTOMS
VOICE CHANGE: 1
COUGH: 0
CONSTIPATION: 1

## 2023-08-04 NOTE — PATIENT INSTRUCTIONS
We will bring you back for an EMG study to look at the nerves in your legs. You can schedule this up front. We have ordered an MRI of your brain. Radiology will contact you to schedule this. Blood work (genetic studies) to be done through [facility]. You should get a phone call from them with further instructions on how to get the lab work done.

## 2023-08-04 NOTE — PROGRESS NOTES
will also bring her back for nerve conduction studies of the lower extremities to help complete his workup. Cognitive impairment: A \"Brain Check\" cognitive assessment was independently performed, interpreted, and reviewed at the time of today's visit. He has rather significant deficits in multiple cognitive domains, though interestingly delayed verbal recall was a relative strength. I have recommended a repeat MRI of the brain to look for interval structural changes or focal patterns of atrophy. Neuropathic pain: Serum lab workup has also been unremarkable. Bilateral carpal tunnel syndrome: Sure as noted on prior nerve conduction studies. Symptoms are stable. Continue to monitor. Follow-up to be arranged. Signature: Payton Hernandez MD      Date:  8/10/2023    [address] [place] [address] Neurology   [facility], [address] [place] [address]  Ph: [phone]  Fax: [phone]         I have personally interviewed and examined Mr. Rafat Lane and I have personally reviewed all relevant records including labs and imaging as noted above. I have written all aspects of this note. More than 50% of this time was used for counseling regarding my diagnosis, prognosis, and plans for management. Procedure (BrainCheck) Time: 31 minutes was spent reviewing and interpreting the cognitive testing results, discussing the results with the patient and family, and integrating the results into the assessment plan. 18 minutes was spent administering and scoring cognitive testing by medical assistant/technician in clinic. Total visit time (including procedure): 95 minutes.

## 2023-08-14 ENCOUNTER — OFFICE VISIT (OUTPATIENT)
Dept: UROLOGY | Age: 63
End: 2023-08-14
Payer: MEDICARE

## 2023-08-14 DIAGNOSIS — N13.8 BPH WITH OBSTRUCTION/LOWER URINARY TRACT SYMPTOMS: ICD-10-CM

## 2023-08-14 DIAGNOSIS — N52.9 ERECTILE DYSFUNCTION, UNSPECIFIED ERECTILE DYSFUNCTION TYPE: ICD-10-CM

## 2023-08-14 DIAGNOSIS — N28.1 RENAL CYST: ICD-10-CM

## 2023-08-14 DIAGNOSIS — N40.1 BPH WITH OBSTRUCTION/LOWER URINARY TRACT SYMPTOMS: ICD-10-CM

## 2023-08-14 DIAGNOSIS — R33.9 INCOMPLETE EMPTYING OF BLADDER: Primary | ICD-10-CM

## 2023-08-14 LAB
BILIRUBIN, URINE, POC: NEGATIVE
BLOOD URINE, POC: NORMAL
GLUCOSE URINE, POC: NEGATIVE
KETONES, URINE, POC: NEGATIVE
LEUKOCYTE ESTERASE, URINE, POC: NEGATIVE
NITRITE, URINE, POC: NEGATIVE
PH, URINE, POC: 5.5 (ref 4.6–8)
PROTEIN,URINE, POC: NEGATIVE
SPECIFIC GRAVITY, URINE, POC: 1.03 (ref 1–1.03)
URINALYSIS CLARITY, POC: CLEAR
URINALYSIS COLOR, POC: YELLOW
UROBILINOGEN, POC: NORMAL

## 2023-08-14 PROCEDURE — G8417 CALC BMI ABV UP PARAM F/U: HCPCS | Performed by: UROLOGY

## 2023-08-14 PROCEDURE — 3017F COLORECTAL CA SCREEN DOC REV: CPT | Performed by: UROLOGY

## 2023-08-14 PROCEDURE — 1036F TOBACCO NON-USER: CPT | Performed by: UROLOGY

## 2023-08-14 PROCEDURE — 81003 URINALYSIS AUTO W/O SCOPE: CPT | Performed by: UROLOGY

## 2023-08-14 PROCEDURE — 99214 OFFICE O/P EST MOD 30 MIN: CPT | Performed by: UROLOGY

## 2023-08-14 PROCEDURE — G8427 DOCREV CUR MEDS BY ELIG CLIN: HCPCS | Performed by: UROLOGY

## 2023-08-14 ASSESSMENT — ENCOUNTER SYMPTOMS
RESPIRATORY NEGATIVE: 1
BACK PAIN: 1
EYES NEGATIVE: 1

## 2023-08-14 NOTE — PROGRESS NOTES
[facility]  [address]  [phone]    Kian Jimenez  : 1960    Chief Complaint   Patient presents with    Follow-up     Incomplete emptying of bladder        HPI     Tomas Lopez is a 60 y.o. male with hx of LUTS, urinary urgency, renal stones. He has nocturia x 4, frequency, incomplete emptying. Went to ER with dysphagia had CT. \ CT wo contrast on 3-8-23: IMPRESSION:  1. No acute findings. 2.  Small sliding hiatal hernia. 3.  Small stone in each kidney. 4.  6 cm right renal cyst.  He has passed stones in the past.   Takes sildenafil for ED. .has CAD by heart cath, no MI. Walks with a cane due to back and neck issues. PSA 1.9 in . BETH showed   Was started on Cialis 5 mg daily in . Urinary issues are better. Still has nocturia x 3-4. ED is better. He is concerned about the right renal cyst. Has been having constant pain in the right flank since 3-23. He thinks that the cyst may be causing the pain. Pain has no relation to activity or eating.    Past Medical History:   Diagnosis Date    Arrhythmia     NSVT taking verapamil for this    Arthritis     generalized    CAD (coronary artery disease) , 10/02/2020    NO MI, NO STENTS NO CABG- heart cath  LAD proximal to mid 20% stenosis, normal LV function per cath dated 10/02/2020; followed by [facility]    Chronic obstructive pulmonary disease ([address])     per pt mild and treated with inhaler    CKD (chronic kidney disease)     stage 3 per patient    Dyspnea on exertion     only with exertion not at rest    GERD (gastroesophageal reflux disease)     taking omeprazole daily; does not sleep elevated    H/O cold sores     Hx of echocardiogram 2021    LVEF 55-60%    Hyperlipidemia     statin taken    Hypertension     controlled w/ med    Latex allergy     Sleep apnea     CPAP cannot tolerate cpap right now; oral device is being made as of

## 2023-08-27 NOTE — PROGRESS NOTES
normal LV function per cath dated 10/02/2020; followed by [facility]    Chronic obstructive pulmonary disease ([address])     per pt mild and treated with inhaler    CKD (chronic kidney disease)     stage 3 per patient    Dyspnea on exertion     only with exertion not at rest    GERD (gastroesophageal reflux disease)     taking omeprazole daily; does not sleep elevated    H/O cold sores     Hx of echocardiogram 03/2021    LVEF 55-60%    Hyperlipidemia     statin taken    Hypertension     controlled w/ med    Latex allergy     Sleep apnea     CPAP cannot tolerate cpap right now; oral device is being made as of 1/27/2023    Wide-complex tachycardia 2017    NSVT; per pt no problems since put on verapamil        Tobacco Use      Smoking status: Never      Smokeless tobacco: Never    Allergies   Allergen Reactions    Latex Rash    Iv Dye [Iodides] Hives and Itching     Current Outpatient Medications   Medication Instructions    acetaminophen (TYLENOL) 1,000 mg, Oral, EVERY 6 HOURS PRN    albuterol sulfate HFA (VENTOLIN HFA) 108 (90 Base) MCG/ACT inhaler 2 puffs, Inhalation, 4 TIMES DAILY PRN    aspirin 81 mg, Oral, DAILY    atorvastatin (LIPITOR) 40 mg, Oral, Nightly    losartan-hydroCHLOROthiazide (HYZAAR) 100-25 MG per tablet TAKE 1 TABLET BY MOUTH ONCE DAILY    LYSINE PO 50 mg, Oral, PRN    omeprazole (PRILOSEC) 40 mg, Oral, DAILY BEFORE BREAKFAST    tadalafil (CIALIS) 5 mg, Oral, DAILY    tiotropium (SPIRIVA RESPIMAT) 2.5 MCG/ACT AERS inhaler 2 puffs, Inhalation, DAILY RESP    verapamil (CALAN SR) 240 mg, Oral, DAILY

## 2023-08-28 ENCOUNTER — OFFICE VISIT (OUTPATIENT)
Dept: PULMONOLOGY | Age: 63
End: 2023-08-28
Payer: MEDICARE

## 2023-08-28 VITALS
TEMPERATURE: 97.2 F | HEIGHT: 71 IN | OXYGEN SATURATION: 95 % | SYSTOLIC BLOOD PRESSURE: 122 MMHG | DIASTOLIC BLOOD PRESSURE: 70 MMHG | HEART RATE: 76 BPM | WEIGHT: 208.1 LBS | RESPIRATION RATE: 19 BRPM | BODY MASS INDEX: 29.13 KG/M2

## 2023-08-28 DIAGNOSIS — G47.33 OSA (OBSTRUCTIVE SLEEP APNEA): ICD-10-CM

## 2023-08-28 DIAGNOSIS — J44.9 COPD, MILD (HCC): Primary | ICD-10-CM

## 2023-08-28 DIAGNOSIS — R06.09 DYSPNEA ON EXERTION: ICD-10-CM

## 2023-08-28 PROCEDURE — 3023F SPIROM DOC REV: CPT | Performed by: NURSE PRACTITIONER

## 2023-08-28 PROCEDURE — 3017F COLORECTAL CA SCREEN DOC REV: CPT | Performed by: NURSE PRACTITIONER

## 2023-08-28 PROCEDURE — 99214 OFFICE O/P EST MOD 30 MIN: CPT | Performed by: NURSE PRACTITIONER

## 2023-08-28 PROCEDURE — 1036F TOBACCO NON-USER: CPT | Performed by: NURSE PRACTITIONER

## 2023-08-28 PROCEDURE — G8427 DOCREV CUR MEDS BY ELIG CLIN: HCPCS | Performed by: NURSE PRACTITIONER

## 2023-08-28 PROCEDURE — G8417 CALC BMI ABV UP PARAM F/U: HCPCS | Performed by: NURSE PRACTITIONER

## 2023-08-28 NOTE — PATIENT INSTRUCTIONS
I have referred you to pulmonary rehab    Phone:  [address]  [address], [address] / [place], [address]

## 2023-08-29 ENCOUNTER — HOSPITAL ENCOUNTER (OUTPATIENT)
Dept: MRI IMAGING | Age: 63
Discharge: HOME | End: 2023-09-01
Attending: PSYCHIATRY & NEUROLOGY

## 2023-08-29 DIAGNOSIS — G31.9 NEURODEGENERATIVE COGNITIVE IMPAIRMENT (HCC): ICD-10-CM

## 2023-08-30 DIAGNOSIS — Z13.228 SCREENING FOR PHENYLKETONURIA (PKU): ICD-10-CM

## 2023-08-30 DIAGNOSIS — E03.9 MYXEDEMA HEART DISEASE: ICD-10-CM

## 2023-08-30 DIAGNOSIS — E03.8 TOXIC DIFFUSE GOITER WITH PRETIBIAL MYXEDEMA: ICD-10-CM

## 2023-08-30 DIAGNOSIS — R79.9 ABNORMAL BLOOD CHEMISTRY: ICD-10-CM

## 2023-08-30 DIAGNOSIS — R79.9 ABNORMAL BLOOD CHEMISTRY: Primary | ICD-10-CM

## 2023-08-30 DIAGNOSIS — R53.83 FATIGUE, UNSPECIFIED TYPE: ICD-10-CM

## 2023-08-30 DIAGNOSIS — E05.00 TOXIC DIFFUSE GOITER WITH PRETIBIAL MYXEDEMA: ICD-10-CM

## 2023-08-30 DIAGNOSIS — I51.9 MYXEDEMA HEART DISEASE: ICD-10-CM

## 2023-08-30 LAB
ALBUMIN SERPL-MCNC: 3.8 G/DL (ref 3.2–4.6)
ALBUMIN/GLOB SERPL: 1.2 (ref 0.4–1.6)
ALP SERPL-CCNC: 62 U/L (ref 50–136)
ALT SERPL-CCNC: 27 U/L (ref 12–65)
ANION GAP SERPL CALC-SCNC: 7 MMOL/L (ref 2–11)
AST SERPL-CCNC: 21 U/L (ref 15–37)
BASOPHILS # BLD: 0.1 K/UL (ref 0–0.2)
BASOPHILS NFR BLD: 1 % (ref 0–2)
BILIRUB SERPL-MCNC: 0.9 MG/DL (ref 0.2–1.1)
BUN SERPL-MCNC: 16 MG/DL (ref 8–23)
CALCIUM SERPL-MCNC: 9.3 MG/DL (ref 8.3–10.4)
CHLORIDE SERPL-SCNC: 110 MMOL/L (ref 101–110)
CHOLEST SERPL-MCNC: 95 MG/DL
CO2 SERPL-SCNC: 27 MMOL/L (ref 21–32)
CREAT SERPL-MCNC: 1.7 MG/DL (ref 0.8–1.5)
DIFFERENTIAL METHOD BLD: ABNORMAL
EOSINOPHIL # BLD: 0.2 K/UL (ref 0–0.8)
EOSINOPHIL NFR BLD: 3 % (ref 0.5–7.8)
ERYTHROCYTE [DISTWIDTH] IN BLOOD BY AUTOMATED COUNT: 13.2 % (ref 11.9–14.6)
GLOBULIN SER CALC-MCNC: 3.2 G/DL (ref 2.8–4.5)
GLUCOSE SERPL-MCNC: 91 MG/DL (ref 65–100)
HCT VFR BLD AUTO: 41.5 % (ref 41.1–50.3)
HDLC SERPL-MCNC: 36 MG/DL (ref 40–60)
HDLC SERPL: 2.6
HGB BLD-MCNC: 13.5 G/DL (ref 13.6–17.2)
IMM GRANULOCYTES # BLD AUTO: 0.1 K/UL (ref 0–0.5)
IMM GRANULOCYTES NFR BLD AUTO: 1 % (ref 0–5)
LDLC SERPL CALC-MCNC: 44.2 MG/DL
LYMPHOCYTES # BLD: 1 K/UL (ref 0.5–4.6)
LYMPHOCYTES NFR BLD: 13 % (ref 13–44)
MCH RBC QN AUTO: 28.7 PG (ref 26.1–32.9)
MCHC RBC AUTO-ENTMCNC: 32.5 G/DL (ref 31.4–35)
MCV RBC AUTO: 88.1 FL (ref 82–102)
MONOCYTES # BLD: 0.8 K/UL (ref 0.1–1.3)
MONOCYTES NFR BLD: 10 % (ref 4–12)
NEUTS SEG # BLD: 5.4 K/UL (ref 1.7–8.2)
NEUTS SEG NFR BLD: 72 % (ref 43–78)
NRBC # BLD: 0 K/UL (ref 0–0.2)
PLATELET # BLD AUTO: 168 K/UL (ref 150–450)
PMV BLD AUTO: 10.9 FL (ref 9.4–12.3)
POTASSIUM SERPL-SCNC: 4 MMOL/L (ref 3.5–5.1)
PROT SERPL-MCNC: 7 G/DL (ref 6.3–8.2)
RBC # BLD AUTO: 4.71 M/UL (ref 4.23–5.6)
SODIUM SERPL-SCNC: 144 MMOL/L (ref 133–143)
T4 FREE SERPL-MCNC: 1 NG/DL (ref 0.78–1.46)
TRIGL SERPL-MCNC: 74 MG/DL (ref 35–150)
TSH, 3RD GENERATION: 1.47 UIU/ML (ref 0.36–3.74)
VLDLC SERPL CALC-MCNC: 14.8 MG/DL (ref 6–23)
WBC # BLD AUTO: 7.5 K/UL (ref 4.3–11.1)

## 2023-09-14 ENCOUNTER — OFFICE VISIT (OUTPATIENT)
Dept: PRIMARY CARE CLINIC | Facility: CLINIC | Age: 63
End: 2023-09-14
Payer: MEDICARE

## 2023-09-14 VITALS
SYSTOLIC BLOOD PRESSURE: 127 MMHG | HEART RATE: 86 BPM | DIASTOLIC BLOOD PRESSURE: 75 MMHG | BODY MASS INDEX: 30.35 KG/M2 | WEIGHT: 217.6 LBS | TEMPERATURE: 97.4 F

## 2023-09-14 DIAGNOSIS — K21.00 GASTROESOPHAGEAL REFLUX DISEASE WITH ESOPHAGITIS WITHOUT HEMORRHAGE: ICD-10-CM

## 2023-09-14 DIAGNOSIS — J44.9 CHRONIC OBSTRUCTIVE PULMONARY DISEASE, UNSPECIFIED COPD TYPE (HCC): ICD-10-CM

## 2023-09-14 DIAGNOSIS — K76.89 LIVER CYST: ICD-10-CM

## 2023-09-14 DIAGNOSIS — N18.31 STAGE 3A CHRONIC KIDNEY DISEASE (HCC): Chronic | ICD-10-CM

## 2023-09-14 DIAGNOSIS — B35.1 FUNGAL INFECTION OF TOENAIL: Primary | ICD-10-CM

## 2023-09-14 PROCEDURE — G8417 CALC BMI ABV UP PARAM F/U: HCPCS | Performed by: FAMILY MEDICINE

## 2023-09-14 PROCEDURE — 1036F TOBACCO NON-USER: CPT | Performed by: FAMILY MEDICINE

## 2023-09-14 PROCEDURE — 3017F COLORECTAL CA SCREEN DOC REV: CPT | Performed by: FAMILY MEDICINE

## 2023-09-14 PROCEDURE — 99214 OFFICE O/P EST MOD 30 MIN: CPT | Performed by: FAMILY MEDICINE

## 2023-09-14 PROCEDURE — G8427 DOCREV CUR MEDS BY ELIG CLIN: HCPCS | Performed by: FAMILY MEDICINE

## 2023-09-14 PROCEDURE — 3023F SPIROM DOC REV: CPT | Performed by: FAMILY MEDICINE

## 2023-09-14 RX ORDER — OMEPRAZOLE 40 MG/1
40 CAPSULE, DELAYED RELEASE ORAL
Qty: 90 CAPSULE | Refills: 3 | Status: SHIPPED | OUTPATIENT
Start: 2023-09-14

## 2023-09-14 RX ORDER — ALBUTEROL SULFATE 90 UG/1
2 AEROSOL, METERED RESPIRATORY (INHALATION) 4 TIMES DAILY PRN
Qty: 54 G | Refills: 1 | Status: SHIPPED | OUTPATIENT
Start: 2023-09-14

## 2023-09-14 ASSESSMENT — PATIENT HEALTH QUESTIONNAIRE - PHQ9
SUM OF ALL RESPONSES TO PHQ QUESTIONS 1-9: 0
2. FEELING DOWN, DEPRESSED OR HOPELESS: 0
SUM OF ALL RESPONSES TO PHQ9 QUESTIONS 1 & 2: 0
1. LITTLE INTEREST OR PLEASURE IN DOING THINGS: 0
SUM OF ALL RESPONSES TO PHQ QUESTIONS 1-9: 0

## 2023-09-14 ASSESSMENT — VISUAL ACUITY
OS_CC: 20/30
OD_CC: 20/30

## 2023-09-14 ASSESSMENT — LIFESTYLE VARIABLES: HOW MANY STANDARD DRINKS CONTAINING ALCOHOL DO YOU HAVE ON A TYPICAL DAY: PATIENT DOES NOT DRINK

## 2023-09-14 NOTE — PROGRESS NOTES
Kristian Teague MD  [address] Hilario Cabral, [address] [facility]  Ph No:  [phone]  Fax:  [phone]:  Chief Complaint   Patient presents with    Follow-up     Pt in for a follow up. Pt says patient had some kind of scan with his cardiologist and it show a cyst on his liver and has questions about it. Medicare AWV     Pt in for wellness. Nail Problem     Pt has a bad toe nail on RT big toe checked. HISTORY OF PRESENT ILLNESS:  Mr. Mouna Hagen is a 60 y.o. male    Pt presents for medicare wellness visit. Pt presents for follow up visit. Pt says he had some type of scan with his cardiologist which as an incidental finding revealed as small cyst on his liver. Pt is advised that liver cysts are not uncommon, and are generally benign. Pt is advised a liver ultrasound can be done later if he wants to characterize the cyst further. Pt also reports a much larger renal cyst, which pt says he will soon have drained by physician. Pt also reports he  has a bad toenail with fungus on the right great toe. Pt reports prior injury to site and he would like to have this treated. Pt is advised regarding lamisil medication treatment, but is further advised he will be best served by seeing a podiatrist to help develop foot care and for this toenail. Pt is referred. Pt is advised regarding vinegar and water soaks that help some to soften toenail and help cure fungus over time. Pt is reviewed on the following labs: (8/30/23):  Potassium 4.0 normal  GFR 45 CKD stage 3a, 45. Pt advised to be sure to hydrate well and avoid NSAIDS. Fasting glucose 91 - non diabetic  Total cholesterol 95, hdl 36, ldl 44.2, ratio of cardiac risk 2.6. Liver enzymes normal alt 27, ast 21  Hgb 13.5 normal, MCV 88.1 normal  Liver enzymes normal alt 27, ast 21    Medications given for refills as follows: Omeprazole 40mg po daily for acid reflux, pt describes as pervasive.   Albuterol

## 2023-09-15 ENCOUNTER — HOSPITAL ENCOUNTER (OUTPATIENT)
Dept: CT IMAGING | Age: 63
End: 2023-09-15
Attending: RADIOLOGY
Payer: MEDICARE

## 2023-09-15 VITALS
RESPIRATION RATE: 12 BRPM | TEMPERATURE: 97.7 F | BODY MASS INDEX: 29.4 KG/M2 | DIASTOLIC BLOOD PRESSURE: 69 MMHG | HEIGHT: 71 IN | HEART RATE: 69 BPM | SYSTOLIC BLOOD PRESSURE: 114 MMHG | WEIGHT: 210 LBS | OXYGEN SATURATION: 98 %

## 2023-09-15 DIAGNOSIS — N28.1 RENAL CYST: ICD-10-CM

## 2023-09-15 PROBLEM — K76.89 LIVER CYST: Status: ACTIVE | Noted: 2023-09-15

## 2023-09-15 PROBLEM — B35.1 FUNGAL INFECTION OF TOENAIL: Status: ACTIVE | Noted: 2023-09-15

## 2023-09-15 PROCEDURE — 2500000003 HC RX 250 WO HCPCS: Performed by: RADIOLOGY

## 2023-09-15 PROCEDURE — 10009 FNA BX W/CT GDN 1ST LES: CPT

## 2023-09-15 PROCEDURE — 6360000002 HC RX W HCPCS: Performed by: RADIOLOGY

## 2023-09-15 RX ORDER — MIDAZOLAM HYDROCHLORIDE 2 MG/2ML
INJECTION, SOLUTION INTRAMUSCULAR; INTRAVENOUS PRN
Status: COMPLETED | OUTPATIENT
Start: 2023-09-15 | End: 2023-09-15

## 2023-09-15 RX ORDER — LIDOCAINE HYDROCHLORIDE 20 MG/ML
INJECTION, SOLUTION INFILTRATION; PERINEURAL PRN
Status: COMPLETED | OUTPATIENT
Start: 2023-09-15 | End: 2023-09-15

## 2023-09-15 RX ORDER — FENTANYL CITRATE 50 UG/ML
INJECTION, SOLUTION INTRAMUSCULAR; INTRAVENOUS PRN
Status: COMPLETED | OUTPATIENT
Start: 2023-09-15 | End: 2023-09-15

## 2023-09-15 RX ADMIN — MIDAZOLAM HYDROCHLORIDE 1 MG: 1 INJECTION, SOLUTION INTRAMUSCULAR; INTRAVENOUS at 08:23

## 2023-09-15 RX ADMIN — LIDOCAINE HYDROCHLORIDE 5 ML: 20 INJECTION, SOLUTION INFILTRATION; PERINEURAL at 08:28

## 2023-09-15 RX ADMIN — FENTANYL CITRATE 50 MCG: 50 INJECTION, SOLUTION INTRAMUSCULAR; INTRAVENOUS at 08:23

## 2023-09-15 ASSESSMENT — PAIN - FUNCTIONAL ASSESSMENT: PAIN_FUNCTIONAL_ASSESSMENT: 0-10

## 2023-09-15 NOTE — H&P
Department of Interventional Radiology  [phone]    History and Physical    Patient:  Shey Olson MRN:  [id number]  SSN:  [id number]    YOB: 1960  Age:  60 y.o. Sex:  male      Primary Care Provider:  Allie Hicks MD  Referring Physician:  Alfredo Guillen MD    Subjective:     Chief Complaint: Renal cyst    History of the Present Illness: The patient is a 60 y.o. male who presents for a CT-guided renal cyst aspiration under moderate sedation. Patient cannot undergo sclerosis of the cyst as he has an iodine allergy    Patient was referred to our department by his urologist Dr. Eleanor Thomason. He has a hx of LUTS, urinary urgency, nocturia with incomplete emptying, and renal stones. Frequent pain in the right flank since 3-23. CT abdomen/pelvis 3/8/23 shows a 6 cm right renal cyst.    Patient is n.p.o. today denies taking any blood thinners.     Past Medical History:   Diagnosis Date    Arrhythmia     NSVT taking verapamil for this    Arthritis     generalized    CAD (coronary artery disease) 2017, 10/02/2020    NO MI, NO STENTS NO CABG- heart cath 2020 LAD proximal to mid 20% stenosis, normal LV function per cath dated 10/02/2020; followed by [facility]    Chronic obstructive pulmonary disease ([address])     per pt mild and treated with inhaler    CKD (chronic kidney disease)     stage 3 per patient    Dyspnea on exertion     only with exertion not at rest    GERD (gastroesophageal reflux disease)     taking omeprazole daily; does not sleep elevated    H/O cold sores     Hx of echocardiogram 03/2021    LVEF 55-60%    Hyperlipidemia     statin taken    Hypertension     controlled w/ med    Latex allergy     Sleep apnea     CPAP cannot tolerate cpap right now; oral device is being made as of 1/27/2023    Wide-complex tachycardia 2017    NSVT; per pt no problems since put on verapamil     Past Surgical History:   Procedure Laterality Date    BACK SURGERY APTT 28.0 09/21/2020 08:37 AM    INR 1.0 09/21/2020 08:37 AM       Assessment:     Pleasant 79-year-old male with right-sided flank pain since March of this year. During patient's work-up for his pain CT scan abdomen and pelvis obtained on March 8 revealed a 6 cm right renal cyst.  It is felt that this may be the cause of his pain. Plan:     Planned Procedure: CT-guided right renal cyst aspiration under moderate sedation    Risks, benefits, and alternatives reviewed with patient and he agrees to proceed with the procedure.       Signed By: TONY Bruno     September 15, 2023

## 2023-09-15 NOTE — PROGRESS NOTES
TRANSFER - OUT REPORT:    Verbal report given to RICKIE Gates on Angi Doshi Garretts being transferred to IR 3 for routine progression of patient care       Report consisted of patient's Situation, Background, Assessment and   Recommendations(SBAR). Information from the following report(s) Nurse Handoff Report was reviewed with the receiving nurse. Opportunity for questions and clarification was provided.       Patient transported with:   Registered Nurse

## 2023-09-28 ENCOUNTER — OFFICE VISIT (OUTPATIENT)
Dept: ENT CLINIC | Age: 63
End: 2023-09-28

## 2023-09-28 VITALS — WEIGHT: 208 LBS | BODY MASS INDEX: 29.12 KG/M2 | HEIGHT: 71 IN | RESPIRATION RATE: 17 BRPM

## 2023-09-28 DIAGNOSIS — H93.293 ABNORMAL AUDITORY PERCEPTION OF BOTH EARS: Primary | ICD-10-CM

## 2023-09-28 DIAGNOSIS — H90.3 HEARING LOSS SENSORY, BILATERAL: ICD-10-CM

## 2023-09-28 RX ORDER — LOSARTAN POTASSIUM 100 MG/1
TABLET ORAL
COMMUNITY
Start: 2023-08-11

## 2023-10-12 ENCOUNTER — NURSE ONLY (OUTPATIENT)
Dept: PULMONOLOGY | Age: 63
End: 2023-10-12
Payer: MEDICARE

## 2023-10-12 DIAGNOSIS — J44.9 COPD, MILD (HCC): Primary | ICD-10-CM

## 2023-10-12 LAB
FEF25-27, POC: 1.66 L/S
FET, POC: NORMAL
FEV 1 , POC: 2.73 L
FEV1/FVC, POC: 63
FVC, POC: 4.31
LUNG AGE, POC: NORMAL
PEF, POC: 4.19 L/S

## 2023-10-12 PROCEDURE — 94010 BREATHING CAPACITY TEST: CPT | Performed by: INTERNAL MEDICINE

## 2023-10-12 PROCEDURE — 94729 DIFFUSING CAPACITY: CPT | Performed by: INTERNAL MEDICINE

## 2023-10-12 PROCEDURE — 94726 PLETHYSMOGRAPHY LUNG VOLUMES: CPT | Performed by: INTERNAL MEDICINE

## 2023-10-12 ASSESSMENT — PULMONARY FUNCTION TESTS
FVC_POC: 4.31
FEV1/FVC_POC: 63

## 2023-10-12 NOTE — RESULT ENCOUNTER NOTE
Please let patient know the breathing tests are consistent with mild COPD. There is also an element of muscle weakness. Would like him to go see a neurologist to rule out any diaphragm muscle weakness contributing to his shortness of breath. I do think that participating in an exercise program will help his shortness of breath as well. Please make referral to neurology at [facility] for muscle weakness. Explained to the patient that it will likely be 4 to 6 months before he is seen.

## 2023-10-15 ENCOUNTER — CLINICAL DOCUMENTATION (OUTPATIENT)
Dept: NEUROLOGY | Age: 63
End: 2023-10-15

## 2023-11-01 ENCOUNTER — PATIENT MESSAGE (OUTPATIENT)
Dept: NEUROLOGY | Age: 63
End: 2023-11-01

## 2023-11-02 NOTE — TELEPHONE ENCOUNTER
Mic Zaleski, [place] 11/1/2023 12:35 PM EDT      ----- Message -----  From: Rogelio Hudson \"Leroy\"  Sent: 11/1/2023 12:19 PM EDT  To: *  Subject: Muscle weakness     A recent breathing test revealed muscle weakness. Dr. Navas Roland me to see neurologist to rule out diaphragm muscle weakness that could be contributing to my shortness of breath. Is this something you can do for me?  Thanks 7

## 2023-11-29 ENCOUNTER — PROCEDURE VISIT (OUTPATIENT)
Dept: NEUROLOGY | Age: 63
End: 2023-11-29
Payer: MEDICARE

## 2023-11-29 DIAGNOSIS — R29.898 WEAKNESS OF LEFT LOWER EXTREMITY: ICD-10-CM

## 2023-11-29 DIAGNOSIS — M54.32 SCIATICA, LEFT SIDE: ICD-10-CM

## 2023-11-29 DIAGNOSIS — G62.9 NEUROPATHY: ICD-10-CM

## 2023-11-29 DIAGNOSIS — R20.2 PARESTHESIA OF BOTH FEET: Primary | ICD-10-CM

## 2023-11-29 DIAGNOSIS — R26.9 GAIT DISTURBANCE: ICD-10-CM

## 2023-11-29 PROCEDURE — 95911 NRV CNDJ TEST 9-10 STUDIES: CPT | Performed by: PSYCHIATRY & NEUROLOGY

## 2023-11-29 PROCEDURE — 95885 MUSC TST DONE W/NERV TST LIM: CPT | Performed by: PSYCHIATRY & NEUROLOGY

## 2023-11-29 NOTE — PROGRESS NOTES
EMG/Nerve Conduction Study Procedure Note  [address] [place] [address], [address] [place] [address] Rd,[address]   [phone]      Hx:    Exam:     58 y.o.  M W  male  for EMG lowers. .. hedy note gait d/o w prev LEFT L5/S1 and left S1 derm complaints. Also hx CTS bilat and is R CTR . Heri Lanza No babinski. No atrophy. Ref[de-identified]  Dr Barron Cortesphil  Ashlee[de-identified]  Aniceto Arango[de-identified]  0'81\"          Summary               needle EMG lowers and CV. Controlled environmental factors / EMG lab. Temperature. NCV : sensory segments:    Normal sural SCV bilaterally == except attenuated left SNAP. NCV plantar  sensory segments:     Markedly Abnormal ==  ABSENT/no response of bilateral plnatar both medial and lateral SCV Segments. NCV Motor MCV segments:     Normal bilateral peroneal and tibial MCV except mild attenuation asymmetry of the left CMAP amplitudes. F-wave studies:         Normal bilateral peroneal and tibial F-waves. H-REFLEX Studies:    Abnormal ==  prolonged left H-reflex. .  normal right H-reflex. NEEDLE EMG:   Tested muscles[de-identified]    TA MG VL mm bilaterally = abnormal at the Left TA and MG = TA 1+giants DA/RIP and also left MG which is more severe w DA pattern. Normal other lower mm. INTERPRETATION:    ELECTROPHYSIOLOGIC EVIDENCE OF LEFT LOWER EXTREMITY DENERVATION IS OLD WITH DENERVATION SIGNALS AT THE LEFT S1 AND L5, MORE PREDOMINANT IN THE LEFT S1 WITHOUT ANY ACTIVE DENERVATION. NO ACTIVE AXONAL DENERVATION THERE APPEARS ALSO DISTAL LIKELY AT LEAST A SENSORY NEUROPATHY PLANTAR NEUROPATHY. NO OTHER EVIDENCE FOR NEUROPATHY OF THE NECK HOWEVER. NO MYOPATHY MYOTONIA OR FASCICULATIONS. CONCLUSION:      Compatible features with old recovered left S1 greater than L5 radiculopathy, inactive. There is also evidence for a sensory distal symmetric neuropathy. Procedure Details:       Radiculopathy and neuropathy correlated with above. Covered in the chronic changes. Active denervation.     There also is

## 2023-12-01 ENCOUNTER — OFFICE VISIT (OUTPATIENT)
Dept: NEUROLOGY | Age: 63
End: 2023-12-01

## 2023-12-01 VITALS
DIASTOLIC BLOOD PRESSURE: 68 MMHG | HEART RATE: 80 BPM | OXYGEN SATURATION: 96 % | WEIGHT: 208 LBS | BODY MASS INDEX: 29.01 KG/M2 | SYSTOLIC BLOOD PRESSURE: 110 MMHG

## 2023-12-01 DIAGNOSIS — G25.0 ESSENTIAL TREMOR: Primary | ICD-10-CM

## 2023-12-01 DIAGNOSIS — R53.1 GENERALIZED WEAKNESS: ICD-10-CM

## 2023-12-01 DIAGNOSIS — G56.03 BILATERAL CARPAL TUNNEL SYNDROME: ICD-10-CM

## 2023-12-01 DIAGNOSIS — G31.84 MILD COGNITIVE IMPAIRMENT: ICD-10-CM

## 2023-12-01 DIAGNOSIS — M79.2 NEUROPATHIC PAIN: ICD-10-CM

## 2023-12-01 DIAGNOSIS — R26.89 ABNORMALITY OF GAIT DUE TO IMPAIRMENT OF BALANCE: ICD-10-CM

## 2023-12-01 LAB
CK SERPL-CCNC: 115 U/L (ref 21–215)
ERYTHROCYTE [SEDIMENTATION RATE] IN BLOOD: 2 MM/HR

## 2023-12-01 ASSESSMENT — PATIENT HEALTH QUESTIONNAIRE - PHQ9
SUM OF ALL RESPONSES TO PHQ QUESTIONS 1-9: 0
SUM OF ALL RESPONSES TO PHQ9 QUESTIONS 1 & 2: 0
SUM OF ALL RESPONSES TO PHQ QUESTIONS 1-9: 0
2. FEELING DOWN, DEPRESSED OR HOPELESS: 0
1. LITTLE INTEREST OR PLEASURE IN DOING THINGS: 0

## 2023-12-01 ASSESSMENT — ENCOUNTER SYMPTOMS
VOICE CHANGE: 1
COUGH: 0
CONSTIPATION: 1

## 2023-12-01 NOTE — PROGRESS NOTES
Leonila Saunders  [address], [address], [address]  Phone: [phone] Fax [phone]  Larissa Swain MD    Patient: Jessica Finn  Provider: Larissa Swain MD    CC:   Chief Complaint   Patient presents with    Follow-up     6 month follow up, EMG/NCV Results     Referring Provider:    History of Present Illness:     Jessica Finn is a 58 y.o. RH male who presents for follow-up of Parkinson's disease. He is unaccompanied by her visit. He was last seen August 2023. He presents for follow-up and continued management of a suspected primary parkinsonian syndrome highlighted by hand tremors, progressive generalized stiffness and gait disturbance. His father had PD in his late 76s. Current medications include (but not limited to):  N/A     Previous medication trials include:  Sinemet, topiramate     Patient presents today for follow-up. There was previously concern for features of parkinsonism with history of progressive generalized stiffness and gait changes, particularly in light of his family history. However trials of levodopa were not felt to be effective and have since been discontinued. Follow-up studies including a DaTscan and a Syn-One skin biopsy were negative. The studies have been reviewed. Continues to have tremor of the hands which is more noticeable as a postural and action. So we did proceed with trials of topiramate for possible essential tremor but this was ultimately poorly tolerated so it has been discontinued. He continues to have a progressive gait disturbance with a tendency to drag the left lower extremity and it is largely felt this is a result of his chronic lumbar spine pathology. He does walk with the assistance of a cane but he is able to walk unassisted. He has not had any recent falls. We had pursued genetic testing for cerebellar ataxia which was unremarkable. Formal cognitive testing has also been done and reviewed.     There has been

## 2023-12-07 LAB — MUSK AB SER IA-ACNC: <1 U/ML

## 2023-12-11 LAB
ACHR AB SER-SCNC: <0.03 NMOL/L (ref 0–0.24)
ACHR BLOCK AB SER-ACNC: 18 % (ref 0–25)
ACHR MOD AB/ACHR TOTAL SFR SER: NORMAL %
ACHR MODULATING AB: 17 % (ref 0–45)

## 2024-01-30 ENCOUNTER — OFFICE VISIT (OUTPATIENT)
Dept: ENT CLINIC | Age: 64
End: 2024-01-30
Payer: MEDICARE

## 2024-01-30 ENCOUNTER — OFFICE VISIT (OUTPATIENT)
Dept: AUDIOLOGY | Age: 64
End: 2024-01-30
Payer: MEDICARE

## 2024-01-30 VITALS — WEIGHT: 206 LBS | HEIGHT: 71 IN | BODY MASS INDEX: 28.84 KG/M2

## 2024-01-30 DIAGNOSIS — H90.3 SENSORINEURAL HEARING LOSS (SNHL) OF BOTH EARS: Primary | Chronic | ICD-10-CM

## 2024-01-30 DIAGNOSIS — H90.3 SENSORINEURAL HEARING LOSS, BILATERAL: Primary | ICD-10-CM

## 2024-01-30 DIAGNOSIS — J34.89 NASAL SEPTAL PERFORATION: Chronic | ICD-10-CM

## 2024-01-30 PROCEDURE — 92557 COMPREHENSIVE HEARING TEST: CPT | Performed by: AUDIOLOGIST

## 2024-01-30 PROCEDURE — 1036F TOBACCO NON-USER: CPT | Performed by: PHYSICIAN ASSISTANT

## 2024-01-30 PROCEDURE — G8417 CALC BMI ABV UP PARAM F/U: HCPCS | Performed by: PHYSICIAN ASSISTANT

## 2024-01-30 PROCEDURE — 3017F COLORECTAL CA SCREEN DOC REV: CPT | Performed by: PHYSICIAN ASSISTANT

## 2024-01-30 PROCEDURE — 99214 OFFICE O/P EST MOD 30 MIN: CPT | Performed by: PHYSICIAN ASSISTANT

## 2024-01-30 PROCEDURE — G8427 DOCREV CUR MEDS BY ELIG CLIN: HCPCS | Performed by: PHYSICIAN ASSISTANT

## 2024-01-30 PROCEDURE — G8484 FLU IMMUNIZE NO ADMIN: HCPCS | Performed by: PHYSICIAN ASSISTANT

## 2024-01-30 ASSESSMENT — ENCOUNTER SYMPTOMS
GASTROINTESTINAL NEGATIVE: 1
ALLERGIC/IMMUNOLOGIC NEGATIVE: 1
RESPIRATORY NEGATIVE: 1
EYES NEGATIVE: 1

## 2024-01-30 NOTE — PROGRESS NOTES
unobstructed, tonsils are  + 1  , no lesion or inflammation.     Neck/Thyroid: Normal appearance without mass, Trachea midline, No lymphadenopathy, No enlargement, tenderness or mass of thyroid noted.      Procedure: N/A    Assessment/Plan:  1. Sensorineural hearing loss (SNHL) of both ears  2. Nasal septal perforation    Patient has a moderately severe hearing loss in both ears.  No complaint of tinnitus.  Receiving back annually and recommend binaural amplification.        Return in about 1 year (around 1/30/2025) for with Audio.    Patient agrees with this plan.        TONY Meyers    This note was generated using voice recognition software, please excuse any typos.

## 2024-01-30 NOTE — PROGRESS NOTES
AUDIOLOGY EVALUATION    Mal Jimenez had Audiometry performed today.    The patient reports hearing loss.     Results as follows:    Audiometry    Test Performed - Comprehensive Audiogram    Type of Loss - Right Ear: abnormal hearing: degree of loss is normal to moderately severe sensorineural hearing loss                           Left Ear: abnormal hearing: degree of loss is normal to moderately severe sensorineural hearing loss     SRT   Measurement Right Ear Left Ear   Value 20 20   Unit dB dB     Discrimination  Measurement Right Ear Left Ear   Value 88% 84%   Unit dB dB     Recommend  Binaural amplification and annual audios    Lynn Ruiz [facility]  Audiologist

## 2024-02-01 ENCOUNTER — PROCEDURE VISIT (OUTPATIENT)
Dept: NEUROLOGY | Age: 64
End: 2024-02-01

## 2024-02-01 DIAGNOSIS — G56.22 CUBITAL TUNNEL SYNDROME ON LEFT: ICD-10-CM

## 2024-02-01 DIAGNOSIS — R20.2 NUMBNESS AND TINGLING IN LEFT HAND: ICD-10-CM

## 2024-02-01 DIAGNOSIS — G56.02 CARPAL TUNNEL SYNDROME OF LEFT WRIST: Primary | ICD-10-CM

## 2024-02-01 DIAGNOSIS — R20.0 NUMBNESS AND TINGLING IN LEFT HAND: ICD-10-CM

## 2024-02-01 NOTE — PROGRESS NOTES
EVIDENCE OF AXONAL DENERVATION OF THE LEFT ULNAR NERVE.              CONCLUSION:      Compatible with significant moderately severe left carpal tunnel syndrome.  This is worsened since the previous testing.  There is also mild early cubital tunnel syndrome.      Procedure Details:       Correlates with moderately severe left carpal tunnel syndrome.  Mild early left cubital tunnel syndrome.    Patient made fully aware.              Please Note::     Data and waveforms * filed under Procedure category [facility].    See Procedure Files for complete data pages.       [ *NOTE:  parts or all of this consultation are produced using artificial voice recognition software.  Some speech errors are inherent in such software and may be included in the produced record. ]           Marlon Purdy MD  [facility]ative  [place] [address]  Phone:  [phone]  Fax:   [phone]          + + +   Glossary:   MUP: motor unit potential;  SNAP: sensory nerve action potential; Fibs:  Fibrillations;  Fascic: fasciculations; IA: insertional activity;  IP: interference pattern;  SCV :  Sensory conduction velocity;  MCV: motor conduction velocity; NOTE:: muscles are abbreviated latin initials.     Nicolet raw datafile::   * filed at Procedure or Media Files. *

## 2024-02-27 ENCOUNTER — OFFICE VISIT (OUTPATIENT)
Dept: ORTHOPEDIC SURGERY | Age: 64
End: 2024-02-27

## 2024-02-27 DIAGNOSIS — G56.02 LEFT CARPAL TUNNEL SYNDROME: ICD-10-CM

## 2024-02-27 DIAGNOSIS — M18.12 ARTHRITIS OF CARPOMETACARPAL (CMC) JOINT OF LEFT THUMB: Primary | ICD-10-CM

## 2024-02-27 DIAGNOSIS — M65.332 TRIGGER FINGER, LEFT MIDDLE FINGER: ICD-10-CM

## 2024-02-27 RX ORDER — BETAMETHASONE SODIUM PHOSPHATE AND BETAMETHASONE ACETATE 3; 3 MG/ML; MG/ML
6 INJECTION, SUSPENSION INTRA-ARTICULAR; INTRALESIONAL; INTRAMUSCULAR; SOFT TISSUE ONCE
Status: COMPLETED | OUTPATIENT
Start: 2024-02-27 | End: 2024-02-27

## 2024-02-27 RX ADMIN — BETAMETHASONE SODIUM PHOSPHATE AND BETAMETHASONE ACETATE 6 MG: 3; 3 INJECTION, SUSPENSION INTRA-ARTICULAR; INTRALESIONAL; INTRAMUSCULAR; SOFT TISSUE at 11:33

## 2024-02-28 ENCOUNTER — OFFICE VISIT (OUTPATIENT)
Age: 64
End: 2024-02-28

## 2024-02-28 VITALS
BODY MASS INDEX: 28.42 KG/M2 | HEART RATE: 80 BPM | DIASTOLIC BLOOD PRESSURE: 60 MMHG | WEIGHT: 203 LBS | HEIGHT: 71 IN | SYSTOLIC BLOOD PRESSURE: 102 MMHG

## 2024-02-28 DIAGNOSIS — N28.1 RENAL CYST, RIGHT: Primary | ICD-10-CM

## 2024-02-28 DIAGNOSIS — R10.9 ACUTE RIGHT FLANK PAIN: ICD-10-CM

## 2024-02-28 PROCEDURE — 3017F COLORECTAL CA SCREEN DOC REV: CPT | Performed by: RADIOLOGY

## 2024-02-28 PROCEDURE — 1036F TOBACCO NON-USER: CPT | Performed by: RADIOLOGY

## 2024-02-28 NOTE — PATIENT INSTRUCTIONS
Your cardiac MRI shows a 4 cm right renal cyst.  Our IR  will contact you by next week to schedule you for your renal cyst aspiration with sclerosis.

## 2024-02-28 NOTE — PROGRESS NOTES
INITIAL ALLERGY/IMMUNOLOGY CONSULTATION    Due to the current COVID-19 (SARS-Cov 2) global pandemic, in an effort to minimize our patients' and the community's exposure, the standard in-office visit was converted to a formal virtual video visit.    I verified that I was speaking with Todd Bolden's mother from home and consent was obtained to conduct a formal virtual video visit.    This visit was performed via live interactive two-way video.    Clinician Location: [facility] [address]   Patient Location: Home  Provider requesting consultation:  Ute Larry MD    Chief Complaint: food allergies     PCP note(s) were reviewed.  Please see HPI for chart review summary.    HISTORY OF PRESENT ILLNESS:     Todd is a 2 year old male who presents for an initial visit to evaluate for a possible food allergy causing loose stools.   He has never had a solid stool per Mom. He stools 2-3 times a day. He can stool up to 6 times about twice a week. He is peeing in the toilet but stools in the diaper often because either it is so liquid or urgent that he can hold it.     At around [age] year of age, when he started to have diary Mom would notice that he had diarrhea with blistering diaper rashes and rashes on his skin (back, belly and face). Mom has noted this rash with diary. They have tried to avoid diary since that time d/t loose stools.     He has had loose stools with eggs as well.     He occasionally complains of abdominal pain but Mom states that it is not always associated with meals.     No issues with bloating.     Rhinitis history:  No symptoms.     Respiratory history:  Has been diagnosed with asthma in the past? No  Has ever use albuterol in the past? No    Food allergies:  Tolerates?   Wheat: yes  Soy: unknown   Egg: see above  Baked egg: see above  Milk: see above  Baked milk:see above  Peanut: yes  Tree nuts: yes  Fish: yes  Shellfish: has not tried yet  Has epinephrine autoinjector?:   [facility]  [address] [facility] [address] [place] [id number]                            Progress Office Note    Mal Jimenez   1960   02/28/24   Referring Provider: Harlan Hazel MD     Subjective:     Chief Complaint   Patient presents with    Other     Follow up s/p CT guided Percutaneous Drainage and Sclerotherapy of Right renal cyst 12/15/23.      History of Present Illness:  Patient is known to our department as he has previously had 2 renal cyst aspirations.  He was initially referred to our department by Dr. Capone.  Has a prior history of LUTS, urinary urgency, nocturia with incomplete emptying, and renal stones. Frequent pain in the right flank since 3-2023. CT abdomen/pelvis 3/8/23 shows a 6 cm right renal cyst.   He underwent cyst aspiration initially on 9- and noticed an improvement in his pain but it did not completely resolve.      He returned again on 12- for CT-guided cyst aspiration and sclerotherapy using doxycycline as patient has an iodine allergy and alcohol is not currently available for sclerosis. His cyst measured 5.1 cm at that visit.  Patient states that his pain resolved for about a month after the procedure and then slowly returned in the right flank area.  It is intermittent in nature.  He underwent a cardiac MRI on 1-9-2024 which showed a 4 cm right sided cyst in similar location.  Patient is interested in having potential aspiration again due to recurrence of his pain.  He does have chronic back issues with a prior L5-S1 discectomy and occasionally gets steroid injections for his back pain.  He however feels that typically his back pain is more midline and that this pain is in the right lateral flank region and tends to get better with cyst aspiration.  He denies any colic like pain or hematuria.    Objective:     Allergies:    Allergies   Allergen Reactions    Latex Rash     Other reaction(s): Rash-Allergy    Iv Dye  no    Atopic dermatitis:  none    Alex Syndrome:  He needs to take Benadryl after mosquito bites d/t large reactions. If he gets bite near his eye, the eye will tend to swell shut. Mom uses Benadryl cream too.     PAST MEDICAL HISTORY   There is no problem list on file for this patient.     Surgeries:  Inguinal hernia repair around 1 yrs of age    ALLERGIES:  Dairy digestive   (food or med)    Current Outpatient Medications   Medication Sig Dispense Refill   • Melatonin 1 MG Tab        No current facility-administered medications for this visit.      FAMILY HISTORY   --Asthma: mother  --Allergic Rhinitis: parents, older brother   --Atopic Dermatitis: none  --Food allergy: none  --No history of celiac  --Mom with diverticulitis and currently being worked up for ulcerative colitis     SOCIAL / ENVIRONMENTAL HISTORY   --No smoke exposure    REVIEW OF SYSTEMS  --Constitutional - Denies fever, chills, sweats, or weight change  --Heent / Eyes / Respiratory - has been reviewed in HPI.  --Cardiovascular: No chest pain, palpitations or other cardiac complaints noted  --Gastrointestinal: No diarrhea, constipation, abdominal pain or other complaints noted  --Genitourinary: No urinary pain, bleeding and voiding problems   --Musculoskeletal: No pain, swelling, weakness or limitation of motion  --Neurologic: No syncope, seizures, paralysis, involuntary movements or gait problems  --Psychiatric: No impaired sleep, anxiety, depression and other psychiatric problems  --Hematologic/Lymphatic/Immunologic: No hematological and lymphatic problems  --Endocrine: No thyroid and diabetic problems     --Integumentary: has been reviewed in HPI       PHYSICAL EXAM:  Physical Exam   Constitutional: He appears well-developed and well-nourished. No distress.   HENT:   Head: Atraumatic.   Nose: No nasal discharge.   Eyes: Conjunctivae are normal.   Pulmonary/Chest: Effort normal. No respiratory distress.   Neurological: He is alert.     TESTING  RESULTS  n/a    ASSESSMENT and PLAN  Diarrhea  Summary: Shady has been having loose stools since infancy. He has never had a formed stool per Mom. He has 2-3 BM/day on average but has up to 6 BM/day about twice a week. Mom is concerned that he may be allergic to cow's milk or eggs as she has noticed worsening diarrhea with both foods and has noted a rash developing after diary consumption. Mom states that with good egg and cow's milk avoidance, Shady still has loose stools.    Mom denies any bloating. He has never had an abdominal xr. Has never had any bowel resections. He has an uncomplicated inguinal hernia repair at 1 yrs of age. Of note, Mom is being worked up for ulcerative colitis. Shady has never had visible blood in his stool.     Plan:  · Labs: common food IgE panel, celiac panel   · Strictly avoid cows milk and egg  · Always have two Epipen Jr. 0.15 mg  with you at all times, if you use one call 911 to be taken to the emergency room  · Referral to pediatric GI      Counseled patient and/or family on food allergies.     Follow up: TBD based off lab results     Thank you for allowing me to share in the care of Todd. Please feel free to contact me at [phone] if you have any questions or concerns.    Electronically signed by: Milton Victor MD  7/20/2020

## 2024-02-29 DIAGNOSIS — N28.1 RENAL CYST, RIGHT: Primary | ICD-10-CM

## 2024-02-29 DIAGNOSIS — R10.9 ACUTE RIGHT FLANK PAIN: ICD-10-CM

## 2024-03-01 DIAGNOSIS — M65.332 TRIGGER FINGER, LEFT MIDDLE FINGER: ICD-10-CM

## 2024-03-01 DIAGNOSIS — G56.02 CARPAL TUNNEL SYNDROME ON LEFT: ICD-10-CM

## 2024-03-01 DIAGNOSIS — M65.332 ACQUIRED TRIGGER FINGER OF LEFT MIDDLE FINGER: ICD-10-CM

## 2024-03-01 DIAGNOSIS — G56.02 LEFT CARPAL TUNNEL SYNDROME: Primary | ICD-10-CM

## 2024-03-12 ENCOUNTER — HOSPITAL ENCOUNTER (OUTPATIENT)
Dept: CT IMAGING | Age: 64
Discharge: HOME | End: 2024-03-15
Attending: RADIOLOGY
Payer: MEDICARE

## 2024-03-12 VITALS
DIASTOLIC BLOOD PRESSURE: 68 MMHG | RESPIRATION RATE: 18 BRPM | OXYGEN SATURATION: 94 % | HEIGHT: 71 IN | SYSTOLIC BLOOD PRESSURE: 105 MMHG | HEART RATE: 54 BPM | BODY MASS INDEX: 27.72 KG/M2 | TEMPERATURE: 97.6 F | WEIGHT: 198 LBS

## 2024-03-12 DIAGNOSIS — R10.9 ACUTE RIGHT FLANK PAIN: ICD-10-CM

## 2024-03-12 DIAGNOSIS — N28.1 RENAL CYST, RIGHT: ICD-10-CM

## 2024-03-12 PROCEDURE — 77012 CT SCAN FOR NEEDLE BIOPSY: CPT

## 2024-03-12 PROCEDURE — C1729 CATH, DRAINAGE: HCPCS

## 2024-03-12 PROCEDURE — 6360000002 HC RX W HCPCS: Performed by: RADIOLOGY

## 2024-03-12 PROCEDURE — 2500000003 HC RX 250 WO HCPCS: Performed by: RADIOLOGY

## 2024-03-12 RX ORDER — SODIUM CHLORIDE 9 MG/ML
INJECTION, SOLUTION INTRAVENOUS CONTINUOUS
Status: DISCONTINUED | OUTPATIENT
Start: 2024-03-12 | End: 2024-03-16 | Stop reason: HOSPADM

## 2024-03-12 RX ORDER — SODIUM TETRADECYL SULFATE 30 MG/ML
INJECTION, SOLUTION INTRAVENOUS PRN
Status: COMPLETED | OUTPATIENT
Start: 2024-03-12 | End: 2024-03-12

## 2024-03-12 RX ORDER — FENTANYL CITRATE 50 UG/ML
INJECTION, SOLUTION INTRAMUSCULAR; INTRAVENOUS PRN
Status: COMPLETED | OUTPATIENT
Start: 2024-03-12 | End: 2024-03-12

## 2024-03-12 RX ORDER — LIDOCAINE HYDROCHLORIDE 20 MG/ML
INJECTION, SOLUTION INFILTRATION; PERINEURAL PRN
Status: COMPLETED | OUTPATIENT
Start: 2024-03-12 | End: 2024-03-12

## 2024-03-12 RX ORDER — MIDAZOLAM HYDROCHLORIDE 2 MG/2ML
INJECTION, SOLUTION INTRAMUSCULAR; INTRAVENOUS PRN
Status: COMPLETED | OUTPATIENT
Start: 2024-03-12 | End: 2024-03-12

## 2024-03-12 RX ORDER — DIPHENHYDRAMINE HYDROCHLORIDE 50 MG/ML
INJECTION INTRAMUSCULAR; INTRAVENOUS PRN
Status: COMPLETED | OUTPATIENT
Start: 2024-03-12 | End: 2024-03-12

## 2024-03-12 RX ORDER — OXYCODONE HYDROCHLORIDE 10 MG/1
10 TABLET ORAL EVERY 4 HOURS PRN
Status: DISCONTINUED | OUTPATIENT
Start: 2024-03-12 | End: 2024-03-12

## 2024-03-12 RX ORDER — OXYCODONE HYDROCHLORIDE 5 MG/1
5 TABLET ORAL EVERY 4 HOURS PRN
Status: DISCONTINUED | OUTPATIENT
Start: 2024-03-12 | End: 2024-03-12

## 2024-03-12 RX ORDER — ONDANSETRON 2 MG/ML
4 INJECTION INTRAMUSCULAR; INTRAVENOUS EVERY 8 HOURS PRN
Status: DISCONTINUED | OUTPATIENT
Start: 2024-03-12 | End: 2024-03-12

## 2024-03-12 RX ADMIN — FENTANYL CITRATE 25 MCG: 50 INJECTION, SOLUTION INTRAMUSCULAR; INTRAVENOUS at 09:58

## 2024-03-12 RX ADMIN — TETRADECYL HYDROGEN SULFATE (ESTER) 4 ML: 30 INJECTION, SOLUTION INTRAVENOUS at 10:07

## 2024-03-12 RX ADMIN — LIDOCAINE HYDROCHLORIDE 5 ML: 20 INJECTION, SOLUTION INFILTRATION; PERINEURAL at 09:58

## 2024-03-12 RX ADMIN — DIPHENHYDRAMINE HYDROCHLORIDE 50 MG: 50 INJECTION, SOLUTION INTRAMUSCULAR; INTRAVENOUS at 09:49

## 2024-03-12 RX ADMIN — FENTANYL CITRATE 50 MCG: 50 INJECTION, SOLUTION INTRAMUSCULAR; INTRAVENOUS at 09:49

## 2024-03-12 RX ADMIN — MIDAZOLAM HYDROCHLORIDE 1 MG: 1 INJECTION, SOLUTION INTRAMUSCULAR; INTRAVENOUS at 09:49

## 2024-03-12 ASSESSMENT — PAIN DESCRIPTION - LOCATION
LOCATION: BACK
LOCATION: BACK

## 2024-03-12 ASSESSMENT — PAIN SCALES - GENERAL
PAINLEVEL_OUTOF10: 0
PAINLEVEL_OUTOF10: 0
PAINLEVEL_OUTOF10: 4
PAINLEVEL_OUTOF10: 4

## 2024-03-12 NOTE — OR NURSING
TRANSFER - OUT REPORT:           Verbal report given to RICKIE Daniels(name) on Mal Jimenez  being transferred to IR Recovery 3(unit) for  routine post-op            Report consisted of patient’s Situation, Background, Assessment and      Recommendations(SBAR).          Information from the following report(s) SBAR, Procedure Summary, and MAR was reviewed with the receiving nurse.       Opportunity for questions and clarification was provided.          Conscious Sedation:    75 Mcg of Fentanyl administered   1 Mg of Versed administered   50 Mg of Benadryl administered        Pt tolerated procedure well.      Peripheral Intravenous Line:   Peripheral IV 03/12/24 Proximal;Right;Anterior Forearm (Active)       VITALS:  /62   Pulse 63   Temp 97.6 °F (36.4 °C) (Infrared)   Resp 14   Ht 1.803 m (5' 10.98\")   Wt 89.8 kg (198 lb)   SpO2 94%   BMI 27.63 kg/m²

## 2024-03-12 NOTE — DISCHARGE INSTRUCTIONS
If you have any questions about your procedure, please call the Interventional Radiology department at [phone].      After business hours (5pm) and weekends, call the answering service at [phone] and ask for the Radiologist on call to be paged.            Si tiene Preguntas acerca del procedimiento, por favor llame al departamento de Radiología Intervencional al [phone].      Después de horas de oficina (5 pm) y los fines de semana, llamar al servicio de llamadas al [phone] y pregunte por el Radiologo de nestor.

## 2024-03-12 NOTE — OR NURSING
Recovery period without difficulty. Pt alert and oriented and denies pain. Dressing is clean, dry, and intact. Reviewed discharge instructions with patient and spouse, both verbalized understanding. Pt escorted to lobby discharge area via wheelchair. Vital signs and Jack score completed.

## 2024-03-12 NOTE — PRE SEDATION
sulfate HFA (VENTOLIN HFA) 108 (90 Base) MCG/ACT inhaler Inhale 2 puffs into the lungs 4 times daily as needed for Wheezing 9/14/23   Abel Ott Jr., MD   tadalafil (CIALIS) 5 MG tablet Take 1 tablet by mouth daily 5/5/23   Saw Capone Jr., MD   aspirin 81 MG EC tablet Take 1 tablet by mouth daily    ProviderDavid MD   losartan-hydroCHLOROthiazide (HYZAAR) 100-25 MG per tablet TAKE 1 TABLET BY MOUTH ONCE DAILY 7/7/22   Provider, MD David   LYSINE PO Take 50 mg by mouth as needed    Automatic Reconciliation, Ar   acetaminophen (TYLENOL) 500 MG tablet Take 2 tablets by mouth every 6 hours as needed 11/11/20   Automatic Reconciliation, Ar   atorvastatin (LIPITOR) 40 MG tablet Take 1 tablet by mouth at bedtime 10/22/19   Automatic Reconciliation, Ar   verapamil (CALAN SR) 240 MG extended release tablet Take 1 tablet by mouth daily    Automatic Reconciliation, Ar        Pre-Sedation Documentation and Exam:   I have personally completed a history, physical exam & review of systems for this patient (see notes).  Vital signs have been reviewed (see flow sheet for vitals).    Mallampati Airway Assessment:  normal, dentition not prohibitive, Mallampati Class II - (soft palate, fauces & uvula are visible)    Prior History of Anesthesia Complications:   none    ASA Classification:  Class 2 - A normal healthy patient with mild systemic disease    Sedation/ Anesthesia Plan:   intravenous sedation    Medications Planned:   midazolam (Versed) intravenously and fentanyl intravenously    Patient is an appropriate candidate for plan of sedation: yes    Electronically signed by TONY Lozada on 3/12/2024 at 8:55 AM

## 2024-03-12 NOTE — H&P
[facility]  Department of Interventional Radiology  [phone]    History and Physical    Patient:  Mal Jimenez MRN:  [id number]  SSN:  [id number]    YOB: 1960  Age:  63 y.o.  Sex:  male      Primary Care Provider:  Abel Ott Jr., MD  Referring Physician:  Harlan Hazel MD    Subjective:     Chief Complaint: right renal cyst    History of the Present Illness:  The patient is a 63 y.o. male who presents for CT-guided right renal cyst aspiration and sclerosis with doxycycline.      Patient is known to our department as he has previously had 2 renal cyst aspirations.  He was initially referred to our department by Dr. Capone.  Has a prior history of LUTS, urinary urgency, nocturia with incomplete emptying, and renal stones. Frequent pain in the right flank since 3-2023. CT abdomen/pelvis 3/8/23 shows a 6 cm right renal cyst.   He underwent cyst aspiration initially on 9- and noticed an improvement in his pain but it did not completely resolve.       He returned again on 12- for CT-guided cyst aspiration and sclerotherapy using doxycycline as patient has an iodine allergy and alcohol is not currently available for sclerosis. His cyst measured 5.1 cm at that visit.  Patient states that his pain resolved for about a month after the procedure and then slowly returned in the right flank area.  It is intermittent in nature.  He underwent a cardiac MRI on 1-9-2024 which showed a 4 cm right sided cyst in similar location.   He denies any colic like pain or hematuria.    Patient is n.p.o. today and denies taking any blood thinners.    Past Medical History:   Diagnosis Date    Arrhythmia     NSVT taking verapamil for this    Arthritis     generalized    CAD (coronary artery disease) 2017, 10/02/2020    NO MI, NO STENTS NO CABG- heart cath 2020 LAD proximal to mid 20% stenosis, normal LV function per cath dated 10/02/2020; followed by

## 2024-03-19 NOTE — PROGRESS NOTES
Patient verified name and     Order for consent WAS NOT found in EHR and matches case posting; patient verified.     Type 1A surgery, PHONE assessment complete.    Labs per surgeon: NONE  Labs per anesthesia protocol: NONE  EKG: IN CARE EVERYWHERE AS NEEDED        Hospital approved surgical skin cleanser and instructions given per hospital policy.    Patient provided with and instructed on educational handouts including Guide to Surgery, Pain Management, Hand Hygiene, Blood Transfusion Education, and [facility] Brochure.    Patient answered medical/surgical history questions at their best of ability. All prior to admission medications documented in [facility]. Original medication prescription bottle WAS NOT visualized during patient appointment.     Patient instructed to hold all vitamins 7 days prior to surgery and NSAIDS 5 days prior to surgery, patient verbalized understanding.     Patient teach back successful and patient demonstrates knowledge of instructions.    Have make OV to see VSP or NP

## 2024-03-19 NOTE — PROGRESS NOTES
PLEASE CONTINUE TAKING ALL PRESCRIPTION MEDICATIONS UP TO THE DAY OF SURGERY UNLESS OTHERWISE DIRECTED BELOW. You may take Tylenol, allergy,  and/or indigestion medications.     TAKE ONLY THESE MEDICATIONS ON THE DAY OF SURGERY    Asa 81 mg, omeprazole, calan and bring albuterol inhaler           DISCONTINUE all vitamins and supplements 7 days prior to surgery. DISCONTINUE Non-Steroidal Anti-Inflammatory (NSAIDS) such as Advil and Aleve 5 days prior to surgery.     Home Medications to Hold- please continue all other medications except these.    Cialis x 5 days        Comments      On the day before surgery please take 2 Tylenol in the morning and then again before bed. You may use either regular or extra strength.           Please do not bring home medications with you on the day of surgery unless otherwise directed by your nurse.  If you are instructed to bring home medications, please give them to your nurse as they will be administered by the nursing staff.    If you have any questions, please call [facility] [phone].    A copy of this note was provided to the patient for reference.

## 2024-03-21 DIAGNOSIS — G56.02 LEFT CARPAL TUNNEL SYNDROME: Primary | ICD-10-CM

## 2024-03-21 DIAGNOSIS — M65.332 TRIGGER FINGER, LEFT MIDDLE FINGER: ICD-10-CM

## 2024-03-22 ENCOUNTER — ANESTHESIA EVENT (OUTPATIENT)
Dept: SURGERY | Age: 64
End: 2024-03-22
Payer: MEDICARE

## 2024-03-22 NOTE — PERIOP NOTE
Preop department called to notify patient of arrival time for scheduled procedure. Instructions given to   - Arrive at [facility]PC Entrance [address].  - Remain NPO after midnight, unless otherwise indicated, including gum, mints, and ice chips.   - Have a responsible adult to drive patient to the hospital, stay during surgery, and patient will need supervision 24 hours after anesthesia.   - Use antibacterial soap in shower the night before surgery and on the morning of surgery.       Was patient contacted: yes  Voicemail left:   Numbers contacted: [phone]   Arrival time: 0630

## 2024-03-25 ENCOUNTER — HOSPITAL ENCOUNTER (OUTPATIENT)
Age: 64
Setting detail: OUTPATIENT SURGERY
Discharge: HOME | End: 2024-03-25
Attending: ORTHOPAEDIC SURGERY | Admitting: ORTHOPAEDIC SURGERY
Payer: MEDICARE

## 2024-03-25 ENCOUNTER — ANESTHESIA (OUTPATIENT)
Dept: SURGERY | Age: 64
End: 2024-03-25
Payer: MEDICARE

## 2024-03-25 VITALS
OXYGEN SATURATION: 92 % | BODY MASS INDEX: 28 KG/M2 | WEIGHT: 200 LBS | HEIGHT: 71 IN | HEART RATE: 70 BPM | RESPIRATION RATE: 17 BRPM | TEMPERATURE: 97.1 F | SYSTOLIC BLOOD PRESSURE: 121 MMHG | DIASTOLIC BLOOD PRESSURE: 64 MMHG

## 2024-03-25 LAB — POTASSIUM BLD-SCNC: 3.3 MMOL/L (ref 3.5–5.1)

## 2024-03-25 PROCEDURE — 2500000003 HC RX 250 WO HCPCS: Performed by: ORTHOPAEDIC SURGERY

## 2024-03-25 PROCEDURE — 3600000002 HC SURGERY LEVEL 2 BASE: Performed by: ORTHOPAEDIC SURGERY

## 2024-03-25 PROCEDURE — 2709999900 HC NON-CHARGEABLE SUPPLY: Performed by: ORTHOPAEDIC SURGERY

## 2024-03-25 PROCEDURE — 84132 ASSAY OF SERUM POTASSIUM: CPT

## 2024-03-25 PROCEDURE — 7100000010 HC PHASE II RECOVERY - FIRST 15 MIN: Performed by: ORTHOPAEDIC SURGERY

## 2024-03-25 PROCEDURE — 26055 INCISE FINGER TENDON SHEATH: CPT | Performed by: ORTHOPAEDIC SURGERY

## 2024-03-25 PROCEDURE — 7100000000 HC PACU RECOVERY - FIRST 15 MIN: Performed by: ORTHOPAEDIC SURGERY

## 2024-03-25 PROCEDURE — 6360000002 HC RX W HCPCS: Performed by: NURSE ANESTHETIST, CERTIFIED REGISTERED

## 2024-03-25 PROCEDURE — 2580000003 HC RX 258: Performed by: ANESTHESIOLOGY

## 2024-03-25 PROCEDURE — 6360000002 HC RX W HCPCS: Performed by: NURSE PRACTITIONER

## 2024-03-25 PROCEDURE — 76998 US GUIDE INTRAOP: CPT | Performed by: ORTHOPAEDIC SURGERY

## 2024-03-25 PROCEDURE — 3700000000 HC ANESTHESIA ATTENDED CARE: Performed by: ORTHOPAEDIC SURGERY

## 2024-03-25 PROCEDURE — 2720000010 HC SURG SUPPLY STERILE: Performed by: ORTHOPAEDIC SURGERY

## 2024-03-25 PROCEDURE — 3700000001 HC ADD 15 MINUTES (ANESTHESIA): Performed by: ORTHOPAEDIC SURGERY

## 2024-03-25 PROCEDURE — 7100000001 HC PACU RECOVERY - ADDTL 15 MIN: Performed by: ORTHOPAEDIC SURGERY

## 2024-03-25 PROCEDURE — 3600000012 HC SURGERY LEVEL 2 ADDTL 15MIN: Performed by: ORTHOPAEDIC SURGERY

## 2024-03-25 PROCEDURE — 6360000002 HC RX W HCPCS: Performed by: ORTHOPAEDIC SURGERY

## 2024-03-25 PROCEDURE — 64721 CARPAL TUNNEL SURGERY: CPT | Performed by: ORTHOPAEDIC SURGERY

## 2024-03-25 RX ORDER — MIDAZOLAM HYDROCHLORIDE 2 MG/2ML
2 INJECTION, SOLUTION INTRAMUSCULAR; INTRAVENOUS
Status: DISCONTINUED | OUTPATIENT
Start: 2024-03-25 | End: 2024-03-25 | Stop reason: HOSPADM

## 2024-03-25 RX ORDER — FENTANYL CITRATE 50 UG/ML
50 INJECTION, SOLUTION INTRAMUSCULAR; INTRAVENOUS PRN
Status: DISCONTINUED | OUTPATIENT
Start: 2024-03-25 | End: 2024-03-25 | Stop reason: HOSPADM

## 2024-03-25 RX ORDER — NALOXONE HYDROCHLORIDE 0.4 MG/ML
INJECTION, SOLUTION INTRAMUSCULAR; INTRAVENOUS; SUBCUTANEOUS PRN
Status: DISCONTINUED | OUTPATIENT
Start: 2024-03-25 | End: 2024-03-25 | Stop reason: HOSPADM

## 2024-03-25 RX ORDER — HYDROMORPHONE HYDROCHLORIDE 2 MG/ML
0.5 INJECTION, SOLUTION INTRAMUSCULAR; INTRAVENOUS; SUBCUTANEOUS EVERY 5 MIN PRN
Status: DISCONTINUED | OUTPATIENT
Start: 2024-03-25 | End: 2024-03-25 | Stop reason: HOSPADM

## 2024-03-25 RX ORDER — MELOXICAM 15 MG/1
15 TABLET ORAL DAILY
Qty: 21 TABLET | Refills: 0 | Status: SHIPPED | OUTPATIENT
Start: 2024-03-25 | End: 2024-04-15

## 2024-03-25 RX ORDER — BUPIVACAINE HYDROCHLORIDE 2.5 MG/ML
INJECTION, SOLUTION EPIDURAL; INFILTRATION; INTRACAUDAL PRN
Status: DISCONTINUED | OUTPATIENT
Start: 2024-03-25 | End: 2024-03-25 | Stop reason: HOSPADM

## 2024-03-25 RX ORDER — ONDANSETRON 2 MG/ML
4 INJECTION INTRAMUSCULAR; INTRAVENOUS
Status: DISCONTINUED | OUTPATIENT
Start: 2024-03-25 | End: 2024-03-25 | Stop reason: HOSPADM

## 2024-03-25 RX ORDER — LIDOCAINE HYDROCHLORIDE 10 MG/ML
1 INJECTION, SOLUTION INFILTRATION; PERINEURAL
Status: DISCONTINUED | OUTPATIENT
Start: 2024-03-25 | End: 2024-03-25 | Stop reason: HOSPADM

## 2024-03-25 RX ORDER — LIDOCAINE HYDROCHLORIDE 10 MG/ML
INJECTION, SOLUTION INFILTRATION; PERINEURAL PRN
Status: DISCONTINUED | OUTPATIENT
Start: 2024-03-25 | End: 2024-03-25 | Stop reason: HOSPADM

## 2024-03-25 RX ORDER — PROPOFOL 10 MG/ML
INJECTION, EMULSION INTRAVENOUS PRN
Status: DISCONTINUED | OUTPATIENT
Start: 2024-03-25 | End: 2024-03-25 | Stop reason: SDUPTHER

## 2024-03-25 RX ORDER — SODIUM CHLORIDE, SODIUM LACTATE, POTASSIUM CHLORIDE, CALCIUM CHLORIDE 600; 310; 30; 20 MG/100ML; MG/100ML; MG/100ML; MG/100ML
INJECTION, SOLUTION INTRAVENOUS CONTINUOUS
Status: DISCONTINUED | OUTPATIENT
Start: 2024-03-25 | End: 2024-03-25 | Stop reason: HOSPADM

## 2024-03-25 RX ORDER — SODIUM CHLORIDE 0.9 % (FLUSH) 0.9 %
5-40 SYRINGE (ML) INJECTION EVERY 12 HOURS SCHEDULED
Status: DISCONTINUED | OUTPATIENT
Start: 2024-03-25 | End: 2024-03-25 | Stop reason: HOSPADM

## 2024-03-25 RX ORDER — SODIUM CHLORIDE 0.9 % (FLUSH) 0.9 %
5-40 SYRINGE (ML) INJECTION PRN
Status: DISCONTINUED | OUTPATIENT
Start: 2024-03-25 | End: 2024-03-25 | Stop reason: HOSPADM

## 2024-03-25 RX ORDER — FENTANYL CITRATE 50 UG/ML
100 INJECTION, SOLUTION INTRAMUSCULAR; INTRAVENOUS PRN
Status: DISCONTINUED | OUTPATIENT
Start: 2024-03-25 | End: 2024-03-25 | Stop reason: HOSPADM

## 2024-03-25 RX ORDER — TRAMADOL HYDROCHLORIDE 50 MG/1
50 TABLET ORAL EVERY 6 HOURS PRN
Qty: 20 TABLET | Refills: 0 | Status: SHIPPED | OUTPATIENT
Start: 2024-03-25 | End: 2024-03-30

## 2024-03-25 RX ORDER — OXYCODONE HYDROCHLORIDE 5 MG/1
5 TABLET ORAL
Status: DISCONTINUED | OUTPATIENT
Start: 2024-03-25 | End: 2024-03-25 | Stop reason: HOSPADM

## 2024-03-25 RX ORDER — SODIUM CHLORIDE 9 MG/ML
INJECTION, SOLUTION INTRAVENOUS PRN
Status: DISCONTINUED | OUTPATIENT
Start: 2024-03-25 | End: 2024-03-25 | Stop reason: HOSPADM

## 2024-03-25 RX ADMIN — Medication 2000 MG: at 08:14

## 2024-03-25 RX ADMIN — SODIUM CHLORIDE, POTASSIUM CHLORIDE, SODIUM LACTATE AND CALCIUM CHLORIDE: 600; 310; 30; 20 INJECTION, SOLUTION INTRAVENOUS at 07:10

## 2024-03-25 RX ADMIN — PROPOFOL 160 MCG/KG/MIN: 10 INJECTION, EMULSION INTRAVENOUS at 08:13

## 2024-03-25 RX ADMIN — PROPOFOL 50 MG: 10 INJECTION, EMULSION INTRAVENOUS at 08:12

## 2024-03-25 ASSESSMENT — COPD QUESTIONNAIRES: CAT_SEVERITY: MILD

## 2024-03-25 NOTE — ANESTHESIA PRE PROCEDURE
Medication Dose Route Frequency Provider Last Rate Last Admin    ceFAZolin (ANCEF) 2000 mg in sterile water 20 mL IV syringe  2,000 mg IntraVENous On Call to OR Atiya Monge M, APRN - CNP        sodium chloride flush 0.9 % injection 5-40 mL  5-40 mL IntraVENous 2 times per day Monge Atiya M, APRN - CNP        sodium chloride flush 0.9 % injection 5-40 mL  5-40 mL IntraVENous PRN Monge Atiya M, APRN - CNP        0.9 % sodium chloride infusion   IntraVENous PRN MongeMonsee M, APRN - CNP        lidocaine 1 % injection 1 mL  1 mL IntraDERmal Once PRN Chad Rae Jr., MD        fentaNYL (SUBLIMAZE) injection 100 mcg  100 mcg IntraVENous PRN Chad Rae Jr., MD        Or    fentaNYL (SUBLIMAZE) injection 50 mcg  50 mcg IntraVENous PRN Chad Rae Jr., MD        lactated ringers IV soln infusion   IntraVENous Continuous Chad Rae Jr.,  mL/hr at 03/25/24 0710 New Bag at 03/25/24 0710    midazolam PF (VERSED) injection 2 mg  2 mg IntraVENous Once PRN Chad Rae Jr., MD           Allergies:    Allergies   Allergen Reactions    Latex Rash     Other reaction(s): Rash-Allergy    Iv Dye [Iodides] Hives and Itching       Problem List:    Patient Active Problem List   Diagnosis Code    Tremors of nervous system R25.1    Acute left-sided low back pain with left-sided sciatica M54.42    Follow-up examination, following other surgery Z09    Left carpal tunnel syndrome G56.02    Parkinson's disease G20.A1    Lumbar herniated disc M51.26    Nocturnal hypoxemia G47.34    AJAY (obstructive sleep apnea) G47.33    Parkinson disease G20.A1    Weakness of left lower extremity R29.898    Cervical radiculopathy M54.12    Trigger finger, right middle finger M65.331    Right carpal tunnel syndrome G56.01    Snoring R06.83    Osteoarthritis of right hip M16.11    OA (osteoarthritis) of hip M16.9    Generalized hyperreflexia R29.2    Cervical spinal stenosis M48.02    Shortness of breath on exertion R06.02

## 2024-03-25 NOTE — INTERVAL H&P NOTE
H&P Update:  Mal Jimenez was seen and examined.  History and physical has been reviewed. The patient has been examined. There have been no significant clinical changes since the completion of the originally dated History and Physical.    Charlie Lei MD  Orthopaedic Surgery  03/25/24  6:57 AM

## 2024-03-25 NOTE — OP NOTE
Hand Surgery Operative Note      Mal Jimenez   63 y.o.   male   3/25/2024     Pre-op diagnosis: Left  Left Carpal Tunnel syndrome, left middle trigger finger  Post op diagnosis: same      Procedure: Left  Left Ultrasound-Guided Carpal Tunnel release, left US guided middle trigger finger release     Surgeon: Charlie Lei Jr, MD, PhD      Anesthesia: Local + MAC      Tourniquet time: * No tourniquets in log *      Procedure indications: Patient with radial digit numbness recalcitrant to conservative measures with positive documentation of NCV findings consistent with carpal tunnel syndrome. After Thorough discussion, the patient decided to proceed with surgical management. We discussed in detail surgical risks including scar, pain, bleeding, infection, anesthetic risks, neurovascular injury, need for further surgery,  weakness, stiffness, risk of death and potential risk of other unforseen complication.      Procedure description:      Patient was placed in the supine position and after appropriate time-out and side, site and procedure confirmed.     Using a sterile ultrasound cover and sterile gel, relevant anatomical landmarks were identified, including but not limited to the median nerve, thenar motor branch/recurrent motor branch of the median nerve, palmar cutaneous branch of the median nerve, median and ulnar digital nerves and any visualized communications, ulnar vessels and superficial palmar arch, palmar fascia and distal transverse carpal ligament. A sterile ink marker was used to lazaro the borders of the transverse and longitudinal safe zones as well as the incision site in the proximal carpal tunnel region. The safe zones were re-scanned to ensure acceptable anatomy and sonographic visualization.    A regional anesthetic was administered. A 25 gauge needle was used to create a small skin wheel at the incision site using 2 ml of buffered 1% lidocaine plain. Following this, under the guidance of

## 2024-03-25 NOTE — PROGRESS NOTES
25-50 ppb >50ppb     Exercise Oximetry:    Echo: No results found for this or any previous visit from the past 3650 days.      [facility] Reference Info:                                                                                                                    There is no immunization history on file for this patient.  Past Medical History:   Diagnosis Date    Arrhythmia     NSVT taking verapamil for this    Arthritis     generalized    CAD (coronary artery disease) 2017, 10/02/2020    NO MI, NO STENTS NO CABG- heart cath 2020 LAD proximal to mid 20% stenosis, normal LV function per cath dated 10/02/2020; followed by [facility]    Chronic obstructive pulmonary disease (HCC)     prn inhaler NO HOME O2    CKD (chronic kidney disease)     stage 3 per patient    Dyspnea on exertion     only with exertion not at rest    GERD (gastroesophageal reflux disease)     controlled with med    H/O cold sores     Hx of echocardiogram 03/2021    LVEF 55-60%    Hyperlipidemia     Hypertension     controlled w/ med    Latex allergy     Renal cyst, right     drained recent--- followed by dr. Cristobal    Sleep apnea     does not wear cpap at hs-- using dental device    Wide-complex tachycardia 2017    NSVT; per pt no problems since put on verapamil-- followed by dr. flores        Tobacco Use      Smoking status: Never      Smokeless tobacco: Never    Allergies   Allergen Reactions    Latex Rash     Other reaction(s): Rash-Allergy    Iv Dye [Iodides] Hives and Itching     Current Outpatient Medications   Medication Instructions    acetaminophen (TYLENOL) 1,000 mg, Oral, EVERY 6 HOURS PRN    albuterol sulfate HFA (VENTOLIN HFA) 108 (90 Base) MCG/ACT inhaler 2 puffs, Inhalation, 4 TIMES DAILY PRN, Dx code j44.9    aspirin 81 mg, Oral, DAILY    atorvastatin (LIPITOR) 40 mg, Oral, Nightly    losartan-hydroCHLOROthiazide (HYZAAR) 100-25 MG per tablet TAKE 1 TABLET BY MOUTH ONCE DAILY    LYSINE PO 50 mg, Oral, PRN

## 2024-03-25 NOTE — ANESTHESIA POSTPROCEDURE EVALUATION
Department of Anesthesiology  Postprocedure Note    Patient: Mal Jimenez  MRN: [id number]  YOB: 1960  Date of evaluation: 3/25/2024    Procedure Summary       Date: 03/25/24 Room / Location: [facility] OP OR 05 / [facility]    Anesthesia Start: 0809 Anesthesia Stop: 0835    Procedures:       CARPAL TUNNEL RELEASE ultra sound guided on the left (Left: Wrist)      FINGER TRIGGER RELEASE ultra sound guided on the left middle (Left: Hand) Diagnosis:       Carpal tunnel syndrome on left      Acquired trigger finger of left middle finger      (Carpal tunnel syndrome on left [G56.02])      (Acquired trigger finger of left middle finger [M65.332])    Surgeons: Charlie Lei MD Responsible Provider: Chad Rae Jr., MD    Anesthesia Type: TIVA ASA Status: 3            Anesthesia Type: TIVA    Jack Phase I: Jack Score: 8    Jack Phase II:      Anesthesia Post Evaluation    Patient location during evaluation: PACU  Patient participation: complete - patient participated  Level of consciousness: awake  Pain score: 0  Airway patency: patent  Nausea & Vomiting: no nausea and no vomiting  Cardiovascular status: blood pressure returned to baseline and hemodynamically stable  Respiratory status: acceptable, spontaneous ventilation and nonlabored ventilation  Hydration status: euvolemic  Multimodal analgesia pain management approach  Pain management: adequate    No notable events documented.

## 2024-03-26 ENCOUNTER — OFFICE VISIT (OUTPATIENT)
Dept: PULMONOLOGY | Age: 64
End: 2024-03-26
Payer: MEDICARE

## 2024-03-26 VITALS
RESPIRATION RATE: 18 BRPM | OXYGEN SATURATION: 97 % | DIASTOLIC BLOOD PRESSURE: 76 MMHG | SYSTOLIC BLOOD PRESSURE: 128 MMHG | WEIGHT: 199.9 LBS | HEART RATE: 97 BPM | BODY MASS INDEX: 27.98 KG/M2 | HEIGHT: 71 IN | TEMPERATURE: 97.2 F

## 2024-03-26 DIAGNOSIS — J44.9 COPD, MILD (HCC): Primary | ICD-10-CM

## 2024-03-26 DIAGNOSIS — R06.09 DYSPNEA ON EXERTION: ICD-10-CM

## 2024-03-26 DIAGNOSIS — M62.81 MUSCLE WEAKNESS: ICD-10-CM

## 2024-03-26 PROCEDURE — G8427 DOCREV CUR MEDS BY ELIG CLIN: HCPCS | Performed by: NURSE PRACTITIONER

## 2024-03-26 PROCEDURE — 3017F COLORECTAL CA SCREEN DOC REV: CPT | Performed by: NURSE PRACTITIONER

## 2024-03-26 PROCEDURE — G8417 CALC BMI ABV UP PARAM F/U: HCPCS | Performed by: NURSE PRACTITIONER

## 2024-03-26 PROCEDURE — 1036F TOBACCO NON-USER: CPT | Performed by: NURSE PRACTITIONER

## 2024-03-26 PROCEDURE — 99214 OFFICE O/P EST MOD 30 MIN: CPT | Performed by: NURSE PRACTITIONER

## 2024-03-26 PROCEDURE — 3023F SPIROM DOC REV: CPT | Performed by: NURSE PRACTITIONER

## 2024-03-26 PROCEDURE — G8484 FLU IMMUNIZE NO ADMIN: HCPCS | Performed by: NURSE PRACTITIONER

## 2024-03-26 RX ORDER — ALBUTEROL SULFATE 90 UG/1
2 AEROSOL, METERED RESPIRATORY (INHALATION) 4 TIMES DAILY PRN
Qty: 3 EACH | Refills: 3 | Status: SHIPPED | OUTPATIENT
Start: 2024-03-26

## 2024-04-03 ENCOUNTER — OFFICE VISIT (OUTPATIENT)
Age: 64
End: 2024-04-03

## 2024-04-03 VITALS
BODY MASS INDEX: 28.28 KG/M2 | SYSTOLIC BLOOD PRESSURE: 113 MMHG | HEIGHT: 71 IN | HEART RATE: 72 BPM | WEIGHT: 202 LBS | DIASTOLIC BLOOD PRESSURE: 71 MMHG

## 2024-04-03 DIAGNOSIS — N18.30 STAGE 3 CHRONIC KIDNEY DISEASE, UNSPECIFIED WHETHER STAGE 3A OR 3B CKD (HCC): Chronic | ICD-10-CM

## 2024-04-03 DIAGNOSIS — M54.9 CHRONIC BACK PAIN GREATER THAN 3 MONTHS DURATION: ICD-10-CM

## 2024-04-03 DIAGNOSIS — N28.1 CYST OF RIGHT KIDNEY: Primary | ICD-10-CM

## 2024-04-03 DIAGNOSIS — G89.29 CHRONIC BACK PAIN GREATER THAN 3 MONTHS DURATION: ICD-10-CM

## 2024-04-03 PROCEDURE — 3017F COLORECTAL CA SCREEN DOC REV: CPT | Performed by: RADIOLOGY

## 2024-04-03 PROCEDURE — 1036F TOBACCO NON-USER: CPT | Performed by: RADIOLOGY

## 2024-04-03 NOTE — PATIENT INSTRUCTIONS
Your renal cyst aspiration went well and it was smaller in size than your prior procedure.  If you continue to have back pain please follow-up with either your PCP or orthopedist to rule out spinal causes of back pain as we feel it is unlikely from your small renal cyst.

## 2024-04-03 NOTE — PROGRESS NOTES
[facility]  [address] [facility] [address] [place] [id number]                            Progress Office Note    Mal Jimenez   1960   04/03/24   Referring Provider: Harlan Hazel MD     Subjective:     Chief Complaint   Patient presents with    Other     Follow up s/p Percutaneous aspiration and sclerotherapy of right kidney cyst  using Sotradecol foam 3/12/24.        History of Present Illness:  Patient is known to our department as he has  had 3 renal cyst aspirations.  He was initially referred to our department by Dr. Capone.  Has a prior history of LUTS, urinary urgency, nocturia with incomplete emptying, and renal stones. Frequent pain in the right flank since 3-2023. CT abdomen/pelvis 3/8/23 showed a 6 cm right renal cyst.   He underwent cyst aspiration initially on 9- and noticed an improvement in his pain but it did not completely resolve.       He returned again on 12- for CT-guided cyst aspiration and sclerotherapy using doxycycline as patient has an iodine allergy and alcohol is not currently available for sclerosis. His cyst measured 5.1 cm at that visit.  Patient states that his pain resolved for about a month after the procedure and then slowly returned in the right flank area.  It is intermittent in nature.  He underwent a cardiac MRI on 1-9-2024 which showed a 4 cm right sided cyst in similar location.  Patient underwent another right renal cyst aspiration on 3/12/24 and sclerosis using sotradecol.      We are following up with patient today to see how he is feeling post procedure.    He indicates he is still having some mild lumbar back pain.  He however has chronic back issues with a prior L5-S1 discectomy and occasionally gets steroid injections for his back pain.  He recently underwent carpal tunnel surgery on his wrist in the past few weeks and is currently taking an antiinflammatory.  He has noticed his back/flank pain has improved

## 2024-04-05 ENCOUNTER — OFFICE VISIT (OUTPATIENT)
Dept: ORTHOPEDIC SURGERY | Age: 64
End: 2024-04-05

## 2024-04-05 DIAGNOSIS — G56.02 LEFT CARPAL TUNNEL SYNDROME: ICD-10-CM

## 2024-04-05 DIAGNOSIS — M65.332 TRIGGER FINGER, LEFT MIDDLE FINGER: Primary | ICD-10-CM

## 2024-04-05 PROCEDURE — 99024 POSTOP FOLLOW-UP VISIT: CPT | Performed by: NURSE PRACTITIONER

## 2024-04-05 NOTE — PROGRESS NOTES
Orthopaedic Hand Surgery Note    Name: Mal Jimenez  YOB: 1960  Gender: male  MRN: [id number]    HPI: Patient is status post CARPAL TUNNEL RELEASE ultra sound guided on the left - Left and FINGER TRIGGER RELEASE ultra sound guided on the left middle - Left on 3/25/2024. Patient reports sensation is improved. Night symptoms have resolved. No fevers or chills.  Patient reports no locking or clicking of the left middle finger.    Physical Examination:  Wounds healing well.  There is no erythema or drainage.  Good finger and wrist range of motion. Sensation is improved from preoperative.  Patient is able to make a composite fist.    Assessment:   1. Trigger finger, left middle finger    2. Left carpal tunnel syndrome        Status post CARPAL TUNNEL RELEASE ultra sound guided on the left - Left and FINGER TRIGGER RELEASE ultra sound guided on the left middle - Left on 3/25/2024    Plan:  Patient can progress to activities as tolerated.  We will see him back as needed.  He wants to do home exercises versus formal therapy.    Atiya Monge NP  Orthopaedic Surgery  04/05/24  9:08 AM

## 2024-04-10 ENCOUNTER — HOSPITAL ENCOUNTER (OUTPATIENT)
Dept: PHYSICAL THERAPY | Age: 64
Setting detail: RECURRING SERIES
End: 2024-04-10
Payer: MEDICARE

## 2024-04-16 ENCOUNTER — HOSPITAL ENCOUNTER (OUTPATIENT)
Dept: PHYSICAL THERAPY | Age: 64
Setting detail: RECURRING SERIES
Discharge: HOME | End: 2024-04-19
Payer: MEDICARE

## 2024-04-16 DIAGNOSIS — R26.2 DIFFICULTY IN WALKING, NOT ELSEWHERE CLASSIFIED: Primary | ICD-10-CM

## 2024-04-16 DIAGNOSIS — M62.81 MUSCLE WEAKNESS (GENERALIZED): ICD-10-CM

## 2024-04-16 PROCEDURE — 97162 PT EVAL MOD COMPLEX 30 MIN: CPT

## 2024-04-16 PROCEDURE — 97530 THERAPEUTIC ACTIVITIES: CPT

## 2024-04-16 NOTE — PROGRESS NOTES
Mal Jimenez  : 1960  Primary: [employer] (Medicare Managed)  Secondary:  [facility] [place] [address]  [facility] [address]  Phone: [phone]  Fax: [phone] Plan Frequency: twice a week for 8 weeks    Plan of Care/Certification Expiration Date: 06/15/24      Plan of Care/Certification Expiration Date:  Plan of Care/Certification Expiration Date: 06/15/24    Frequency/Duration:   Plan Frequency: twice a week for 8 weeks      Time In/Out:   Time In: 34  Time Out: 1016    PT Visit Info:    Plan Frequency: twice a week for 8 weeks  Progress Note Counter: 1        Visit Count:  1     OUTPATIENT PHYSICAL THERAPY:OP NOTE TYPE: Treatment Note 2024       Charge Capture   Episode              Treatment Diagnosis:    Difficulty in walking, not elsewhere classified  Muscle weakness (generalized)  Medical/Referring Diagnosis:  Muscle weakness     Referring Physician:  Amy Shaw, DEB - CNP  MD Orders:  PT Eval and Treat   Return MD Appt:  unspecified  Date of Onset:  Onset Date:  (no specific date, but pt stating about 3-4 years ago when he got his hip surgeries done his weakness in his legs increased)     Allergies:   Latex and Iv dye [iodides]  Restrictions/Precautions:    None  Interventions Planned See assessment note.        Subjective Assessment: See history from evaluation today.     Progress Note Counter: 1     Initial Pain Level:}     (no specific pain verbalized - R side of trunk)/10  Post Session Pain Level:        (no change in pain stated)/10  Medications Last Reviewed:  2024  Updated Objective Findings:  See Evaluation Note from today  Treatment   In addition to assessment today, the following treatments were provided:  Therapeutic Activity:  ( 9 minutes):  Pt education for symptom control (pursed lip breathing), education on role of O2 saturation levels in activity tolerance and anatomy and physiology of

## 2024-04-16 NOTE — THERAPY EVALUATION
Mal Jimenez  : 1960  Primary: [employer] (Medicare Managed)  Secondary:  [facility] [place] [address] [place]  [facility] [address]  Phone: [phone]  Fax: [phone] Plan Frequency: twice a week for 8 weeks    Plan of Care/Certification Expiration Date: 06/15/24      Plan of Care/Certification Expiration Date:  Plan of Care/Certification Expiration Date: 06/15/24    Frequency/Duration: Plan Frequency: twice a week for 8 weeks      Time In/Out:   Time In: 34  Time Out: 1016    PT Visit Info:    Plan Frequency: twice a week for 8 weeks  Progress Note Counter: 1        Visit Count:  1                 OUTPATIENT PHYSICAL THERAPY:             Initial Assessment 2024               Episode (difficulty walking)       Treatment Diagnosis:     Difficulty in walking, not elsewhere classified  Muscle weakness (generalized)  Medical/Referring Diagnosis:    Muscle weakness     Referring Physician:  Amy Shaw APRN - CNP  MD Orders:  PT Eval and Treat   Return MD Appt:  unspecified  Date of Onset:  Onset Date:  (no specific date, but pt stating about 3-4 years ago when he got his hip surgeries done his weakness in his legs increased)      Allergies:  Latex and Iv dye [iodides]  Restrictions/Precautions:    None  Medications Last Reviewed:  2024     SUBJECTIVE   History of Injury/Illness (Reason for Referral):  Date of Onset: Onset Date:  (no specific date, but pt stating about 3-4 years ago when he got his hip surgeries done his weakness in his legs increased)      Pertinent Information Regarding Onset: Pt states he gets short of breath especially with exertion. States his pulmonary doctor thinks it is due to muscle weakness. States after his hip surgeries (done within about 2 months of each other) his legs appear to be worse. States he also got back surgery a while after his hip surgeries. Got therapy after each surgery and it

## 2024-05-01 ENCOUNTER — HOSPITAL ENCOUNTER (OUTPATIENT)
Dept: PHYSICAL THERAPY | Age: 64
Setting detail: RECURRING SERIES
Discharge: HOME | End: 2024-05-04
Payer: MEDICARE

## 2024-05-01 PROCEDURE — 97110 THERAPEUTIC EXERCISES: CPT

## 2024-05-01 ASSESSMENT — PAIN SCALES - GENERAL: PAINLEVEL_OUTOF10: 3

## 2024-05-01 NOTE — PROGRESS NOTES
Mal Jimenez  : 1960  Primary: [employer] (Medicare Managed)  Secondary:  [facility] [place] [address] [place] [address]  Phone: [phone]  Fax: [phone] Plan Frequency: twice a week for 8 weeks    Plan of Care/Certification Expiration Date: 06/15/24      Plan of Care/Certification Expiration Date:  Plan of Care/Certification Expiration Date: 06/15/24    Frequency/Duration:   Plan Frequency: twice a week for 8 weeks      Time In/Out:   Time In: 844  Time Out: 941    PT Visit Info:    Plan Frequency: twice a week for 8 weeks  Total # of Visits Approved: 24 (until 6/15/2024)  Progress Note Counter: 2        Visit Count:  2     OUTPATIENT PHYSICAL THERAPY:OP NOTE TYPE: Treatment Note 2024       Charge Capture   Episode              Treatment Diagnosis:    Difficulty in walking, not elsewhere classified  Muscle weakness (generalized)  Medical/Referring Diagnosis:  Muscle weakness     Referring Physician:  Amy Shaw APRN - CNP  MD Orders:  PT Eval and Treat   Return MD Appt:  unspecified  Date of Onset:  Onset Date:  (no specific date, but pt stating about 3-4 years ago when he got his hip surgeries done his weakness in his legs increased)     Allergies:   Latex and Iv dye [iodides]  Restrictions/Precautions:    None  Interventions Planned See assessment note.        Subjective Assessment: Pt stating he usually has some soreness in his low back.    Progress Note Counter: 2     Initial Pain Level:}    3/10  Post Session Pain Level:        (no pain reported)/10  Medications Last Reviewed:  2024  Updated Objective Findings:  None Today  Treatment   Therapeutic Exercise: ( 54 minutes):  Exercises per grid below to improve mobility, strength, balance, and dynamic movement of bilateral legs to improve functional mobility .  Required minimal visual, verbal, and tactile cues to promote proper body alignment, promote proper

## 2024-05-06 ENCOUNTER — HOSPITAL ENCOUNTER (OUTPATIENT)
Dept: PHYSICAL THERAPY | Age: 64
Setting detail: RECURRING SERIES
Discharge: HOME | End: 2024-05-09
Payer: MEDICARE

## 2024-05-06 PROCEDURE — 97110 THERAPEUTIC EXERCISES: CPT

## 2024-05-06 PROCEDURE — 97112 NEUROMUSCULAR REEDUCATION: CPT

## 2024-05-06 ASSESSMENT — PAIN SCALES - GENERAL: PAINLEVEL_OUTOF10: 0

## 2024-05-06 NOTE — PROGRESS NOTES
Mal Jimenez  : 1960  Primary: [employer] (Medicare Managed)  Secondary:  [facility] [place] [address]  [place] [address]  Phone: [phone]  Fax: [phone] Plan Frequency: twice a week for 8 weeks    Plan of Care/Certification Expiration Date: 06/15/24      Plan of Care/Certification Expiration Date:  Plan of Care/Certification Expiration Date: 06/15/24    Frequency/Duration:   Plan Frequency: twice a week for 8 weeks      Time In/Out:   Time In: 08  Time Out: 852    PT Visit Info:    Plan Frequency: twice a week for 8 weeks  Total # of Visits Approved: 24 (until 6/15/2024)  Progress Note Counter: 3        Visit Count:  3     OUTPATIENT PHYSICAL THERAPY:OP NOTE TYPE: Treatment Note 2024       Charge Capture   Episode              Treatment Diagnosis:    Difficulty in walking, not elsewhere classified  Muscle weakness (generalized)  Medical/Referring Diagnosis:  Muscle weakness     Referring Physician:  Amy Shaw APRN - CNP  MD Orders:  PT Eval and Treat   Return MD Appt:  unspecified  Date of Onset:  Onset Date:  (no specific date, but pt stating about 3-4 years ago when he got his hip surgeries done his weakness in his legs increased)     Allergies:   Latex and Iv dye [iodides]  Restrictions/Precautions:    None  Interventions Planned See assessment note.        Subjective Assessment: Pt stating he usually has the most shortness of breath when bending over and walking up hills    Progress Note Counter: 3     Initial Pain Level:}    0/10  Post Session Pain Level:       0/10  Medications Last Reviewed:  2024  Updated Objective Findings:  None Today  Treatment   Therapeutic Exercise: ( 30 minutes):  Exercises per grid below to improve mobility, strength, balance, and dynamic movement of bilateral legs to improve functional mobility .  Required minimal visual, verbal, and tactile cues to promote proper body

## 2024-05-08 ENCOUNTER — HOSPITAL ENCOUNTER (OUTPATIENT)
Dept: PHYSICAL THERAPY | Age: 64
Setting detail: RECURRING SERIES
Discharge: HOME | End: 2024-05-11
Payer: MEDICARE

## 2024-05-08 PROCEDURE — 97110 THERAPEUTIC EXERCISES: CPT

## 2024-05-08 PROCEDURE — 97112 NEUROMUSCULAR REEDUCATION: CPT

## 2024-05-08 ASSESSMENT — PAIN SCALES - GENERAL: PAINLEVEL_OUTOF10: 0

## 2024-05-08 NOTE — PROGRESS NOTES
Mal Jimenez  : 1960  Primary: [employer] (Medicare Managed)  Secondary:  [facility] @ [place] [address] [place] [address]  Phone: [phone]  Fax: [phone] Plan Frequency: twice a week for 8 weeks    Plan of Care/Certification Expiration Date: 06/15/24      Plan of Care/Certification Expiration Date:  Plan of Care/Certification Expiration Date: 06/15/24    Frequency/Duration:   Plan Frequency: twice a week for 8 weeks      Time In/Out:   Time In: 847  Time Out: 928    PT Visit Info:    Plan Frequency: twice a week for 8 weeks  Total # of Visits Approved: 24 (until 6/15/2024)  Progress Note Counter: 4        Visit Count:  4     OUTPATIENT PHYSICAL THERAPY:OP NOTE TYPE: Treatment Note 2024       Charge Capture   Episode              Treatment Diagnosis:    Difficulty in walking, not elsewhere classified  Muscle weakness (generalized)  Medical/Referring Diagnosis:  Muscle weakness     Referring Physician:  Amy Shaw APRN - CNP  MD Orders:  PT Eval and Treat   Return MD Appt:  unspecified  Date of Onset:  Onset Date:  (no specific date, but pt stating about 3-4 years ago when he got his hip surgeries done his weakness in his legs increased)     Allergies:   Latex and Iv dye [iodides]  Restrictions/Precautions:    None  Interventions Planned See assessment note.    Functional limitations: pt tends to have the most shortness of breath when bending over and walking up hills    Subjective Assessment: Pt stating no significant fatigue after last session.    Progress Note Counter: 4     Initial Pain Level:}    0/10  Post Session Pain Level:       0/10  Medications Last Reviewed:  2024  Updated Objective Findings:  None Today  Treatment   Therapeutic Exercise: ( 32 minutes):  Exercises per grid below to improve mobility, strength, balance, and dynamic movement of bilateral legs to improve functional mobility .  Required 
no

## 2024-05-13 ENCOUNTER — APPOINTMENT (OUTPATIENT)
Dept: PHYSICAL THERAPY | Age: 64
End: 2024-05-13
Payer: MEDICARE

## 2024-05-15 ENCOUNTER — HOSPITAL ENCOUNTER (OUTPATIENT)
Dept: PHYSICAL THERAPY | Age: 64
Setting detail: RECURRING SERIES
Discharge: HOME | End: 2024-05-18
Payer: MEDICARE

## 2024-05-15 PROCEDURE — 97110 THERAPEUTIC EXERCISES: CPT

## 2024-05-15 ASSESSMENT — PAIN SCALES - GENERAL: PAINLEVEL_OUTOF10: 0

## 2024-05-15 NOTE — PROGRESS NOTES
Mal Jimenez  : 1960  Primary: [employer] (Medicare Managed)  Secondary:  [facility] @ [place] [address] [place] [address]  Phone: [phone]  Fax: [phone] Plan Frequency: twice a week for 8 weeks    Plan of Care/Certification Expiration Date: 06/15/24      Plan of Care/Certification Expiration Date:  Plan of Care/Certification Expiration Date: 06/15/24    Frequency/Duration:   Plan Frequency: twice a week for 8 weeks      Time In/Out:   Time In: 0846  Time Out: 930    PT Visit Info:    Plan Frequency: twice a week for 8 weeks  Total # of Visits Approved: 24 (until 6/15/2024)  Progress Note Counter: 5        Visit Count:  5     OUTPATIENT PHYSICAL THERAPY:OP NOTE TYPE: Treatment Note 2024       Charge Capture   Episode              Treatment Diagnosis:    Difficulty in walking, not elsewhere classified  Muscle weakness (generalized)  Medical/Referring Diagnosis:  Muscle weakness     Referring Physician:  Amy Shaw APRN - CNP  MD Orders:  PT Eval and Treat   Return MD Appt:  unspecified  Date of Onset:  Onset Date:  (no specific date, but pt stating about 3-4 years ago when he got his hip surgeries done his weakness in his legs increased)     Allergies:   Latex and Iv dye [iodides]  Restrictions/Precautions:    None  Interventions Planned See assessment note.    Functional limitations: pt tends to have the most shortness of breath when bending over and walking up hills    Subjective Assessment: Pt stating no pain today. States he is feeling better overall, but still struggles with trying to bend forward.    Progress Note Counter: 5     Initial Pain Level:}    0/10  Post Session Pain Level:       0/10  Medications Last Reviewed:  5/15/2024  Updated Objective Findings:  None Today  Treatment   Therapeutic Exercise: ( 40 minutes):  Exercises per grid below to improve mobility, strength, balance, and dynamic movement of

## 2024-05-20 ENCOUNTER — HOSPITAL ENCOUNTER (OUTPATIENT)
Dept: PHYSICAL THERAPY | Age: 64
Setting detail: RECURRING SERIES
Discharge: HOME | End: 2024-05-23
Payer: MEDICARE

## 2024-05-20 PROCEDURE — 97110 THERAPEUTIC EXERCISES: CPT

## 2024-05-20 ASSESSMENT — PAIN SCALES - GENERAL: PAINLEVEL_OUTOF10: 0

## 2024-05-20 NOTE — PROGRESS NOTES
[] [place]     Neuromuscular Education:  (0 minutes):  Exercise/activities per grid below to improve balance, posture, and proprioception.  Required minimal visual, verbal, and tactile cues to promote static and dynamic balance in standing.                                   Most recent tx:   Date:  5/8/2024 Date:  5/6/2024   Activity/Exercise  Parameters   Standing heel/toe raises On foam; 20 reps/1 set each direction with no UE support; minimal ankle instability 20 reps/1 set each direction with no UE support; minimal ankle instability   Static standing on foam 15-30 second holds in tandem stance each LE forward on narrow foam with minimal to moderate instability 60 second trials with feet together with minimal ankle instability  30-60 second trials with feet in semi-tandem with minimal to moderate ankle instability   Dynamic reaching <<<                              Treatment/Session Summary:    Treatment Assessment: Pt  continuing to progress height/challenge of step ups/downs this session as well as to progress to step up and overs. He is maintaining good O2 sats at or above 95% throughout entire session.  Communication/Consultation:  None today  Equipment provided today:  None  Recommendations/Intent for next treatment session: Next visit will focus on LE strengthening; gait; balance; cardiovascular endurance.    Total Treatment Billable Duration: 40 minutes  Time In: 1100  Time Out: 1142    Key Davies PT       Charge Capture  [employer] [facility]  Appt Desk     Future Appointments   Date Time Provider Department Center   5/22/2024 10:15 AM Key Davies, PT [facility]   5/29/2024  1:00 PM Key Davies, PT [facility]   6/3/2024  8:00 AM Key Davies PT [facility]   6/4/2024  7:30 AM Inder Clemons MD [facility]   6/5/2024  8:00 AM Key Davies, PT [facility]   8/14/2024  9:20 AM Saw Capone Jr., MD [facility]   9/24/2024  8:10 AM Amy Shaw, APRN - CNP [facility]

## 2024-05-22 ENCOUNTER — HOSPITAL ENCOUNTER (OUTPATIENT)
Dept: PHYSICAL THERAPY | Age: 64
Setting detail: RECURRING SERIES
Discharge: HOME | End: 2024-05-25
Payer: MEDICARE

## 2024-05-22 PROCEDURE — 97112 NEUROMUSCULAR REEDUCATION: CPT

## 2024-05-22 PROCEDURE — 97110 THERAPEUTIC EXERCISES: CPT

## 2024-05-22 ASSESSMENT — PAIN SCALES - GENERAL: PAINLEVEL_OUTOF10: 2

## 2024-05-22 NOTE — PROGRESS NOTES
Mal Jimenez  : 1960  Primary: [employer] (Medicare Managed)  Secondary:  [facility] @ [place] [address]  [place] [address]  Phone: [phone]  Fax: [phone] Plan Frequency: twice a week for 8 weeks    Plan of Care/Certification Expiration Date: 06/15/24      Plan of Care/Certification Expiration Date:  Plan of Care/Certification Expiration Date: 06/15/24    Frequency/Duration:   Plan Frequency: twice a week for 8 weeks      Time In/Out:   Time In: 1018  Time Out: 1057    PT Visit Info:    Plan Frequency: twice a week for 8 weeks  Total # of Visits Approved: 24 (until 6/15/2024)  Progress Note Counter: 7        Visit Count:  7     OUTPATIENT PHYSICAL THERAPY:OP NOTE TYPE: Treatment Note 2024       Charge Capture   Episode              Treatment Diagnosis:    Difficulty in walking, not elsewhere classified  Muscle weakness (generalized)  Medical/Referring Diagnosis:  Muscle weakness     Referring Physician:  Amy Shaw APRN - CNP  MD Orders:  PT Eval and Treat   Return MD Appt:  unspecified  Date of Onset:  Onset Date:  (no specific date, but pt stating about 3-4 years ago when he got his hip surgeries done his weakness in his legs increased)     Allergies:   Latex and Iv dye [iodides]  Restrictions/Precautions:    None  Interventions Planned See assessment note.    Functional limitations: pt tends to have the most shortness of breath when bending over and walking up hills    Subjective Assessment: Pt stating feeling no increased stiffness this session.    Progress Note Counter: 7     Initial Pain Level:}    2/10  Post Session Pain Level:        (no specific pain level reported)/10  Medications Last Reviewed:  2024  Updated Objective Findings:  None Today  Treatment   Therapeutic Exercise: ( 30 minutes):  Exercises per grid below to improve mobility, strength, balance, and dynamic movement of bilateral legs to

## 2024-05-29 ENCOUNTER — HOSPITAL ENCOUNTER (OUTPATIENT)
Dept: PHYSICAL THERAPY | Age: 64
Setting detail: RECURRING SERIES
Discharge: HOME | End: 2024-06-01
Payer: MEDICARE

## 2024-05-29 PROCEDURE — 97112 NEUROMUSCULAR REEDUCATION: CPT

## 2024-05-29 PROCEDURE — 97110 THERAPEUTIC EXERCISES: CPT

## 2024-05-29 ASSESSMENT — PAIN SCALES - GENERAL: PAINLEVEL_OUTOF10: 0

## 2024-05-29 NOTE — PROGRESS NOTES
Mal Jimenez  : 1960  Primary: [employer] (Medicare Managed)  Secondary:  [facility] @ [place] [address]  [place] [address]  Phone: [phone]  Fax: [phone] Plan Frequency: twice a week for 8 weeks    Plan of Care/Certification Expiration Date: 06/15/24      Plan of Care/Certification Expiration Date:  Plan of Care/Certification Expiration Date: 06/15/24    Frequency/Duration:   Plan Frequency: twice a week for 8 weeks      Time In/Out:   Time In: 1300  Time Out: 1345    PT Visit Info:    Plan Frequency: twice a week for 8 weeks  Total # of Visits Approved: 24 (until 6/15/2024)  Progress Note Counter: 8        Visit Count:  8     OUTPATIENT PHYSICAL THERAPY:OP NOTE TYPE: Treatment Note 2024       Charge Capture   Episode              Treatment Diagnosis:    Difficulty in walking, not elsewhere classified  Muscle weakness (generalized)  Medical/Referring Diagnosis:  Muscle weakness     Referring Physician:  Amy Shaw APRN - CNP  MD Orders:  PT Eval and Treat   Return MD Appt:  unspecified  Date of Onset:  Onset Date:  (no specific date, but pt stating about 3-4 years ago when he got his hip surgeries done his weakness in his legs increased)     Allergies:   Latex and Iv dye [iodides]  Restrictions/Precautions:    None  Interventions Planned See assessment note.    Functional limitations: pt tends to have the most shortness of breath when bending over and walking up hills    Subjective Assessment: Pt states he was cleaning a car and having to stoop down so now he has a little soreness    Progress Note Counter: 8     Initial Pain Level:}    0/10  Post Session Pain Level:       0 (no pain verbalized)/10  Medications Last Reviewed:  2024  Updated Objective Findings:  None Today  Treatment   Therapeutic Exercise: ( 17 minutes):  Exercises per grid below to improve mobility, strength, balance, and dynamic movement of

## 2024-06-03 ENCOUNTER — HOSPITAL ENCOUNTER (OUTPATIENT)
Dept: PHYSICAL THERAPY | Age: 64
Setting detail: RECURRING SERIES
Discharge: HOME | End: 2024-06-06
Payer: MEDICARE

## 2024-06-03 PROCEDURE — 97110 THERAPEUTIC EXERCISES: CPT

## 2024-06-03 PROCEDURE — 97112 NEUROMUSCULAR REEDUCATION: CPT

## 2024-06-03 ASSESSMENT — PAIN SCALES - GENERAL: PAINLEVEL_OUTOF10: 0

## 2024-06-03 NOTE — PROGRESS NOTES
minimal visual, verbal, and tactile cues to promote proper body alignment, promote proper body posture, promote proper body mechanics, and promote proper body breathing techniques.  Progressed resistance, range, repetitions, and complexity of movement as indicated.                                   Most recent tx:   Date:  6/3/2024 Date:  5/29/2024 Date:  5/22/2024 Date:  5/20/2024 Date:  5/15/2024 Date:  5/8/2024   Activity/Exercise         Nustep L5 resistance for 10 minutes with B LEs and UEs to increase strength/endurance L6 resistance for 10 minutes with B LEs and UEs to increase strength/endurance L5 resistance for 10 minutes with B LEs and UEs to increase strength/endurance L4 resistance for 10 minutes with B LEs and UEs to increase strength/endurance; O2 sats after were 96% L4 resistance for 10 minutes with B LEs and UEs to increase strength/endurance; O2 sats after were 96% L4-5 resistance for 10 minutes (L5 for last 8 minutes) with B LEs and UEs to increase strength/endurance; O2 sats after were 96%   Ankle plantarflexor stretch on incline board 30 second hold x 3 reps with knees extended then flexed; B UE support 30 second hold x 3 reps with knees extended then flexed; B UE support 30 second hold x 3 reps with knees extended then flexed; B UE support 30 second hold x 3 reps with knees extended then flexed; B UE support 30 second hold x 3 reps with knees extended then flexed; B UE support 30 second hold x 3 reps with knees extended then flexed; B UE support   Lower trunk rotation*         Bridging in hooklying*         Sidelying hip clamshells*         SLR in supine*         Prone hip extension*         Modified dead lifts in sitting 9 lb dumbbell; 20 reps/1 set 7 lb dumbbell; 20 reps/1 set 5 lb dumbbell; 20 reps/1 set      Quadruped cat/camel   20 reps/1 set each direction with cues to isolate to lumbar spine               Step ups    Onto 8 inch step (progressing quickly to having foam on top); 20 reps/1  Methotrexate Counseling:  Patient counseled regarding adverse effects of methotrexate including but not limited to nausea, vomiting, abnormalities in liver function tests. Patients may develop mouth sores, rash, diarrhea, and abnormalities in blood counts. The patient understands that monitoring is required including LFT's and blood counts.  There is a rare possibility of scarring of the liver and lung problems that can occur when taking methotrexate. Persistent nausea, loss of appetite, pale stools, dark urine, cough, and shortness of breath should be reported immediately. Patient advised to discontinue methotrexate treatment at least three months before attempting to become pregnant.  I discussed the need for folate supplements while taking methotrexate.  These supplements can decrease side effects during methotrexate treatment. The patient verbalized understanding of the proper use and possible adverse effects of methotrexate.  All of the patient's questions and concerns were addressed.

## 2024-06-04 ENCOUNTER — OFFICE VISIT (OUTPATIENT)
Dept: NEUROLOGY | Age: 64
End: 2024-06-04
Payer: MEDICARE

## 2024-06-04 VITALS
DIASTOLIC BLOOD PRESSURE: 69 MMHG | SYSTOLIC BLOOD PRESSURE: 116 MMHG | WEIGHT: 200 LBS | HEIGHT: 71 IN | HEART RATE: 84 BPM | BODY MASS INDEX: 28 KG/M2

## 2024-06-04 DIAGNOSIS — G56.03 BILATERAL CARPAL TUNNEL SYNDROME: ICD-10-CM

## 2024-06-04 DIAGNOSIS — G31.84 MILD COGNITIVE IMPAIRMENT: ICD-10-CM

## 2024-06-04 DIAGNOSIS — R26.89 ABNORMALITY OF GAIT DUE TO IMPAIRMENT OF BALANCE: ICD-10-CM

## 2024-06-04 DIAGNOSIS — R25.1 TREMORS OF NERVOUS SYSTEM: Primary | ICD-10-CM

## 2024-06-04 PROCEDURE — 1036F TOBACCO NON-USER: CPT | Performed by: PSYCHIATRY & NEUROLOGY

## 2024-06-04 PROCEDURE — 99215 OFFICE O/P EST HI 40 MIN: CPT | Performed by: PSYCHIATRY & NEUROLOGY

## 2024-06-04 PROCEDURE — G8417 CALC BMI ABV UP PARAM F/U: HCPCS | Performed by: PSYCHIATRY & NEUROLOGY

## 2024-06-04 PROCEDURE — 3017F COLORECTAL CA SCREEN DOC REV: CPT | Performed by: PSYCHIATRY & NEUROLOGY

## 2024-06-04 PROCEDURE — G8427 DOCREV CUR MEDS BY ELIG CLIN: HCPCS | Performed by: PSYCHIATRY & NEUROLOGY

## 2024-06-04 ASSESSMENT — ENCOUNTER SYMPTOMS
VOICE CHANGE: 1
CONSTIPATION: 1
COUGH: 0

## 2024-06-04 NOTE — PROGRESS NOTES
Iv Dye [Iodides] Hives and Itching       Physical Exam:     /69   Pulse 84   Ht 1.803 m (5' 11\")   Wt 90.7 kg (200 lb)   BMI 27.89 kg/m²     General Exam:  General: Well developed, well nourished, in no apparent distress.   HEENT: Normocephalic, atraumatic. Sclera anicteric. Oropharynx clear.   Neck: Supple without masses  Cardiovascular: Regular rate and rhythm. No carotid bruits.   Lungs: Somewhat labored breathing intermittently.  Abdomen: Soft, nontender, nondistended.   Extremities: Peripheral pulses intact. No edema and no rashes.      Neurological Exam:      MS/Language/Speech:  Alert. Oriented to person, place, and time. Language fluent.  Speech is clear and intelligible.     Cranial Nerves: PERRL. Eye movements full. No nystagmus.  Facial expression appears intact though may be mildly diminished and no asymmetry noted. Tongue and palate were midline. Shoulder shrug sluggish bilaterally.     Motor: Tone is normal. No cogwheeling or spasticity. Previously mentioned slowing with rapid alternating movements seems to have improved since previous examinations.  With hands outstretched there is a jerky tremulousness of the hands that is not particularly rhythmic. No tremor with finger-nose-finger bilaterally.  No resting tremor. With writing there is a general shakiness in his handwriting but no micrographia and spiral drawing show a very low amplitude and jerky appearing tremor.     Sensory: Normal to light touch throughout.     Cerebellar: No ataxia or dysmetria.      Reflexes (R/L): Biceps (2+/2+), Brachioradialis (2+/2+), Patellar (2+/2+), Ankle (2+/2+). Jimenez's was negative and plantar responses were flexor.      Gait: He is slow to rise from a seated position.  Gait is primarily an antalgic gait secondary to back pain.  Otherwise he looks fairly fluid with preserved arm swing bilaterally. Mild balance difficulty. Romberg is negative.       Assessment and Plan:     Mal Jimenez is a 63

## 2024-06-05 ENCOUNTER — HOSPITAL ENCOUNTER (OUTPATIENT)
Dept: PHYSICAL THERAPY | Age: 64
Setting detail: RECURRING SERIES
Discharge: HOME | End: 2024-06-08
Payer: MEDICARE

## 2024-06-05 PROCEDURE — 97110 THERAPEUTIC EXERCISES: CPT

## 2024-06-05 ASSESSMENT — PAIN SCALES - GENERAL: PAINLEVEL_OUTOF10: 5

## 2024-06-05 NOTE — PROGRESS NOTES
Mal Jimenez  : 1960  Primary: [employer] (Medicare Managed)  Secondary:  [facility] @ [place] [address] [facility] [address]  Phone: [phone]  Fax: [phone] Plan Frequency: twice a week for 5 weeks    Plan of Care/Certification Expiration Date: 24      Plan of Care/Certification Expiration Date:  Plan of Care/Certification Expiration Date: 24    Frequency/Duration:   Plan Frequency: twice a week for 5 weeks      Time In/Out:   Time In: 0803  Time Out: 08    PT Visit Info:    Plan Frequency: twice a week for 5 weeks  Total # of Visits Approved: 24 (until 6/15/2024)  Progress Note Counter: 10        Visit Count:  10     OUTPATIENT PHYSICAL THERAPY:OP NOTE TYPE: Treatment Note 2024       Charge Capture   Episode              Treatment Diagnosis:    Difficulty in walking, not elsewhere classified  Muscle weakness (generalized)  Medical/Referring Diagnosis:  Muscle weakness     Referring Physician:  Amy Shaw APRN - CNP  MD Orders:  PT Eval and Treat   Return MD Appt:  unspecified  Date of Onset:  Onset Date:  (no specific date, but pt stating about 3-4 years ago when he got his hip surgeries done his weakness in his legs increased)     Allergies:   Latex and Iv dye [iodides]  Restrictions/Precautions:    None  Interventions Planned See assessment note.    Functional limitations: pt tends to have the most shortness of breath when bending over and walking up hills    Subjective Assessment: Pt states he feels like rainy weather makes him worse. He is having pain in his R lateral trunk near his ribcage. States he still has difficulty with walking faster or stooping due to shortness of breath. States going up steps is also difficult.    Progress Note Counter: 10     Initial Pain Level:}    5/10  Post Session Pain Level:        (no pain verbalized)/10  Medications Last Reviewed:  2024  Updated Objective

## 2024-06-05 NOTE — THERAPY EVALUATION
Mal Jimenez  : 1960  Primary: [employer] (Medicare Managed)  Secondary:  [facility] @ [place] [address]  [place] [address]  Phone: [phone]  Fax: [phone] Plan Frequency: twice a week for 5 weeks    Plan of Care/Certification Expiration Date: 24      Plan of Care/Certification Expiration Date:  Plan of Care/Certification Expiration Date: 24    Frequency/Duration: Plan Frequency: twice a week for 5 weeks      PT Visit Info:    Plan Frequency: twice a week for 5 weeks  Total # of Visits Approved: 24 (until 6/15/2024)  Progress Note Counter: 10        Visit Count:  10                 OUTPATIENT PHYSICAL THERAPY:             Recertification 2024               Episode (difficulty walking)       Treatment Diagnosis:     Difficulty in walking, not elsewhere classified  Muscle weakness (generalized)  Medical/Referring Diagnosis:    Muscle weakness     Referring Physician:  Amy Shaw, DEB - CNP  MD Orders:  PT Eval and Treat   Return MD Appt:  unspecified  Date of Onset:  Onset Date:  (no specific date, but pt stating about 3-4 years ago when he got his hip surgeries done his weakness in his legs increased)      Allergies:  Latex and Iv dye [iodides]  Restrictions/Precautions:    None  Medications Last Reviewed:  2024      OBJECTIVE   Assessment on 2024  Observation/Orthostatic Postural Assessment:    The following postural deficits were noted in [email or web address]  loss of cervical lordosis, increased thoracic kyphosis, forward head, rounded shoulders, and posterior pelvic tilt    2024  Improved upright posture     The following postural deficits were noted in standing  2024  forward trunk flexion   2024  Improved upright     ROM:    Initial measurement: (on 2024) Initial measurement: (on 2024)   WFL and non-painful throughout L hip, knee, and ankle WFL and non-painful

## 2024-06-11 ENCOUNTER — HOSPITAL ENCOUNTER (OUTPATIENT)
Dept: PHYSICAL THERAPY | Age: 64
Setting detail: RECURRING SERIES
Discharge: HOME | End: 2024-06-14
Payer: MEDICARE

## 2024-06-11 PROCEDURE — 97110 THERAPEUTIC EXERCISES: CPT

## 2024-06-11 PROCEDURE — 97112 NEUROMUSCULAR REEDUCATION: CPT

## 2024-06-11 ASSESSMENT — PAIN SCALES - GENERAL: PAINLEVEL_OUTOF10: 0

## 2024-06-11 NOTE — PROGRESS NOTES
Mal Jimenez  : 1960  Primary: [employer] (Medicare Managed)  Secondary:  [facility] @ [place] [address]  [place] [address]  Phone: [phone]  Fax: [phone] Plan Frequency: twice a week for 5 weeks    Plan of Care/Certification Expiration Date: 24      Plan of Care/Certification Expiration Date:  Plan of Care/Certification Expiration Date: 24    Frequency/Duration:   Plan Frequency: twice a week for 5 weeks      Time In/Out:   Time In: 0803  Time Out: 0845    PT Visit Info:    Plan Frequency: twice a week for 5 weeks  Total # of Visits Approved: 24 (until 6/15/2024)  Progress Note Counter: 1        Visit Count:  11     OUTPATIENT PHYSICAL THERAPY:OP NOTE TYPE: Treatment Note 2024       Charge Capture   Episode              Treatment Diagnosis:    Difficulty in walking, not elsewhere classified  Muscle weakness (generalized)  Medical/Referring Diagnosis:  Muscle weakness     Referring Physician:  Amy Shaw APRN - CNP  MD Orders:  PT Eval and Treat   Return MD Appt:  unspecified  Date of Onset:  Onset Date:  (no specific date, but pt stating about 3-4 years ago when he got his hip surgeries done his weakness in his legs increased)     Allergies:   Latex and Iv dye [iodides]  Restrictions/Precautions:    None  Interventions Planned See assessment note.    Functional limitations: pt tends to have the most shortness of breath when bending over and walking up hills    Subjective Assessment: Pt states the pain he was having at last session has seemed to resolve    Progress Note Counter: 1     Initial Pain Level:}    0/10  Post Session Pain Level:        (no change in pain reported)/10  Medications Last Reviewed:  2024  Updated Objective Findings:  None Today  Treatment   Therapeutic Exercise: ( 32 minutes):  Exercises per grid below to improve mobility, strength, balance, and dynamic movement of bilateral

## 2024-06-13 ENCOUNTER — HOSPITAL ENCOUNTER (OUTPATIENT)
Dept: PHYSICAL THERAPY | Age: 64
Setting detail: RECURRING SERIES
Discharge: HOME | End: 2024-06-16
Payer: MEDICARE

## 2024-06-13 DIAGNOSIS — N13.8 BPH WITH OBSTRUCTION/LOWER URINARY TRACT SYMPTOMS: ICD-10-CM

## 2024-06-13 DIAGNOSIS — N40.1 BPH WITH OBSTRUCTION/LOWER URINARY TRACT SYMPTOMS: ICD-10-CM

## 2024-06-13 PROCEDURE — 97110 THERAPEUTIC EXERCISES: CPT

## 2024-06-13 PROCEDURE — 97112 NEUROMUSCULAR REEDUCATION: CPT

## 2024-06-13 RX ORDER — TADALAFIL 5 MG/1
5 TABLET ORAL DAILY
Qty: 30 TABLET | Refills: 11 | Status: SHIPPED | OUTPATIENT
Start: 2024-06-13

## 2024-06-13 ASSESSMENT — PAIN SCALES - GENERAL: PAINLEVEL_OUTOF10: 0

## 2024-06-13 NOTE — PROGRESS NOTES
balance; cardiovascular endurance.    Total Treatment Billable Duration: 40 minutes  Time In: 0804  Time Out: 0847    Key Davies, PT       Charge Capture  [facility]  Appt Desk     Future Appointments   Date Time Provider Department Center   8/14/2024  9:20 AM Saw Capone Jr., MD [facility]   8/15/2024  1:40 PM Lizeth Diaz, APRN - NP [facility]   9/24/2024  8:10 AM Amy Shaw APRN - CNP [facility]   12/4/2024  7:30 AM Inder Clemons MD [facility]   2/3/2025 10:00 AM Stacie Rodriguez AuD [facility]   2/3/2025 10:00 AM Jessica Ramírez PA [facility]

## 2024-06-26 ENCOUNTER — APPOINTMENT (OUTPATIENT)
Dept: PHYSICAL THERAPY | Age: 64
End: 2024-06-26
Payer: MEDICARE

## 2024-06-26 ENCOUNTER — HOSPITAL ENCOUNTER (OUTPATIENT)
Dept: PHYSICAL THERAPY | Age: 64
Setting detail: RECURRING SERIES
Discharge: HOME | End: 2024-06-29
Payer: MEDICARE

## 2024-06-26 PROCEDURE — 97110 THERAPEUTIC EXERCISES: CPT

## 2024-06-26 ASSESSMENT — PAIN SCALES - GENERAL: PAINLEVEL_OUTOF10: 0

## 2024-06-26 NOTE — PROGRESS NOTES
dynamic movement of bilateral legs to improve functional mobility .  Required minimal visual, verbal, and tactile cues to promote proper body alignment, promote proper body posture, promote proper body mechanics, and promote proper body breathing techniques.  Progressed resistance, range, repetitions, and complexity of movement as indicated.                                   Most recent tx:   Date:  6/26/2024 Date:  6/13/2024 Date:  6/11/2024 Date:  6/5/2024 Date:  6/3/2024 Date:  5/29/2024   Activity/Exercise         Nustep L6 resistance for 10 minutes with B LEs and UEs to increase strength/endurance L6-7 resistance for 5 minutes with B LEs and UEs to increase strength/endurance L6 resistance for 10 minutes with B LEs and UEs to increase strength/endurance; 93% O2 sats after L6 resistance for 8 minutes with B LEs and UEs to increase strength/endurance L5 resistance for 10 minutes with B LEs and UEs to increase strength/endurance L6 resistance for 10 minutes with B LEs and UEs to increase strength/endurance   Treadmill  2.5 mph at #6 incline; 5 minutes with cues for longer steps; 98% O2 sats 2.0 mph at #5 incline; 5 minutes with cues for longer steps; 94% O2 sats <<<      Ankle plantarflexor stretch on incline board 30 second hold x 3 reps with knees extended then flexed; B UE support 30 second hold x 3 reps with knees extended then flexed; B UE support 30 second hold x 3 reps with knees extended then flexed; B UE support 30 second hold x 3 reps with knees extended then flexed; B UE support 30 second hold x 3 reps with knees extended then flexed; B UE support 30 second hold x 3 reps with knees extended then flexed; B UE support   Lower trunk rotation*         Bridging in hooklying*         Sidelying hip clamshells*         SLR in supine*         Prone hip extension*         Modified dead lifts in sitting     9 lb dumbbell; 20 reps/1 set 7 lb dumbbell; 20 reps/1 set   Quadruped cat/camel                  Deadlifts

## 2024-06-28 ENCOUNTER — HOSPITAL ENCOUNTER (OUTPATIENT)
Dept: PHYSICAL THERAPY | Age: 64
Setting detail: RECURRING SERIES
Discharge: HOME | End: 2024-07-01
Payer: MEDICARE

## 2024-06-28 ENCOUNTER — APPOINTMENT (OUTPATIENT)
Dept: PHYSICAL THERAPY | Age: 64
End: 2024-06-28
Payer: MEDICARE

## 2024-06-28 PROCEDURE — 97110 THERAPEUTIC EXERCISES: CPT

## 2024-06-28 ASSESSMENT — PAIN SCALES - GENERAL: PAINLEVEL_OUTOF10: 3

## 2024-06-28 NOTE — PROGRESS NOTES
Mal Jimenez  : 1960  Primary: [employer] (Medicare Managed)  Secondary:  [facility] [place] [address]  [place] [address]  Phone: [phone]  Fax: [phone] Plan Frequency: twice a week for 5 weeks    Plan of Care/Certification Expiration Date: 24      Plan of Care/Certification Expiration Date:  Plan of Care/Certification Expiration Date: 24    Frequency/Duration:   Plan Frequency: twice a week for 5 weeks      Time In/Out:   Time In: 845  Time Out: 929    PT Visit Info:    Plan Frequency: twice a week for 5 weeks  Total # of Visits Approved: 24 (until 2024)  Progress Note Counter: 4        Visit Count:  14     OUTPATIENT PHYSICAL THERAPY:OP NOTE TYPE: Treatment Note 2024       Charge Capture   Episode              Treatment Diagnosis:    Difficulty in walking, not elsewhere classified  Muscle weakness (generalized)  Medical/Referring Diagnosis:  Muscle weakness     Referring Physician:  Amy Shaw APRN - CNP  MD Orders:  PT Eval and Treat   Return MD Appt:  unspecified  Date of Onset:  Onset Date:  (no specific date, but pt stating about 3-4 years ago when he got his hip surgeries done his weakness in his legs increased)     Allergies:   Latex and Iv dye [iodides]  Restrictions/Precautions:    None  Interventions Planned See assessment note.    Functional limitations: pt tends to have the most shortness of breath when bending over and walking up hills    Subjective Assessment: Pt stating his R foot is still hurting, but states he thinks it has gotten a little better. Pt states he has a little soreness in his R shoulder because he has been painting bookshelves.    Progress Note Counter: 4     Initial Pain Level:}    3/10  Post Session Pain Level:        (no pain reported at end of session)/10  Medications Last Reviewed:  2024  Updated Objective Findings:  None Today  Treatment   Therapeutic

## 2024-07-09 ENCOUNTER — HOSPITAL ENCOUNTER (OUTPATIENT)
Dept: PHYSICAL THERAPY | Age: 64
Setting detail: RECURRING SERIES
Discharge: HOME | End: 2024-07-12
Payer: MEDICARE

## 2024-07-09 PROCEDURE — 97110 THERAPEUTIC EXERCISES: CPT

## 2024-07-09 ASSESSMENT — PAIN SCALES - GENERAL: PAINLEVEL_OUTOF10: 0

## 2024-07-09 NOTE — PROGRESS NOTES
improve functional mobility .  Required minimal visual, verbal, and tactile cues to promote proper body alignment, promote proper body posture, promote proper body mechanics, and promote proper body breathing techniques.  Progressed resistance, range, repetitions, and complexity of movement as indicated.                                   Most recent tx:   Date:  7/9/2024 Date:  6/28/2024 Date:  6/26/2024 Date:  6/13/2024 Date:  6/11/2024 Date:  6/5/2024 Date:  6/3/2024   Activity/Exercise          Nustep L7 resistance for 9 minutes with B LEs and UEs to increase strength/endurance L6 resistance for 7 minutes with B LEs and UEs to increase strength/endurance L6 resistance for 10 minutes with B LEs and UEs to increase strength/endurance L6-7 resistance for 5 minutes with B LEs and UEs to increase strength/endurance L6 resistance for 10 minutes with B LEs and UEs to increase strength/endurance; 93% O2 sats after L6 resistance for 8 minutes with B LEs and UEs to increase strength/endurance L5 resistance for 10 minutes with B LEs and UEs to increase strength/endurance   Treadmill  2.5 mph at #7-8 incline; 7 minutes with cues for longer steps; 97% O2 sats 2.5-3 mph at #7 incline; 5 minutes with cues for longer steps; 97% O2 sats 2.5 mph at #6 incline; 5 minutes with cues for longer steps; 98% O2 sats 2.0 mph at #5 incline; 5 minutes with cues for longer steps; 94% O2 sats <<<     Ankle plantarflexor stretch on incline board 30 second hold x 3 reps with knees extended then flexed; B UE support 30 second hold x 3 reps with knees extended then flexed; B UE support 30 second hold x 3 reps with knees extended then flexed; B UE support 30 second hold x 3 reps with knees extended then flexed; B UE support 30 second hold x 3 reps with knees extended then flexed; B UE support 30 second hold x 3 reps with knees extended then flexed; B UE support 30 second hold x 3 reps with knees extended then flexed; B UE support   Lower trunk

## 2024-07-12 ENCOUNTER — HOSPITAL ENCOUNTER (OUTPATIENT)
Dept: PHYSICAL THERAPY | Age: 64
Setting detail: RECURRING SERIES
Discharge: HOME | End: 2024-07-15
Payer: MEDICARE

## 2024-07-12 PROCEDURE — 97110 THERAPEUTIC EXERCISES: CPT

## 2024-07-12 NOTE — PROGRESS NOTES
Mal Jimenez  : 1960  Primary: [employer] (Medicare Managed)  Secondary:  [facility] [place] [address]  [place] [address]  Phone: [phone]  Fax: [phone] Plan Frequency: twice a week for 5 weeks    Plan of Care/Certification Expiration Date: 24      Plan of Care/Certification Expiration Date:  Plan of Care/Certification Expiration Date: 24    Frequency/Duration: Plan Frequency: twice a week for 5 weeks      PT Visit Info:    Plan Frequency: twice a week for 5 weeks  Total # of Visits Approved: 24 (until 2024)  Progress Note Counter: 5        Visit Count:  16                 OUTPATIENT PHYSICAL THERAPY:             Discharge Summary 2024               Episode (difficulty walking)       Treatment Diagnosis:     Difficulty in walking, not elsewhere classified  Muscle weakness (generalized)  Medical/Referring Diagnosis:    Muscle weakness     Referring Physician:  Amy Shaw, DEB - CNP  MD Orders:  PT Eval and Treat   Return MD Appt:  unspecified  Date of Onset:  Onset Date:  (no specific date, but pt stating about 3-4 years ago when he got his hip surgeries done his weakness in his legs increased)      Allergies:  Latex and Iv dye [iodides]  Restrictions/Precautions:    None  Medications Last Reviewed:  2024      OBJECTIVE   Assessment on 2024  Observation/Orthostatic Postural Assessment:    The following postural deficits were noted in [email or web address]  loss of cervical lordosis, increased thoracic kyphosis, forward head, rounded shoulders, and posterior pelvic tilt    2024  Improved upright posture   2024  Good upright posture     The following postural deficits were noted in standing  2024  forward trunk flexion   2024  Improved upright   2024  Improved upright posture with no forward lean noted     Strength:                              Most recent:

## 2024-07-12 NOTE — PROGRESS NOTES
Mal Jimenez  : 1960  Primary: [employer] (Medicare Managed)  Secondary:  [facility] [place] [address]  [place] [address]  Phone: [phone]  Fax: [phone] Plan Frequency: twice a week for 5 weeks    Plan of Care/Certification Expiration Date: 24      Plan of Care/Certification Expiration Date:  Plan of Care/Certification Expiration Date: 24    Frequency/Duration:   Plan Frequency: twice a week for 5 weeks      Time In/Out:   Time In: 931  Time Out: 1013    PT Visit Info:    Plan Frequency: twice a week for 5 weeks  Total # of Visits Approved: 24 (until 2024)  Progress Note Counter: 6        Visit Count:  16     OUTPATIENT PHYSICAL THERAPY:OP NOTE TYPE: Treatment Note 2024       Charge Capture   Episode              Treatment Diagnosis:    Difficulty in walking, not elsewhere classified  Muscle weakness (generalized)  Medical/Referring Diagnosis:  Muscle weakness     Referring Physician:  Amy Shaw, DEB - CNP  MD Orders:  PT Eval and Treat   Return MD Appt:  unspecified  Date of Onset:  Onset Date:  (no specific date, but pt stating about 3-4 years ago when he got his hip surgeries done his weakness in his legs increased)     Allergies:   Latex and Iv dye [iodides]  Restrictions/Precautions:    None  Interventions Planned See assessment note.    Functional limitations: pt tends to have the most shortness of breath when bending over and walking up hills    Subjective Assessment: Pt  stating his back was a little stiff this morning but other than that he is feeling good.    Progress Note Counter: 6     Initial Pain Level:}     (pt stating nothing new)/10  Post Session Pain Level:        (no pain reported)/10  Medications Last Reviewed:  2024  Updated Objective Findings:  See Discharge Note from today  Treatment   Therapeutic Exercise: ( 42 minutes):  Exercises per grid below (and assessment in

## 2024-07-15 ENCOUNTER — TELEPHONE (OUTPATIENT)
Dept: PRIMARY CARE CLINIC | Facility: CLINIC | Age: 64
End: 2024-07-15

## 2024-08-03 SDOH — ECONOMIC STABILITY: FOOD INSECURITY: WITHIN THE PAST 12 MONTHS, THE FOOD YOU BOUGHT JUST DIDN'T LAST AND YOU DIDN'T HAVE MONEY TO GET MORE.: NEVER TRUE

## 2024-08-03 SDOH — ECONOMIC STABILITY: INCOME INSECURITY: HOW HARD IS IT FOR YOU TO PAY FOR THE VERY BASICS LIKE FOOD, HOUSING, MEDICAL CARE, AND HEATING?: SOMEWHAT HARD

## 2024-08-03 SDOH — ECONOMIC STABILITY: FOOD INSECURITY: WITHIN THE PAST 12 MONTHS, YOU WORRIED THAT YOUR FOOD WOULD RUN OUT BEFORE YOU GOT MONEY TO BUY MORE.: NEVER TRUE

## 2024-08-03 ASSESSMENT — PATIENT HEALTH QUESTIONNAIRE - PHQ9
SUM OF ALL RESPONSES TO PHQ QUESTIONS 1-9: 0
1. LITTLE INTEREST OR PLEASURE IN DOING THINGS: NOT AT ALL
2. FEELING DOWN, DEPRESSED OR HOPELESS: NOT AT ALL
SUM OF ALL RESPONSES TO PHQ QUESTIONS 1-9: 0
SUM OF ALL RESPONSES TO PHQ9 QUESTIONS 1 & 2: 0
2. FEELING DOWN, DEPRESSED OR HOPELESS: NOT AT ALL
SUM OF ALL RESPONSES TO PHQ9 QUESTIONS 1 & 2: 0
SUM OF ALL RESPONSES TO PHQ QUESTIONS 1-9: 0
1. LITTLE INTEREST OR PLEASURE IN DOING THINGS: NOT AT ALL
SUM OF ALL RESPONSES TO PHQ QUESTIONS 1-9: 0

## 2024-08-06 ENCOUNTER — OFFICE VISIT (OUTPATIENT)
Dept: PRIMARY CARE CLINIC | Facility: CLINIC | Age: 64
End: 2024-08-06
Payer: MEDICARE

## 2024-08-06 VITALS
OXYGEN SATURATION: 97 % | DIASTOLIC BLOOD PRESSURE: 69 MMHG | HEART RATE: 69 BPM | WEIGHT: 201 LBS | SYSTOLIC BLOOD PRESSURE: 119 MMHG | TEMPERATURE: 97.8 F | HEIGHT: 71 IN | BODY MASS INDEX: 28.14 KG/M2

## 2024-08-06 DIAGNOSIS — R79.89 OTHER SPECIFIED ABNORMAL FINDINGS OF BLOOD CHEMISTRY: ICD-10-CM

## 2024-08-06 DIAGNOSIS — Z13.220 SCREENING FOR LIPID DISORDERS: ICD-10-CM

## 2024-08-06 DIAGNOSIS — Z13.29 SCREENING FOR ENDOCRINE, NUTRITIONAL, METABOLIC AND IMMUNITY DISORDER: ICD-10-CM

## 2024-08-06 DIAGNOSIS — R53.82 CHRONIC FATIGUE: ICD-10-CM

## 2024-08-06 DIAGNOSIS — R53.81 MALAISE: ICD-10-CM

## 2024-08-06 DIAGNOSIS — R29.2 GENERALIZED HYPERREFLEXIA: ICD-10-CM

## 2024-08-06 DIAGNOSIS — R79.9 ABNORMAL BLOOD CHEMISTRY: ICD-10-CM

## 2024-08-06 DIAGNOSIS — Z13.29 SCREENING FOR THYROID DISORDER: ICD-10-CM

## 2024-08-06 DIAGNOSIS — Z13.21 SCREENING FOR ENDOCRINE, NUTRITIONAL, METABOLIC AND IMMUNITY DISORDER: ICD-10-CM

## 2024-08-06 DIAGNOSIS — Z13.0 SCREENING FOR DEFICIENCY ANEMIA: ICD-10-CM

## 2024-08-06 DIAGNOSIS — Z13.228 SCREENING FOR METABOLIC DISORDER: ICD-10-CM

## 2024-08-06 DIAGNOSIS — R73.09 OTHER ABNORMAL GLUCOSE: ICD-10-CM

## 2024-08-06 DIAGNOSIS — Z13.0 SCREENING FOR ENDOCRINE, NUTRITIONAL, METABOLIC AND IMMUNITY DISORDER: ICD-10-CM

## 2024-08-06 DIAGNOSIS — L98.9 SKIN LESION: Primary | ICD-10-CM

## 2024-08-06 DIAGNOSIS — Z13.1 SCREENING FOR DIABETES MELLITUS: ICD-10-CM

## 2024-08-06 DIAGNOSIS — E55.9 VITAMIN D DEFICIENCY: ICD-10-CM

## 2024-08-06 DIAGNOSIS — Z79.899 ENCOUNTER FOR LONG-TERM (CURRENT) USE OF MEDICATIONS: ICD-10-CM

## 2024-08-06 DIAGNOSIS — R53.83 MALAISE AND FATIGUE: ICD-10-CM

## 2024-08-06 DIAGNOSIS — Z13.21 ENCOUNTER FOR VITAMIN DEFICIENCY SCREENING: ICD-10-CM

## 2024-08-06 DIAGNOSIS — Z12.5 SCREENING FOR PROSTATE CANCER: ICD-10-CM

## 2024-08-06 DIAGNOSIS — R53.81 MALAISE AND FATIGUE: ICD-10-CM

## 2024-08-06 DIAGNOSIS — K21.00 GASTROESOPHAGEAL REFLUX DISEASE WITH ESOPHAGITIS WITHOUT HEMORRHAGE: ICD-10-CM

## 2024-08-06 DIAGNOSIS — Z13.228 SCREENING FOR ENDOCRINE, NUTRITIONAL, METABOLIC AND IMMUNITY DISORDER: ICD-10-CM

## 2024-08-06 DIAGNOSIS — L98.9 SKIN LESION: ICD-10-CM

## 2024-08-06 DIAGNOSIS — R53.83 OTHER FATIGUE: ICD-10-CM

## 2024-08-06 DIAGNOSIS — J44.9 CHRONIC OBSTRUCTIVE PULMONARY DISEASE, UNSPECIFIED COPD TYPE (HCC): Chronic | ICD-10-CM

## 2024-08-06 PROBLEM — G56.03 BILATERAL CARPAL TUNNEL SYNDROME: Status: RESOLVED | Noted: 2022-10-19 | Resolved: 2024-08-06

## 2024-08-06 PROBLEM — K52.9 ACUTE COLITIS: Status: RESOLVED | Noted: 2023-03-08 | Resolved: 2024-08-06

## 2024-08-06 PROBLEM — G56.02 LEFT CARPAL TUNNEL SYNDROME: Status: RESOLVED | Noted: 2021-10-01 | Resolved: 2024-08-06

## 2024-08-06 PROBLEM — R21 RASH AND NONSPECIFIC SKIN ERUPTION: Status: RESOLVED | Noted: 2021-12-02 | Resolved: 2024-08-06

## 2024-08-06 PROBLEM — M54.42 ACUTE LEFT-SIDED LOW BACK PAIN WITH LEFT-SIDED SCIATICA: Status: RESOLVED | Noted: 2021-12-02 | Resolved: 2024-08-06

## 2024-08-06 PROBLEM — N17.9 ACUTE ON CHRONIC KIDNEY FAILURE (HCC): Status: RESOLVED | Noted: 2023-03-08 | Resolved: 2024-08-06

## 2024-08-06 PROBLEM — G20.A1 PARKINSON DISEASE (HCC): Chronic | Status: RESOLVED | Noted: 2022-02-25 | Resolved: 2024-08-06

## 2024-08-06 PROBLEM — N18.9 ACUTE ON CHRONIC KIDNEY FAILURE (HCC): Status: RESOLVED | Noted: 2023-03-08 | Resolved: 2024-08-06

## 2024-08-06 PROBLEM — R29.898 WEAKNESS OF LEFT LOWER EXTREMITY: Status: RESOLVED | Noted: 2021-12-02 | Resolved: 2024-08-06

## 2024-08-06 PROBLEM — B35.1 FUNGAL INFECTION OF TOENAIL: Status: RESOLVED | Noted: 2023-09-15 | Resolved: 2024-08-06

## 2024-08-06 PROBLEM — Z09 FOLLOW-UP EXAMINATION, FOLLOWING OTHER SURGERY: Status: RESOLVED | Noted: 2021-08-27 | Resolved: 2024-08-06

## 2024-08-06 PROCEDURE — 99214 OFFICE O/P EST MOD 30 MIN: CPT | Performed by: NURSE PRACTITIONER

## 2024-08-06 RX ORDER — OMEPRAZOLE 40 MG/1
40 CAPSULE, DELAYED RELEASE ORAL
Qty: 90 CAPSULE | Refills: 1 | Status: SHIPPED | OUTPATIENT
Start: 2024-08-06

## 2024-08-06 RX ORDER — LOSARTAN POTASSIUM AND HYDROCHLOROTHIAZIDE 25; 100 MG/1; MG/1
1 TABLET ORAL DAILY
Qty: 90 TABLET | Refills: 1 | Status: SHIPPED | OUTPATIENT
Start: 2024-08-06

## 2024-08-06 RX ORDER — VERAPAMIL HYDROCHLORIDE 240 MG/1
240 TABLET, FILM COATED, EXTENDED RELEASE ORAL DAILY
Qty: 90 TABLET | Refills: 1 | Status: SHIPPED | OUTPATIENT
Start: 2024-08-06

## 2024-08-06 RX ORDER — ATORVASTATIN CALCIUM 40 MG/1
40 TABLET, FILM COATED ORAL NIGHTLY
Qty: 90 TABLET | Refills: 1 | Status: SHIPPED | OUTPATIENT
Start: 2024-08-06

## 2024-08-06 ASSESSMENT — ENCOUNTER SYMPTOMS
ABDOMINAL PAIN: 0
HEARTBURN: 1
EYES NEGATIVE: 1
RESPIRATORY NEGATIVE: 1
ABDOMINAL DISTENTION: 0
ALLERGIC/IMMUNOLOGIC NEGATIVE: 1

## 2024-08-06 NOTE — PROGRESS NOTES
deficiency  -     Vitamin D 25 Hydroxy; Future  15. Encounter for vitamin deficiency screening  -     Vitamin D 25 Hydroxy; Future  -     Vitamin B12; Future  16. Malaise  -     Vitamin D 25 Hydroxy; Future  17. Screening for metabolic disorder  -     Comprehensive Metabolic Panel; Future  18. Screening for deficiency anemia  -     CBC with Auto Differential; Future  19. Screening for lipid disorders  -     Lipid Panel; Future  20. Chronic obstructive pulmonary disease, unspecified COPD type (HCC)  21. Generalized hyperreflexia       I have reviewed the patient's past medical history, social history and family history and vitals.  We have discussed treatment plan and follow up and given patient instructions.  Patient's questions are answered and we will follow up as indicated.    Return in about 6 months (around 2/6/2025) for AWV (VV is okay) asap.     DEB Chávez - MARGARITO    ADDITIONAL EDUCATION    Last 7 days of labs:    No visits with results within 1 Week(s) from this visit.   Latest known visit with results is:   Admission on 03/25/2024, Discharged on 03/25/2024   Component Date Value Ref Range Status    POC Potassium 03/25/2024 3.3 (L)  3.5 - 5.1 MMOL/L Final       Educational documents were provided including; but, not limited to diagnosis, prognosis, recurrent, complications, monitoring, instructions, prevention, etc.    Patient aware of all medications (prescribed and recommended). Patient verbalized understanding. Patient declined all other medications (OTC, provided by clinic and prescribed) as well as additional testing/imaging/diagnostics at this time. Patient aware of risks associated with declining treatment/recommendations and/or non-compliance with plan of care.    *Side effects, adverse effects, risks versus benefits associated with medications prescribed/recommended were discussed with the patient. Patient verbalized understanding. All questions answered.    *Patient was encouraged to return to

## 2024-08-08 LAB
BACTERIA SPEC CULT: NORMAL
GRAM STN SPEC: NORMAL
GRAM STN SPEC: NORMAL
SERVICE CMNT-IMP: NORMAL

## 2024-08-08 NOTE — RESULT ENCOUNTER NOTE
Please let the patient know:    Wound culture #1 is negative  Anaerobic culture is pending    MyChart message sent as well    Explanatory note: Be assured that the information provided to create your medical record comes from your provider. The written transcription portion of this note is prepared electronically by voice-recognition software. At times, there may be some errors in capitalization, punctuation, tense, or context that are inherent in the system, but your provider reviews the note for content and to ensure that the note contains appropriate information for your continuing care.

## 2024-08-14 ENCOUNTER — OFFICE VISIT (OUTPATIENT)
Dept: UROLOGY | Age: 64
End: 2024-08-14
Payer: MEDICARE

## 2024-08-14 DIAGNOSIS — N52.9 ERECTILE DYSFUNCTION, UNSPECIFIED ERECTILE DYSFUNCTION TYPE: ICD-10-CM

## 2024-08-14 DIAGNOSIS — R33.9 INCOMPLETE EMPTYING OF BLADDER: ICD-10-CM

## 2024-08-14 DIAGNOSIS — N13.8 BPH WITH OBSTRUCTION/LOWER URINARY TRACT SYMPTOMS: Primary | ICD-10-CM

## 2024-08-14 DIAGNOSIS — N20.0 RENAL STONE: ICD-10-CM

## 2024-08-14 DIAGNOSIS — N28.1 RENAL CYST: ICD-10-CM

## 2024-08-14 DIAGNOSIS — N40.1 BPH WITH OBSTRUCTION/LOWER URINARY TRACT SYMPTOMS: Primary | ICD-10-CM

## 2024-08-14 LAB
BILIRUBIN, URINE, POC: NEGATIVE
BLOOD URINE, POC: NORMAL
GLUCOSE URINE, POC: NEGATIVE
KETONES, URINE, POC: NEGATIVE
LEUKOCYTE ESTERASE, URINE, POC: NEGATIVE
NITRITE, URINE, POC: NEGATIVE
PH, URINE, POC: 5.5 (ref 4.6–8)
PROTEIN,URINE, POC: NEGATIVE
SPECIFIC GRAVITY, URINE, POC: 1.02 (ref 1–1.03)
URINALYSIS CLARITY, POC: NORMAL
URINALYSIS COLOR, POC: NORMAL
UROBILINOGEN, POC: NORMAL

## 2024-08-14 PROCEDURE — G8427 DOCREV CUR MEDS BY ELIG CLIN: HCPCS | Performed by: UROLOGY

## 2024-08-14 PROCEDURE — 81003 URINALYSIS AUTO W/O SCOPE: CPT | Performed by: UROLOGY

## 2024-08-14 PROCEDURE — G8417 CALC BMI ABV UP PARAM F/U: HCPCS | Performed by: UROLOGY

## 2024-08-14 PROCEDURE — 3017F COLORECTAL CA SCREEN DOC REV: CPT | Performed by: UROLOGY

## 2024-08-14 PROCEDURE — 99213 OFFICE O/P EST LOW 20 MIN: CPT | Performed by: UROLOGY

## 2024-08-14 PROCEDURE — 1036F TOBACCO NON-USER: CPT | Performed by: UROLOGY

## 2024-08-14 ASSESSMENT — ENCOUNTER SYMPTOMS
BACK PAIN: 0
NAUSEA: 0

## 2024-08-14 NOTE — PROGRESS NOTES
[facility]  [address] [place] [id number]  [phone]    Mal Jimenez  : 1960    Chief Complaint   Patient presents with    Follow-up        HPI     Mal Jimenez is a 63 y.o. male   with hx of LUTS, urinary urgency, renal stones.   He has nocturia x 4, frequency, incomplete emptying.   Went to ER with dysphagia had CT.   \ CT wo contrast on 3-8-23: IMPRESSION:  1. No acute findings.  2.  Small sliding hiatal hernia.  3.  Small stone in each kidney.  4.  6 cm right renal cyst.  He has passed stones in the past.   Takes sildenafil for ED. .has CAD by heart cath, no MI. Walks with a cane due to back and neck issues.   PSA 1.9 in .  Was started on Cialis 5 mg daily in .  Urinary issues are better. Still has nocturia x 3-4. ED is better.   He is concerned about the right renal cyst. Has been having constant pain in the right flank since 3-23. He thinks that the cyst may be causing the pain. Pain has no relation to activity or eating.   Had right renal cyst aspiration and sclerotherapy in 3-24.  His flank pain is about the same.   Past Medical History:   Diagnosis Date    Arrhythmia     NSVT taking verapamil for this    Arthritis     generalized    CAD (coronary artery disease) , 10/02/2020    NO MI, NO STENTS NO CABG- heart cath  LAD proximal to mid 20% stenosis, normal LV function per cath dated 10/02/2020; followed by [facility]    Chronic kidney disease     Chronic obstructive pulmonary disease (HCC)     prn inhaler NO HOME O2    CKD (chronic kidney disease)     stage 3 per patient    Dyspnea on exertion     only with exertion not at rest    GERD (gastroesophageal reflux disease)     controlled with med    H/O cold sores     Hx of echocardiogram 2021    LVEF 55-60%    Hyperlipidemia     Hypertension     controlled w/ med    Latex allergy     Renal cyst, right     drained recent--- followed by dr. Cristobal    Sleep apnea

## 2024-08-15 ENCOUNTER — APPOINTMENT (RX ONLY)
Dept: URBAN - METROPOLITAN AREA CLINIC 25 | Facility: CLINIC | Age: 64
Setting detail: DERMATOLOGY
End: 2024-08-15

## 2024-08-15 DIAGNOSIS — L57.8 OTHER SKIN CHANGES DUE TO CHRONIC EXPOSURE TO NONIONIZING RADIATION: ICD-10-CM

## 2024-08-15 DIAGNOSIS — L73.8 OTHER SPECIFIED FOLLICULAR DISORDERS: ICD-10-CM

## 2024-08-15 DIAGNOSIS — L73.9 FOLLICULAR DISORDER, UNSPECIFIED: ICD-10-CM

## 2024-08-15 DIAGNOSIS — L738 OTHER SPECIFIED DISEASES OF HAIR AND HAIR FOLLICLES: ICD-10-CM

## 2024-08-15 DIAGNOSIS — L82.1 OTHER SEBORRHEIC KERATOSIS: ICD-10-CM

## 2024-08-15 DIAGNOSIS — L663 OTHER SPECIFIED DISEASES OF HAIR AND HAIR FOLLICLES: ICD-10-CM

## 2024-08-15 PROBLEM — L02.12 FURUNCLE OF NECK: Status: ACTIVE | Noted: 2024-08-15

## 2024-08-15 PROBLEM — D48.5 NEOPLASM OF UNCERTAIN BEHAVIOR OF SKIN: Status: ACTIVE | Noted: 2024-08-15

## 2024-08-15 PROCEDURE — ? COUNSELING

## 2024-08-15 PROCEDURE — ? OTHER

## 2024-08-15 PROCEDURE — 11102 TANGNTL BX SKIN SINGLE LES: CPT

## 2024-08-15 PROCEDURE — 99203 OFFICE O/P NEW LOW 30 MIN: CPT | Mod: 25

## 2024-08-15 PROCEDURE — ? BIOPSY BY SHAVE METHOD

## 2024-08-15 ASSESSMENT — LOCATION SIMPLE DESCRIPTION DERM
LOCATION SIMPLE: RIGHT FOREHEAD
LOCATION SIMPLE: POSTERIOR NECK

## 2024-08-15 ASSESSMENT — LOCATION DETAILED DESCRIPTION DERM
LOCATION DETAILED: RIGHT SUPERIOR MEDIAL FOREHEAD
LOCATION DETAILED: RIGHT MEDIAL TRAPEZIAL NECK
LOCATION DETAILED: MID POSTERIOR NECK

## 2024-08-15 ASSESSMENT — LOCATION ZONE DERM
LOCATION ZONE: NECK
LOCATION ZONE: FACE

## 2024-08-15 NOTE — PROCEDURE: OTHER
Render Risk Assessment In Note?: no
Note Text (......Xxx Chief Complaint.): This diagnosis correlates with the
Detail Level: Zone
Other (Free Text): Patient declines any prescriptions at this time

## 2024-08-16 LAB
BACTERIA SPEC CULT: ABNORMAL
SERVICE CMNT-IMP: ABNORMAL

## 2024-08-16 NOTE — RESULT ENCOUNTER NOTE
Please let the patient know:    Most recent update from lab on anaerobic culture: \"MODERATE ANAEROBIC GRAM POSITIVE RODS SENT TO [facility] FOR TESTING ON 8/16/24 [place].\"    MyChart message sent as well    Explanatory note: Be assured that the information provided to create your medical record comes from your provider. The written transcription portion of this note is prepared electronically by voice-recognition software. At times, there may be some errors in capitalization, punctuation, tense, or context that are inherent in the system, but your provider reviews the note for content and to ensure that the note contains appropriate information for your continuing care.

## 2024-08-23 LAB
BACTERIA SPEC CULT: ABNORMAL
ORGANISM ID: NORMAL
SERVICE CMNT-IMP: ABNORMAL

## 2024-08-27 ENCOUNTER — APPOINTMENT (RX ONLY)
Dept: URBAN - METROPOLITAN AREA CLINIC 23 | Facility: CLINIC | Age: 64
Setting detail: DERMATOLOGY
End: 2024-08-27

## 2024-08-27 PROBLEM — C44.311 BASAL CELL CARCINOMA OF SKIN OF NOSE: Status: ACTIVE | Noted: 2024-08-27

## 2024-08-27 PROCEDURE — 14060 TIS TRNFR E/N/E/L 10 SQ CM/<: CPT

## 2024-08-27 PROCEDURE — ? MOHS SURGERY

## 2024-08-27 PROCEDURE — 17311 MOHS 1 STAGE H/N/HF/G: CPT

## 2024-08-27 PROCEDURE — ? PRESCRIPTION

## 2024-08-27 RX ORDER — CEPHALEXIN 500 MG/1
CAPSULE ORAL
Qty: 22 | Refills: 0 | Status: ERX | COMMUNITY
Start: 2024-08-27

## 2024-08-27 RX ADMIN — CEPHALEXIN: 500 CAPSULE ORAL at 00:00

## 2024-08-27 NOTE — PROCEDURE: MOHS SURGERY
Mohs Case Number: [id number]
Previous Accession (Optional): [id number]
Biopsy Photograph Reviewed: Yes
Referring Physician (Optional): VIKKI Rm
Consent Type: Consent 1 (Standard)
Eye Shield Used: No
Initial Size Of Lesion: 0.7
X Size Of Lesion In Cm (Optional): 0
Number Of Stages: 1
Primary Defect Width In Cm (Final Defect Size - Required For Flaps/Grafts): 0.8
Repair Type: Flap
Which Instrument Did You Use For Dermabrasion?: Wire Brush
Which Eyelid Repair Cpt Are You Using?: 52289
Oculoplastic Surgeon Procedure Text (A): After obtaining clear surgical margins the patient was sent to oculoplastics for surgical repair.  The patient understands they will receive post-surgical care and follow-up from the referring physician's office.
Otolaryngologist Procedure Text (A): After obtaining clear surgical margins the patient was sent to otolaryngology for surgical repair.  The patient understands they will receive post-surgical care and follow-up from the referring physician's office.
Plastic Surgeon Procedure Text (A): After obtaining clear surgical margins the patient was sent to plastics for surgical repair.  The patient understands they will receive post-surgical care and follow-up from the referring physician's office.
Mid-Level Procedure Text (A): After obtaining clear surgical margins the patient was sent to a mid-level provider for surgical repair.  The patient understands they will receive post-surgical care and follow-up from the mid-level provider.
Provider Procedure Text (A): After obtaining clear surgical margins the defect was repaired by another provider.
Asc Procedure Text (A): After obtaining clear surgical margins the patient was sent to an ASC for surgical repair.  The patient understands they will receive post-surgical care and follow-up from the ASC physician.
Suturegard Retention Suture: 2-0 Nylon
Retention Suture Bite Size: 3 mm
Length To Time In Minutes Device Was In Place: 10
Undermining Type: Entire Wound
Debridement Text: The wound edges were debrided prior to proceeding with the closure to facilitate wound healing.
Helical Rim Text: The closure involved the helical rim.
Vermilion Border Text: The closure involved the vermilion border.
Nostril Rim Text: The closure involved the nostril rim.
Retention Suture Text: Retention sutures were placed to support the closure and prevent dehiscence.
Flap Type: Burow's Advancement Flap
Secondary Defect Length In Cm (Required For Flaps): 3.7
Secondary Defect Width In Cm (Required For Flaps): 1.2
Location Indication Override (Is Already Calculated Based On Selected Body Location): Area H
Area H Indication Text: Tumors in this location are included in Area H (eyelids, eyebrows, nose, lips, chin, ear, pre-auricular, post-auricular, temple, genitalia, hands, feet, ankles and areola).  Tissue conservation is critical in these anatomic locations.
Area M Indication Text: Tumors in this location are included in Area M (cheek, forehead, scalp, neck, jawline and pretibial skin).  Mohs surgery is indicated for tumors in these anatomic locations.
Area L Indication Text: Tumors in this location are included in Area L (trunk and extremities).  Mohs surgery is indicated for larger tumors, or tumors with aggressive histologic features, in these anatomic locations.
Tumor Debulked?: curette
Depth Of Tumor Invasion (For Histology): tumor not visualized
Perineural Invasion (For Histology - Be Specific If Possible): absent
Special Stains Stage 1 - Results: Base On Clearance Noted Above
Stage 2: Additional Anesthesia Type: 1% lidocaine with epinephrine
Staging Info: By selecting yes to the question above you will include information on AJCC 8 tumor staging in your Mohs note. Information on tumor staging will be automatically added for SCCs on the head and neck. AJCC 8 includes tumor size, tumor depth, perineural involvement and bone invasion.
Tumor Depth: Less than 6mm from granular layer and no invasion beyond the subcutaneous fat
Was The Patient On Physician Recommended Anticoagulation Therapy?: Please Select the Appropriate Response
Medical Necessity Statement: Based on my medical judgement, Mohs surgery is the most appropriate treatment for this cancer compared to other treatments.
Alternatives Discussed Intro (Do Not Add Period): I discussed alternative treatments to Mohs surgery and specifically discussed the risks and benefits of
Consent 1/Introductory Paragraph: The rationale for Mohs was explained to the patient and consent was obtained. The risks, benefits and alternatives to therapy were discussed in detail. Specifically, the risks of infection, scarring, bleeding, prolonged wound healing, incomplete removal, allergy to anesthesia, nerve injury(both sensory and motor which can be permanent), and recurrence were addressed. Prior to the procedure, the treatment site was clearly identified and confirmed by the patient. All components of Universal Protocol/PAUSE Rule completed.
Consent 2/Introductory Paragraph: Mohs surgery was explained to the patient and consent was obtained. The risks, benefits and alternatives to therapy were discussed in detail. Specifically, the risks of infection, scarring, bleeding, prolonged wound healing, incomplete removal, allergy to anesthesia, nerve injury and recurrence were addressed. Prior to the procedure, the treatment site was clearly identified and confirmed by the patient. All components of Universal Protocol/PAUSE Rule completed.
Consent 3/Introductory Paragraph: I gave the patient a chance to ask questions they had about the procedure.  Following this I explained the Mohs procedure and consent was obtained. The risks, benefits and alternatives to therapy were discussed in detail. Specifically, the risks of infection, scarring, bleeding, prolonged wound healing, incomplete removal, allergy to anesthesia, nerve injury and recurrence were addressed. Prior to the procedure, the treatment site was clearly identified and confirmed by the patient. All components of Universal Protocol/PAUSE Rule completed.
Consent (Temporal Branch)/Introductory Paragraph: The rationale for Mohs was explained to the patient and consent was obtained. The risks, benefits and alternatives to therapy were discussed in detail. Specifically, the risks of damage to the temporal branch of the facial nerve, infection, scarring, bleeding, prolonged wound healing, incomplete removal, allergy to anesthesia, and recurrence were addressed. Prior to the procedure, the treatment site was clearly identified and confirmed by the patient. All components of Universal Protocol/PAUSE Rule completed.
Consent (Marginal Mandibular)/Introductory Paragraph: The rationale for Mohs was explained to the patient and consent was obtained. The risks, benefits and alternatives to therapy were discussed in detail. Specifically, the risks of damage to the marginal mandibular branch of the facial nerve, infection, scarring, bleeding, prolonged wound healing, incomplete removal, allergy to anesthesia, and recurrence were addressed. Prior to the procedure, the treatment site was clearly identified and confirmed by the patient. All components of Universal Protocol/PAUSE Rule completed.
Consent (Spinal Accessory)/Introductory Paragraph: The rationale for Mohs was explained to the patient and consent was obtained. The risks, benefits and alternatives to therapy were discussed in detail. Specifically, the risks of damage to the spinal accessory nerve, infection, scarring, bleeding, prolonged wound healing, incomplete removal, allergy to anesthesia, and recurrence were addressed. Prior to the procedure, the treatment site was clearly identified and confirmed by the patient. All components of Universal Protocol/PAUSE Rule completed.
Consent (Near Eyelid Margin)/Introductory Paragraph: The rationale for Mohs was explained to the patient and consent was obtained. The risks, benefits and alternatives to therapy were discussed in detail. Specifically, the risks of ectropion or eyelid deformity, infection, scarring, bleeding, prolonged wound healing, incomplete removal, allergy to anesthesia, nerve injury and recurrence were addressed. Prior to the procedure, the treatment site was clearly identified and confirmed by the patient. All components of Universal Protocol/PAUSE Rule completed.
Consent (Ear)/Introductory Paragraph: The rationale for Mohs was explained to the patient and consent was obtained. The risks, benefits and alternatives to therapy were discussed in detail. Specifically, the risks of ear deformity, infection, scarring, bleeding, prolonged wound healing, incomplete removal, allergy to anesthesia, nerve injury and recurrence were addressed. Prior to the procedure, the treatment site was clearly identified and confirmed by the patient. All components of Universal Protocol/PAUSE Rule completed.
Consent (Nose)/Introductory Paragraph: The rationale for Mohs was explained to the patient and consent was obtained. The risks, benefits and alternatives to therapy were discussed in detail. Specifically, the risks of nasal deformity, changes in the flow of air through the nose, infection, scarring, bleeding, prolonged wound healing, incomplete removal, allergy to anesthesia, nerve injury and recurrence were addressed. Prior to the procedure, the treatment site was clearly identified and confirmed by the patient. All components of Universal Protocol/PAUSE Rule completed.
Consent (Lip)/Introductory Paragraph: The rationale for Mohs was explained to the patient and consent was obtained. The risks, benefits and alternatives to therapy were discussed in detail. Specifically, the risks of lip deformity, changes in the oral aperture, infection, scarring, bleeding, prolonged wound healing, incomplete removal, allergy to anesthesia, nerve injury and recurrence were addressed. Prior to the procedure, the treatment site was clearly identified and confirmed by the patient. All components of Universal Protocol/PAUSE Rule completed.
Consent (Scalp)/Introductory Paragraph: The rationale for Mohs was explained to the patient and consent was obtained. The risks, benefits and alternatives to therapy were discussed in detail. Specifically, the risks of changes in hair growth pattern secondary to repair, infection, scarring, bleeding, prolonged wound healing, incomplete removal, allergy to anesthesia, nerve injury and recurrence were addressed. Prior to the procedure, the treatment site was clearly identified and confirmed by the patient. All components of Universal Protocol/PAUSE Rule completed.
Detail Level: Detailed
Postop Diagnosis: same
Anesthesia Type: 1% lidocaine with 1:100,000 epinephrine and a 1:10 solution of 8.4% sodium bicarbonate
Anesthesia Volume In Cc: 1.5
Hemostasis: Electrocautery
Estimated Blood Loss (Cc): minimal
Repair Anesthesia Method: local infiltration
Anesthesia Volume In Cc: 3
Brow Lift Text: A midfrontal incision was made medially to the defect to allow access to the tissues just superior to the left eyebrow. Following careful dissection inferiorly in a supraperiosteal plane to the level of the left eyebrow, several 3-0 monocryl sutures were used to resuspend the eyebrow orbicularis oculi muscular unit to the superior frontal bone periosteum. This resulted in an appropriate reapproximation of static eyebrow symmetry and correction of the left brow ptosis.
Deep Sutures: 4-0 Vicryl
Epidermal Sutures: 5-0 Fast Absorbing Gut
Epidermal Closure: running and interrupted
Suturegard Intro: Intraoperative tissue expansion was performed, utilizing the SUTUREGARD device, in order to reduce wound tension.
Suturegard Body: The suture ends were repeatedly re-tightened and re-clamped to achieve the desired tissue expansion.
Hemigard Intro: Due to skin fragility and wound tension, it was decided to use HEMIGARD adhesive retention suture devices to permit a linear closure. The skin was cleaned and dried for a 6cm distance away from the wound. Excessive hair, if present, was removed to allow for adhesion.
Hemigard Postcare Instructions: The HEMIGARD strips are to remain completely dry for at least 5-7 days.
Donor Site Anesthesia Type: same as repair anesthesia
Epidermal Closure Graft Donor Site (Optional): simple interrupted
Graft Donor Site Bandage (Optional-Leave Blank If You Don't Want In Note): Steri-strips and a pressure bandage were applied to the donor site.
Closure 2 Information: This tab is for additional flaps and grafts, including complex repair and grafts and complex repair and flaps. You can also specify a different location for the additional defect, if the location is the same you do not need to select a new one. We will insert the automated text for the repair you select below just as we do for solitary flaps and grafts. Please note that at this time if you select a location with a different insurance zone you will need to override the ICD10 and CPT if appropriate.
Closure 3 Information: This tab is for additional flaps and grafts above and beyond our usual structured repairs.  Please note if you enter information here it will not currently bill and you will need to add the billing information manually.
Wound Care: Petrolatum
Dressing: pressure dressing
Dressing (No Sutures): dry sterile dressing
Unna Boot Text: An Unna boot was placed to help immobilize the limb and facilitate more rapid healing.
Home Suture Removal Text: Patient was provided instructions on removing sutures and will remove their sutures at home.  If they have any questions or difficulties they will call the office.
Post-Care Instructions: I reviewed with the patient in detail post-care instructions. Patient is not to engage in any heavy lifting, exercise, or swimming for the next 14 days. Should the patient develop any fevers, chills, bleeding, severe pain patient will contact the office immediately.
Pain Refusal Text: I offered to prescribe pain medication but the patient refused to take this medication.
Mauc Instructions: By selecting yes to the question below the MAUC number will be added into the note.  This will be calculated automatically based on the diagnosis chosen, the size entered, the body zone selected (H,M,L) and the specific indications you chose. You will also have the option to override the Mohs AUC if you disagree with the automatically calculated number and this option is found in the Case Summary tab.
Where Do You Want The Question To Include Opioid Counseling Located?: Case Summary Tab
Eye Protection Verbiage: Before proceeding with the stage, a plastic scleral shield was inserted. The globe was anesthetized with a few drops of 1% lidocaine with 1:100,000 epinephrine. Then, an appropriate sized scleral shield was chosen and coated with lacrilube ointment. The shield was gently inserted and left in place for the duration of each stage. After the stage was completed, the shield was gently removed.
Mohs Method Verbiage: An incision at a 45 degree angle following the standard Mohs approach was done and the specimen was harvested as a microscopic controlled layer.
Surgeon/Pathologist Verbiage (Will Incorporate Name Of Surgeon From Intro If Not Blank): operated in two distinct and integrated capacities as the surgeon and pathologist.
Mohs Histo Method Verbiage: Each section was then chromacoded and processed in the Mohs lab using the Mohs protocol and submitted for frozen section.
Subsequent Stages Histo Method Verbiage: Using a similar technique to that described above, a thin layer of tissue was removed from all areas where tumor was visible on the previous stage.  The tissue was again oriented, mapped, dyed, and processed as above.
Mohs Rapid Report Verbiage: The area of clinically evident tumor was marked with skin marking ink and appropriately hatched.  The initial incision was made following the Mohs approach through the skin.  The specimen was taken to the lab, divided into the necessary number of pieces, chromacoded and processed according to the Mohs protocol.  This was repeated in successive stages until a tumor free defect was achieved.
Complex Repair Preamble Text (Leave Blank If You Do Not Want): Extensive wide undermining was performed.
Intermediate Repair Preamble Text (Leave Blank If You Do Not Want): Undermining was performed with blunt dissection.
Graft Cartilage Fenestration Text: The cartilage was fenestrated with a 2mm punch biopsy to help facilitate graft survival and healing.
Non-Graft Cartilage Fenestration Text: The cartilage was fenestrated with a 2mm punch biopsy to help facilitate healing.
Secondary Intention Text (Leave Blank If You Do Not Want): The defect will heal with secondary intention.
No Repair - Repaired With Adjacent Surgical Defect Text (Leave Blank If You Do Not Want): After obtaining clear surgical margins the defect was repaired concurrently with another surgical defect which was in close approximation.
Adjacent Tissue Transfer Text: The defect edges were debeveled with a #15 scalpel blade.  Given the location of the defect and the proximity to free margins an adjacent tissue transfer was deemed most appropriate.  Using a sterile surgical marker, an appropriate flap was drawn incorporating the defect and placing the expected incisions within the relaxed skin tension lines where possible.    The area thus outlined was incised deep to adipose tissue with a #15 scalpel blade.  The skin margins were undermined to an appropriate distance in all directions utilizing iris scissors.
Advancement Flap (Single) Text: The defect edges were debeveled with a #15 scalpel blade.  Given the location of the defect and the proximity to free margins a single advancement flap was deemed most appropriate.  Using a sterile surgical marker, an appropriate advancement flap was drawn incorporating the defect and placing the expected incisions within the relaxed skin tension lines where possible.    The area thus outlined was incised deep to adipose tissue with a #15 scalpel blade.  The skin margins were undermined to an appropriate distance in all directions utilizing iris scissors.
Advancement Flap (Double) Text: The defect edges were debeveled with a #15 scalpel blade.  Given the location of the defect and the proximity to free margins a double advancement flap was deemed most appropriate.  Using a sterile surgical marker, the appropriate advancement flaps were drawn incorporating the defect and placing the expected incisions within the relaxed skin tension lines where possible.    The area thus outlined was incised deep to adipose tissue with a #15 scalpel blade.  The skin margins were undermined to an appropriate distance in all directions utilizing iris scissors.
Advancement-Rotation Flap Text: The defect edges were debeveled with a #15 scalpel blade.  Given the location of the defect, shape of the defect and the proximity to free margins an advancement-rotation flap was deemed most appropriate.  Using a sterile surgical marker, an appropriate flap was drawn incorporating the defect and placing the expected incisions within the relaxed skin tension lines where possible. The area thus outlined was incised deep to adipose tissue with a #15 scalpel blade.  The skin margins were undermined to an appropriate distance in all directions utilizing iris scissors.
Alar Island Pedicle Flap Text: The defect edges were debeveled with a #15 scalpel blade.  Given the location of the defect, shape of the defect and the proximity to the alar rim an island pedicle advancement flap was deemed most appropriate.  Using a sterile surgical marker, an appropriate advancement flap was drawn incorporating the defect, outlining the appropriate donor tissue and placing the expected incisions within the nasal ala running parallel to the alar rim. The area thus outlined was incised with a #15 scalpel blade.  The skin margins were undermined minimally to an appropriate distance in all directions around the primary defect and laterally outward around the island pedicle utilizing iris scissors.  There was minimal undermining beneath the pedicle flap.
A-T Advancement Flap Text: The defect edges were debeveled with a #15 scalpel blade.  Given the location of the defect, shape of the defect and the proximity to free margins an A-T advancement flap was deemed most appropriate.  Using a sterile surgical marker, an appropriate advancement flap was drawn incorporating the defect and placing the expected incisions within the relaxed skin tension lines where possible.    The area thus outlined was incised deep to adipose tissue with a #15 scalpel blade.  The skin margins were undermined to an appropriate distance in all directions utilizing iris scissors.
Banner Transposition Flap Text: The defect edges were debeveled with a #15 scalpel blade.  Given the location of the defect and the proximity to free margins a Banner transposition flap was deemed most appropriate.  Using a sterile surgical marker, an appropriate flap drawn around the defect. The area thus outlined was incised deep to adipose tissue with a #15 scalpel blade.  The skin margins were undermined to an appropriate distance in all directions utilizing iris scissors.
Bilateral Helical Rim Advancement Flap Text: The defect edges were debeveled with a #15 blade scalpel.  Given the location of the defect and the proximity to free margins (helical rim) a bilateral helical rim advancement flap was deemed most appropriate.  Using a sterile surgical marker, the appropriate advancement flaps were drawn incorporating the defect and placing the expected incisions between the helical rim and antihelix where possible.  The area thus outlined was incised through and through with a #15 scalpel blade.  With a skin hook and iris scissors, the flaps were gently and sharply undermined and freed up.
Bilateral Rotation Flap Text: The defect edges were debeveled with a #15 scalpel blade. Given the location of the defect, shape of the defect and the proximity to free margins a bilateral rotation flap was deemed most appropriate. Using a sterile surgical marker, an appropriate rotation flap was drawn incorporating the defect and placing the expected incisions within the relaxed skin tension lines where possible. The area thus outlined was incised deep to adipose tissue with a #15 scalpel blade. The skin margins were undermined to an appropriate distance in all directions utilizing iris scissors. Following this, the designed flap was carried over into the primary defect and sutured into place.
Bilobed Flap Text: The defect edges were debeveled with a #15 scalpel blade.  Given the location of the defect and the proximity to free margins a bilobe flap was deemed most appropriate.  Using a sterile surgical marker, an appropriate bilobe flap drawn around the defect.    The area thus outlined was incised deep to adipose tissue with a #15 scalpel blade.  The skin margins were undermined to an appropriate distance in all directions utilizing iris scissors.
Bilobed Transposition Flap Text: The defect edges were debeveled with a #15 scalpel blade.  Given the location of the defect and the proximity to free margins a bilobed transposition flap was deemed most appropriate.  Using a sterile surgical marker, an appropriate bilobe flap drawn around the defect.    The area thus outlined was incised deep to adipose tissue with a #15 scalpel blade.  The skin margins were undermined to an appropriate distance in all directions utilizing iris scissors.
Bi-Rhombic Flap Text: The defect edges were debeveled with a #15 scalpel blade.  Given the location of the defect and the proximity to free margins a bi-rhombic flap was deemed most appropriate.  Using a sterile surgical marker, an appropriate rhombic flap was drawn incorporating the defect. The area thus outlined was incised deep to adipose tissue with a #15 scalpel blade.  The skin margins were undermined to an appropriate distance in all directions utilizing iris scissors.
Burow's Advancement Flap Text: The defect edges were debeveled with a #15 scalpel blade.  Given the location of the defect and the proximity to free margins a Burow's advancement flap was deemed most appropriate.  Using a sterile surgical marker, the appropriate advancement flap was drawn incorporating the defect and placing the expected incisions within the relaxed skin tension lines where possible.    The area thus outlined was incised deep to adipose tissue with a #15 scalpel blade.  The skin margins were undermined to an appropriate distance in all directions utilizing iris scissors.
Chonodrocutaneous Helical Advancement Flap Text: The defect edges were debeveled with a #15 scalpel blade.  Given the location of the defect and the proximity to free margins a chondrocutaneous helical advancement flap was deemed most appropriate.  Using a sterile surgical marker, the appropriate advancement flap was drawn incorporating the defect and placing the expected incisions within the relaxed skin tension lines where possible.    The area thus outlined was incised deep to adipose tissue with a #15 scalpel blade.  The skin margins were undermined to an appropriate distance in all directions utilizing iris scissors.
Crescentic Advancement Flap Text: The defect edges were debeveled with a #15 scalpel blade.  Given the location of the defect and the proximity to free margins a crescentic advancement flap was deemed most appropriate.  Using a sterile surgical marker, the appropriate advancement flap was drawn incorporating the defect and placing the expected incisions within the relaxed skin tension lines where possible.    The area thus outlined was incised deep to adipose tissue with a #15 scalpel blade.  The skin margins were undermined to an appropriate distance in all directions utilizing iris scissors.
Dorsal Nasal Flap Text: The defect edges were debeveled with a #15 scalpel blade.  Given the location of the defect and the proximity to free margins a dorsal nasal flap was deemed most appropriate.  Using a sterile surgical marker, an appropriate dorsal nasal flap was drawn around the defect.    The area thus outlined was incised deep to adipose tissue with a #15 scalpel blade.  The skin margins were undermined to an appropriate distance in all directions utilizing iris scissors.
Double Island Pedicle Flap Text: The defect edges were debeveled with a #15 scalpel blade.  Given the location of the defect, shape of the defect and the proximity to free margins a double island pedicle advancement flap was deemed most appropriate.  Using a sterile surgical marker, an appropriate advancement flap was drawn incorporating the defect, outlining the appropriate donor tissue and placing the expected incisions within the relaxed skin tension lines where possible.    The area thus outlined was incised deep to adipose tissue with a #15 scalpel blade.  The skin margins were undermined to an appropriate distance in all directions around the primary defect and laterally outward around the island pedicle utilizing iris scissors.  There was minimal undermining beneath the pedicle flap.
Double O-Z Flap Text: The defect edges were debeveled with a #15 scalpel blade.  Given the location of the defect, shape of the defect and the proximity to free margins a Double O-Z flap was deemed most appropriate.  Using a sterile surgical marker, an appropriate transposition flap was drawn incorporating the defect and placing the expected incisions within the relaxed skin tension lines where possible. The area thus outlined was incised deep to adipose tissue with a #15 scalpel blade.  The skin margins were undermined to an appropriate distance in all directions utilizing iris scissors.
Double O-Z Plasty Text: The defect edges were debeveled with a #15 scalpel blade.  Given the location of the defect, shape of the defect and the proximity to free margins a Double O-Z plasty (double transposition flap) was deemed most appropriate.  Using a sterile surgical marker, the appropriate transposition flaps were drawn incorporating the defect and placing the expected incisions within the relaxed skin tension lines where possible. The area thus outlined was incised deep to adipose tissue with a #15 scalpel blade.  The skin margins were undermined to an appropriate distance in all directions utilizing iris scissors.  Hemostasis was achieved with electrocautery.  The flaps were then transposed into place, one clockwise and the other counterclockwise, and anchored with interrupted buried subcutaneous sutures.
Double Z Plasty Text: The lesion was extirpated to the level of the fat with a #15 scalpel blade. Given the location of the defect, shape of the defect and the proximity to free margins a double Z-plasty was deemed most appropriate for repair. Using a sterile surgical marker, the appropriate transposition arms of the double Z-plasty were drawn incorporating the defect and placing the expected incisions within the relaxed skin tension lines where possible. The area thus outlined was incised deep to adipose tissue with a #15 scalpel blade. The skin margins were undermined to an appropriate distance in all directions utilizing iris scissors. The opposing transposition arms were then transposed and carried over into place in opposite direction and anchored with interrupted buried subcutaneous sutures.
Ear Star Wedge Flap Text: The defect edges were debeveled with a #15 blade scalpel.  Given the location of the defect and the proximity to free margins (helical rim) an ear star wedge flap was deemed most appropriate.  Using a sterile surgical marker, the appropriate flap was drawn incorporating the defect and placing the expected incisions between the helical rim and antihelix where possible.  The area thus outlined was incised through and through with a #15 scalpel blade.
Flip-Flop Flap Text: The defect edges were debeveled with a #15 blade scalpel.  Given the location of the defect and the proximity to free margins a flip-flop flap was deemed most appropriate. Using a sterile surgical marker, the appropriate flap was drawn incorporating the defect and placing the expected incisions between the helical rim and antihelix where possible.  The area thus outlined was incised through and through with a #15 scalpel blade. Following this, the designed flap was carried over into the primary defect and sutured into place.
Hatchet Flap Text: The defect edges were debeveled with a #15 scalpel blade.  Given the location of the defect, shape of the defect and the proximity to free margins a hatchet flap was deemed most appropriate.  Using a sterile surgical marker, an appropriate hatchet flap was drawn incorporating the defect and placing the expected incisions within the relaxed skin tension lines where possible.    The area thus outlined was incised deep to adipose tissue with a #15 scalpel blade.  The skin margins were undermined to an appropriate distance in all directions utilizing iris scissors.
Helical Rim Advancement Flap Text: The defect edges were debeveled with a #15 blade scalpel.  Given the location of the defect and the proximity to free margins (helical rim) a double helical rim advancement flap was deemed most appropriate.  Using a sterile surgical marker, the appropriate advancement flaps were drawn incorporating the defect and placing the expected incisions between the helical rim and antihelix where possible.  The area thus outlined was incised through and through with a #15 scalpel blade.  With a skin hook and iris scissors, the flaps were gently and sharply undermined and freed up.
H Plasty Text: Given the location of the defect, shape of the defect and the proximity to free margins a H-plasty was deemed most appropriate for repair.  Using a sterile surgical marker, the appropriate advancement arms of the H-plasty were drawn incorporating the defect and placing the expected incisions within the relaxed skin tension lines where possible. The area thus outlined was incised deep to adipose tissue with a #15 scalpel blade. The skin margins were undermined to an appropriate distance in all directions utilizing iris scissors.  The opposing advancement arms were then advanced into place in opposite direction and anchored with interrupted buried subcutaneous sutures.
Island Pedicle Flap Text: The defect edges were debeveled with a #15 scalpel blade.  Given the location of the defect, shape of the defect and the proximity to free margins an island pedicle advancement flap was deemed most appropriate.  Using a sterile surgical marker, an appropriate advancement flap was drawn incorporating the defect, outlining the appropriate donor tissue and placing the expected incisions within the relaxed skin tension lines where possible.    The area thus outlined was incised deep to adipose tissue with a #15 scalpel blade.  The skin margins were undermined to an appropriate distance in all directions around the primary defect and laterally outward around the island pedicle utilizing iris scissors.  There was minimal undermining beneath the pedicle flap.
Island Pedicle Flap With Canthal Suspension Text: The defect edges were debeveled with a #15 scalpel blade.  Given the location of the defect, shape of the defect and the proximity to free margins an island pedicle advancement flap was deemed most appropriate.  Using a sterile surgical marker, an appropriate advancement flap was drawn incorporating the defect, outlining the appropriate donor tissue and placing the expected incisions within the relaxed skin tension lines where possible. The area thus outlined was incised deep to adipose tissue with a #15 scalpel blade.  The skin margins were undermined to an appropriate distance in all directions around the primary defect and laterally outward around the island pedicle utilizing iris scissors.  There was minimal undermining beneath the pedicle flap. A suspension suture was placed in the canthal tendon to prevent tension and prevent ectropion.
Island Pedicle Flap-Requiring Vessel Identification Text: The defect edges were debeveled with a #15 scalpel blade.  Given the location of the defect, shape of the defect and the proximity to free margins an island pedicle advancement flap was deemed most appropriate.  Using a sterile surgical marker, an appropriate advancement flap was drawn, based on the axial vessel mentioned above, incorporating the defect, outlining the appropriate donor tissue and placing the expected incisions within the relaxed skin tension lines where possible.    The area thus outlined was incised deep to adipose tissue with a #15 scalpel blade.  The skin margins were undermined to an appropriate distance in all directions around the primary defect and laterally outward around the island pedicle utilizing iris scissors.  There was minimal undermining beneath the pedicle flap.
Keystone Flap Text: The defect edges were debeveled with a #15 scalpel blade.  Given the location of the defect, shape of the defect a keystone flap was deemed most appropriate.  Using a sterile surgical marker, an appropriate keystone flap was drawn incorporating the defect, outlining the appropriate donor tissue and placing the expected incisions within the relaxed skin tension lines where possible. The area thus outlined was incised deep to adipose tissue with a #15 scalpel blade.  The skin margins were undermined to an appropriate distance in all directions around the primary defect and laterally outward around the flap utilizing iris scissors.
Melolabial Transposition Flap Text: The defect edges were debeveled with a #15 scalpel blade.  Given the location of the defect and the proximity to free margins a melolabial flap was deemed most appropriate.  Using a sterile surgical marker, an appropriate melolabial transposition flap was drawn incorporating the defect.    The area thus outlined was incised deep to adipose tissue with a #15 scalpel blade.  The skin margins were undermined to an appropriate distance in all directions utilizing iris scissors.
Mercedes Flap Text: The defect edges were debeveled with a #15 scalpel blade.  Given the location of the defect, shape of the defect and the proximity to free margins a Mercedes flap was deemed most appropriate.  Using a sterile surgical marker, an appropriate advancement flap was drawn incorporating the defect and placing the expected incisions within the relaxed skin tension lines where possible. The area thus outlined was incised deep to adipose tissue with a #15 scalpel blade.  The skin margins were undermined to an appropriate distance in all directions utilizing iris scissors.
Modified Advancement Flap Text: The defect edges were debeveled with a #15 scalpel blade.  Given the location of the defect, shape of the defect and the proximity to free margins a modified advancement flap was deemed most appropriate.  Using a sterile surgical marker, an appropriate advancement flap was drawn incorporating the defect and placing the expected incisions within the relaxed skin tension lines where possible.    The area thus outlined was incised deep to adipose tissue with a #15 scalpel blade.  The skin margins were undermined to an appropriate distance in all directions utilizing iris scissors.
Mucosal Advancement Flap Text: Given the location of the defect, shape of the defect and the proximity to free margins a mucosal advancement flap was deemed most appropriate. Incisions were made with a 15 blade scalpel in the appropriate fashion along the cutaneous vermilion border and the mucosal lip. The remaining actinically damaged mucosal tissue was excised.  The mucosal advancement flap was then elevated to the gingival sulcus with care taken to preserve the neurovascular structures and advanced into the primary defect. Care was taken to ensure that precise realignment of the vermilion border was achieved.
Muscle Hinge Flap Text: The defect edges were debeveled with a #15 scalpel blade.  Given the size, depth and location of the defect and the proximity to free margins a muscle hinge flap was deemed most appropriate.  Using a sterile surgical marker, an appropriate hinge flap was drawn incorporating the defect. The area thus outlined was incised with a #15 scalpel blade.  The skin margins were undermined to an appropriate distance in all directions utilizing iris scissors.
Mustarde Flap Text: The defect edges were debeveled with a #15 scalpel blade.  Given the size, depth and location of the defect and the proximity to free margins a Mustarde flap was deemed most appropriate.  Using a sterile surgical marker, an appropriate flap was drawn incorporating the defect. The area thus outlined was incised with a #15 scalpel blade.  The skin margins were undermined to an appropriate distance in all directions utilizing iris scissors.
Nasal Turnover Hinge Flap Text: The defect edges were debeveled with a #15 scalpel blade.  Given the size, depth, location of the defect and the defect being full thickness a nasal turnover hinge flap was deemed most appropriate.  Using a sterile surgical marker, an appropriate hinge flap was drawn incorporating the defect. The area thus outlined was incised with a #15 scalpel blade. The flap was designed to recreate the nasal mucosal lining and the alar rim. The skin margins were undermined to an appropriate distance in all directions utilizing iris scissors.
Nasalis-Muscle-Based Myocutaneous Island Pedicle Flap Text: Using a #15 blade, an incision was made around the donor flap to the level of the nasalis muscle. Wide lateral undermining was then performed in both the subcutaneous plane above the nasalis muscle, and in a submuscular plane just above periosteum. This allowed the formation of a free nasalis muscle axial pedicle (based on the angular artery) which was still attached to the actual cutaneous flap, increasing its mobility and vascular viability. Hemostasis was obtained with pinpoint electrocoagulation. The flap was mobilized into position and the pivotal anchor points positioned and stabilized with buried interrupted sutures. Subcutaneous and dermal tissues were closed in a multilayered fashion with sutures. Tissue redundancies were excised, and the epidermal edges were apposed without significant tension and sutured with sutures.
Nasalis Myocutaneous Flap Text: Using a #15 blade, an incision was made around the donor flap to the level of the nasalis muscle. Wide lateral undermining was then performed in both the subcutaneous plane above the nasalis muscle, and in a submuscular plane just above periosteum. This allowed the formation of a free nasalis muscle axial pedicle which was still attached to the actual cutaneous flap, increasing its mobility and vascular viability. Hemostasis was obtained with pinpoint electrocoagulation. The flap was mobilized into position and the pivotal anchor points positioned and stabilized with buried interrupted sutures. Subcutaneous and dermal tissues were closed in a multilayered fashion with sutures. Tissue redundancies were excised, and the epidermal edges were apposed without significant tension and sutured with sutures.
Nasolabial Transposition Flap Text: The defect edges were debeveled with a #15 scalpel blade.  Given the size, depth and location of the defect and the proximity to free margins a nasolabial transposition flap was deemed most appropriate. Using a sterile surgical marker, an appropriate flap was drawn incorporating the defect. The area thus outlined was incised with a #15 scalpel blade. The skin margins were undermined to an appropriate distance in all directions utilizing iris scissors. Following this, the designed flap was carried into the primary defect and sutured into place.
Orbicularis Oris Muscle Flap Text: The defect edges were debeveled with a #15 scalpel blade.  Given that the defect affected the competency of the oral sphincter an orbicularis oris muscle flap was deemed most appropriate to restore this competency and normal muscle function.  Using a sterile surgical marker, an appropriate flap was drawn incorporating the defect. The area thus outlined was incised with a #15 scalpel blade.
O-T Advancement Flap Text: The defect edges were debeveled with a #15 scalpel blade.  Given the location of the defect, shape of the defect and the proximity to free margins an O-T advancement flap was deemed most appropriate.  Using a sterile surgical marker, an appropriate advancement flap was drawn incorporating the defect and placing the expected incisions within the relaxed skin tension lines where possible.    The area thus outlined was incised deep to adipose tissue with a #15 scalpel blade.  The skin margins were undermined to an appropriate distance in all directions utilizing iris scissors.
O-T Plasty Text: The defect edges were debeveled with a #15 scalpel blade.  Given the location of the defect, shape of the defect and the proximity to free margins an O-T plasty was deemed most appropriate.  Using a sterile surgical marker, an appropriate O-T plasty was drawn incorporating the defect and placing the expected incisions within the relaxed skin tension lines where possible.    The area thus outlined was incised deep to adipose tissue with a #15 scalpel blade.  The skin margins were undermined to an appropriate distance in all directions utilizing iris scissors.
O-L Flap Text: The defect edges were debeveled with a #15 scalpel blade.  Given the location of the defect, shape of the defect and the proximity to free margins an O-L flap was deemed most appropriate.  Using a sterile surgical marker, an appropriate advancement flap was drawn incorporating the defect and placing the expected incisions within the relaxed skin tension lines where possible.    The area thus outlined was incised deep to adipose tissue with a #15 scalpel blade.  The skin margins were undermined to an appropriate distance in all directions utilizing iris scissors.
O-Z Flap Text: The defect edges were debeveled with a #15 scalpel blade.  Given the location of the defect, shape of the defect and the proximity to free margins an O-Z flap was deemed most appropriate.  Using a sterile surgical marker, an appropriate transposition flap was drawn incorporating the defect and placing the expected incisions within the relaxed skin tension lines where possible. The area thus outlined was incised deep to adipose tissue with a #15 scalpel blade.  The skin margins were undermined to an appropriate distance in all directions utilizing iris scissors.
O-Z Plasty Text: The defect edges were debeveled with a #15 scalpel blade.  Given the location of the defect, shape of the defect and the proximity to free margins an O-Z plasty (double transposition flap) was deemed most appropriate.  Using a sterile surgical marker, the appropriate transposition flaps were drawn incorporating the defect and placing the expected incisions within the relaxed skin tension lines where possible.    The area thus outlined was incised deep to adipose tissue with a #15 scalpel blade.  The skin margins were undermined to an appropriate distance in all directions utilizing iris scissors.  Hemostasis was achieved with electrocautery.  The flaps were then transposed into place, one clockwise and the other counterclockwise, and anchored with interrupted buried subcutaneous sutures.
Peng Advancement Flap Text: The defect edges were debeveled with a #15 scalpel blade.  Given the location of the defect, shape of the defect and the proximity to free margins a Peng advancement flap was deemed most appropriate.  Using a sterile surgical marker, an appropriate advancement flap was drawn incorporating the defect and placing the expected incisions within the relaxed skin tension lines where possible. The area thus outlined was incised deep to adipose tissue with a #15 scalpel blade.  The skin margins were undermined to an appropriate distance in all directions utilizing iris scissors.
Rectangular Flap Text: The defect edges were debeveled with a #15 scalpel blade. Given the location of the defect and the proximity to free margins a rectangular flap was deemed most appropriate. Using a sterile surgical marker, an appropriate rectangular flap was drawn incorporating the defect. The area thus outlined was incised deep to adipose tissue with a #15 scalpel blade. The skin margins were undermined to an appropriate distance in all directions utilizing iris scissors. Following this, the designed flap was carried over into the primary defect and sutured into place.
Rhombic Flap Text: The defect edges were debeveled with a #15 scalpel blade.  Given the location of the defect and the proximity to free margins a rhombic flap was deemed most appropriate.  Using a sterile surgical marker, an appropriate rhombic flap was drawn incorporating the defect.    The area thus outlined was incised deep to adipose tissue with a #15 scalpel blade.  The skin margins were undermined to an appropriate distance in all directions utilizing iris scissors.
Rhomboid Transposition Flap Text: The defect edges were debeveled with a #15 scalpel blade.  Given the location of the defect and the proximity to free margins a rhomboid transposition flap was deemed most appropriate.  Using a sterile surgical marker, an appropriate rhomboid flap was drawn incorporating the defect.    The area thus outlined was incised deep to adipose tissue with a #15 scalpel blade.  The skin margins were undermined to an appropriate distance in all directions utilizing iris scissors.
Rotation Flap Text: The defect edges were debeveled with a #15 scalpel blade.  Given the location of the defect, shape of the defect and the proximity to free margins a rotation flap was deemed most appropriate.  Using a sterile surgical marker, an appropriate rotation flap was drawn incorporating the defect and placing the expected incisions within the relaxed skin tension lines where possible.    The area thus outlined was incised deep to adipose tissue with a #15 scalpel blade.  The skin margins were undermined to an appropriate distance in all directions utilizing iris scissors.
Spiral Flap Text: The defect edges were debeveled with a #15 scalpel blade.  Given the location of the defect, shape of the defect and the proximity to free margins a spiral flap was deemed most appropriate.  Using a sterile surgical marker, an appropriate rotation flap was drawn incorporating the defect and placing the expected incisions within the relaxed skin tension lines where possible. The area thus outlined was incised deep to adipose tissue with a #15 scalpel blade.  The skin margins were undermined to an appropriate distance in all directions utilizing iris scissors.
Staged Advancement Flap Text: The defect edges were debeveled with a #15 scalpel blade.  Given the location of the defect, shape of the defect and the proximity to free margins a staged advancement flap was deemed most appropriate.  Using a sterile surgical marker, an appropriate advancement flap was drawn incorporating the defect and placing the expected incisions within the relaxed skin tension lines where possible. The area thus outlined was incised deep to adipose tissue with a #15 scalpel blade.  The skin margins were undermined to an appropriate distance in all directions utilizing iris scissors.
Star Wedge Flap Text: The defect edges were debeveled with a #15 scalpel blade.  Given the location of the defect, shape of the defect and the proximity to free margins a star wedge flap was deemed most appropriate.  Using a sterile surgical marker, an appropriate rotation flap was drawn incorporating the defect and placing the expected incisions within the relaxed skin tension lines where possible. The area thus outlined was incised deep to adipose tissue with a #15 scalpel blade.  The skin margins were undermined to an appropriate distance in all directions utilizing iris scissors.
Transposition Flap Text: The defect edges were debeveled with a #15 scalpel blade.  Given the location of the defect and the proximity to free margins a transposition flap was deemed most appropriate.  Using a sterile surgical marker, an appropriate transposition flap was drawn incorporating the defect.    The area thus outlined was incised deep to adipose tissue with a #15 scalpel blade.  The skin margins were undermined to an appropriate distance in all directions utilizing iris scissors.
Trilobed Flap Text: The defect edges were debeveled with a #15 scalpel blade.  Given the location of the defect and the proximity to free margins a trilobed flap was deemed most appropriate.  Using a sterile surgical marker, an appropriate trilobed flap drawn around the defect.    The area thus outlined was incised deep to adipose tissue with a #15 scalpel blade.  The skin margins were undermined to an appropriate distance in all directions utilizing iris scissors.
V-Y Flap Text: The defect edges were debeveled with a #15 scalpel blade.  Given the location of the defect, shape of the defect and the proximity to free margins a V-Y flap was deemed most appropriate.  Using a sterile surgical marker, an appropriate advancement flap was drawn incorporating the defect and placing the expected incisions within the relaxed skin tension lines where possible.    The area thus outlined was incised deep to adipose tissue with a #15 scalpel blade.  The skin margins were undermined to an appropriate distance in all directions utilizing iris scissors.
V-Y Plasty Text: The defect edges were debeveled with a #15 scalpel blade.  Given the location of the defect, shape of the defect and the proximity to free margins an V-Y advancement flap was deemed most appropriate.  Using a sterile surgical marker, an appropriate advancement flap was drawn incorporating the defect and placing the expected incisions within the relaxed skin tension lines where possible.    The area thus outlined was incised deep to adipose tissue with a #15 scalpel blade.  The skin margins were undermined to an appropriate distance in all directions utilizing iris scissors.
W Plasty Text: The lesion was extirpated to the level of the fat with a #15 scalpel blade.  Given the location of the defect, shape of the defect and the proximity to free margins a W-plasty was deemed most appropriate for repair.  Using a sterile surgical marker, the appropriate transposition arms of the W-plasty were drawn incorporating the defect and placing the expected incisions within the relaxed skin tension lines where possible.    The area thus outlined was incised deep to adipose tissue with a #15 scalpel blade.  The skin margins were undermined to an appropriate distance in all directions utilizing iris scissors.  The opposing transposition arms were then transposed into place in opposite direction and anchored with interrupted buried subcutaneous sutures.
Z Plasty Text: The lesion was extirpated to the level of the fat with a #15 scalpel blade.  Given the location of the defect, shape of the defect and the proximity to free margins a Z-plasty was deemed most appropriate for repair.  Using a sterile surgical marker, the appropriate transposition arms of the Z-plasty were drawn incorporating the defect and placing the expected incisions within the relaxed skin tension lines where possible.    The area thus outlined was incised deep to adipose tissue with a #15 scalpel blade.  The skin margins were undermined to an appropriate distance in all directions utilizing iris scissors.  The opposing transposition arms were then transposed into place in opposite direction and anchored with interrupted buried subcutaneous sutures.
Zygomaticofacial Flap Text: Given the location of the defect, shape of the defect and the proximity to free margins a zygomaticofacial flap was deemed most appropriate for repair.  Using a sterile surgical marker, the appropriate flap was drawn incorporating the defect and placing the expected incisions within the relaxed skin tension lines where possible. The area thus outlined was incised deep to adipose tissue with a #15 scalpel blade with preservation of a vascular pedicle.  The skin margins were undermined to an appropriate distance in all directions utilizing iris scissors.  The flap was then placed into the defect and anchored with interrupted buried subcutaneous sutures.
Abbe Flap (Lower To Upper Lip) Text: The defect of the upper lip was assessed and measured.  Given the location and size of the defect, an Abbe flap was deemed most appropriate.  Using a sterile surgical marker, an appropriate Abbe flap was measured and drawn on the lower lip. Local anesthesia was then infiltrated. A scalpel was then used to incise the upper lip through and through the skin, vermilion, muscle and mucosa, leaving the flap pedicled on the opposite side.  The flap was then rotated and transferred to the lower lip defect.  The flap was then sutured into place with a three layer technique, closing the orbicularis oris muscle layer with subcutaneous buried sutures, followed by a mucosal layer and an epidermal layer.
Abbe Flap (Upper To Lower Lip) Text: The defect of the lower lip was assessed and measured.  Given the location and size of the defect, an Abbe flap was deemed most appropriate.  Using a sterile surgical marker, an appropriate Abbe flap was measured and drawn on the upper lip. Local anesthesia was then infiltrated.  A scalpel was then used to incise the upper lip through and through the skin, vermilion, muscle and mucosa, leaving the flap pedicled on the opposite side.  The flap was then rotated and transferred to the lower lip defect.  The flap was then sutured into place with a three layer technique, closing the orbicularis oris muscle layer with subcutaneous buried sutures, followed by a mucosal layer and an epidermal layer.
Cheek Interpolation Flap Text: A decision was made to reconstruct the defect utilizing an interpolation axial flap and a staged reconstruction.  A telfa template was made of the defect.  This telfa template was then used to outline the Cheek Interpolation flap.  The donor area for the pedicle flap was then injected with anesthesia.  The flap was excised through the skin and subcutaneous tissue down to the layer of the underlying musculature.  The interpolation flap was carefully excised within this deep plane to maintain its blood supply.  The edges of the donor site were undermined.   The donor site was closed in a primary fashion.  The pedicle was then rotated into position and sutured.  Once the tube was sutured into place, adequate blood supply was confirmed with blanching and refill.  The pedicle was then wrapped with xeroform gauze and dressed appropriately with a telfa and gauze bandage to ensure continued blood supply and protect the attached pedicle.
Cheek-To-Nose Interpolation Flap Text: A decision was made to reconstruct the defect utilizing an interpolation axial flap and a staged reconstruction.  A telfa template was made of the defect.  This telfa template was then used to outline the Cheek-To-Nose Interpolation flap.  The donor area for the pedicle flap was then injected with anesthesia.  The flap was excised through the skin and subcutaneous tissue down to the layer of the underlying musculature.  The interpolation flap was carefully excised within this deep plane to maintain its blood supply.  The edges of the donor site were undermined.   The donor site was closed in a primary fashion.  The pedicle was then rotated into position and sutured.  Once the tube was sutured into place, adequate blood supply was confirmed with blanching and refill.  The pedicle was then wrapped with xeroform gauze and dressed appropriately with a telfa and gauze bandage to ensure continued blood supply and protect the attached pedicle.
Estlander Flap (Lower To Upper Lip) Text: The defect of the lower lip was assessed and measured.  Given the location and size of the defect, an Estlander flap was deemed most appropriate.  Using a sterile surgical marker, an appropriate Estlander flap was measured and drawn on the upper lip. Local anesthesia was then infiltrated. A scalpel was then used to incise the lateral aspect of the flap, through skin, muscle and mucosa, leaving the flap pedicled medially.  The flap was then rotated and positioned to fill the lower lip defect.  The flap was then sutured into place with a three layer technique, closing the orbicularis oris muscle layer with subcutaneous buried sutures, followed by a mucosal layer and an epidermal layer.
Interpolation Flap Text: A decision was made to reconstruct the defect utilizing an interpolation axial flap and a staged reconstruction.  A telfa template was made of the defect.  This telfa template was then used to outline the interpolation flap.  The donor area for the pedicle flap was then injected with anesthesia.  The flap was excised through the skin and subcutaneous tissue down to the layer of the underlying musculature.  The interpolation flap was carefully excised within this deep plane to maintain its blood supply.  The edges of the donor site were undermined.   The donor site was closed in a primary fashion.  The pedicle was then rotated into position and sutured.  Once the tube was sutured into place, adequate blood supply was confirmed with blanching and refill.  The pedicle was then wrapped with xeroform gauze and dressed appropriately with a telfa and gauze bandage to ensure continued blood supply and protect the attached pedicle.
Melolabial Interpolation Flap Text: A decision was made to reconstruct the defect utilizing an interpolation axial flap and a staged reconstruction.  A telfa template was made of the defect.  This telfa template was then used to outline the melolabial interpolation flap.  The donor area for the pedicle flap was then injected with anesthesia.  The flap was excised through the skin and subcutaneous tissue down to the layer of the underlying musculature.  The pedicle flap was carefully excised within this deep plane to maintain its blood supply.  The edges of the donor site were undermined.   The donor site was closed in a primary fashion.  The pedicle was then rotated into position and sutured.  Once the tube was sutured into place, adequate blood supply was confirmed with blanching and refill.  The pedicle was then wrapped with xeroform gauze and dressed appropriately with a telfa and gauze bandage to ensure continued blood supply and protect the attached pedicle.
Mastoid Interpolation Flap Text: A decision was made to reconstruct the defect utilizing an interpolation axial flap and a staged reconstruction.  A telfa template was made of the defect.  This telfa template was then used to outline the mastoid interpolation flap.  The donor area for the pedicle flap was then injected with anesthesia.  The flap was excised through the skin and subcutaneous tissue down to the layer of the underlying musculature.  The pedicle flap was carefully excised within this deep plane to maintain its blood supply.  The edges of the donor site were undermined.   The donor site was closed in a primary fashion.  The pedicle was then rotated into position and sutured.  Once the tube was sutured into place, adequate blood supply was confirmed with blanching and refill.  The pedicle was then wrapped with xeroform gauze and dressed appropriately with a telfa and gauze bandage to ensure continued blood supply and protect the attached pedicle.
Paramedian Forehead Flap Text: A decision was made to reconstruct the defect utilizing an interpolation axial flap and a staged reconstruction.  A telfa template was made of the defect.  This telfa template was then used to outline the paramedian forehead pedicle flap.  The donor area for the pedicle flap was then injected with anesthesia.  The flap was excised through the skin and subcutaneous tissue down to the layer of the underlying musculature.  The pedicle flap was carefully excised within this deep plane to maintain its blood supply.  The edges of the donor site were undermined.   The donor site was closed in a primary fashion.  The pedicle was then rotated into position and sutured.  Once the tube was sutured into place, adequate blood supply was confirmed with blanching and refill.  The pedicle was then wrapped with xeroform gauze and dressed appropriately with a telfa and gauze bandage to ensure continued blood supply and protect the attached pedicle.
Posterior Auricular Interpolation Flap Text: A decision was made to reconstruct the defect utilizing an interpolation axial flap and a staged reconstruction.  A telfa template was made of the defect.  This telfa template was then used to outline the posterior auricular interpolation flap.  The donor area for the pedicle flap was then injected with anesthesia.  The flap was excised through the skin and subcutaneous tissue down to the layer of the underlying musculature.  The pedicle flap was carefully excised within this deep plane to maintain its blood supply.  The edges of the donor site were undermined.   The donor site was closed in a primary fashion.  The pedicle was then rotated into position and sutured.  Once the tube was sutured into place, adequate blood supply was confirmed with blanching and refill.  The pedicle was then wrapped with xeroform gauze and dressed appropriately with a telfa and gauze bandage to ensure continued blood supply and protect the attached pedicle.
Cheiloplasty (Complex) Text: A decision was made to reconstruct the defect with a  cheiloplasty.  The defect was undermined extensively.  Additional orbicularis oris muscle was excised with a 15 blade scalpel.  The defect was converted into a full thickness wedge to facilite a better cosmetic result.  Small vessels were then tied off with 5-0 monocyrl. The orbicularis oris, superficial fascia, adipose and dermis were then reapproximated.  After the deeper layers were approximated the epidermis was reapproximated with particular care given to realign the vermilion border.
Cheiloplasty (Less Than 50%) Text: A decision was made to reconstruct the defect with a  cheiloplasty.  The defect was undermined extensively.  Additional orbicularis oris muscle was excised with a 15 blade scalpel.  The defect was converted into a full thickness wedge, of less than 50% of the vertical height of the lip, to facilite a better cosmetic result.  Small vessels were then tied off with 5-0 monocyrl. The orbicularis oris, superficial fascia, adipose and dermis were then reapproximated.  After the deeper layers were approximated the epidermis was reapproximated with particular care given to realign the vermilion border.
Ear Wedge Repair Text: A wedge excision was completed by carrying down an excision through the full thickness of the ear and cartilage with an inward facing Burow's triangle. The wound was then closed in a layered fashion.
Full Thickness Lip Wedge Repair (Flap) Text: Given the location of the defect and the proximity to free margins a full thickness wedge repair was deemed most appropriate.  Using a sterile surgical marker, the appropriate repair was drawn incorporating the defect and placing the expected incisions perpendicular to the vermilion border.  The vermilion border was also meticulously outlined to ensure appropriate reapproximation during the repair.  The area thus outlined was incised through and through with a #15 scalpel blade.  The muscularis and dermis were reaproximated with deep sutures following hemostasis. Care was taken to realign the vermilion border before proceeding with the superficial closure.  Once the vermilion was realigned the superfical and mucosal closure was finished.
Burow's Graft Text: The defect edges were debeveled with a #15 scalpel blade.  Given the location of the defect, shape of the defect, the proximity to free margins and the presence of a standing cone deformity a Burow's skin graft was deemed most appropriate. The standing cone was removed and this tissue was then trimmed to the shape of the primary defect. The adipose tissue was also removed until only dermis and epidermis were left.  The skin margins of the secondary defect were undermined to an appropriate distance in all directions utilizing iris scissors.  The secondary defect was closed with interrupted buried subcutaneous sutures.  The skin edges were then re-apposed with running  sutures.  The skin graft was then placed in the primary defect and oriented appropriately.
Cartilage Graft Text: The defect edges were debeveled with a #15 scalpel blade.  Given the location of the defect, shape of the defect, the fact the defect involved a full thickness cartilage defect a cartilage graft was deemed most appropriate.  An appropriate donor site was identified, cleansed, and anesthetized. The cartilage graft was then harvested and transferred to the recipient site, oriented appropriately and then sutured into place.  The secondary defect was then repaired using a primary closure.
Composite Graft Text: The defect edges were debeveled with a #15 scalpel blade.  Given the location of the defect, shape of the defect, the proximity to free margins and the fact the defect was full thickness a composite graft was deemed most appropriate.  The defect was outline and then transferred to the donor site.  A full thickness graft was then excised from the donor site. The graft was then placed in the primary defect, oriented appropriately and then sutured into place.  The secondary defect was then repaired using a primary closure.
Epidermal Autograft Text: The defect edges were debeveled with a #15 scalpel blade.  Given the location of the defect, shape of the defect and the proximity to free margins an epidermal autograft was deemed most appropriate.  Using a sterile surgical marker, the primary defect shape was transferred to the donor site. The epidermal graft was then harvested.  The skin graft was then placed in the primary defect and oriented appropriately.
Dermal Autograft Text: The defect edges were debeveled with a #15 scalpel blade.  Given the location of the defect, shape of the defect and the proximity to free margins a dermal autograft was deemed most appropriate.  Using a sterile surgical marker, the primary defect shape was transferred to the donor site. The area thus outlined was incised deep to adipose tissue with a #15 scalpel blade.  The harvested graft was then trimmed of adipose and epidermal tissue until only dermis was left.  The skin graft was then placed in the primary defect and oriented appropriately.
Ftsg Text: The defect edges were debeveled with a #15 scalpel blade.  Given the location of the defect, shape of the defect and the proximity to free margins a full thickness skin graft was deemed most appropriate.  Using a sterile surgical marker, the primary defect shape was transferred to the donor site. The area thus outlined was incised deep to adipose tissue with a #15 scalpel blade.  The harvested graft was then trimmed of adipose tissue until only dermis and epidermis was left.  The skin margins of the secondary defect were undermined to an appropriate distance in all directions utilizing iris scissors.  The secondary defect was closed with interrupted buried subcutaneous sutures.  The skin edges were then re-apposed with running  sutures.  The skin graft was then placed in the primary defect and oriented appropriately.
Pinch Graft Text: The defect edges were debeveled with a #15 scalpel blade. Given the location of the defect, shape of the defect and the proximity to free margins a pinch graft was deemed most appropriate. Using a sterile surgical marker, the primary defect shape was transferred to the donor site. The area thus outlined was incised deep to adipose tissue with a #15 scalpel blade.  The harvested graft was then trimmed of adipose tissue until only dermis and epidermis was left. The skin margins of the secondary defect were undermined to an appropriate distance in all directions utilizing iris scissors.  The secondary defect was closed with interrupted buried subcutaneous sutures.  The skin edges were then re-apposed with running  sutures.  The skin graft was then placed in the primary defect and oriented appropriately.
Skin Substitute Text: The defect edges were debeveled with a #15 scalpel blade.  Given the location of the defect, shape of the defect and the proximity to free margins a skin substitute graft was deemed most appropriate.  The graft material was trimmed to fit the size of the defect. The graft was then placed in the primary defect and oriented appropriately.
Split-Thickness Skin Graft Text: The defect edges were debeveled with a #15 scalpel blade.  Given the location of the defect, shape of the defect and the proximity to free margins a split thickness skin graft was deemed most appropriate.  Using a sterile surgical marker, the primary defect shape was transferred to the donor site. The split thickness graft was then harvested.  The skin graft was then placed in the primary defect and oriented appropriately.
Tissue Cultured Epidermal Autograft Text: The defect edges were debeveled with a #15 scalpel blade.  Given the location of the defect, shape of the defect and the proximity to free margins a tissue cultured epidermal autograft was deemed most appropriate.  The graft was then trimmed to fit the size of the defect.  The graft was then placed in the primary defect and oriented appropriately.
Xenograft Text: The defect edges were debeveled with a #15 scalpel blade.  Given the location of the defect, shape of the defect and the proximity to free margins a xenograft was deemed most appropriate.  The graft was then trimmed to fit the size of the defect.  The graft was then placed in the primary defect and oriented appropriately.
Complex Repair And Flap Additional Text (Will Appearing After The Standard Complex Repair Text): The complex repair was not sufficient to completely close the primary defect. The remaining additional defect was repaired with the flap mentioned below.
Complex Repair And Graft Additional Text (Will Appearing After The Standard Complex Repair Text): The complex repair was not sufficient to completely close the primary defect. The remaining additional defect was repaired with the graft mentioned below.
Eyelid Full Thickness Repair - 84107: The eyelid defect was full thickness which required a wedge repair of the eyelid. Special care was taken to ensure that the eyelid margin was realligned when placing sutures.
Eyelid Partial Thickness Repair - 54712: The eyelid defect was partial thickness which required a wedge repair of the eyelid. Special care was taken to ensure that the eyelid margin was realligned when placing sutures.
Intermediate Repair And Flap Additional Text (Will Appearing After The Standard Complex Repair Text): The intermediate repair was not sufficient to completely close the primary defect. The remaining additional defect was repaired with the flap mentioned below.
Intermediate Repair And Graft Additional Text (Will Appearing After The Standard Complex Repair Text): The intermediate repair was not sufficient to completely close the primary defect. The remaining additional defect was repaired with the graft mentioned below.
Localized Dermabrasion With 15 Blade Text: The patient was draped in routine manner.  Localized dermabrasion using a 15 blade was performed in routine manner to papillary dermis. This spot dermabrasion is being performed to complete skin cancer reconstruction. It also will eliminate the other sun damaged precancerous cells that are known to be part of the regional effect of a lifetime's worth of sun exposure. This localized dermabrasion is therapeutic and should not be considered cosmetic in any regard.
Localized Dermabrasion With Sand Papertext: The patient was draped in routine manner.  Localized dermabrasion using sterile sand paper was performed in routine manner to papillary dermis. This spot dermabrasion is being performed to complete skin cancer reconstruction. It also will eliminate the other sun damaged precancerous cells that are known to be part of the regional effect of a lifetime's worth of sun exposure. This localized dermabrasion is therapeutic and should not be considered cosmetic in any regard.
Localized Dermabrasion With Wire Brush Text: The patient was draped in routine manner.  Localized dermabrasion using 3 x 17 mm wire brush was performed in routine manner to papillary dermis. This spot dermabrasion is being performed to complete skin cancer reconstruction. It also will eliminate the other sun damaged precancerous cells that are known to be part of the regional effect of a lifetime's worth of sun exposure. This localized dermabrasion is therapeutic and should not be considered cosmetic in any regard.
Purse String (Simple) Text: Given the location of the defect and the characteristics of the surrounding skin a purse string closure was deemed most appropriate.  Undermining was performed circumferentially around the surgical defect.  A purse string suture was then placed and tightened.
Purse String (Intermediate) Text: Given the location of the defect and the characteristics of the surrounding skin a purse string intermediate closure was deemed most appropriate.  Undermining was performed circumferentially around the surgical defect.  A purse string suture was then placed and tightened.
Partial Purse String (Simple) Text: Given the location of the defect and the characteristics of the surrounding skin a simple purse string closure was deemed most appropriate.  Undermining was performed circumferentially around the surgical defect.  A purse string suture was then placed and tightened. Wound tension only allowed a partial closure of the circular defect.
Partial Purse String (Intermediate) Text: Given the location of the defect and the characteristics of the surrounding skin an intermediate purse string closure was deemed most appropriate.  Undermining was performed circumferentially around the surgical defect.  A purse string suture was then placed and tightened. Wound tension only allowed a partial closure of the circular defect.
Tarsorrhaphy Text: A tarsorrhaphy was performed using Frost sutures.
Manual Repair Warning Statement: We plan on removing the manually selected variable below in favor of our much easier automatic structured text blocks found in the previous tab. We decided to do this to help make the flow better and give you the full power of structured data. Manual selection is never going to be ideal in our platform and I would encourage you to avoid using manual selection from this point on, especially since I will be sunsetting this feature. It is important that you do one of two things with the customized text below. First, you can save all of the text in a word file so you can have it for future reference. Second, transfer the text to the appropriate area in the Library tab. Lastly, if there is a flap or graft type which we do not have you need to let us know right away so I can add it in before the variable is hidden. No need to panic, we plan to give you roughly 6 months to make the change.
Same Histology In Subsequent Stages Text: The pattern and morphology of the tumor is as described in the first stage.
No Residual Tumor Seen Histology Text: There were no malignant cells seen in the sections examined.
Inflammation Suggestive Of Cancer Camouflage Histology Text: There was a dense lymphocytic infiltrate which prevented adequate histologic evaluation of adjacent structures.
Incidental Superficial Basal Cell Carcinoma Histology Text: Incidental superficial basaloid proliferation emanating from the epidermis noted
Incidental Squamous Cell Carcinoma In Situ Histology Text: Incidental full thickness keratinocytic atypia of epidermis noted
Incidental Actinic Keratosis Histology Text: Area of epidermal basilar layer atypia, parakeratosis, and solar elastosis
Bcc Histology Text: There were numerous aggregates of basaloid cells.
Bcc Infiltrative Histology Text: There were numerous aggregates of basaloid cells demonstrating an infiltrative pattern.
Bcc Micronodular Histology Text: There were numerous aggregates of small, micronodular basaloid proliferations
Bcc  Morpheaform/Sclerosing Histology Text: There are thin basaloid strands of epithelial cells often seen in a fibrous storm
Bcc  Nodular Histology Text: Nodular aggregates of basaloid proliferations
Bcc Pigmented Histology Text: Aggregates of basaloid proliferations with some pigment
Bcc Superficial Histology Text: superficial basaloid proliferation emanating from the epidermis noted
Mixed Superficial And Nodular Bcc Histology Text: Areas of nodular basaloid proliferations mixed with superficial basaloid proliferations emanating from the epidermis
Mixed Nodular And Infiltrative Bcc Histology Text: Areas of nodular basaloid proliferations mixed with thin strands of basaloid proliferations with an infiltrative growth pattern
Mixed Nodular And Micronodular Bcc Histology Text: Areas of nodular basaloid proliferations mixed with micronodular aggregates of basaloid proliferations
Scc Histology Text: Nests of atypical squamous keratinocytes arising from the epidermis with growth into the dermis
Scc Well Differentiated Histology Text: Nests of well differentiated atypical squamous keratinocytes arising from the epidermis with growth into the dermis
Scc Moderately Differentiated Histology Text: Nests of moderately differentiated atypical squamous keratinocytes arising from the epidermis with growth into the dermis
Scc In Situ Histology Text: Full thickness epidermal squamous atypia noted
Mart-1 - Positive Histology Text: MART-1 staining demonstrates areas of higher density and clustering of melanocytes with Pagetoid spread upwards within the epidermis. The surgical margins are positive for tumor cells.
Mart-1 - Negative Histology Text: MART-1 staining demonstrates a normal density and pattern of melanocytes along the dermal-epidermal junction. The surgical margins are negative for tumor cells.
Information: Selecting Yes will display possible errors in your note based on the variables you have selected. This validation is only offered as a suggestion for you. PLEASE NOTE THAT THE VALIDATION TEXT WILL BE REMOVED WHEN YOU FINALIZE YOUR NOTE. IF YOU WANT TO FAX A PRELIMINARY NOTE YOU WILL NEED TO TOGGLE THIS TO 'NO' IF YOU DO NOT WANT IT IN YOUR FAXED NOTE.
Bill 59 Modifier?: No - Continue to Bill 79 Modifier

## 2024-09-10 ENCOUNTER — APPOINTMENT (RX ONLY)
Dept: URBAN - METROPOLITAN AREA CLINIC 23 | Facility: CLINIC | Age: 64
Setting detail: DERMATOLOGY
End: 2024-09-10

## 2024-09-10 DIAGNOSIS — Z48.01 ENCOUNTER FOR CHANGE OR REMOVAL OF SURGICAL WOUND DRESSING: ICD-10-CM

## 2024-09-10 PROCEDURE — 99024 POSTOP FOLLOW-UP VISIT: CPT

## 2024-09-10 PROCEDURE — ? PRESCRIPTION

## 2024-09-10 PROCEDURE — ? POST-OP WOUND CHECK

## 2024-09-10 RX ORDER — MUPIROCIN 20 MG/G
OINTMENT TOPICAL
Qty: 44 | Refills: 6 | Status: ERX | COMMUNITY
Start: 2024-09-10

## 2024-09-10 RX ADMIN — MUPIROCIN: 20 OINTMENT TOPICAL at 00:00

## 2024-09-10 ASSESSMENT — LOCATION DETAILED DESCRIPTION DERM: LOCATION DETAILED: RIGHT NASAL SIDEWALL

## 2024-09-10 ASSESSMENT — LOCATION ZONE DERM: LOCATION ZONE: NOSE

## 2024-09-10 ASSESSMENT — LOCATION SIMPLE DESCRIPTION DERM: LOCATION SIMPLE: RIGHT NOSE

## 2024-09-10 NOTE — PROCEDURE: POST-OP WOUND CHECK
Detail Level: Detailed
Add 08461 Cpt? (Important Note: In 2017 The Use Of 95497 Is Being Tracked By Cms To Determine Future Global Period Reimbursement For Global Periods): yes

## 2024-10-01 ENCOUNTER — APPOINTMENT (RX ONLY)
Dept: URBAN - METROPOLITAN AREA CLINIC 23 | Facility: CLINIC | Age: 64
Setting detail: DERMATOLOGY
End: 2024-10-01

## 2024-10-01 DIAGNOSIS — Z48.01 ENCOUNTER FOR CHANGE OR REMOVAL OF SURGICAL WOUND DRESSING: ICD-10-CM

## 2024-10-01 PROCEDURE — 99024 POSTOP FOLLOW-UP VISIT: CPT

## 2024-10-01 PROCEDURE — ? POST-OP WOUND CHECK

## 2024-10-01 ASSESSMENT — LOCATION ZONE DERM: LOCATION ZONE: NOSE

## 2024-10-01 ASSESSMENT — LOCATION DETAILED DESCRIPTION DERM: LOCATION DETAILED: RIGHT NASAL SIDEWALL

## 2024-10-01 ASSESSMENT — LOCATION SIMPLE DESCRIPTION DERM: LOCATION SIMPLE: RIGHT NOSE

## 2024-10-01 NOTE — PROCEDURE: POST-OP WOUND CHECK
Detail Level: Detailed
Add 05396 Cpt? (Important Note: In 2017 The Use Of 22764 Is Being Tracked By Cms To Determine Future Global Period Reimbursement For Global Periods): yes

## 2024-10-03 NOTE — PROGRESS NOTES
Tc99m-HDP was administered intravenously. One hour  after administration, anterior, lateral, and oblique planar images were  obtained. Regions of interest were identified in the myocardium and  contralateral chest, and quantitative counts were obtained from these  regions. SPECT imaging was obtained and axial, coronal, and sagittal  tomographs were produced.      FINDINGS:    The image quality is fair.    Morphology:  The heart is normal in size.    Quantitative Uptake:  Mean counts per pixel, myocardium: 45.1  Mean counts per pixel, contralateral side: 35.0  H/CL ratio: 1.28    Qualitative Uptake:  There is equivalent uptake in the myocardium as compared to the ribs,  corresponding to a quantitative score of grade 2    Other Findings:  None    Impression  1. Equivocal for TTR amyloidosis.  Consider further work-up if there is  high clinical suspicion for amyloidosis.        Anton Padilla III, M.D.  [facility]  [phone]    PFTs:        No data to display              No results found for this or any previous visit. No results found for this or any previous visit.    FeNO: No results found for this or any previous visit.  FeNO and Likelihood of Eosinophilic Asthma   Unlikely Intermediate Likely   <25 ppb 25-50 ppb >50ppb     Exercise Oximetry:    Echo: No results found for this or any previous visit from the past 3650 days.    [facility] Reference Info:                                                                                                                  There is no immunization history on file for this patient.  Past Medical History:   Diagnosis Date    Arrhythmia     NSVT taking verapamil for this    Arthritis     generalized    CAD (coronary artery disease) 2017, 10/02/2020    NO MI, NO STENTS NO CABG- heart cath 2020 LAD proximal to mid 20% stenosis, normal LV function per cath dated 10/02/2020; followed by [facility]    Chronic kidney disease     Chronic

## 2024-10-07 ENCOUNTER — OFFICE VISIT (OUTPATIENT)
Dept: PULMONOLOGY | Age: 64
End: 2024-10-07
Payer: MEDICARE

## 2024-10-07 VITALS
HEIGHT: 71 IN | DIASTOLIC BLOOD PRESSURE: 72 MMHG | BODY MASS INDEX: 28.97 KG/M2 | SYSTOLIC BLOOD PRESSURE: 124 MMHG | RESPIRATION RATE: 18 BRPM | TEMPERATURE: 97.2 F | WEIGHT: 206.9 LBS | HEART RATE: 77 BPM | OXYGEN SATURATION: 99 %

## 2024-10-07 DIAGNOSIS — R06.09 DYSPNEA ON EXERTION: ICD-10-CM

## 2024-10-07 DIAGNOSIS — J44.9 COPD, MILD (HCC): Primary | ICD-10-CM

## 2024-10-07 DIAGNOSIS — Z71.85 IMMUNIZATION COUNSELING: ICD-10-CM

## 2024-10-07 PROCEDURE — 3023F SPIROM DOC REV: CPT | Performed by: NURSE PRACTITIONER

## 2024-10-07 PROCEDURE — 99214 OFFICE O/P EST MOD 30 MIN: CPT | Performed by: NURSE PRACTITIONER

## 2024-10-07 PROCEDURE — G2211 COMPLEX E/M VISIT ADD ON: HCPCS | Performed by: NURSE PRACTITIONER

## 2024-10-07 PROCEDURE — G8484 FLU IMMUNIZE NO ADMIN: HCPCS | Performed by: NURSE PRACTITIONER

## 2024-10-07 PROCEDURE — 1036F TOBACCO NON-USER: CPT | Performed by: NURSE PRACTITIONER

## 2024-10-07 PROCEDURE — G8417 CALC BMI ABV UP PARAM F/U: HCPCS | Performed by: NURSE PRACTITIONER

## 2024-10-07 PROCEDURE — G8427 DOCREV CUR MEDS BY ELIG CLIN: HCPCS | Performed by: NURSE PRACTITIONER

## 2024-10-07 PROCEDURE — 3017F COLORECTAL CA SCREEN DOC REV: CPT | Performed by: NURSE PRACTITIONER

## 2024-10-07 RX ORDER — ALBUTEROL SULFATE 90 UG/1
2 INHALANT RESPIRATORY (INHALATION) 4 TIMES DAILY PRN
Qty: 3 EACH | Refills: 3 | Status: SHIPPED | OUTPATIENT
Start: 2024-10-07

## 2024-10-07 NOTE — PATIENT INSTRUCTIONS
I have referred you to pulmonary rehab    Phone:  [phone]    [address], [address] / [place] [id number]

## 2024-12-04 ENCOUNTER — OFFICE VISIT (OUTPATIENT)
Dept: NEUROLOGY | Age: 64
End: 2024-12-04
Payer: MEDICARE

## 2024-12-04 VITALS
BODY MASS INDEX: 28.86 KG/M2 | SYSTOLIC BLOOD PRESSURE: 117 MMHG | HEIGHT: 71 IN | DIASTOLIC BLOOD PRESSURE: 72 MMHG | HEART RATE: 78 BPM

## 2024-12-04 DIAGNOSIS — G56.03 BILATERAL CARPAL TUNNEL SYNDROME: ICD-10-CM

## 2024-12-04 DIAGNOSIS — R25.1 TREMORS OF NERVOUS SYSTEM: Primary | ICD-10-CM

## 2024-12-04 DIAGNOSIS — G31.84 MILD COGNITIVE IMPAIRMENT: ICD-10-CM

## 2024-12-04 DIAGNOSIS — R26.89 ABNORMALITY OF GAIT DUE TO IMPAIRMENT OF BALANCE: ICD-10-CM

## 2024-12-04 PROCEDURE — G8417 CALC BMI ABV UP PARAM F/U: HCPCS | Performed by: PSYCHIATRY & NEUROLOGY

## 2024-12-04 PROCEDURE — 3017F COLORECTAL CA SCREEN DOC REV: CPT | Performed by: PSYCHIATRY & NEUROLOGY

## 2024-12-04 PROCEDURE — 1123F ACP DISCUSS/DSCN MKR DOCD: CPT | Performed by: PSYCHIATRY & NEUROLOGY

## 2024-12-04 PROCEDURE — G8427 DOCREV CUR MEDS BY ELIG CLIN: HCPCS | Performed by: PSYCHIATRY & NEUROLOGY

## 2024-12-04 PROCEDURE — G8484 FLU IMMUNIZE NO ADMIN: HCPCS | Performed by: PSYCHIATRY & NEUROLOGY

## 2024-12-04 PROCEDURE — 1036F TOBACCO NON-USER: CPT | Performed by: PSYCHIATRY & NEUROLOGY

## 2024-12-04 PROCEDURE — 99214 OFFICE O/P EST MOD 30 MIN: CPT | Performed by: PSYCHIATRY & NEUROLOGY

## 2024-12-04 ASSESSMENT — ENCOUNTER SYMPTOMS
CONSTIPATION: 1
COUGH: 0
VOICE CHANGE: 1

## 2024-12-04 NOTE — PROGRESS NOTES
syndrome:   Repeat nerve conduction studies reviewed.  Continue follow-up with hand surgery postoperatively from his left sided carpal tunnel release.      Follow-up in 6 months.       Signature: Inder Clemons MD      Date:  12/4/2024    [address], [place] [address]  Ph: [phone]  Fax: [phone]         I have personally interviewed and examined Mr. Leroy Jimenez and I have personally reviewed all relevant records including labs and imaging as noted above. I have written all aspects of this note. More than 50% of this time was used for counseling regarding my diagnosis, prognosis, and plans for management.   Total visit time: 33 minutes.

## 2024-12-17 SDOH — HEALTH STABILITY: PHYSICAL HEALTH: ON AVERAGE, HOW MANY MINUTES DO YOU ENGAGE IN EXERCISE AT THIS LEVEL?: 10 MIN

## 2024-12-17 SDOH — HEALTH STABILITY: PHYSICAL HEALTH: ON AVERAGE, HOW MANY DAYS PER WEEK DO YOU ENGAGE IN MODERATE TO STRENUOUS EXERCISE (LIKE A BRISK WALK)?: 3 DAYS

## 2024-12-17 ASSESSMENT — PATIENT HEALTH QUESTIONNAIRE - PHQ9
SUM OF ALL RESPONSES TO PHQ QUESTIONS 1-9: 0
2. FEELING DOWN, DEPRESSED OR HOPELESS: NOT AT ALL
SUM OF ALL RESPONSES TO PHQ9 QUESTIONS 1 & 2: 0
1. LITTLE INTEREST OR PLEASURE IN DOING THINGS: NOT AT ALL

## 2024-12-17 ASSESSMENT — LIFESTYLE VARIABLES
HOW MANY STANDARD DRINKS CONTAINING ALCOHOL DO YOU HAVE ON A TYPICAL DAY: PATIENT DOES NOT DRINK
HOW OFTEN DO YOU HAVE A DRINK CONTAINING ALCOHOL: NEVER
HOW OFTEN DO YOU HAVE SIX OR MORE DRINKS ON ONE OCCASION: 1
HOW OFTEN DO YOU HAVE A DRINK CONTAINING ALCOHOL: 1
HOW MANY STANDARD DRINKS CONTAINING ALCOHOL DO YOU HAVE ON A TYPICAL DAY: 0

## 2024-12-20 ENCOUNTER — OFFICE VISIT (OUTPATIENT)
Dept: PRIMARY CARE CLINIC | Facility: CLINIC | Age: 64
End: 2024-12-20

## 2024-12-20 VITALS
SYSTOLIC BLOOD PRESSURE: 126 MMHG | DIASTOLIC BLOOD PRESSURE: 67 MMHG | HEART RATE: 67 BPM | HEIGHT: 71 IN | TEMPERATURE: 98.1 F | WEIGHT: 208 LBS | BODY MASS INDEX: 29.12 KG/M2 | OXYGEN SATURATION: 98 %

## 2024-12-20 DIAGNOSIS — Z13.21 ENCOUNTER FOR VITAMIN DEFICIENCY SCREENING: ICD-10-CM

## 2024-12-20 DIAGNOSIS — R21 RASH: ICD-10-CM

## 2024-12-20 DIAGNOSIS — Z13.21 SCREENING FOR ENDOCRINE, NUTRITIONAL, METABOLIC AND IMMUNITY DISORDER: ICD-10-CM

## 2024-12-20 DIAGNOSIS — Z12.5 SCREENING FOR PROSTATE CANCER: ICD-10-CM

## 2024-12-20 DIAGNOSIS — E55.9 VITAMIN D DEFICIENCY: ICD-10-CM

## 2024-12-20 DIAGNOSIS — Z13.228 SCREENING FOR METABOLIC DISORDER: ICD-10-CM

## 2024-12-20 DIAGNOSIS — Z13.220 SCREENING FOR LIPID DISORDERS: ICD-10-CM

## 2024-12-20 DIAGNOSIS — R53.83 MALAISE AND FATIGUE: ICD-10-CM

## 2024-12-20 DIAGNOSIS — R79.9 ABNORMAL BLOOD CHEMISTRY: ICD-10-CM

## 2024-12-20 DIAGNOSIS — R53.82 CHRONIC FATIGUE: ICD-10-CM

## 2024-12-20 DIAGNOSIS — Z13.228 SCREENING FOR ENDOCRINE, NUTRITIONAL, METABOLIC AND IMMUNITY DISORDER: ICD-10-CM

## 2024-12-20 DIAGNOSIS — Z79.899 ENCOUNTER FOR LONG-TERM (CURRENT) USE OF MEDICATIONS: ICD-10-CM

## 2024-12-20 DIAGNOSIS — Z13.0 SCREENING FOR ENDOCRINE, NUTRITIONAL, METABOLIC AND IMMUNITY DISORDER: ICD-10-CM

## 2024-12-20 DIAGNOSIS — Z00.00 MEDICARE ANNUAL WELLNESS VISIT, SUBSEQUENT: Primary | ICD-10-CM

## 2024-12-20 DIAGNOSIS — R53.81 MALAISE: ICD-10-CM

## 2024-12-20 DIAGNOSIS — R79.89 OTHER SPECIFIED ABNORMAL FINDINGS OF BLOOD CHEMISTRY: ICD-10-CM

## 2024-12-20 DIAGNOSIS — R53.81 MALAISE AND FATIGUE: ICD-10-CM

## 2024-12-20 DIAGNOSIS — Z13.1 SCREENING FOR DIABETES MELLITUS: ICD-10-CM

## 2024-12-20 DIAGNOSIS — R53.83 OTHER FATIGUE: ICD-10-CM

## 2024-12-20 DIAGNOSIS — Z13.29 SCREENING FOR THYROID DISORDER: ICD-10-CM

## 2024-12-20 DIAGNOSIS — Z13.29 SCREENING FOR ENDOCRINE, NUTRITIONAL, METABOLIC AND IMMUNITY DISORDER: ICD-10-CM

## 2024-12-20 DIAGNOSIS — Z13.0 SCREENING FOR DEFICIENCY ANEMIA: ICD-10-CM

## 2024-12-20 DIAGNOSIS — R73.09 OTHER ABNORMAL GLUCOSE: ICD-10-CM

## 2024-12-20 LAB
25(OH)D3 SERPL-MCNC: 28.6 NG/ML (ref 30–100)
ALBUMIN SERPL-MCNC: 3.9 G/DL (ref 3.2–4.6)
ALBUMIN/GLOB SERPL: 1.5 (ref 1–1.9)
ALP SERPL-CCNC: 66 U/L (ref 40–129)
ALT SERPL-CCNC: 24 U/L (ref 8–55)
ANION GAP SERPL CALC-SCNC: 10 MMOL/L (ref 7–16)
AST SERPL-CCNC: 27 U/L (ref 15–37)
BASOPHILS # BLD: 0.1 K/UL (ref 0–0.2)
BASOPHILS NFR BLD: 1 % (ref 0–2)
BILIRUB SERPL-MCNC: 0.7 MG/DL (ref 0–1.2)
BUN SERPL-MCNC: 18 MG/DL (ref 8–23)
CALCIUM SERPL-MCNC: 9.5 MG/DL (ref 8.8–10.2)
CHLORIDE SERPL-SCNC: 104 MMOL/L (ref 98–107)
CHOLEST SERPL-MCNC: 108 MG/DL (ref 0–200)
CO2 SERPL-SCNC: 29 MMOL/L (ref 20–29)
CREAT SERPL-MCNC: 1.55 MG/DL (ref 0.8–1.3)
DIFFERENTIAL METHOD BLD: NORMAL
EOSINOPHIL # BLD: 0.3 K/UL (ref 0–0.8)
EOSINOPHIL NFR BLD: 5 % (ref 0.5–7.8)
ERYTHROCYTE [DISTWIDTH] IN BLOOD BY AUTOMATED COUNT: 13.7 % (ref 11.9–14.6)
EST. AVERAGE GLUCOSE BLD GHB EST-MCNC: 111 MG/DL
GLOBULIN SER CALC-MCNC: 2.6 G/DL (ref 2.3–3.5)
GLUCOSE SERPL-MCNC: 69 MG/DL (ref 70–99)
HBA1C MFR BLD: 5.5 % (ref 0–5.6)
HCT VFR BLD AUTO: 43.6 % (ref 41.1–50.3)
HDLC SERPL-MCNC: 37 MG/DL (ref 40–60)
HDLC SERPL: 3 (ref 0–5)
HGB BLD-MCNC: 13.8 G/DL (ref 13.6–17.2)
IMM GRANULOCYTES # BLD AUTO: 0.1 K/UL (ref 0–0.5)
IMM GRANULOCYTES NFR BLD AUTO: 1 % (ref 0–5)
LDLC SERPL CALC-MCNC: 53 MG/DL (ref 0–100)
LYMPHOCYTES # BLD: 0.9 K/UL (ref 0.5–4.6)
LYMPHOCYTES NFR BLD: 13 % (ref 13–44)
MCH RBC QN AUTO: 28 PG (ref 26.1–32.9)
MCHC RBC AUTO-ENTMCNC: 31.7 G/DL (ref 31.4–35)
MCV RBC AUTO: 88.6 FL (ref 82–102)
MONOCYTES # BLD: 0.6 K/UL (ref 0.1–1.3)
MONOCYTES NFR BLD: 9 % (ref 4–12)
NEUTS SEG # BLD: 4.5 K/UL (ref 1.7–8.2)
NEUTS SEG NFR BLD: 71 % (ref 43–78)
NRBC # BLD: 0 K/UL (ref 0–0.2)
PLATELET # BLD AUTO: 154 K/UL (ref 150–450)
PMV BLD AUTO: 11 FL (ref 9.4–12.3)
POTASSIUM SERPL-SCNC: 3.6 MMOL/L (ref 3.5–5.1)
PROT SERPL-MCNC: 6.4 G/DL (ref 6.3–8.2)
PSA SERPL-MCNC: 1.7 NG/ML (ref 0–4)
RBC # BLD AUTO: 4.92 M/UL (ref 4.23–5.6)
SODIUM SERPL-SCNC: 142 MMOL/L (ref 136–145)
TRIGL SERPL-MCNC: 91 MG/DL (ref 0–150)
TSH W FREE THYROID IF ABNORMAL: 3.05 UIU/ML (ref 0.27–4.2)
VIT B12 SERPL-MCNC: 313 PG/ML (ref 193–986)
VLDLC SERPL CALC-MCNC: 18 MG/DL (ref 6–23)
WBC # BLD AUTO: 6.4 K/UL (ref 4.3–11.1)

## 2024-12-20 RX ORDER — NYSTATIN AND TRIAMCINOLONE ACETONIDE 100000; 1 [USP'U]/G; MG/G
CREAM TOPICAL
Qty: 30 G | Refills: 1 | Status: SHIPPED | OUTPATIENT
Start: 2024-12-20

## 2024-12-20 ASSESSMENT — ENCOUNTER SYMPTOMS
ABDOMINAL DISTENTION: 0
EYES NEGATIVE: 1
ABDOMINAL PAIN: 0
RESPIRATORY NEGATIVE: 1
ALLERGIC/IMMUNOLOGIC NEGATIVE: 1

## 2024-12-20 NOTE — PROGRESS NOTES
[facility] PHYSICIAN SERVICES  [facility] PRIMARY CARE  [address] [place] [address] [address] [place] [id number]  Dept: [phone]       Patient: Mal Jimenez  YOB: 1960  Patient Age: 64 y.o.  Patient Sex: male  Medical Record: [id number]  Visit Date: 12/20/2024    Family Practice Clinic Note    Chief Complaint   Patient presents with    Medicare AWV     Presents today for AWV, no refills or concerns today. Labs are due        HPI     History of Present Illness  The patient presents for evaluation of a spot on the back of his leg.    Spot on Posterior Leg  - Reports a spot on the posterior leg with pruritus during winter, characterized by dry skin.  - The condition began 6 weeks ago with intermittent itching.  - Unsure if the spot is from scratching or potential ringworm exposure.    Previous Basal Cell Carcinoma  - Had a spot removed from his nose, diagnosed as basal cell carcinoma.    Supplemental information: He is due for lab work, last done in August 2023. He is under the care of neurology, pulmonology, cardiology, urology, orthopedics, ENT, and [facility]. No new allergies reported.    ALLERGIES  No known allergies.    Results      Allergies   Allergen Reactions    Latex Rash     Other reaction(s): Rash-Allergy    Iv Dye [Iodides] Hives and Itching       Current Outpatient Medications   Medication Sig Dispense Refill    nystatin-triamcinolone (MYCOLOG II) 653714-9.1 UNIT/GM-% cream Apply topically 2 times daily. 30 g 1    albuterol sulfate HFA (VENTOLIN HFA) 108 (90 Base) MCG/ACT inhaler Inhale 2 puffs into the lungs 4 times daily as needed for Wheezing or Shortness of Breath Dx code j44.9 3 each 3    omeprazole (PRILOSEC) 40 MG delayed release capsule Take 1 capsule by mouth every morning (before breakfast) 90 capsule 1    losartan-hydroCHLOROthiazide (HYZAAR) 100-25 MG per tablet Take 1 tablet by mouth daily 90 tablet 1    verapamil (CALAN SR) 240 MG extended release tablet Take 1

## 2024-12-23 NOTE — RESULT ENCOUNTER NOTE
Please let the patient know:    64-year-old male with complex medical history including:  · Pulmonary: COPD, AJAY with nocturnal hypoxemia.  · ENT: Nasal obstruction with deviated septum, hypertrophic turbinates.  · Neurological: Parkinson's disease, ulnar nerve entrapment, cervical radiculopathy, carpal tunnel syndrome, sciatica, and generalized tremors.  · Musculoskeletal: Lumbar herniated disc, osteoarthritis, and trigger finger.  · Renal: Chronic kidney disease stage III.  · Digestive: GERD with esophagitis, liver cyst.  · Other: Memory changes, paresthesia of feet, mild mental slowing.    Key medications: Albuterol, omeprazole, losartan-HCTZ, verapamil, atorvastatin, tadalafil, aspirin, and supplements.    Lab Results and Interpretation:    Blood Count and Electrolytes:  · All within normal limits.  · No signs of anemia, infection, or electrolyte imbalance.    Kidney Function:  · Creatinine: 1.55 (H)  · eGFR: 49 (L) - Reflects stage III chronic kidney disease.  Recommendations:  · Maintain hydration. Avoid NSAIDs (like ibuprofen).  · Monitor kidney function every 3-6 months.    Lipid Panel:  · HDL (\"good cholesterol\"): 37 (L).  · Other values: Total cholesterol, LDL, and triglycerides are well-controlled, likely due to atorvastatin.  Recommendations:  · Increase physical activity (e.g., walking, swimming).  · Consider dietary adjustments to include heart-healthy fats (e.g., nuts, seeds, avocado).    Vitamin Levels:  · Vitamin D: 28.6 (L) - Slightly low.  · Vitamin B12: 313 - Within low-normal range.  Recommendations:  · Increase Vitamin D intake through supplements (e.g., 2,000 IU daily) and fortified foods.  · Reassess Vitamin B12 annually or sooner if symptoms of deficiency arise.    Glucose Control:  · Fasting glucose: 69 (L) - A touch low; no immediate concern.  · Hemoglobin A1C: 5.5 - Excellent control; no diabetes.  Recommendations:  · Maintain current diet.    Other Results:  · PSA (Prostate): Normal at

## 2025-02-18 ENCOUNTER — OFFICE VISIT (OUTPATIENT)
Dept: ENT CLINIC | Age: 65
End: 2025-02-18
Payer: MEDICARE

## 2025-02-18 ENCOUNTER — OFFICE VISIT (OUTPATIENT)
Dept: AUDIOLOGY | Age: 65
End: 2025-02-18
Payer: MEDICARE

## 2025-02-18 VITALS — HEIGHT: 71 IN | WEIGHT: 214.2 LBS | BODY MASS INDEX: 29.99 KG/M2

## 2025-02-18 DIAGNOSIS — H90.3 SENSORINEURAL HEARING LOSS, BILATERAL: Primary | ICD-10-CM

## 2025-02-18 DIAGNOSIS — J34.89 NASAL SEPTAL PERFORATION: Chronic | ICD-10-CM

## 2025-02-18 DIAGNOSIS — H90.3 SENSORINEURAL HEARING LOSS (SNHL) OF BOTH EARS: Primary | Chronic | ICD-10-CM

## 2025-02-18 PROCEDURE — 99214 OFFICE O/P EST MOD 30 MIN: CPT | Performed by: PHYSICIAN ASSISTANT

## 2025-02-18 PROCEDURE — 92557 COMPREHENSIVE HEARING TEST: CPT | Performed by: AUDIOLOGIST

## 2025-02-18 PROCEDURE — 1123F ACP DISCUSS/DSCN MKR DOCD: CPT | Performed by: PHYSICIAN ASSISTANT

## 2025-02-18 PROCEDURE — 92567 TYMPANOMETRY: CPT | Performed by: AUDIOLOGIST

## 2025-02-18 ASSESSMENT — ENCOUNTER SYMPTOMS
RESPIRATORY NEGATIVE: 1
ALLERGIC/IMMUNOLOGIC NEGATIVE: 1
GASTROINTESTINAL NEGATIVE: 1
EYES NEGATIVE: 1

## 2025-02-18 NOTE — PROGRESS NOTES
AUDIOLOGY EVALUATION    Mal Jimenez had Tympanometry and Audiometry performed today.    The patient reports hearing loss.     Results as follows:    Tympanometry    Type A -  bilaterally    Audiometry    Test Performed - Comprehensive Audiogram    Type of Loss - Right Ear: abnormal hearing: degree of loss is normal to moderately severe sensorineural hearing loss                           Left Ear: abnormal hearing: degree of loss is normal to moderately severe sensorineural hearing loss     SRT   Measurement Right Ear Left Ear   Value 15 15   Unit dB dB     Discrimination  Measurement Right Ear Left Ear   Value 92% 92%   Unit dB dB     Recommend  Binaural amplification and annual audios    Lynn Ruiz [facility]-A  Audiologist

## 2025-02-18 NOTE — PROGRESS NOTES
History of Present Illness  The patient is a 64-year-old male returning today for his annual audiogram.    He reports no significant change in his hearing compared to the previous year. He expresses concern about potential worsening of his hearing, as he has been experiencing difficulty distinguishing voices, particularly in high-frequency ranges. He also mentions that his wife has been raising her voice more frequently. He does not report any other ear-related issues. Additionally, he inquires about the possibility of using rechargeable batteries for his hearing aids, given his hobbies involve woodworking and machine use.        Chief Complaint   Patient presents with    Follow-up     1 year with audio.  Feels like hearing has gotten a little worse over the year.  Denies pain, drainage, and itching.  Denies tinnitus.  No other complaints at this time.         Patient Active Problem List   Diagnosis    Tremors of nervous system    Parkinson's disease (HCC)    Lumbar herniated disc    Nocturnal hypoxemia    AJAY (obstructive sleep apnea)    Cervical radiculopathy    Trigger finger, left middle finger    Right carpal tunnel syndrome    Snoring    Osteoarthritis of right hip    OA (osteoarthritis) of hip    Generalized hyperreflexia    Cervical spinal stenosis    Shortness of breath on exertion    Chronic renal disease, stage III (HCC)    Ulnar nerve entrapment at elbow, right    Chronic obstructive pulmonary disease (HCC)    Chronic mouth breathing    Gastroesophageal reflux disease with esophagitis without hemorrhage    Deviated nasal septum    Hypertrophy of both inferior nasal turbinates    Refractory obstruction of nasal airway    Mild mental slowing    Memory changes    Liver cyst    Paresthesia of both feet    Sciatica, left side    Carpal tunnel syndrome on left    Acquired trigger finger of left middle finger        Reviewed and updated this visit by provider:  Tobacco  Allergies  Meds  Problems  Med Hx

## 2025-02-19 ENCOUNTER — APPOINTMENT (OUTPATIENT)
Dept: URBAN - METROPOLITAN AREA CLINIC 25 | Facility: CLINIC | Age: 65
Setting detail: DERMATOLOGY
End: 2025-02-19

## 2025-02-19 DIAGNOSIS — L20.89 OTHER ATOPIC DERMATITIS: ICD-10-CM | Status: INADEQUATELY CONTROLLED

## 2025-02-19 DIAGNOSIS — L82.1 OTHER SEBORRHEIC KERATOSIS: ICD-10-CM

## 2025-02-19 DIAGNOSIS — L57.8 OTHER SKIN CHANGES DUE TO CHRONIC EXPOSURE TO NONIONIZING RADIATION: ICD-10-CM

## 2025-02-19 DIAGNOSIS — L72.8 OTHER FOLLICULAR CYSTS OF THE SKIN AND SUBCUTANEOUS TISSUE: ICD-10-CM

## 2025-02-19 DIAGNOSIS — L73.8 OTHER SPECIFIED FOLLICULAR DISORDERS: ICD-10-CM

## 2025-02-19 DIAGNOSIS — D22 MELANOCYTIC NEVI: ICD-10-CM

## 2025-02-19 DIAGNOSIS — Z85.828 PERSONAL HISTORY OF OTHER MALIGNANT NEOPLASM OF SKIN: ICD-10-CM

## 2025-02-19 PROBLEM — D22.5 MELANOCYTIC NEVI OF TRUNK: Status: ACTIVE | Noted: 2025-02-19

## 2025-02-19 PROCEDURE — ? PATIENT SPECIFIC COUNSELING

## 2025-02-19 PROCEDURE — ? PRESCRIPTION MEDICATION MANAGEMENT

## 2025-02-19 PROCEDURE — ? SUNSCREEN RECOMMENDATIONS

## 2025-02-19 PROCEDURE — ? PRESCRIPTION

## 2025-02-19 PROCEDURE — ? RECOMMENDATIONS

## 2025-02-19 PROCEDURE — ? PHOTO-DOCUMENTATION

## 2025-02-19 PROCEDURE — ? COUNSELING

## 2025-02-19 PROCEDURE — 99214 OFFICE O/P EST MOD 30 MIN: CPT

## 2025-02-19 RX ORDER — TRIAMCINOLONE ACETONIDE 1 MG/G
CREAM TOPICAL
Qty: 454 | Refills: 5 | Status: ERX | COMMUNITY
Start: 2025-02-19

## 2025-02-19 RX ADMIN — TRIAMCINOLONE ACETONIDE: 1 CREAM TOPICAL at 00:00

## 2025-02-19 ASSESSMENT — LOCATION DETAILED DESCRIPTION DERM
LOCATION DETAILED: RIGHT MEDIAL TRAPEZIAL NECK
LOCATION DETAILED: LEFT PROXIMAL PRETIBIAL REGION
LOCATION DETAILED: RIGHT INFERIOR MEDIAL UPPER BACK
LOCATION DETAILED: RIGHT SUPERIOR MEDIAL FOREHEAD
LOCATION DETAILED: RIGHT ANTERIOR DISTAL THIGH
LOCATION DETAILED: LEFT DORSAL SHAFT OF PENIS
LOCATION DETAILED: LEFT RADIAL DORSAL HAND
LOCATION DETAILED: RIGHT ULNAR DORSAL HAND
LOCATION DETAILED: RIGHT VENTRAL DISTAL FOREARM
LOCATION DETAILED: RIGHT NASAL SIDEWALL
LOCATION DETAILED: LEFT MEDIAL FOREHEAD
LOCATION DETAILED: LEFT VENTRAL DISTAL FOREARM
LOCATION DETAILED: RIGHT MID-UPPER BACK

## 2025-02-19 ASSESSMENT — LOCATION ZONE DERM
LOCATION ZONE: HAND
LOCATION ZONE: NECK
LOCATION ZONE: TRUNK
LOCATION ZONE: ARM
LOCATION ZONE: NOSE
LOCATION ZONE: LEG
LOCATION ZONE: PENIS
LOCATION ZONE: FACE

## 2025-02-19 ASSESSMENT — LOCATION SIMPLE DESCRIPTION DERM
LOCATION SIMPLE: POSTERIOR NECK
LOCATION SIMPLE: LEFT FOREHEAD
LOCATION SIMPLE: LEFT HAND
LOCATION SIMPLE: RIGHT FOREHEAD
LOCATION SIMPLE: RIGHT FOREARM
LOCATION SIMPLE: PENIS
LOCATION SIMPLE: RIGHT THIGH
LOCATION SIMPLE: RIGHT NOSE
LOCATION SIMPLE: RIGHT HAND
LOCATION SIMPLE: RIGHT UPPER BACK
LOCATION SIMPLE: LEFT PRETIBIAL REGION
LOCATION SIMPLE: LEFT FOREARM

## 2025-02-19 ASSESSMENT — ITCH NUMERIC RATING SCALE: HOW SEVERE IS YOUR ITCHING?: 4

## 2025-02-19 NOTE — PROCEDURE: SUNSCREEN RECOMMENDATIONS
General Sunscreen Counseling: I recommended a broad spectrum sunscreen with a SPF of 30 or higher.  I explained that SPF 30 sunscreens block approximately 97 percent of the sun's harmful rays.  Sunscreens should be applied at least 15 minutes prior to expected sun exposure and then every 2 hours after that as long as sun exposure continues. If swimming or exercising sunscreen should be reapplied every 45 minutes to an hour after getting wet or sweating.  One ounce, or the equivalent of a shot glass full of sunscreen, is adequate to protect the skin not covered by a bathing suit. I also recommended a lip balm with a sunscreen as well. Sun protective clothing can be used in lieu of sunscreen but must be worn the entire time you are exposed to the sun's rays.
Detail Level: Detailed
Products Recommended: All sunscreens sold over the counter are FDA-approved and will work well if applied as described above. The following are products that I like for various areas of the body:\\nFace:\\n- Curated Protect SPF40 sunscreen with CE Ferulic\\n- SkinCeuticals Physical Fusion UV Defense SPF 50\\n- EltaMD UV Clear Broad-Spectrum SPF 46\\n- Revision Intellishade SPF 45\\n\\nBody: \\n- EltaMD UV Spray Broad-Spectrum SPF 46\\n- EltaMD UV Pure Broad-Spectrum SPF 47\\n- EltaMD UV Sport Broad-Spectrum SPF 50\\n- Neutrogena Ultra Sheer Face & Body Sunscreen Stick SPF 70\\n- Neutrogena Sheer Zinc Broad-Spectrum SPF 50\\n\\nLips:\\n- Aquaphor Lip Protectant + Sunscreen SPF 30\\n- EltaMD UV Lip Arabi Broad-Spectrum SPF 36\\n\\nScalp:\\n- Supergoop Poof Part Powder SPF 50

## 2025-02-19 NOTE — PROCEDURE: PATIENT SPECIFIC COUNSELING
Discussed if becomes bothersome consistently discussed referring to urologist for surgical removal
Detail Level: Zone
Discontinue using Dial soap

## 2025-02-19 NOTE — PROCEDURE: RECOMMENDATIONS
Recommendation Preamble: The following recommendations were made during the visit: Vanicream Products and OTC Zyrtec
Render Risk Assessment In Note?: no
Detail Level: Zone

## 2025-02-19 NOTE — PROCEDURE: PRESCRIPTION MEDICATION MANAGEMENT
There are no Wet Read(s) to document.
Detail Level: Zone
Render In Strict Bullet Format?: No
Initiate Treatment: triamcinolone 0.1% cream twice daily
There is 1 Wet Read(s) to document.

## 2025-04-16 ENCOUNTER — OFFICE VISIT (OUTPATIENT)
Dept: PULMONOLOGY | Age: 65
End: 2025-04-16
Payer: MEDICARE

## 2025-04-16 VITALS
OXYGEN SATURATION: 99 % | TEMPERATURE: 97 F | HEIGHT: 70 IN | HEART RATE: 77 BPM | DIASTOLIC BLOOD PRESSURE: 76 MMHG | SYSTOLIC BLOOD PRESSURE: 130 MMHG | WEIGHT: 213.4 LBS | RESPIRATION RATE: 18 BRPM | BODY MASS INDEX: 30.55 KG/M2

## 2025-04-16 DIAGNOSIS — J44.9 COPD, MILD (HCC): Primary | ICD-10-CM

## 2025-04-16 DIAGNOSIS — M25.50 ARTHRALGIA, UNSPECIFIED JOINT: ICD-10-CM

## 2025-04-16 DIAGNOSIS — R06.09 DYSPNEA ON EXERTION: ICD-10-CM

## 2025-04-16 DIAGNOSIS — R09.89 BIBASILAR CRACKLES: ICD-10-CM

## 2025-04-16 LAB
CRP SERPL-MCNC: 0.4 MG/DL (ref 0–0.4)
ERYTHROCYTE [SEDIMENTATION RATE] IN BLOOD: <1 MM/HR

## 2025-04-16 PROCEDURE — G2211 COMPLEX E/M VISIT ADD ON: HCPCS | Performed by: NURSE PRACTITIONER

## 2025-04-16 PROCEDURE — 99214 OFFICE O/P EST MOD 30 MIN: CPT | Performed by: NURSE PRACTITIONER

## 2025-04-16 PROCEDURE — 1123F ACP DISCUSS/DSCN MKR DOCD: CPT | Performed by: NURSE PRACTITIONER

## 2025-04-16 RX ORDER — TRIAMCINOLONE ACETONIDE 1 MG/G
CREAM TOPICAL
COMMUNITY
Start: 2025-04-11

## 2025-04-16 RX ORDER — ALBUTEROL SULFATE 90 UG/1
2 INHALANT RESPIRATORY (INHALATION) 4 TIMES DAILY PRN
Qty: 3 EACH | Refills: 3 | Status: SHIPPED | OUTPATIENT
Start: 2025-04-16

## 2025-04-16 NOTE — PROGRESS NOTES
Name:  Mal Jimenez  YOB: 1960   MRN: [id number]      Office Visit: 4/16/2025      The patient (or guardian, if applicable) and other individuals in attendance with the patient were advised that Artificial Intelligence will be utilized during this visit to record, process the conversation to generate a clinical note, and support improvement of the AI technology. The patient (or guardian, if applicable) and other individuals in attendance at the appointment consented to the use of AI, including the recording.         Assessment & Plan (Medical Decision Making)      Impression: 64 y.o. male     Assessment & Plan  1. Chronic obstructive pulmonary disease (COPD).  He presents with mild COPD, as evidenced by previous pulmonary function tests. Despite trials of various inhalers, he did not perceive any significant benefit and has been managing his condition with albuterol on an as-needed basis.  A refill prescription for albuterol will be sent to [facility], with instructions to use it up to 4 times daily if necessary.  - albuterol sulfate HFA (VENTOLIN HFA) 108 (90 Base) MCG/ACT inhaler; Inhale 2 puffs into the lungs 4 times daily as needed for Wheezing or Shortness of Breath Dx code j44.9  Dispense: 3 each; Refill: 3    2. DICKEY   His shortness of breath on exertion is likely due to decreased respiratory muscle strength. He is advised to continue using the breather device twice daily, performing 10 repetitions in the morning and evening. A gentle weight loss regimen is also recommended to help improve his shortness of breath.  A repeat of the complete pulmonary function tests will be conducted.   - albuterol sulfate HFA (VENTOLIN HFA) 108 (90 Base) MCG/ACT inhaler; Inhale 2 puffs into the lungs 4 times daily as needed for Wheezing or Shortness of Breath Dx code j44.9  Dispense: 3 each; Refill: 3  - CT CHEST HIGH RESOLUTION; Future  - AMB POC PLETHYSMOGRAPHY LUNG VOLUMES W/WO AIRWAY RESIST;

## 2025-04-16 NOTE — PATIENT INSTRUCTIONS
I have ordered CT of chest.  You should receive a call from scheduling within the next week.  If you do not hear from them, please call [phone] to schedule your test.

## 2025-04-17 LAB — RHEUMATOID FACT SER QL LA: NEGATIVE

## 2025-04-18 ENCOUNTER — RESULTS FOLLOW-UP (OUTPATIENT)
Dept: PULMONOLOGY | Age: 65
End: 2025-04-18

## 2025-04-18 LAB — ANA SER QL: NEGATIVE

## 2025-04-18 NOTE — RESULT ENCOUNTER NOTE
Normal lab work.  Good news.  Will wait on CT scan and breathing tests to make further recommendations.  Message sent to patient via [employer].

## 2025-04-19 DIAGNOSIS — K21.00 GASTROESOPHAGEAL REFLUX DISEASE WITH ESOPHAGITIS WITHOUT HEMORRHAGE: ICD-10-CM

## 2025-04-19 RX ORDER — OMEPRAZOLE 40 MG/1
40 CAPSULE, DELAYED RELEASE ORAL
Qty: 90 CAPSULE | Refills: 1 | Status: SHIPPED | OUTPATIENT
Start: 2025-04-19

## 2025-04-23 ENCOUNTER — HOSPITAL ENCOUNTER (OUTPATIENT)
Dept: CT IMAGING | Age: 65
Discharge: HOME | End: 2025-04-25
Payer: MEDICARE

## 2025-04-23 DIAGNOSIS — R09.89 BIBASILAR CRACKLES: ICD-10-CM

## 2025-04-23 DIAGNOSIS — R06.09 DYSPNEA ON EXERTION: ICD-10-CM

## 2025-04-23 PROCEDURE — 71250 CT THORAX DX C-: CPT

## 2025-04-24 ENCOUNTER — RESULTS FOLLOW-UP (OUTPATIENT)
Dept: PULMONOLOGY | Age: 65
End: 2025-04-24

## 2025-04-28 NOTE — TELEPHONE ENCOUNTER
----- Message from DEB Francois CNP sent at 4/24/2025  1:14 PM EDT -----  Please let patient know the good news–his lab work and CT scan are normal–no worrisome findings.

## 2025-05-20 ENCOUNTER — CLINICAL SUPPORT (OUTPATIENT)
Dept: PULMONOLOGY | Age: 65
End: 2025-05-20
Payer: MEDICARE

## 2025-05-20 DIAGNOSIS — R06.09 DYSPNEA ON EXERTION: Primary | ICD-10-CM

## 2025-05-20 LAB
FEF25-27, POC: 1.43 L/S
FET, POC: NORMAL
FEV 1 , POC: 2.59 L
FEV1/FVC, POC: NORMAL
FVC, POC: NORMAL
LUNG AGE, POC: NORMAL
PEF, POC: 5.55 L/S

## 2025-05-20 PROCEDURE — 94010 BREATHING CAPACITY TEST: CPT | Performed by: STUDENT IN AN ORGANIZED HEALTH CARE EDUCATION/TRAINING PROGRAM

## 2025-05-20 PROCEDURE — 94729 DIFFUSING CAPACITY: CPT | Performed by: STUDENT IN AN ORGANIZED HEALTH CARE EDUCATION/TRAINING PROGRAM

## 2025-05-20 PROCEDURE — 94726 PLETHYSMOGRAPHY LUNG VOLUMES: CPT | Performed by: STUDENT IN AN ORGANIZED HEALTH CARE EDUCATION/TRAINING PROGRAM

## 2025-06-09 ENCOUNTER — OFFICE VISIT (OUTPATIENT)
Dept: NEUROLOGY | Age: 65
End: 2025-06-09
Payer: MEDICARE

## 2025-06-09 VITALS
HEART RATE: 61 BPM | HEIGHT: 70 IN | SYSTOLIC BLOOD PRESSURE: 130 MMHG | DIASTOLIC BLOOD PRESSURE: 75 MMHG | BODY MASS INDEX: 30.62 KG/M2

## 2025-06-09 DIAGNOSIS — R26.89 ABNORMALITY OF GAIT DUE TO IMPAIRMENT OF BALANCE: ICD-10-CM

## 2025-06-09 DIAGNOSIS — G31.84 MILD COGNITIVE IMPAIRMENT: ICD-10-CM

## 2025-06-09 DIAGNOSIS — R25.1 TREMORS OF NERVOUS SYSTEM: Primary | ICD-10-CM

## 2025-06-09 PROCEDURE — 1123F ACP DISCUSS/DSCN MKR DOCD: CPT | Performed by: PSYCHIATRY & NEUROLOGY

## 2025-06-09 PROCEDURE — 99213 OFFICE O/P EST LOW 20 MIN: CPT | Performed by: PSYCHIATRY & NEUROLOGY

## 2025-06-09 ASSESSMENT — ENCOUNTER SYMPTOMS
CONSTIPATION: 1
COUGH: 0
VOICE CHANGE: 1

## 2025-06-09 NOTE — PROGRESS NOTES
place, and time. Language fluent.  Speech is clear and intelligible.     Cranial Nerves: PERRL. Eye movements full. No nystagmus.  Facial expression appears intact though may be mildly diminished and no asymmetry noted. Tongue and palate were midline. Shoulder shrug sluggish bilaterally.     Motor: Tone is normal. No cogwheeling or spasticity.  No appreciable bradykinesia. With hands outstretched there is only a slight jerky tremulousness of the hands that is not particularly rhythmic. No tremor with finger-nose-finger bilaterally.  No resting tremor. With writing there is a general shakiness in his handwriting but no micrographia and Archimedes spirals are mildly shaky but intact.     Sensory: Normal to light touch throughout.     Cerebellar: No ataxia or dysmetria.      Reflexes (R/L): Biceps (2+/2+), Brachioradialis (2+/2+), Patellar (2+/2+), Ankle (2+/2+). Jimenez's was negative and plantar responses were flexor.      Gait: He is slow to rise from a seated position.  Gait is primarily an antalgic gait.  Otherwise he looks fairly fluid with preserved arm swing bilaterally. Mild balance difficulty. Romberg is negative.       Assessment and Plan:     Mal Jimenez is a 64 y.o. male who presents with the following issues:     Mal \"Leroy\" was seen today for follow-up and tremors.    Diagnoses and all orders for this visit:    Tremors of nervous system    Abnormality of gait due to impairment of balance    Mild cognitive impairment      Patient presents for follow-up and continued management of tremor of the hands.  Previous concern for parkinsonism but this has essentially been ruled out as a potential diagnosis given his lack of progressive features and negative studies.  Will continue to monitor for any emerging features or worsening symptoms.    Tremor of the hands:   Stable.  He has a mild postural tremor which could be consistent with either mild benign essential tremor or enhanced physiologic 
80 year old male with PMHx of HTN, HLD, IDDM2, CAD (s/p CABG), pAF (on rivaroxaban), chronic combined HFpEF and HFrEF (LV EF 25-30% and grade III diastolic dysfunction on echo 8/9/24, s/p AICD placement), hx of severe aortic stenosis (s/p TAVR), chronic hypoxic respiratory failure secondary to COPD (baseline on 3-4L NC at home), CKD stage IV, and BPH who presented to the ED on 1/10/25 for complaints of shortness of breath and admitted for acute on chronic combined HFpEF and HFrEF exacerbation and acute blood loss anemia secondary to suspected upper GI bleed.    # Acute on chronic combined HFpEF and HFrEF exacerbation  -Likely due to non compliance with medications particularly diuretics as per wife.  Sleeps on recliner due to inability to lay flat, denies paroxysmal nocturnal dyspnea.  Pro-BNP 4773 on admission, Prior TTE (on 8/8/24) with LV EF 25-30%,

## 2025-06-11 RX ORDER — ATORVASTATIN CALCIUM 40 MG/1
40 TABLET, FILM COATED ORAL NIGHTLY
Qty: 90 TABLET | Refills: 1 | Status: SHIPPED | OUTPATIENT
Start: 2025-06-11

## 2025-06-17 SDOH — HEALTH STABILITY: PHYSICAL HEALTH: ON AVERAGE, HOW MANY MINUTES DO YOU ENGAGE IN EXERCISE AT THIS LEVEL?: 30 MIN

## 2025-06-17 SDOH — HEALTH STABILITY: PHYSICAL HEALTH: ON AVERAGE, HOW MANY DAYS PER WEEK DO YOU ENGAGE IN MODERATE TO STRENUOUS EXERCISE (LIKE A BRISK WALK)?: 4 DAYS

## 2025-06-17 SDOH — ECONOMIC STABILITY: FOOD INSECURITY: WITHIN THE PAST 12 MONTHS, THE FOOD YOU BOUGHT JUST DIDN'T LAST AND YOU DIDN'T HAVE MONEY TO GET MORE.: NEVER TRUE

## 2025-06-17 SDOH — ECONOMIC STABILITY: INCOME INSECURITY: IN THE LAST 12 MONTHS, WAS THERE A TIME WHEN YOU WERE NOT ABLE TO PAY THE MORTGAGE OR RENT ON TIME?: NO

## 2025-06-17 SDOH — ECONOMIC STABILITY: FOOD INSECURITY: WITHIN THE PAST 12 MONTHS, YOU WORRIED THAT YOUR FOOD WOULD RUN OUT BEFORE YOU GOT MONEY TO BUY MORE.: NEVER TRUE

## 2025-06-17 SDOH — ECONOMIC STABILITY: TRANSPORTATION INSECURITY
IN THE PAST 12 MONTHS, HAS THE LACK OF TRANSPORTATION KEPT YOU FROM MEDICAL APPOINTMENTS OR FROM GETTING MEDICATIONS?: NO

## 2025-06-17 ASSESSMENT — PATIENT HEALTH QUESTIONNAIRE - PHQ9
2. FEELING DOWN, DEPRESSED OR HOPELESS: NOT AT ALL
SUM OF ALL RESPONSES TO PHQ QUESTIONS 1-9: 0
SUM OF ALL RESPONSES TO PHQ9 QUESTIONS 1 & 2: 0
1. LITTLE INTEREST OR PLEASURE IN DOING THINGS: NOT AT ALL
SUM OF ALL RESPONSES TO PHQ QUESTIONS 1-9: 0
1. LITTLE INTEREST OR PLEASURE IN DOING THINGS: NOT AT ALL
SUM OF ALL RESPONSES TO PHQ QUESTIONS 1-9: 0
2. FEELING DOWN, DEPRESSED OR HOPELESS: NOT AT ALL
SUM OF ALL RESPONSES TO PHQ QUESTIONS 1-9: 0

## 2025-06-17 ASSESSMENT — LIFESTYLE VARIABLES
HOW OFTEN DO YOU HAVE A DRINK CONTAINING ALCOHOL: 1
HOW OFTEN DO YOU HAVE A DRINK CONTAINING ALCOHOL: NEVER
HOW MANY STANDARD DRINKS CONTAINING ALCOHOL DO YOU HAVE ON A TYPICAL DAY: 0
HOW OFTEN DO YOU HAVE SIX OR MORE DRINKS ON ONE OCCASION: 1
HOW MANY STANDARD DRINKS CONTAINING ALCOHOL DO YOU HAVE ON A TYPICAL DAY: PATIENT DOES NOT DRINK

## 2025-06-20 ENCOUNTER — OFFICE VISIT (OUTPATIENT)
Dept: PRIMARY CARE CLINIC | Facility: CLINIC | Age: 65
End: 2025-06-20

## 2025-06-20 VITALS
HEIGHT: 70 IN | OXYGEN SATURATION: 99 % | HEART RATE: 83 BPM | TEMPERATURE: 97.2 F | SYSTOLIC BLOOD PRESSURE: 113 MMHG | WEIGHT: 212.25 LBS | BODY MASS INDEX: 30.38 KG/M2 | DIASTOLIC BLOOD PRESSURE: 66 MMHG

## 2025-06-20 DIAGNOSIS — Z13.0 SCREENING FOR ENDOCRINE, NUTRITIONAL, METABOLIC AND IMMUNITY DISORDER: ICD-10-CM

## 2025-06-20 DIAGNOSIS — Z00.00 MEDICARE ANNUAL WELLNESS VISIT, SUBSEQUENT: Primary | ICD-10-CM

## 2025-06-20 DIAGNOSIS — R29.2 GENERALIZED HYPERREFLEXIA: ICD-10-CM

## 2025-06-20 DIAGNOSIS — Z13.0 SCREENING FOR IRON DEFICIENCY ANEMIA: ICD-10-CM

## 2025-06-20 DIAGNOSIS — Z13.228 SCREENING FOR ENDOCRINE, NUTRITIONAL, METABOLIC AND IMMUNITY DISORDER: ICD-10-CM

## 2025-06-20 DIAGNOSIS — R79.0 LOW MAGNESIUM LEVEL: ICD-10-CM

## 2025-06-20 DIAGNOSIS — G47.33 OSA (OBSTRUCTIVE SLEEP APNEA): Chronic | ICD-10-CM

## 2025-06-20 DIAGNOSIS — R53.83 MALAISE AND FATIGUE: ICD-10-CM

## 2025-06-20 DIAGNOSIS — Z13.0 SCREENING FOR DEFICIENCY ANEMIA: ICD-10-CM

## 2025-06-20 DIAGNOSIS — R53.81 MALAISE AND FATIGUE: ICD-10-CM

## 2025-06-20 DIAGNOSIS — Z13.21 ENCOUNTER FOR VITAMIN DEFICIENCY SCREENING: ICD-10-CM

## 2025-06-20 DIAGNOSIS — J44.9 CHRONIC OBSTRUCTIVE PULMONARY DISEASE, UNSPECIFIED COPD TYPE (HCC): Chronic | ICD-10-CM

## 2025-06-20 DIAGNOSIS — Z13.29 SCREENING FOR ENDOCRINE, METABOLIC AND IMMUNITY DISORDER: ICD-10-CM

## 2025-06-20 DIAGNOSIS — R79.89 OTHER SPECIFIED ABNORMAL FINDINGS OF BLOOD CHEMISTRY: ICD-10-CM

## 2025-06-20 DIAGNOSIS — Z71.89 ACP (ADVANCE CARE PLANNING): ICD-10-CM

## 2025-06-20 DIAGNOSIS — N18.31 STAGE 3A CHRONIC KIDNEY DISEASE (HCC): Chronic | ICD-10-CM

## 2025-06-20 DIAGNOSIS — Z13.0 SCREENING FOR ENDOCRINE, METABOLIC AND IMMUNITY DISORDER: ICD-10-CM

## 2025-06-20 DIAGNOSIS — Z13.228 SCREENING FOR ENDOCRINE, METABOLIC AND IMMUNITY DISORDER: ICD-10-CM

## 2025-06-20 DIAGNOSIS — R79.9 ABNORMAL BLOOD CHEMISTRY: ICD-10-CM

## 2025-06-20 DIAGNOSIS — K21.00 GASTROESOPHAGEAL REFLUX DISEASE WITH ESOPHAGITIS WITHOUT HEMORRHAGE: ICD-10-CM

## 2025-06-20 DIAGNOSIS — M48.02 CERVICAL SPINAL STENOSIS: ICD-10-CM

## 2025-06-20 DIAGNOSIS — M65.341 TRIGGER FINGER, RIGHT RING FINGER: ICD-10-CM

## 2025-06-20 DIAGNOSIS — Z13.228 SCREENING FOR METABOLIC DISORDER: ICD-10-CM

## 2025-06-20 DIAGNOSIS — Z13.1 SCREENING FOR DIABETES MELLITUS: ICD-10-CM

## 2025-06-20 DIAGNOSIS — M54.50 CHRONIC MIDLINE LOW BACK PAIN WITHOUT SCIATICA: ICD-10-CM

## 2025-06-20 DIAGNOSIS — E55.9 VITAMIN D DEFICIENCY: ICD-10-CM

## 2025-06-20 DIAGNOSIS — Z79.899 ENCOUNTER FOR LONG-TERM (CURRENT) USE OF MEDICATIONS: ICD-10-CM

## 2025-06-20 DIAGNOSIS — R73.09 OTHER ABNORMAL GLUCOSE: ICD-10-CM

## 2025-06-20 DIAGNOSIS — R20.2 PARESTHESIA OF BOTH FEET: ICD-10-CM

## 2025-06-20 DIAGNOSIS — Z13.220 SCREENING FOR LIPID DISORDERS: ICD-10-CM

## 2025-06-20 DIAGNOSIS — R25.1 TREMORS OF NERVOUS SYSTEM: ICD-10-CM

## 2025-06-20 DIAGNOSIS — R53.82 CHRONIC FATIGUE: ICD-10-CM

## 2025-06-20 DIAGNOSIS — Z13.29 SCREENING FOR THYROID DISORDER: ICD-10-CM

## 2025-06-20 DIAGNOSIS — Z13.21 SCREENING FOR ENDOCRINE, NUTRITIONAL, METABOLIC AND IMMUNITY DISORDER: ICD-10-CM

## 2025-06-20 DIAGNOSIS — R53.83 OTHER FATIGUE: ICD-10-CM

## 2025-06-20 DIAGNOSIS — K76.89 LIVER CYST: ICD-10-CM

## 2025-06-20 DIAGNOSIS — Z13.29 SCREENING FOR ENDOCRINE, NUTRITIONAL, METABOLIC AND IMMUNITY DISORDER: ICD-10-CM

## 2025-06-20 DIAGNOSIS — R53.81 MALAISE: ICD-10-CM

## 2025-06-20 DIAGNOSIS — G89.29 CHRONIC MIDLINE LOW BACK PAIN WITHOUT SCIATICA: ICD-10-CM

## 2025-06-20 DIAGNOSIS — Z12.5 SCREENING FOR PROSTATE CANCER: ICD-10-CM

## 2025-06-20 DIAGNOSIS — Z00.00 MEDICARE ANNUAL WELLNESS VISIT, SUBSEQUENT: ICD-10-CM

## 2025-06-20 PROBLEM — J34.89 REFRACTORY OBSTRUCTION OF NASAL AIRWAY: Status: RESOLVED | Noted: 2023-01-16 | Resolved: 2025-06-20

## 2025-06-20 PROBLEM — G56.02 CARPAL TUNNEL SYNDROME ON LEFT: Status: RESOLVED | Noted: 2024-03-01 | Resolved: 2025-06-20

## 2025-06-20 PROBLEM — M54.32 SCIATICA, LEFT SIDE: Status: RESOLVED | Noted: 2023-11-29 | Resolved: 2025-06-20

## 2025-06-20 PROBLEM — R06.83 SNORING: Status: RESOLVED | Noted: 2020-09-22 | Resolved: 2025-06-20

## 2025-06-20 PROBLEM — J34.2 DEVIATED NASAL SEPTUM: Status: RESOLVED | Noted: 2023-01-16 | Resolved: 2025-06-20

## 2025-06-20 PROBLEM — F70 MILD MENTAL SLOWING: Status: RESOLVED | Noted: 2023-06-05 | Resolved: 2025-06-20

## 2025-06-20 PROBLEM — R41.3 MEMORY CHANGES: Status: RESOLVED | Noted: 2023-06-05 | Resolved: 2025-06-20

## 2025-06-20 PROBLEM — R06.02 SHORTNESS OF BREATH ON EXERTION: Status: RESOLVED | Noted: 2021-02-01 | Resolved: 2025-06-20

## 2025-06-20 PROBLEM — G20.A1 PARKINSON'S DISEASE (HCC): Chronic | Status: RESOLVED | Noted: 2022-03-02 | Resolved: 2025-06-20

## 2025-06-20 PROBLEM — R06.5 CHRONIC MOUTH BREATHING: Chronic | Status: RESOLVED | Noted: 2022-12-06 | Resolved: 2025-06-20

## 2025-06-20 PROBLEM — M16.9 OA (OSTEOARTHRITIS) OF HIP: Status: RESOLVED | Noted: 2020-11-11 | Resolved: 2025-06-20

## 2025-06-20 PROBLEM — G56.01 RIGHT CARPAL TUNNEL SYNDROME: Status: RESOLVED | Noted: 2021-10-01 | Resolved: 2025-06-20

## 2025-06-20 PROBLEM — M65.332 ACQUIRED TRIGGER FINGER OF LEFT MIDDLE FINGER: Status: RESOLVED | Noted: 2024-03-01 | Resolved: 2025-06-20

## 2025-06-20 PROBLEM — G47.34 NOCTURNAL HYPOXEMIA: Status: RESOLVED | Noted: 2021-02-01 | Resolved: 2025-06-20

## 2025-06-20 PROBLEM — J34.3 HYPERTROPHY OF BOTH INFERIOR NASAL TURBINATES: Status: RESOLVED | Noted: 2023-01-16 | Resolved: 2025-06-20

## 2025-06-20 PROBLEM — G56.21 ULNAR NERVE ENTRAPMENT AT ELBOW, RIGHT: Status: RESOLVED | Noted: 2022-10-19 | Resolved: 2025-06-20

## 2025-06-20 PROBLEM — M54.12 CERVICAL RADICULOPATHY: Status: RESOLVED | Noted: 2021-08-27 | Resolved: 2025-06-20

## 2025-06-20 PROBLEM — M16.11 OSTEOARTHRITIS OF RIGHT HIP: Status: RESOLVED | Noted: 2020-10-07 | Resolved: 2025-06-20

## 2025-06-20 LAB
25(OH)D3 SERPL-MCNC: 47.1 NG/ML (ref 30–100)
ALBUMIN SERPL-MCNC: 3.8 G/DL (ref 3.2–4.6)
ALBUMIN/GLOB SERPL: 1.4 (ref 1–1.9)
ALP SERPL-CCNC: 67 U/L (ref 40–129)
ALT SERPL-CCNC: 28 U/L (ref 8–55)
ANION GAP SERPL CALC-SCNC: 10 MMOL/L (ref 7–16)
AST SERPL-CCNC: 29 U/L (ref 15–37)
BASOPHILS # BLD: 0.1 K/UL (ref 0–0.2)
BASOPHILS NFR BLD: 1.3 % (ref 0–2)
BILIRUB SERPL-MCNC: 1.2 MG/DL (ref 0–1.2)
BUN SERPL-MCNC: 24 MG/DL (ref 8–23)
CALCIUM SERPL-MCNC: 9.8 MG/DL (ref 8.8–10.2)
CHLORIDE SERPL-SCNC: 106 MMOL/L (ref 98–107)
CHOLEST SERPL-MCNC: 117 MG/DL (ref 0–200)
CO2 SERPL-SCNC: 27 MMOL/L (ref 20–29)
CREAT SERPL-MCNC: 1.5 MG/DL (ref 0.8–1.3)
DIFFERENTIAL METHOD BLD: ABNORMAL
EOSINOPHIL # BLD: 0.49 K/UL (ref 0–0.8)
EOSINOPHIL NFR BLD: 6.5 % (ref 0.5–7.8)
ERYTHROCYTE [DISTWIDTH] IN BLOOD BY AUTOMATED COUNT: 14.4 % (ref 11.9–14.6)
EST. AVERAGE GLUCOSE BLD GHB EST-MCNC: 111 MG/DL
FERRITIN SERPL-MCNC: 82 NG/ML (ref 8–388)
FOLATE SERPL-MCNC: 11.6 NG/ML (ref 3.1–17.5)
GLOBULIN SER CALC-MCNC: 2.7 G/DL (ref 2.3–3.5)
GLUCOSE SERPL-MCNC: 98 MG/DL (ref 70–99)
HBA1C MFR BLD: 5.5 % (ref 0–5.6)
HCT VFR BLD AUTO: 42.6 % (ref 41.1–50.3)
HDLC SERPL-MCNC: 31 MG/DL (ref 40–60)
HDLC SERPL: 3.8 (ref 0–5)
HGB BLD-MCNC: 14 G/DL (ref 13.6–17.2)
IMM GRANULOCYTES # BLD AUTO: 0.07 K/UL (ref 0–0.5)
IMM GRANULOCYTES NFR BLD AUTO: 0.9 % (ref 0–5)
IRON SATN MFR SERPL: 30 % (ref 20–50)
IRON SERPL-MCNC: 96 UG/DL (ref 35–100)
LDLC SERPL CALC-MCNC: 59 MG/DL (ref 0–100)
LYMPHOCYTES # BLD: 0.96 K/UL (ref 0.5–4.6)
LYMPHOCYTES NFR BLD: 12.7 % (ref 13–44)
MAGNESIUM SERPL-MCNC: 2.2 MG/DL (ref 1.8–2.4)
MCH RBC QN AUTO: 28.5 PG (ref 26.1–32.9)
MCHC RBC AUTO-ENTMCNC: 32.9 G/DL (ref 31.4–35)
MCV RBC AUTO: 86.6 FL (ref 82–102)
MONOCYTES # BLD: 0.64 K/UL (ref 0.1–1.3)
MONOCYTES NFR BLD: 8.5 % (ref 4–12)
NEUTS SEG # BLD: 5.31 K/UL (ref 1.7–8.2)
NEUTS SEG NFR BLD: 70.1 % (ref 43–78)
NRBC # BLD: 0 K/UL (ref 0–0.2)
PLATELET # BLD AUTO: 157 K/UL (ref 150–450)
PMV BLD AUTO: 11 FL (ref 9.4–12.3)
POTASSIUM SERPL-SCNC: 3.8 MMOL/L (ref 3.5–5.1)
PROT SERPL-MCNC: 6.5 G/DL (ref 6.3–8.2)
PSA SERPL-MCNC: 2 NG/ML (ref 0–4)
RBC # BLD AUTO: 4.92 M/UL (ref 4.23–5.6)
SODIUM SERPL-SCNC: 142 MMOL/L (ref 136–145)
TIBC SERPL-MCNC: 319 UG/DL (ref 240–450)
TRIGL SERPL-MCNC: 139 MG/DL (ref 0–150)
TSH W FREE THYROID IF ABNORMAL: 2.34 UIU/ML (ref 0.27–4.2)
UIBC SERPL-MCNC: 223 UG/DL (ref 112–347)
VIT B12 SERPL-MCNC: 336 PG/ML (ref 193–986)
VLDLC SERPL CALC-MCNC: 28 MG/DL (ref 6–23)
WBC # BLD AUTO: 7.6 K/UL (ref 4.3–11.1)

## 2025-06-20 RX ORDER — DEXAMETHASONE SODIUM PHOSPHATE 4 MG/ML
4 INJECTION, SOLUTION INTRAMUSCULAR; INTRAVENOUS ONCE
Status: COMPLETED | OUTPATIENT
Start: 2025-06-20 | End: 2025-06-20

## 2025-06-20 RX ORDER — LOSARTAN POTASSIUM AND HYDROCHLOROTHIAZIDE 25; 100 MG/1; MG/1
1 TABLET ORAL DAILY
Qty: 90 TABLET | Refills: 1 | Status: SHIPPED | OUTPATIENT
Start: 2025-06-20

## 2025-06-20 RX ORDER — VERAPAMIL HYDROCHLORIDE 240 MG/1
240 TABLET, FILM COATED, EXTENDED RELEASE ORAL DAILY
Qty: 90 TABLET | Refills: 1 | Status: SHIPPED | OUTPATIENT
Start: 2025-06-20

## 2025-06-20 RX ORDER — KETOROLAC TROMETHAMINE 30 MG/ML
30 INJECTION, SOLUTION INTRAMUSCULAR; INTRAVENOUS ONCE
Status: COMPLETED | OUTPATIENT
Start: 2025-06-20 | End: 2025-06-20

## 2025-06-20 RX ADMIN — DEXAMETHASONE SODIUM PHOSPHATE 4 MG: 4 INJECTION, SOLUTION INTRAMUSCULAR; INTRAVENOUS at 09:19

## 2025-06-20 RX ADMIN — KETOROLAC TROMETHAMINE 30 MG: 30 INJECTION, SOLUTION INTRAMUSCULAR; INTRAVENOUS at 09:20

## 2025-06-20 ASSESSMENT — ENCOUNTER SYMPTOMS
ALLERGIC/IMMUNOLOGIC NEGATIVE: 1
ABDOMINAL DISTENTION: 0
RESPIRATORY NEGATIVE: 1
EYES NEGATIVE: 1
ABDOMINAL PAIN: 0

## 2025-06-20 NOTE — PROGRESS NOTES
Vitamin D 25 Hydroxy; Future  -     Vitamin B12; Future  -     CBC with Auto Differential; Future  -     Comprehensive Metabolic Panel; Future  -     Lipid Panel; Future  -     Magnesium; Future  -     Ferritin; Future  -     Folate; Future  -     Iron and TIBC; Future  10. Screening for endocrine, nutritional, metabolic and immunity disorder  -     PSA Screening; Future  -     Hemoglobin A1C; Future  -     TSH reflex to FT4; Future  -     Vitamin D 25 Hydroxy; Future  -     Vitamin B12; Future  -     CBC with Auto Differential; Future  -     Comprehensive Metabolic Panel; Future  -     Lipid Panel; Future  -     Magnesium; Future  -     Ferritin; Future  -     Folate; Future  -     Iron and TIBC; Future  11. Other fatigue  -     PSA Screening; Future  -     Hemoglobin A1C; Future  -     TSH reflex to FT4; Future  -     Vitamin D 25 Hydroxy; Future  -     Vitamin B12; Future  -     CBC with Auto Differential; Future  -     Comprehensive Metabolic Panel; Future  -     Lipid Panel; Future  -     Magnesium; Future  -     Ferritin; Future  -     Folate; Future  -     Iron and TIBC; Future  12. Malaise and fatigue  -     PSA Screening; Future  -     Hemoglobin A1C; Future  -     TSH reflex to FT4; Future  -     Vitamin D 25 Hydroxy; Future  -     Vitamin B12; Future  -     CBC with Auto Differential; Future  -     Comprehensive Metabolic Panel; Future  -     Lipid Panel; Future  -     Magnesium; Future  -     Ferritin; Future  -     Folate; Future  -     Iron and TIBC; Future  13. Encounter for long-term (current) use of medications  -     PSA Screening; Future  -     Hemoglobin A1C; Future  -     TSH reflex to FT4; Future  -     Vitamin D 25 Hydroxy; Future  -     Vitamin B12; Future  -     CBC with Auto Differential; Future  -     Comprehensive Metabolic Panel; Future  -     Lipid Panel; Future  -     Magnesium; Future  -     Ferritin; Future  -     Folate; Future  -     Iron and TIBC; Future  14. Vitamin D

## 2025-06-20 NOTE — ASSESSMENT & PLAN NOTE
Recheck in 10/2025  -------------------------------------------  CT 04/22/2025:    - UPPER ABDOMEN: There is a small hiatal hernia. There are 2 small  low-attenuation lesions in the left lobe of the liver, likely cysts. No acute  findings.

## 2025-06-23 ENCOUNTER — RESULTS FOLLOW-UP (OUTPATIENT)
Dept: PRIMARY CARE CLINIC | Facility: CLINIC | Age: 65
End: 2025-06-23

## 2025-06-23 DIAGNOSIS — N18.31 STAGE 3A CHRONIC KIDNEY DISEASE (HCC): Chronic | ICD-10-CM

## 2025-06-23 DIAGNOSIS — E78.2 MIXED HYPERLIPIDEMIA: Primary | ICD-10-CM

## 2025-06-23 PROBLEM — E78.5 HYPERLIPIDEMIA: Status: ACTIVE | Noted: 2025-06-23

## 2025-08-14 ENCOUNTER — TELEPHONE (OUTPATIENT)
Dept: UROLOGY | Age: 65
End: 2025-08-14

## 2025-08-14 ENCOUNTER — OFFICE VISIT (OUTPATIENT)
Dept: UROLOGY | Age: 65
End: 2025-08-14

## 2025-08-14 DIAGNOSIS — N52.9 ERECTILE DYSFUNCTION, UNSPECIFIED ERECTILE DYSFUNCTION TYPE: ICD-10-CM

## 2025-08-14 DIAGNOSIS — N40.1 BPH WITH OBSTRUCTION/LOWER URINARY TRACT SYMPTOMS: Primary | ICD-10-CM

## 2025-08-14 DIAGNOSIS — N13.8 BPH WITH OBSTRUCTION/LOWER URINARY TRACT SYMPTOMS: Primary | ICD-10-CM

## 2025-08-14 LAB
BILIRUBIN, URINE, POC: NEGATIVE
BLOOD URINE, POC: NORMAL
GLUCOSE URINE, POC: NEGATIVE MG/DL
KETONES, URINE, POC: NEGATIVE MG/DL
LEUKOCYTE ESTERASE, URINE, POC: NEGATIVE
NITRITE, URINE, POC: NEGATIVE
PH, URINE, POC: 5.5 (ref 4.6–8)
PROTEIN,URINE, POC: NEGATIVE MG/DL
SPECIFIC GRAVITY, URINE, POC: 1.02 (ref 1–1.03)
URINALYSIS CLARITY, POC: NORMAL
URINALYSIS COLOR, POC: NORMAL
UROBILINOGEN, POC: NORMAL MG/DL

## 2025-08-14 RX ORDER — TAMSULOSIN HYDROCHLORIDE 0.4 MG/1
0.4 CAPSULE ORAL DAILY
Qty: 90 CAPSULE | Refills: 3 | Status: SHIPPED | OUTPATIENT
Start: 2025-08-14

## 2025-08-14 RX ORDER — TADALAFIL 5 MG/1
TABLET ORAL
Qty: 100 TABLET | Refills: 3 | Status: SHIPPED | OUTPATIENT
Start: 2025-08-14

## 2025-08-14 ASSESSMENT — ENCOUNTER SYMPTOMS
NAUSEA: 0
BACK PAIN: 0

## 2025-08-20 ENCOUNTER — APPOINTMENT (OUTPATIENT)
Dept: URBAN - METROPOLITAN AREA CLINIC 25 | Facility: CLINIC | Age: 65
Setting detail: DERMATOLOGY
End: 2025-08-20

## 2025-08-20 DIAGNOSIS — L73.8 OTHER SPECIFIED FOLLICULAR DISORDERS: ICD-10-CM

## 2025-08-20 DIAGNOSIS — L20.89 OTHER ATOPIC DERMATITIS: ICD-10-CM

## 2025-08-20 DIAGNOSIS — L57.8 OTHER SKIN CHANGES DUE TO CHRONIC EXPOSURE TO NONIONIZING RADIATION: ICD-10-CM

## 2025-08-20 DIAGNOSIS — L82.1 OTHER SEBORRHEIC KERATOSIS: ICD-10-CM

## 2025-08-20 DIAGNOSIS — L57.0 ACTINIC KERATOSIS: ICD-10-CM

## 2025-08-20 DIAGNOSIS — Z85.828 PERSONAL HISTORY OF OTHER MALIGNANT NEOPLASM OF SKIN: ICD-10-CM

## 2025-08-20 DIAGNOSIS — D22 MELANOCYTIC NEVI: ICD-10-CM

## 2025-08-20 DIAGNOSIS — L81.8 OTHER SPECIFIED DISORDERS OF PIGMENTATION: ICD-10-CM

## 2025-08-20 PROBLEM — D22.5 MELANOCYTIC NEVI OF TRUNK: Status: ACTIVE | Noted: 2025-08-20

## 2025-08-20 PROCEDURE — ? SUNSCREEN RECOMMENDATIONS

## 2025-08-20 PROCEDURE — ? RECOMMENDATIONS

## 2025-08-20 PROCEDURE — ? COUNSELING

## 2025-08-20 PROCEDURE — ? PHOTO-DOCUMENTATION

## 2025-08-20 PROCEDURE — ? LIQUID NITROGEN

## 2025-08-20 PROCEDURE — ? PRESCRIPTION MEDICATION MANAGEMENT

## 2025-08-20 ASSESSMENT — LOCATION ZONE DERM
LOCATION ZONE: ARM
LOCATION ZONE: LEG
LOCATION ZONE: HAND
LOCATION ZONE: FACE
LOCATION ZONE: NOSE
LOCATION ZONE: NECK
LOCATION ZONE: TRUNK

## 2025-08-20 ASSESSMENT — LOCATION SIMPLE DESCRIPTION DERM
LOCATION SIMPLE: RIGHT UPPER BACK
LOCATION SIMPLE: RIGHT FOREARM
LOCATION SIMPLE: RIGHT THIGH
LOCATION SIMPLE: RIGHT CHEEK
LOCATION SIMPLE: LEFT HAND
LOCATION SIMPLE: LEFT FOREHEAD
LOCATION SIMPLE: LEFT FOREARM
LOCATION SIMPLE: RIGHT NOSE
LOCATION SIMPLE: RIGHT FOREHEAD
LOCATION SIMPLE: LEFT ANKLE
LOCATION SIMPLE: POSTERIOR NECK
LOCATION SIMPLE: RIGHT HAND
LOCATION SIMPLE: LEFT PRETIBIAL REGION

## 2025-08-20 ASSESSMENT — LOCATION DETAILED DESCRIPTION DERM
LOCATION DETAILED: LEFT PROXIMAL PRETIBIAL REGION
LOCATION DETAILED: RIGHT MID-UPPER BACK
LOCATION DETAILED: RIGHT ANTERIOR DISTAL THIGH
LOCATION DETAILED: LEFT ANKLE
LOCATION DETAILED: RIGHT MEDIAL TRAPEZIAL NECK
LOCATION DETAILED: RIGHT VENTRAL DISTAL FOREARM
LOCATION DETAILED: RIGHT NASAL SIDEWALL
LOCATION DETAILED: RIGHT SUPERIOR MEDIAL FOREHEAD
LOCATION DETAILED: RIGHT ULNAR DORSAL HAND
LOCATION DETAILED: LEFT MEDIAL FOREHEAD
LOCATION DETAILED: RIGHT CENTRAL MALAR CHEEK
LOCATION DETAILED: LEFT RADIAL DORSAL HAND
LOCATION DETAILED: LEFT VENTRAL DISTAL FOREARM
LOCATION DETAILED: RIGHT INFERIOR MEDIAL UPPER BACK

## (undated) DEVICE — SOLUTION IV 250ML 0.9% SOD CHL CLR INJ FLX BG CONT PRT CLSR

## (undated) DEVICE — SOLUTION IV 1000ML 0.9% SOD CHL

## (undated) DEVICE — SPLINT NSL SEPTAL SUPP REG PRE PUNCHED HOLE SIL STRL BRTH EZ

## (undated) DEVICE — BONE WAX WHITE: Brand: BONE WAX WHITE

## (undated) DEVICE — SUTURE VCRL + 3-0 L27IN ABSRB UD PS-2 L19MM 3/8 CIR PRIM VCP427H

## (undated) DEVICE — BANDAGE,ELASTIC,ESMARK,STERILE,4"X9',LF: Brand: MEDLINE

## (undated) DEVICE — PACKING 8004003 NEURAY 200PK 25X25MM: Brand: NEURAY ®

## (undated) DEVICE — INTENDED FOR TISSUE SEPARATION, AND OTHER PROCEDURES THAT REQUIRE A SHARP SURGICAL BLADE TO PUNCTURE OR CUT.: Brand: BARD-PARKER SAFETY BLADES SIZE 10, STERILE

## (undated) DEVICE — STRYKER PERFORMANCE SERIES SAGITTAL BLADE: Brand: STRYKER PERFORMANCE SERIES

## (undated) DEVICE — SUTURE MCRYL SZ 2-0 L27IN ABSRB UD CP-1 1 L36MM 1/2 CIR REV Y266H

## (undated) DEVICE — ELECTRODE PT RET AD L9FT HI MOIST COND ADH HYDRGEL CORDED

## (undated) DEVICE — PADDING CAST W2INXL4YD ST COT COHESIVE HND TEARABLE SPEC

## (undated) DEVICE — SYRINGE MED 3ML CLR PLAS STD N CTRL LUERLOCK TIP DISP

## (undated) DEVICE — SPONGE,NEURO,1"X3",XR,STRL,LF,10/PK: Brand: MEDLINE

## (undated) DEVICE — SYR 10ML LUER LOK 1/5ML GRAD --

## (undated) DEVICE — DRAPE, FILM SHEET, 44X65 STERILE: Brand: MEDLINE

## (undated) DEVICE — 3M™ STERI-DRAPE™ INSTRUMENT POUCH 1018: Brand: STERI-DRAPE™

## (undated) DEVICE — HANDPIECE SET WITH COAXIAL HIGH FLOW TIP AND SUCTION TUBE: Brand: INTERPULSE

## (undated) DEVICE — TRAY PREP DRY W/ PREM GLV 2 APPL 6 SPNG 2 UNDPD 1 OVERWRAP

## (undated) DEVICE — ABDOMINAL PAD: Brand: DERMACEA

## (undated) DEVICE — APPLICATOR MEDICATED 26 CC SOLUTION HI LT ORNG CHLORAPREP

## (undated) DEVICE — DRESSING,GAUZE,XEROFORM,CURAD,1"X8",ST: Brand: CURAD

## (undated) DEVICE — KENDALL RADIOLUCENT FOAM MONITORING ELECTRODE RECTANGULAR SHAPE: Brand: KENDALL

## (undated) DEVICE — PAD,ABDOMINAL,5"X9",ST,LF,25/BX: Brand: MEDLINE INDUSTRIES, INC.

## (undated) DEVICE — LUBE JELLY FOIL PACK 1.4 OZ: Brand: MEDLINE INDUSTRIES, INC.

## (undated) DEVICE — BLADE OPHTH DIA4MM MINI MENIS FLAT ARTHRO LOK

## (undated) DEVICE — KIT PROCEDURE SURG HEAD AND NECK TOTE

## (undated) DEVICE — KIT,ANTI FOG,W/SPONGE & FLUID,SOFT PACK: Brand: MEDLINE

## (undated) DEVICE — DUAL LUMEN STOMACH TUBE: Brand: SALEM SUMP

## (undated) DEVICE — SUTURE STRATAFIX SPRL SZ 1 L5IN ABSRB VLT CT-1 L36MM 1/2 SXPD2B401

## (undated) DEVICE — FORCEPS BX L240CM JAW DIA2.8MM L CAP W/ NDL MIC MESH TOOTH

## (undated) DEVICE — BNDG,ELSTC,MATRIX,STRL,2"X5YD,LF,HOOK&LP: Brand: MEDLINE

## (undated) DEVICE — NEEDLE HYPO 25GA L1.5IN BLU POLYPR HUB S STL REG BVL STR

## (undated) DEVICE — STOCKINETTE TUBE 6X48 -- MEDICHOICE

## (undated) DEVICE — Z DUP USE 2701075 SYSTEM SKIN CLSR 42CM DERMBND PRINEO

## (undated) DEVICE — SOLUTION IRRIG 1000ML STRL H2O USP PLAS POUR BTL

## (undated) DEVICE — BNDG ELAS ESMARK 4INX12FT LF -- STRL

## (undated) DEVICE — SINU FOAM: Brand: SINU-FOAM

## (undated) DEVICE — BIPOLAR SEALER 23-112-1 AQM 6.0: Brand: AQUAMANTYS ®

## (undated) DEVICE — TOTAL HIP DR WATSON: Brand: MEDLINE INDUSTRIES, INC.

## (undated) DEVICE — SUTURE STRATAFIX SZ 3-0 L30CM NONABSORBABLE UD L19MM FS-2 SXMP2B408

## (undated) DEVICE — T4 HOOD

## (undated) DEVICE — SUTURE VCRL SZ 1 L27IN ABSRB UD L36MM CP-1 1/2 CIR REV CUT J268H

## (undated) DEVICE — SOLUTION IRRIG 1000ML 0.9% SOD CHL USP POUR PLAS BTL

## (undated) DEVICE — SOLUTION IRRIG 3000ML 0.9% SOD CHL FLX CONT 0797208] ICU MEDICAL INC]

## (undated) DEVICE — AIRLIFE™ OXYGEN TUBING 7 FEET (2.1 M) CRUSH RESISTANT OXYGEN TUBING, VINYL TIPPED: Brand: AIRLIFE™

## (undated) DEVICE — (D)PREP SKN CHLRAPRP APPL 26ML -- CONVERT TO ITEM 371833

## (undated) DEVICE — PADDING CAST W3INXL4YD COT BLEND MIC PLEAT UNDERCAST SPEC

## (undated) DEVICE — SPONGE LAP 18X18IN STRL -- 5/PK

## (undated) DEVICE — DRAPE,HAND,STERILE: Brand: MEDLINE

## (undated) DEVICE — SUTURE NONABSORBABLE MONOFILAMENT 4-0 PS-2 18 IN BLU PROLENE 8682H

## (undated) DEVICE — DISPOSABLE BIPOLAR CODE, 12' (3.66 M): Brand: CONMED

## (undated) DEVICE — NEEDLE HYPO 21GA L1.5IN INTRAMUSCULAR S STL LATCH BVL UP

## (undated) DEVICE — 48" PROBE COVER W/GEL, ULTRASOUND, STERILE: Brand: SITE-RITE

## (undated) DEVICE — PAD,NON-ADHERENT,3X8,STERILE,LF,1/PK: Brand: MEDLINE

## (undated) DEVICE — 3M™ IOBAN™ 2 ANTIMICROBIAL INCISE DRAPE 6650EZ: Brand: IOBAN™ 2

## (undated) DEVICE — CARDINAL HEALTH FLEXIBLE LIGHT HANDLE COVER: Brand: CARDINAL HEALTH

## (undated) DEVICE — OCCLUSIVE GAUZE STRIP,3% BISMUTH TRIBROMOPHENATE IN PETROLATUM BLEND: Brand: XEROFORM

## (undated) DEVICE — SUTURE VCRL SZ 2-0 L27IN ABSRB UD L36MM CP-1 1/2 CIR REV J266H

## (undated) DEVICE — 3M™ TEGADERM™ TRANSPARENT FILM DRESSING FRAME STYLE, 1626W, 4 IN X 4-3/4 IN (10 CM X 12 CM), 50/CT 4CT/CASE: Brand: 3M™ TEGADERM™

## (undated) DEVICE — SUTURE FIBERWIRE SZ 2 W/ TAPERED NEEDLE BLUE L38IN NONABSORB BLU L26.5MM 1/2 CIRCLE AR7200

## (undated) DEVICE — SUTURE CHROMIC GUT SZ 4-0 L27IN ABSRB BRN L17MM RB-1 1/2 U203H

## (undated) DEVICE — STERILE HOOK LOCK LATEX FREE ELASTIC BANDAGE 2INX5YD: Brand: HOOK LOCK™

## (undated) DEVICE — INTENDED FOR TISSUE SEPARATION, AND OTHER PROCEDURES THAT REQUIRE A SHARP SURGICAL BLADE TO PUNCTURE OR CUT.: Brand: BARD-PARKER SAFETY BLADES SIZE 15, STERILE

## (undated) DEVICE — PIN FIX TROCAR PT 1 END 9/64X9 IN 1 PT STYL SMOOTH PLN STRL
Type: IMPLANTABLE DEVICE | Site: HIP | Status: NON-FUNCTIONAL
Removed: 2020-11-11

## (undated) DEVICE — SYSTEM SKIN CLSR 22CM DERMBND PRINEO

## (undated) DEVICE — PREP SKN CHLRAPRP APL 26ML STR --

## (undated) DEVICE — REM POLYHESIVE ADULT PATIENT RETURN ELECTRODE: Brand: VALLEYLAB

## (undated) DEVICE — FORMALIN SOLUTION 20

## (undated) DEVICE — Device

## (undated) DEVICE — JELLY LUBRICATING 10GM PREFIL SYR LUBE

## (undated) DEVICE — Z DISCONTINUED USE 2423037 APPLICATOR MEDICATED 3 CC CHLORHEXIDINE GLUC 2% CHLORAPREP

## (undated) DEVICE — DRAPE XR C ARM 41X74IN LF --

## (undated) DEVICE — LIQUIBAND RAPID ADHESIVE 36/CS 0.8ML: Brand: MEDLINE

## (undated) DEVICE — DEVICE ULTRAGUIDE TFR HANDHELD SINGLE USE

## (undated) DEVICE — SINGLE PORT MANIFOLD: Brand: NEPTUNE 2

## (undated) DEVICE — GAUZE,SPONGE,4"X4",12PLY,WOVEN,NS,LF: Brand: MEDLINE

## (undated) DEVICE — SUTURE VCRL RAPIDE SZ 4-0 L18IN ABSRB UD L19MM PS-2 1/2 CIR VR496

## (undated) DEVICE — CANISTER, RIGID, 2000CC: Brand: MEDLINE INDUSTRIES, INC.

## (undated) DEVICE — AGENT HEMSTAT 8ML FLX TIP MTRX + DISP SURGIFLO

## (undated) DEVICE — SUTURE PROL SZ 4-0 L18IN NONABSORBABLE BLU L13MM P-3 3/8 8699G

## (undated) DEVICE — SOLUTION IRRIG 1000ML 09% SOD CHL USP PIC PLAS CONTAINER

## (undated) DEVICE — SYRINGE MED 10ML LUERLOCK TIP W/O SFTY DISP

## (undated) DEVICE — SYRINGE EAR 2OZ ULC SLIMMER TIP FLAT BTM SUCT PWR DISP FOR

## (undated) DEVICE — SYRINGE, LUER SLIP, STERILE, 60ML: Brand: MEDLINE

## (undated) DEVICE — SX-ONE MICROKNIFE IS REBRANDED AS ULTRAGUIDECTR.  ULTRAGUIDECTR IS INTENDED TO TRANSECT THE TRANSVERSE CARPAL LIGAMENT FOR THE TREATMENT OF CARPAL TUNNEL SYNDROME.ULTRAGUIDECTR IS A DISPOSABLE DEVICE INTENDED TO CREATE SPACE WITHIN THE CARPAL TUNNEL AND TRANSECT THE TRANSVERSE CARPAL LIGAMENT (TCL) FOR THE TREATMENT OF CARPAL TUNNEL SYNDROME.: Brand: SX-ONE MICROKNIFE

## (undated) DEVICE — TUBING, SUCTION, 1/4" X 10', STRAIGHT: Brand: MEDLINE

## (undated) DEVICE — SUTURE ETHLN SZ 3-0 L18IN NONABSORBABLE BLK PS-2 L19MM 3/8 1669H

## (undated) DEVICE — CONNECTOR TBNG OD5-7MM O2 END DISP

## (undated) DEVICE — HAND PACK: Brand: MEDLINE INDUSTRIES, INC.

## (undated) DEVICE — JACKSON TABLE POSITIONER KIT: Brand: MEDLINE INDUSTRIES, INC.

## (undated) DEVICE — GLOVE ORANGE PI 8   MSG9080

## (undated) DEVICE — CANNULA NSL ORAL AD FOR CAPNOFLEX CO2 O2 AIRLFE

## (undated) DEVICE — 5.0MM PRECISION ROUND

## (undated) DEVICE — GOWN,AURORA,FABRIC-REINFORCED,2XL: Brand: MEDLINE

## (undated) DEVICE — ZIMMER® STERILE DISPOSABLE TOURNIQUET CUFF WITH PLC, DUAL PORT, SINGLE BLADDER, 18 IN. (46 CM)

## (undated) DEVICE — SUTURE STRATAFIX SPRL SZ 1 L14IN ABSRB VLT L48CM CTX 1/2 SXPD2B405

## (undated) DEVICE — ELECTROSURGICAL SUCTION COAGULATOR 10FR

## (undated) DEVICE — YANKAUER,BULB TIP,W/O VENT,RIGID,STERILE: Brand: MEDLINE

## (undated) DEVICE — SPONGE LAPAROTOMY W18XL18IN WHITE STRUNG RADIOPAQUE STERILE

## (undated) DEVICE — DRAPE,U/SHT,SPLIT,FILM,60X84,STERILE: Brand: MEDLINE

## (undated) DEVICE — INTENDED FOR TISSUE SEPARATION, AND OTHER PROCEDURES THAT REQUIRE A SHARP SURGICAL BLADE TO PUNCTURE OR CUT.: Brand: BARD-PARKER ®  SAFETY SCALPED

## (undated) DEVICE — 1010 S-DRAPE TOWEL DRAPE 10/BX: Brand: STERI-DRAPE™

## (undated) DEVICE — STRIP SKIN CLSR W1XL5IN NYL REINF CURAD

## (undated) DEVICE — POSTERIOR LAMI VANPLT-LUCAS: Brand: MEDLINE INDUSTRIES, INC.

## (undated) DEVICE — 3M™ IOBAN™ 2 ANTIMICROBIAL INCISE DRAPE 6651EZ: Brand: IOBAN™ 2

## (undated) DEVICE — GEL US 20GM NONIRRITATING OVERWRAPPED FILE PCH TRNSMIT

## (undated) DEVICE — AMD ANTIMICROBIAL GAUZE SPONGES,12 PLY USP TYPE VII, 0.2% POLYHEXAMETHYLENE BIGUANIDE HCI (PHMB): Brand: CURITY

## (undated) DEVICE — DRAPE SHT 3 QTR PROXIMA 53X77 --

## (undated) DEVICE — HERCULES 3 STAGE BALLOON ESOPHAGEAL: Brand: HERCULES

## (undated) DEVICE — GLOVE ORANGE PI 7 1/2   MSG9075

## (undated) DEVICE — NEEDLE SYR 18GA L1.5IN RED PLAS HUB S STL BLNT FILL W/O

## (undated) DEVICE — BLOCK BITE AD 60FR W/ VELC STRP ADDRESSES MOST PT AND